# Patient Record
Sex: FEMALE | Race: WHITE | NOT HISPANIC OR LATINO | Employment: UNEMPLOYED | URBAN - METROPOLITAN AREA
[De-identification: names, ages, dates, MRNs, and addresses within clinical notes are randomized per-mention and may not be internally consistent; named-entity substitution may affect disease eponyms.]

---

## 2017-09-05 ENCOUNTER — HOSPITAL ENCOUNTER (OUTPATIENT)
Facility: MEDICAL CENTER | Age: 27
End: 2017-09-05
Admitting: OBSTETRICS & GYNECOLOGY
Payer: MEDICAID

## 2017-10-28 ENCOUNTER — HOSPITAL ENCOUNTER (INPATIENT)
Facility: MEDICAL CENTER | Age: 27
LOS: 2 days | End: 2017-10-30
Admitting: FAMILY MEDICINE
Payer: MEDICAID

## 2017-10-28 LAB
BASOPHILS # BLD AUTO: 0.4 % (ref 0–1.8)
BASOPHILS # BLD: 0.05 K/UL (ref 0–0.12)
CRYSTALS AMN MICRO: NORMAL
EOSINOPHIL # BLD AUTO: 0.07 K/UL (ref 0–0.51)
EOSINOPHIL NFR BLD: 0.5 % (ref 0–6.9)
ERYTHROCYTE [DISTWIDTH] IN BLOOD BY AUTOMATED COUNT: 42.9 FL (ref 35.9–50)
HCT VFR BLD AUTO: 37.3 % (ref 37–47)
HGB BLD-MCNC: 12.9 G/DL (ref 12–16)
HOLDING TUBE BB 8507: NORMAL
IMM GRANULOCYTES # BLD AUTO: 0.08 K/UL (ref 0–0.11)
IMM GRANULOCYTES NFR BLD AUTO: 0.6 % (ref 0–0.9)
LYMPHOCYTES # BLD AUTO: 2.68 K/UL (ref 1–4.8)
LYMPHOCYTES NFR BLD: 19.6 % (ref 22–41)
MCH RBC QN AUTO: 30.9 PG (ref 27–33)
MCHC RBC AUTO-ENTMCNC: 34.6 G/DL (ref 33.6–35)
MCV RBC AUTO: 89.4 FL (ref 81.4–97.8)
MONOCYTES # BLD AUTO: 1.05 K/UL (ref 0–0.85)
MONOCYTES NFR BLD AUTO: 7.7 % (ref 0–13.4)
NEUTROPHILS # BLD AUTO: 9.77 K/UL (ref 2–7.15)
NEUTROPHILS NFR BLD: 71.2 % (ref 44–72)
NRBC # BLD AUTO: 0 K/UL
NRBC BLD AUTO-RTO: 0 /100 WBC
PLATELET # BLD AUTO: 233 K/UL (ref 164–446)
PMV BLD AUTO: 10.8 FL (ref 9–12.9)
RBC # BLD AUTO: 4.17 M/UL (ref 4.2–5.4)
WBC # BLD AUTO: 13.7 K/UL (ref 4.8–10.8)

## 2017-10-28 PROCEDURE — A9270 NON-COVERED ITEM OR SERVICE: HCPCS | Performed by: FAMILY MEDICINE

## 2017-10-28 PROCEDURE — 700105 HCHG RX REV CODE 258: Performed by: FAMILY MEDICINE

## 2017-10-28 PROCEDURE — 89060 EXAM SYNOVIAL FLUID CRYSTALS: CPT

## 2017-10-28 PROCEDURE — 700102 HCHG RX REV CODE 250 W/ 637 OVERRIDE(OP): Performed by: FAMILY MEDICINE

## 2017-10-28 PROCEDURE — 700111 HCHG RX REV CODE 636 W/ 250 OVERRIDE (IP): Performed by: FAMILY MEDICINE

## 2017-10-28 PROCEDURE — 4A1HX4Z MONITORING OF PRODUCTS OF CONCEPTION, CARDIAC ELECTRICAL ACTIVITY, EXTERNAL APPROACH: ICD-10-PCS | Performed by: FAMILY MEDICINE

## 2017-10-28 PROCEDURE — 770002 HCHG ROOM/CARE - OB PRIVATE (112)

## 2017-10-28 PROCEDURE — 85025 COMPLETE CBC W/AUTO DIFF WBC: CPT

## 2017-10-28 PROCEDURE — 700111 HCHG RX REV CODE 636 W/ 250 OVERRIDE (IP)

## 2017-10-28 RX ORDER — ROPIVACAINE HYDROCHLORIDE 2 MG/ML
INJECTION, SOLUTION EPIDURAL; INFILTRATION; PERINEURAL
Status: COMPLETED
Start: 2017-10-28 | End: 2017-10-28

## 2017-10-28 RX ORDER — SODIUM CHLORIDE, SODIUM LACTATE, POTASSIUM CHLORIDE, CALCIUM CHLORIDE 600; 310; 30; 20 MG/100ML; MG/100ML; MG/100ML; MG/100ML
INJECTION, SOLUTION INTRAVENOUS CONTINUOUS
Status: DISPENSED | OUTPATIENT
Start: 2017-10-28 | End: 2017-10-29

## 2017-10-28 RX ORDER — ONDANSETRON 4 MG/1
4 TABLET, ORALLY DISINTEGRATING ORAL EVERY 6 HOURS PRN
Status: DISCONTINUED | OUTPATIENT
Start: 2017-10-28 | End: 2017-10-30 | Stop reason: HOSPADM

## 2017-10-28 RX ORDER — MISOPROSTOL 200 UG/1
800 TABLET ORAL
Status: DISCONTINUED | OUTPATIENT
Start: 2017-10-28 | End: 2017-10-29 | Stop reason: HOSPADM

## 2017-10-28 RX ORDER — CALCIUM CARBONATE 500 MG/1
500 TABLET, CHEWABLE ORAL 3 TIMES DAILY
Status: DISCONTINUED | OUTPATIENT
Start: 2017-10-28 | End: 2017-10-30 | Stop reason: HOSPADM

## 2017-10-28 RX ORDER — CARBOPROST TROMETHAMINE 250 UG/ML
250 INJECTION, SOLUTION INTRAMUSCULAR
Status: DISCONTINUED | OUTPATIENT
Start: 2017-10-28 | End: 2017-10-29 | Stop reason: HOSPADM

## 2017-10-28 RX ORDER — IBUPROFEN 600 MG/1
600 TABLET ORAL EVERY 6 HOURS PRN
Status: DISCONTINUED | OUTPATIENT
Start: 2017-10-28 | End: 2017-10-30 | Stop reason: HOSPADM

## 2017-10-28 RX ORDER — METHYLERGONOVINE MALEATE 0.2 MG/ML
0.2 INJECTION INTRAVENOUS
Status: DISCONTINUED | OUTPATIENT
Start: 2017-10-28 | End: 2017-10-29 | Stop reason: HOSPADM

## 2017-10-28 RX ORDER — ACETAMINOPHEN 325 MG/1
325 TABLET ORAL EVERY 4 HOURS PRN
Status: DISCONTINUED | OUTPATIENT
Start: 2017-10-28 | End: 2017-10-30 | Stop reason: HOSPADM

## 2017-10-28 RX ORDER — ONDANSETRON 2 MG/ML
4 INJECTION INTRAMUSCULAR; INTRAVENOUS EVERY 6 HOURS PRN
Status: DISCONTINUED | OUTPATIENT
Start: 2017-10-28 | End: 2017-10-30 | Stop reason: HOSPADM

## 2017-10-28 RX ORDER — HYDROCODONE BITARTRATE AND ACETAMINOPHEN 5; 325 MG/1; MG/1
1 TABLET ORAL EVERY 4 HOURS PRN
Status: DISCONTINUED | OUTPATIENT
Start: 2017-10-28 | End: 2017-10-30 | Stop reason: HOSPADM

## 2017-10-28 RX ORDER — HYDROCODONE BITARTRATE AND ACETAMINOPHEN 5; 325 MG/1; MG/1
2 TABLET ORAL EVERY 4 HOURS PRN
Status: DISCONTINUED | OUTPATIENT
Start: 2017-10-28 | End: 2017-10-30 | Stop reason: HOSPADM

## 2017-10-28 RX ADMIN — ANTACID TABLETS 500 MG: 500 TABLET, CHEWABLE ORAL at 19:24

## 2017-10-28 RX ADMIN — FENTANYL CITRATE 100 MCG: 50 INJECTION, SOLUTION INTRAMUSCULAR; INTRAVENOUS at 22:31

## 2017-10-28 RX ADMIN — SODIUM CHLORIDE, POTASSIUM CHLORIDE, SODIUM LACTATE AND CALCIUM CHLORIDE: 600; 310; 30; 20 INJECTION, SOLUTION INTRAVENOUS at 19:24

## 2017-10-28 RX ADMIN — ROPIVACAINE HYDROCHLORIDE 100 ML: 2 INJECTION, SOLUTION EPIDURAL; INFILTRATION at 23:33

## 2017-10-28 RX ADMIN — ONDANSETRON HYDROCHLORIDE 4 MG: 2 INJECTION, SOLUTION INTRAMUSCULAR; INTRAVENOUS at 22:26

## 2017-10-28 RX ADMIN — OXYTOCIN 1 MILLI-UNITS/MIN: 10 INJECTION, SOLUTION INTRAMUSCULAR; INTRAVENOUS at 19:23

## 2017-10-28 RX ADMIN — SODIUM CHLORIDE, POTASSIUM CHLORIDE, SODIUM LACTATE AND CALCIUM CHLORIDE: 600; 310; 30; 20 INJECTION, SOLUTION INTRAVENOUS at 22:26

## 2017-10-28 ASSESSMENT — PATIENT HEALTH QUESTIONNAIRE - PHQ9
2. FEELING DOWN, DEPRESSED, IRRITABLE, OR HOPELESS: NOT AT ALL
1. LITTLE INTEREST OR PLEASURE IN DOING THINGS: NOT AT ALL
SUM OF ALL RESPONSES TO PHQ QUESTIONS 1-9: 0
SUM OF ALL RESPONSES TO PHQ9 QUESTIONS 1 AND 2: 0

## 2017-10-28 ASSESSMENT — LIFESTYLE VARIABLES
EVER_SMOKED: YES
DO YOU DRINK ALCOHOL: NO
ALCOHOL_USE: NO

## 2017-10-29 LAB
ERYTHROCYTE [DISTWIDTH] IN BLOOD BY AUTOMATED COUNT: 44.1 FL (ref 35.9–50)
HCT VFR BLD AUTO: 37.3 % (ref 37–47)
HGB BLD-MCNC: 12.3 G/DL (ref 12–16)
MCH RBC QN AUTO: 30.1 PG (ref 27–33)
MCHC RBC AUTO-ENTMCNC: 33 G/DL (ref 33.6–35)
MCV RBC AUTO: 91.4 FL (ref 81.4–97.8)
PLATELET # BLD AUTO: 182 K/UL (ref 164–446)
PMV BLD AUTO: 10.5 FL (ref 9–12.9)
RBC # BLD AUTO: 4.08 M/UL (ref 4.2–5.4)
WBC # BLD AUTO: 20.3 K/UL (ref 4.8–10.8)

## 2017-10-29 PROCEDURE — 303615 HCHG EPIDURAL/SPINAL ANESTHESIA FOR LABOR

## 2017-10-29 PROCEDURE — 770002 HCHG ROOM/CARE - OB PRIVATE (112)

## 2017-10-29 PROCEDURE — 36415 COLL VENOUS BLD VENIPUNCTURE: CPT

## 2017-10-29 PROCEDURE — 700112 HCHG RX REV CODE 229: Performed by: FAMILY MEDICINE

## 2017-10-29 PROCEDURE — 700111 HCHG RX REV CODE 636 W/ 250 OVERRIDE (IP): Performed by: FAMILY MEDICINE

## 2017-10-29 PROCEDURE — 700102 HCHG RX REV CODE 250 W/ 637 OVERRIDE(OP): Performed by: FAMILY MEDICINE

## 2017-10-29 PROCEDURE — 59409 OBSTETRICAL CARE: CPT

## 2017-10-29 PROCEDURE — 85027 COMPLETE CBC AUTOMATED: CPT

## 2017-10-29 PROCEDURE — 700105 HCHG RX REV CODE 258: Performed by: FAMILY MEDICINE

## 2017-10-29 PROCEDURE — 304965 HCHG RECOVERY SERVICES

## 2017-10-29 PROCEDURE — A9270 NON-COVERED ITEM OR SERVICE: HCPCS | Performed by: FAMILY MEDICINE

## 2017-10-29 RX ORDER — SODIUM CHLORIDE, SODIUM LACTATE, POTASSIUM CHLORIDE, CALCIUM CHLORIDE 600; 310; 30; 20 MG/100ML; MG/100ML; MG/100ML; MG/100ML
INJECTION, SOLUTION INTRAVENOUS PRN
Status: DISCONTINUED | OUTPATIENT
Start: 2017-10-29 | End: 2017-10-30 | Stop reason: HOSPADM

## 2017-10-29 RX ORDER — DOCUSATE SODIUM 100 MG/1
100 CAPSULE, LIQUID FILLED ORAL 2 TIMES DAILY PRN
Status: DISCONTINUED | OUTPATIENT
Start: 2017-10-29 | End: 2017-10-30 | Stop reason: HOSPADM

## 2017-10-29 RX ORDER — BISACODYL 10 MG
10 SUPPOSITORY, RECTAL RECTAL PRN
Status: DISCONTINUED | OUTPATIENT
Start: 2017-10-29 | End: 2017-10-30 | Stop reason: HOSPADM

## 2017-10-29 RX ORDER — CARBOPROST TROMETHAMINE 250 UG/ML
250 INJECTION, SOLUTION INTRAMUSCULAR
Status: DISCONTINUED | OUTPATIENT
Start: 2017-10-29 | End: 2017-10-30 | Stop reason: HOSPADM

## 2017-10-29 RX ORDER — METHYLERGONOVINE MALEATE 0.2 MG/ML
0.2 INJECTION INTRAVENOUS
Status: DISCONTINUED | OUTPATIENT
Start: 2017-10-29 | End: 2017-10-30 | Stop reason: HOSPADM

## 2017-10-29 RX ORDER — MISOPROSTOL 200 UG/1
600 TABLET ORAL
Status: DISCONTINUED | OUTPATIENT
Start: 2017-10-29 | End: 2017-10-30 | Stop reason: HOSPADM

## 2017-10-29 RX ORDER — VITAMIN A ACETATE, BETA CAROTENE, ASCORBIC ACID, CHOLECALCIFEROL, .ALPHA.-TOCOPHEROL ACETATE, DL-, THIAMINE MONONITRATE, RIBOFLAVIN, NIACINAMIDE, PYRIDOXINE HYDROCHLORIDE, FOLIC ACID, CYANOCOBALAMIN, CALCIUM CARBONATE, FERROUS FUMARATE, ZINC OXIDE, CUPRIC OXIDE 3080; 12; 120; 400; 1; 1.84; 3; 20; 22; 920; 25; 200; 27; 10; 2 [IU]/1; UG/1; MG/1; [IU]/1; MG/1; MG/1; MG/1; MG/1; MG/1; [IU]/1; MG/1; MG/1; MG/1; MG/1; MG/1
1 TABLET, FILM COATED ORAL EVERY MORNING
Status: DISCONTINUED | OUTPATIENT
Start: 2017-10-29 | End: 2017-10-30 | Stop reason: HOSPADM

## 2017-10-29 RX ADMIN — IBUPROFEN 600 MG: 600 TABLET, FILM COATED ORAL at 03:09

## 2017-10-29 RX ADMIN — HYDROCODONE BITARTRATE AND ACETAMINOPHEN 2 TABLET: 5; 325 TABLET ORAL at 07:43

## 2017-10-29 RX ADMIN — HYDROCODONE BITARTRATE AND ACETAMINOPHEN 2 TABLET: 5; 325 TABLET ORAL at 03:37

## 2017-10-29 RX ADMIN — IBUPROFEN 600 MG: 600 TABLET, FILM COATED ORAL at 09:07

## 2017-10-29 RX ADMIN — OXYTOCIN 125 ML/HR: 10 INJECTION, SOLUTION INTRAMUSCULAR; INTRAVENOUS at 03:29

## 2017-10-29 RX ADMIN — IBUPROFEN 600 MG: 600 TABLET, FILM COATED ORAL at 21:26

## 2017-10-29 RX ADMIN — DOCUSATE SODIUM 100 MG: 100 CAPSULE ORAL at 21:28

## 2017-10-29 RX ADMIN — IBUPROFEN 600 MG: 600 TABLET, FILM COATED ORAL at 15:25

## 2017-10-29 RX ADMIN — OXYTOCIN 2000 ML/HR: 10 INJECTION, SOLUTION INTRAMUSCULAR; INTRAVENOUS at 02:27

## 2017-10-29 RX ADMIN — SODIUM CHLORIDE, POTASSIUM CHLORIDE, SODIUM LACTATE AND CALCIUM CHLORIDE: 600; 310; 30; 20 INJECTION, SOLUTION INTRAVENOUS at 00:17

## 2017-10-29 RX ADMIN — ANTACID TABLETS 500 MG: 500 TABLET, CHEWABLE ORAL at 15:25

## 2017-10-29 RX ADMIN — DOCUSATE SODIUM 100 MG: 100 CAPSULE ORAL at 09:14

## 2017-10-29 RX ADMIN — HYDROCODONE BITARTRATE AND ACETAMINOPHEN 2 TABLET: 5; 325 TABLET ORAL at 12:07

## 2017-10-29 RX ADMIN — HYDROCODONE BITARTRATE AND ACETAMINOPHEN 1 TABLET: 5; 325 TABLET ORAL at 19:21

## 2017-10-29 RX ADMIN — ANTACID TABLETS 500 MG: 500 TABLET, CHEWABLE ORAL at 21:26

## 2017-10-29 RX ADMIN — ANTACID TABLETS 500 MG: 500 TABLET, CHEWABLE ORAL at 07:43

## 2017-10-29 RX ADMIN — Medication 1 TABLET: at 07:43

## 2017-10-29 ASSESSMENT — PAIN SCALES - GENERAL
PAINLEVEL_OUTOF10: 6
PAINLEVEL_OUTOF10: 6
PAINLEVEL_OUTOF10: 5
PAINLEVEL_OUTOF10: 5
PAINLEVEL_OUTOF10: 7
PAINLEVEL_OUTOF10: 3
PAINLEVEL_OUTOF10: 2
PAINLEVEL_OUTOF10: 9
PAINLEVEL_OUTOF10: 3
PAINLEVEL_OUTOF10: 7
PAINLEVEL_OUTOF10: 5
PAINLEVEL_OUTOF10: 4

## 2017-10-29 ASSESSMENT — PATIENT HEALTH QUESTIONNAIRE - PHQ9
SUM OF ALL RESPONSES TO PHQ9 QUESTIONS 1 AND 2: 0
SUM OF ALL RESPONSES TO PHQ QUESTIONS 1-9: 0
1. LITTLE INTEREST OR PLEASURE IN DOING THINGS: NOT AT ALL
SUM OF ALL RESPONSES TO PHQ9 QUESTIONS 1 AND 2: 0
SUM OF ALL RESPONSES TO PHQ QUESTIONS 1-9: 0
2. FEELING DOWN, DEPRESSED, IRRITABLE, OR HOPELESS: NOT AT ALL
2. FEELING DOWN, DEPRESSED, IRRITABLE, OR HOPELESS: NOT AT ALL
1. LITTLE INTEREST OR PLEASURE IN DOING THINGS: NOT AT ALL

## 2017-10-29 ASSESSMENT — LIFESTYLE VARIABLES: DO YOU DRINK ALCOHOL: NO

## 2017-10-29 NOTE — PROGRESS NOTES
"Received bedside report  from \"LAUREN Quezada\"  on patient and assumed care.  Pt denies any questions at this time.  Would like her pain meds around the clock every 4 hours.  "

## 2017-10-29 NOTE — PROGRESS NOTES
185 Report received from LISA Lamar RN and assumed care. Escorted pt to labor room, POC discussed with pt and family, encouraged pt and family to state needs and questions at any time     Dr. Crews at bedside. US performed, vertex position confirmed by provider. Order for pitocin clarified by provider. Pitocin stopped at this time.     Dr. Crews at bedside. SVE by provider /-2. Orders received to restart pitocin     SVE by ANGEL Ta RN 3/100/-1    2322 Dr. Cooley at bedside to place epidural. Pt tolerated well.    2359 SVE by ANGEL Ta RN 4-100/0    0000 Dr. Crews at bedside, update given    0015 Dr. Cooley called, given update on pt's BP. Pt denies feelings of dizziness, light-headedness, or nausea.    0018 Dr. Cooley at bedside, ephedrine administered by provider    0105 SVE by ANGEL Ta RN 5100/0    0159 SVE by ANGEL Ta /0    0209 Dr. Crews at bedside, given update. SVE by provider C/+1    0226  of viable male infant apgars 8/9    0230 delivery of placenta S/I    0410 Pt up to bathroom and voided    0422 Pt transferred to  via wheelchair, infant in arms. Pt and infant in stable condition.    0430 Report given to Damien AGUILAR. Bands verified and cuddles active

## 2017-10-29 NOTE — CONSULTS
Baby placed skin to skin with mother and blankets over both. Discussed watching for hunger cues and helping baby to breast. Discussed normal feeding frequency of newborns. Enc to place baby in bassinette when she feels sleepy and needs to rest. Pt states that baby latched, without any nipple discomfort for her, twice since delivery.

## 2017-10-29 NOTE — PROGRESS NOTES
"[facility] LABOR AND DELIVERY PROGRESS NOTE    PATIENT ID:  NAME:  Lali Funes  MRN:               [id number]  YOB: 1990     27 y.o. female  at 38w0d.    Subjective: No acute events. Mildly uncomfortable but tolerating well.    Objective:    Vitals:    10/28/17 1719 10/28/17 1900   BP: 115/69 116/86   Pulse: (!) 118 (!) 108   Temp: 36.7 °C (98 °F) 36.7 °C (98 °F)   Weight: 87.1 kg (192 lb)    Height: 1.651 m (5' 5\")        Cervix:  2cm/80%/-2  [place]: Uterine Contractions Q2-3 minutes.   FHRM: Baseline 120, mod variability, Accels present, no decels  Pitocin: 1  Pain control: none    Assessment: 27 y.o. female  at 38w0d.    Plan: Intrauterine pregancy at 38w0d weeks in latent labor here for delivery for ROM at 1600 10/28/17. EDC 17.    - IUP at term, EFW - 3500g, Patient is GBS Neg  - Anticipating   - EFM Category 1  - Pitocin augmentation currently, consider IUPC if not improving at next check  "

## 2017-10-29 NOTE — PROGRESS NOTES
Patient presents to unit from L&D in wheelchair with infant in arms.  Infant to bassinet and IV fluids to pump per orders. Pt transfers self without difficulty.  Bedside report received from off going RN. Plan of care discussed, questions answered and understanding verbalized. Oriented to room, call light, care board, emergency light and toy light.  Encouraged to call for assistance or with questions, comments or concerns.

## 2017-10-29 NOTE — PROGRESS NOTES
[facility] LABOR AND DELIVERY PROGRESS NOTE    PATIENT ID:  NAME:  Lali Funes  MRN:               [id number]  YOB: 1990      27 y.o. female  at 38w0d.     Subjective: No acute events. Pain well controlled with epidural.     Objective:    Vitals:    10/29/17 0050 10/29/17 0055 10/29/17 0105 10/29/17 0120   BP: 104/59 (!) 97/55 103/57 (!) 94/52   Pulse: 86 86 85 81   Temp:       Weight:       Height:           Cervix:  5cm/100%/0  [place]: Uterine Contractions Q2-3 minutes.   FHRM: Baseline 125, moderate variability, Accels present, no decels  Pitocin: 1  Pain control: epidural      Plan: Intrauterine pregancy at 38w0d weeks in latent labor here for delivery for ROM at 1600 10/28/17. EDC 17.     - IUP at term, EFW - 3500g, Patient is GBS Neg  - Anticipating   - EFM Category 1  - Pitocin augmentation currently, dilated to 5 since 10pm check at 2cm  - Continue current management, will consider internal monitors as needed.

## 2017-10-29 NOTE — CARE PLAN
Problem: Potential for postpartum infection related to presence of episiotomy/vaginal tear and/or uterine contamination  Goal: Patient will be absent from signs and symptoms of infection  Outcome: PROGRESSING AS EXPECTED  Patient has no signs/symptoms of infection.  Vital signs stable.  Ambulating without difficulty.      Problem: Alteration in comfort related to episiotomy, vaginal repair and/or after birth pains  Goal: Patient verbalizes acceptable pain level  Outcome: PROGRESSING AS EXPECTED  Patient states pain control is adequate.

## 2017-10-29 NOTE — PROCEDURES
[facility] SPONTANEOUS VAGINAL DELIVERY PRODEDURE NOTE    PATIENT ID:  NAME:  Lali Funes  MRN:               [id number]  YOB: 1990    On 10/29/2017, this 27 y.o., now  38w1d , GBS Neg female delivered via  under epidural anesthesia a viable male infant weight pending with APGAR scores of 8 and 9 at one and five minutes. There was a single loose nuchal cord which was reduced and infant was bulb suctioned at delivery. Cord was doubly clamped, cut and infant handed to RN in attendance. An intact placenta was delivered spontaneously with 3 vessel cord. Upon vaginal exam, there was no lacerations. Estimated blood loss was 300cc. Patient will be transferred to postpartum in stable condition and infant to  nursery.    Delivery attended by Dr. Contreras who was present for the entire delivery.

## 2017-10-29 NOTE — CARE PLAN
Problem: Altered physiologic condition related to immediate post-delivery state and potential for bleeding/hemorrhage  Goal: Patient physiologically stable as evidenced by normal lochia, palpable uterine involution and vital signs within normal limits  Outcome: PROGRESSING AS EXPECTED  Assess fundus and lochia every 4 hours x12 followed by every shift and as indicated. Educate on when to contact physician

## 2017-10-29 NOTE — H&P
[facility] LABOR AND DELIVERY HISTORY AND PHYSICAL    PATIENT ID:  NAME:  Lali Funes  MRN:               [id number]  YOB: 1990    CC:  [facility]    HPI:  Lali Funes is a 27 y.o. female  at 38w0d by a 6 week ultrasound c/w LMP. Estimated Date of Delivery: 17.  Patient presents complaining of + uterine contractions, with + loss of fluid.  Fluid was clear.  normal fetal movement.  no vaginal bleeding.  Pregnancy was uncomplicated to date    ROS: Patient denies any fever chills, nausea, vomiting, headache, chest pain, shortness of breath, or dysuria or unusual swelling of hands or feet.     Prenatal Care: Obtained at [facility].  Third trimester BPs were approximately 110/80s.    Prenatal Labs:   HepBsAg: Neg HIV: NR Rubella: Imm   RPR: Neg PAP: NL GBS: Neg   GC/CT: NEG B+ / Ab Neg Quad Screen: WNL   No results for input(s): WBC, RBC, HEMOGLOBIN, HEMATOCRIT, MCV, MCH, RDW, PLATELETCT, MPV, NEUTSPOLYS, LYMPHOCYTES, MONOCYTES, EOSINOPHILS, BASOPHILS, RBCMORPHOLO in the last 72 hours.  No results for input(s): SODIUM, POTASSIUM, CHLORIDE, CO2, GLUCOSE, BUN, CPKTOTAL in the last 72 hours.       IMAGING:  3/21/17 OB ultrasound:   Single live IUP at 6w3d    17 US - Anatomy:  Anatomy scan WNL; Male  Single Live IUP dates consistent    POB Hx:  OB History    Para Term  AB Living   2 1 1   1 1   SAB TAB Ectopic Molar Multiple Live Births   1         1      # Outcome Date GA Lbr Koko/2nd Weight Sex Delivery Anes PTL Lv   2 Term 13 39w2d  2.821 kg (6 lb 3.5 oz) F Vag-Spont EPI  ISHMAEL   1 SAB  5w0d             Birth Comments: Pt. states no D/C needed.          PMH/Problem List:    Past Medical History:   Diagnosis Date   • Nausea/vomiting in pregnancy 10/5/2012     Patient Active Problem List    Diagnosis Date Noted   • Labor and delivery indication for care or intervention 2013   • Fetal cardiac echogenic focus-2; s/p PANN; declined amnio 01/10/2013   • Encounter for  "supervision of other normal pregnancy 10/05/2012       Current Outpatient Medications:  No current facility-administered medications on file prior to encounter.      Current Outpatient Prescriptions on File Prior to Encounter   Medication Sig Dispense Refill   • hydrOXYzine (ATARAX) 25 MG TABS Take 1 Tab by mouth 3 times a day as needed for Itching. 20 Tab 0   • hydrOXYzine (ATARAX) 25 MG TABS Take 1 Tab by mouth 3 times a day as needed for Itching. 15 Tab 0   • metoclopramide (REGLAN) 10 MG TABS Take 1 Tab by mouth 3 times a day before meals. 30 Tab 0   • ondansetron (ZOFRAN ODT) 4 MG TBDP Take 1 Tab by mouth every 6 hours as needed for Nausea/Vomiting. 10 Tab 1       PSH:    No past surgical history on file.    Allergies:   Allergies   Allergen Reactions   • Nkda [No Known Drug Allergy]        SH:  Social History     Social History   • Marital status: Single     Spouse name: N/A   • Number of children: N/A   • Years of education: N/A     Occupational History   • Not on file.     Social History Main Topics   • Smoking status: Current Every Day Smoker     Years: 8.00     Types: Cigarettes     Last attempt to quit: 2012   • Smokeless tobacco: Never Used      Comment: Pt. states smoking 2-3 a day.    • Alcohol use No   • Drug use: No   • Sexual activity: Yes     Partners: Male      Comment: none     Other Topics Concern   • Not on file     Social History Narrative   • No narrative on file         PHYSICAL EXAM:  Vitals:    10/28/17 1719   BP: 115/69   Pulse: (!) 118   Temp: 36.7 °C (98 °F)   Weight: 87.1 kg (192 lb)   Height: 1.651 m (5' 5\")     Temp (24hrs), Av.7 °C (98 °F), Min:36.7 °C (98 °F), Max:36.7 °C (98 °F)    General: No acute distress, resting comfortably in bed.  HEENT: normocephalic, nontraumatic, PERRLA, EOMI  Cardiovascular: Heart RRR with no murmurs, rubs or gallops. Distal Pulses 2+  Respiratory: symmetric chest expansion, lungs CTA bilaterally with no wheezes rales or  rhonci  Abdomen: " gravid, nontender  Musculoskeletal: strength 5/5 in four extremities  Neuro: non focal with no numbness, tingling or changes in sensation    SVE: 2/80%/-2  [place]: Q 2-3 minutes; EFM: 145 with accels to 165  EFW: 3500g    A/P: Intrauterine pregancy at 38w0d weeks in latent labor here for delivery and possible IOL for ROM at 1600 10/28/17. EDC 17   1. IUP at term  2. Patient is GBS Neg  3. Anticipating   4. EFM Category 1  5. Re-evaluate Dilation in several hours and consider augmentation if not progressing appropriately.

## 2017-10-29 NOTE — CARE PLAN
Problem: Alteration in comfort related to episiotomy, vaginal repair and/or after birth pains  Goal: Patient is able to ambulate, care for self and infant  Outcome: PROGRESSING AS EXPECTED  Assess pain every 2-4 hours and patient wants meds offered every 4 hours

## 2017-10-29 NOTE — PROGRESS NOTES
Came in for ob check.EFM applied and POC discussed.she has been leaking fluid wince 1600-Spec exam and julio césar slide sent.1805 Dr walker called inquiring.he is on his way in.SVE 2/80/-2 vertex.fern results positive.

## 2017-10-29 NOTE — CARE PLAN
Problem: Pain  Goal: Alleviation of Pain or a reduction in pain to the patient's comfort goal  Outcome: PROGRESSING AS EXPECTED  Pt states feeling comfortable at this time. Pain management options for labor discussed, pt opting for epidural, will notify RN when starting to feel uncomfortable.    Problem: Risk for Infection, Impaired Wound Healing  Goal: Remain free from signs and symptoms of infection  Outcome: PROGRESSING AS EXPECTED  Pt is afebrile at this time and shows no s/s of infection

## 2017-10-29 NOTE — PROGRESS NOTES
Nursing assessment done.  Plan of care discussed and pt progressing according to caremap.  FOB at bedside.   Reviewed infant security measures with pt per hospital protocol.  Advised of emergency cords in pt's room and skylight.  Pt wearing supportive bra and encouraged to continue.    Pt ambulating well.  See MAR for pain med administration. Pt encouraged to call for any needs.

## 2017-10-30 VITALS
HEIGHT: 65 IN | OXYGEN SATURATION: 96 % | SYSTOLIC BLOOD PRESSURE: 108 MMHG | DIASTOLIC BLOOD PRESSURE: 70 MMHG | HEART RATE: 56 BPM | BODY MASS INDEX: 31.99 KG/M2 | RESPIRATION RATE: 16 BRPM | TEMPERATURE: 97.5 F | WEIGHT: 192 LBS

## 2017-10-30 PROCEDURE — A9270 NON-COVERED ITEM OR SERVICE: HCPCS | Performed by: FAMILY MEDICINE

## 2017-10-30 PROCEDURE — 700102 HCHG RX REV CODE 250 W/ 637 OVERRIDE(OP): Performed by: FAMILY MEDICINE

## 2017-10-30 PROCEDURE — 700112 HCHG RX REV CODE 229: Performed by: FAMILY MEDICINE

## 2017-10-30 RX ORDER — BISACODYL 10 MG
10 SUPPOSITORY, RECTAL RECTAL PRN
Qty: 30 SUPPOSITORY | Refills: 1 | Status: SHIPPED | OUTPATIENT
Start: 2017-10-30 | End: 2019-05-16

## 2017-10-30 RX ORDER — IBUPROFEN 600 MG/1
600 TABLET ORAL EVERY 6 HOURS PRN
Qty: 30 TAB | Refills: 1 | Status: SHIPPED | OUTPATIENT
Start: 2017-10-30 | End: 2018-10-16 | Stop reason: SINTOL

## 2017-10-30 RX ADMIN — HYDROCODONE BITARTRATE AND ACETAMINOPHEN 1 TABLET: 5; 325 TABLET ORAL at 08:29

## 2017-10-30 RX ADMIN — HYDROCODONE BITARTRATE AND ACETAMINOPHEN 1 TABLET: 5; 325 TABLET ORAL at 03:29

## 2017-10-30 RX ADMIN — IBUPROFEN 600 MG: 600 TABLET, FILM COATED ORAL at 12:27

## 2017-10-30 RX ADMIN — Medication 1 TABLET: at 08:29

## 2017-10-30 RX ADMIN — ANTACID TABLETS 500 MG: 500 TABLET, CHEWABLE ORAL at 08:29

## 2017-10-30 RX ADMIN — IBUPROFEN 600 MG: 600 TABLET, FILM COATED ORAL at 03:29

## 2017-10-30 RX ADMIN — DOCUSATE SODIUM 100 MG: 100 CAPSULE ORAL at 08:29

## 2017-10-30 ASSESSMENT — PAIN SCALES - GENERAL
PAINLEVEL_OUTOF10: 6
PAINLEVEL_OUTOF10: 0
PAINLEVEL_OUTOF10: 5
PAINLEVEL_OUTOF10: 0
PAINLEVEL_OUTOF10: 0
PAINLEVEL_OUTOF10: 6

## 2017-10-30 NOTE — CARE PLAN
Problem: Altered physiologic condition related to immediate post-delivery state and potential for bleeding/hemorrhage  Goal: Patient physiologically stable as evidenced by normal lochia, palpable uterine involution and vital signs within normal limits  Outcome: PROGRESSING AS EXPECTED  Fundus firm,lochia light.    Problem: Potential for postpartum infection related to presence of episiotomy/vaginal tear and/or uterine contamination  Goal: Patient will be absent from signs and symptoms of infection  Outcome: PROGRESSING AS EXPECTED  Afebrile,no s/s of infection noted.    Problem: Alteration in comfort related to episiotomy, vaginal repair and/or after birth pains  Goal: Patient verbalizes acceptable pain level  Outcome: PROGRESSING AS EXPECTED  Medicated with norco 1 tab.amd motrin 600 mg.po for uterine cramps with good relief as verbalized by patient.States that she will call if she wants pain medication.Will medicate as needed.

## 2017-10-30 NOTE — PROGRESS NOTES
Report received from Carmela at the change of shift. Assessment done. Medicated for pain. Discussed plan of care. Encouraged  to call if with needs.

## 2017-10-30 NOTE — PROGRESS NOTES
Discussed normal course of breastfeeding and what to expect at 12-48 hours. Encouraged frequent skin to skin, and to continue offering breast every 2-3 hours. Breastfeeding essentials pamphlet given, and reviewed.      KAMLA states she has concerns regarding latching. Infant is NBN getting circumcision.       Discussed discharge feeding plan-   1- To breastfeed first.  2- if latch or breastfeeding is suboptimal, can supplement according to guidelines. (Volumes reviewed per infant birthweight)  3. Use breastpump to protect supply.      KAMLA has medicaid, states she will see about getting WIC.  If is able to get WIC, then can get outpatient lactation support at WIC office, or from [facility], lactation counselors.     KAMLA has no other questions or concerns regarding breastfeeding. Encouraged to call for assistance if needed with latch.

## 2017-10-30 NOTE — DISCHARGE INSTRUCTIONS
POSTPARTUM DISCHARGE INSTRUCTIONS FOR MOM    YOB: 1990   Age: 27 y.o.               Admit Date: 10/28/2017     Discharge Date: 10/30/2017  Attending Doctor:  Rebecca Hui                  Allergies:  Nkda [no known drug allergy]    Discharged to home by car. Discharged via wheelchair, hospital escort: Yes.  Special equipment needed: Not Applicable  Belongings with: Personal  Be sure to schedule a follow-up appointment with your primary care doctor or any specialists as instructed.     Discharge Plan:   Diet Plan: Discussed  Activity Level: Discussed  Smoking Cessation Offered: Patient Counseled  Confirmed Follow up Appointment: Patient to Call and Schedule Appointment  Confirmed Symptoms Management: Discussed  Medication Reconciliation Updated: Yes  Influenza Vaccine Indication: Not indicated: Previously immunized this influenza season and > 8 years of age    REASONS TO CALL YOUR OBSTETRICIAN:  1.   Persistent fever or shaking chills (Temperature higher than 100.4)  2.   Heavy bleeding (soaking more than 1 pad per hour); Passing clots  3.   Foul odor from vagina  4.   Mastitis (Breast infection; breast pain, chills, fever, redness)  5.   Urinary pain, burning or frequency  6.   Episiotomy infection  7.   Severe depression longer than 24 hours    HAND WASHING  · Prior to handling the baby.  · Before breastfeeding or bottle feeding baby.  · After using the bathroom or changing the baby's diaper.    VAGINAL CARE  · Nothing inside vagina for 6 weeks: no sexual intercourse, tampons or douching.  · Bleeding may continue for 2-4 weeks.  Amount may vary.    · Call your physician for heavy bleeding which means soaking more than 1 pad per hour    BIRTH CONTROL  · It is possible to become pregnant at any time after delivery and while breastfeeding.  · Plan to discuss a method of birth control with your physician at your follow up visit. visit.    DIET AND ELIMINATION  · Eating more fiber (bran cereal, fruits, and  "vegetables) and drinking plenty of fluids will help to avoid constipation.  · Urinary frequency after childbirth is normal.    POSTPARTUM BLUES  During the first few days after birth, you may experience a sense of the \"blues\" which may include impatience, irritability or even crying.  These feeling come and go quickly.  However, as many as 1 in 10 women experience emotional symptoms known as postpartum depression.    Postpartum depression:  May start as early as the second or third day after delivery or take several weeks or months to develop.  Symptoms of \"blues\" are present, but are more intense:  Crying spells; loss of appetite; feelings of hopelessness or loss of control; fear of touching the baby; over concern or no concern at all about the baby; little or no concern about your own appearance/caring for yourself; and/or inability to sleep or excessive sleeping.  Contact your physician if you are experiencing any of these symptoms.    Crisis Hotline:  · [place] Crisis Hotline:  9-598-UOZHCTY  Or 1-219.301.8147  · [place] Crisis Hotline:  1-731.435.3971  Or 809-609-0778    PREVENTING SHAKEN BABY:  If you are angry or stressed, PUT THE BABY IN THE CRIB, step away, take some deep breaths, and wait until you are calm to care for the baby.  DO NOT SHAKE THE BABY.  You are not alone, call a supporter for help.    · Crisis Call Center 24/7 crisis line 298-945-5461 or 1-535.191.4670  · You can also text them, text \"ANSWER\" to 829885    QUIT SMOKING/TOBACCO USE:  I understand the use of any tobacco products increases my chance of suffering from future heart disease and could cause other illnesses which may shorten my life. Quitting the use of tobacco products is the single most important thing I can do to improve my health. For further information on smoking / tobacco cessation call a Toll Free Quit Line at 1-438.566.1701 (*[employer] [place]) or 1-262.249.3195 (American Lung Association) or you can access the web " based program at www.lungusa.org.    · [place] Tobacco Users Help Line:  (633) 838-9758       Toll Free: 1-777.346.6214  · Quit Tobacco Program [facility] (163)726-4931    DEPRESSION / SUICIDE RISK:  As you are discharged from this [facility], it is important to learn how to keep safe from harming yourself.    Recognize the warning signs:  · Abrupt changes in personality, positive or negative- including increase in energy   · Giving away possessions  · Change in eating patterns- significant weight changes-  positive or negative  · Change in sleeping patterns- unable to sleep or sleeping all the time   · Unwillingness or inability to communicate  · Depression  · Unusual sadness, discouragement and loneliness  · Talk of wanting to die  · Neglect of personal appearance   · Rebelliousness- reckless behavior  · Withdrawal from people/activities they love  · Confusion- inability to concentrate     If you or a loved one observes any of these behaviors or has concerns about self-harm, here's what you can do:  · Talk about it- your feelings and reasons for harming yourself  · Remove any means that you might use to hurt yourself (examples: pills, rope, extension cords, firearm)  · Get professional help from the community (Mental Health, Substance Abuse, psychological counseling)  · Do not be alone:Call your Safe Contact- someone whom you trust who will be there for you.  · Call your local CRISIS HOTLINE 393-6553 or 183-715-3839  · Call your local Children's Mobile Crisis Response Team [place] (744) 969-4910 or www.Tr[employer]a  · Call the toll free National Suicide Prevention Hotlines   · National Suicide Prevention Lifeline 980-047-RCEN (0738)  · National Hope Line Network 800-SUICIDE (192-0808)    DISCHARGE SURVEY:  Thank you for choosing [facility].  We hope we provided you with very good care.  You may be receiving a survey in the mail.  Please fill it out.  Your opinion is  valuable to us.    ADDITIONAL EDUCATIONAL MATERIALS GIVEN TO PATIENT:        My signature on this form indicates that:  1.  I have reviewed and understand the above information  2.  My questions regarding this information have been answered to my satisfaction.  3.  I have formulated a plan with my discharge nurse to obtain my prescribed medication for home.

## 2017-10-30 NOTE — DISCHARGE SUMMARY
[facility]  NORMAL SPONTANEOUS VAGINAL DISCHARGE SUMMARY    PATIENT ID:  NAME:  Lali Funes  MRN:               [id number]  YOB: 1990    DATE OF ADMISSION: 10/28/2017    DATE OF DISCHARGE: 10/30/2017     ADMITTING DIAGNOSIS:  1. Intrauterine pregnancy at 38w0d.    DISCHARGE DIAGNOSIS:  1. s/p  at 38w1d        HOSPITAL COURSE: This is a 27 y.o. year old female admitted at 38w0d who presented with + contractions, + LOF, no vaginal bleeding, normal FM and in latent labor with SROM at 1600 on 10/28/17. Pt was 2 cm dilated, 80% effaced and at  -2 station on sterile vaginal exam. Pregnancy was uncomplicated. The patient had a good labor pattern after admission and proceeded to deliver a viable male infant weighing  3120g. Infants Apgars scores were 8 and 9 at one and five minutes. The patients postpartum course was uncomplicated and she was discharged home in stable condition on postpartum day #1.    PROCEDURES PERFORMED: Normal spontaneous vaginal delivery over intact perineum.    COMPLICATIONS: None    DIET: Regular    ACTIVITY: Nothing in the vagina (ie Tampons, douching, intercourse,...) for until follow-up in the office in five weeks.  No soaking in bathtubs or hot tubs until follow-up appointment in 5 weeks. Continue to take your prenatal vitamins while breastfeeding. Return to ER or see your primary care physician if your symptoms worsen or for any new problems, questions or concerns.      MEDICATIONS:  Current Outpatient Prescriptions   Medication Sig Dispense Refill   • ibuprofen (MOTRIN) 600 MG Tab Take 1 Tab by mouth every 6 hours as needed for Mild Pain or Moderate Pain (For cramping after delivery; do not give if patient is receiving ketorolac (Toradol)). 30 Tab 1   • bisacodyl (DULCOLAX) 10 MG Suppos Insert 1 Suppository in rectum as needed (Constipation, if docusate sodium ineffective or not ordered, but not if 4th degree laceration). 30 Suppository 1       FOLLOWUP:  1) Dr. Crews at [facility]  in 5-6 weeks  2) Return to the hospital if copious vaginal bleeding or foul smelling discharge is noted

## 2018-05-29 ENCOUNTER — EH NON-PROVIDER (OUTPATIENT)
Dept: OCCUPATIONAL MEDICINE | Facility: CLINIC | Age: 28
End: 2018-05-29

## 2018-05-29 ENCOUNTER — HOSPITAL ENCOUNTER (OUTPATIENT)
Facility: MEDICAL CENTER | Age: 28
End: 2018-05-29
Attending: PREVENTIVE MEDICINE
Payer: COMMERCIAL

## 2018-05-29 ENCOUNTER — EMPLOYEE HEALTH (OUTPATIENT)
Dept: OCCUPATIONAL MEDICINE | Facility: CLINIC | Age: 28
End: 2018-05-29

## 2018-05-29 VITALS
SYSTOLIC BLOOD PRESSURE: 102 MMHG | HEART RATE: 78 BPM | TEMPERATURE: 97 F | DIASTOLIC BLOOD PRESSURE: 62 MMHG | HEIGHT: 65 IN | OXYGEN SATURATION: 95 % | RESPIRATION RATE: 16 BRPM

## 2018-05-29 DIAGNOSIS — Z02.89 VISIT FOR OCCUPATIONAL HEALTH EXAMINATION: ICD-10-CM

## 2018-05-29 DIAGNOSIS — Z02.1 PRE-EMPLOYMENT HEALTH SCREENING EXAMINATION: ICD-10-CM

## 2018-05-29 DIAGNOSIS — Z02.1 PRE-EMPLOYMENT DRUG SCREENING: ICD-10-CM

## 2018-05-29 LAB
AMP AMPHETAMINE: NORMAL
BAR BARBITURATES: NORMAL
BZO BENZODIAZEPINES: NORMAL
COC COCAINE: NORMAL
INT CON NEG: NORMAL
INT CON POS: NORMAL
MDMA ECSTASY: NORMAL
MET METHAMPHETAMINES: NORMAL
MTD METHADONE: NORMAL
OPI OPIATES: NORMAL
OXY OXYCODONE: NORMAL
PCP PHENCYCLIDINE: NORMAL
POC URINE DRUG SCREEN OCDRS: NEGATIVE
THC: NORMAL

## 2018-05-29 PROCEDURE — 90632 HEPA VACCINE ADULT IM: CPT | Performed by: PREVENTIVE MEDICINE

## 2018-05-29 PROCEDURE — 86480 TB TEST CELL IMMUN MEASURE: CPT | Performed by: PREVENTIVE MEDICINE

## 2018-05-29 PROCEDURE — 86787 VARICELLA-ZOSTER ANTIBODY: CPT | Performed by: PREVENTIVE MEDICINE

## 2018-05-29 PROCEDURE — 8915 PR COMPREHENSIVE PHYSICAL: Performed by: PREVENTIVE MEDICINE

## 2018-05-29 PROCEDURE — 80305 DRUG TEST PRSMV DIR OPT OBS: CPT | Performed by: PREVENTIVE MEDICINE

## 2018-05-29 ASSESSMENT — VISUAL ACUITY
OD_CC: 20/15
OS_CC: 20/15

## 2018-05-30 LAB
M TB TUBERC IFN-G BLD QL: NEGATIVE
M TB TUBERC IFN-G/MITOGEN IGNF BLD: -0
M TB TUBERC IGNF/MITOGEN IGNF CONTROL: 50.7 [IU]/ML
MITOGEN IGNF BCKGRD COR BLD-ACNC: 0.02 [IU]/ML
VZV IGG SER IA-ACNC: 2.22

## 2018-06-04 ENCOUNTER — DOCUMENTATION (OUTPATIENT)
Dept: OCCUPATIONAL MEDICINE | Facility: CLINIC | Age: 28
End: 2018-06-04

## 2018-09-27 ENCOUNTER — IMMUNIZATION (OUTPATIENT)
Dept: OCCUPATIONAL MEDICINE | Facility: CLINIC | Age: 28
End: 2018-09-27

## 2018-09-27 DIAGNOSIS — Z23 NEED FOR VACCINATION: ICD-10-CM

## 2018-09-27 PROCEDURE — 90686 IIV4 VACC NO PRSV 0.5 ML IM: CPT | Performed by: PREVENTIVE MEDICINE

## 2018-10-02 ENCOUNTER — HOSPITAL ENCOUNTER (OUTPATIENT)
Dept: RADIOLOGY | Facility: MEDICAL CENTER | Age: 28
End: 2018-10-02
Attending: STUDENT IN AN ORGANIZED HEALTH CARE EDUCATION/TRAINING PROGRAM
Payer: COMMERCIAL

## 2018-10-02 DIAGNOSIS — Z33.1 PREGNANT STATE, INCIDENTAL: ICD-10-CM

## 2018-10-02 PROCEDURE — 76801 OB US < 14 WKS SINGLE FETUS: CPT

## 2018-10-16 ENCOUNTER — OFFICE VISIT (OUTPATIENT)
Dept: MEDICAL GROUP | Facility: PHYSICIAN GROUP | Age: 28
End: 2018-10-16
Payer: COMMERCIAL

## 2018-10-16 ENCOUNTER — HOSPITAL ENCOUNTER (OUTPATIENT)
Dept: LAB | Facility: MEDICAL CENTER | Age: 28
End: 2018-10-16
Attending: FAMILY MEDICINE
Payer: COMMERCIAL

## 2018-10-16 VITALS
WEIGHT: 163 LBS | DIASTOLIC BLOOD PRESSURE: 68 MMHG | HEART RATE: 87 BPM | BODY MASS INDEX: 27.16 KG/M2 | TEMPERATURE: 97.7 F | HEIGHT: 65 IN | OXYGEN SATURATION: 96 % | SYSTOLIC BLOOD PRESSURE: 120 MMHG

## 2018-10-16 DIAGNOSIS — N91.2 AMENORRHEA: ICD-10-CM

## 2018-10-16 DIAGNOSIS — Z3A.11 11 WEEKS GESTATION OF PREGNANCY: ICD-10-CM

## 2018-10-16 LAB
ABO GROUP BLD: NORMAL
APPEARANCE UR: CLEAR
BASOPHILS # BLD AUTO: 0.3 % (ref 0–1.8)
BASOPHILS # BLD: 0.03 K/UL (ref 0–0.12)
BILIRUB UR QL STRIP.AUTO: ABNORMAL
BLD GP AB SCN SERPL QL: NORMAL
COLOR UR: ABNORMAL
EOSINOPHIL # BLD AUTO: 0.06 K/UL (ref 0–0.51)
EOSINOPHIL NFR BLD: 0.7 % (ref 0–6.9)
ERYTHROCYTE [DISTWIDTH] IN BLOOD BY AUTOMATED COUNT: 42.3 FL (ref 35.9–50)
GLUCOSE UR STRIP.AUTO-MCNC: NEGATIVE MG/DL
HBV SURFACE AG SER QL: NEGATIVE
HCT VFR BLD AUTO: 44.3 % (ref 37–47)
HGB BLD-MCNC: 15.4 G/DL (ref 12–16)
HIV 1+2 AB+HIV1 P24 AG SERPL QL IA: NON REACTIVE
IMM GRANULOCYTES # BLD AUTO: 0.02 K/UL (ref 0–0.11)
IMM GRANULOCYTES NFR BLD AUTO: 0.2 % (ref 0–0.9)
KETONES UR STRIP.AUTO-MCNC: ABNORMAL MG/DL
LEUKOCYTE ESTERASE UR QL STRIP.AUTO: NEGATIVE
LYMPHOCYTES # BLD AUTO: 2.21 K/UL (ref 1–4.8)
LYMPHOCYTES NFR BLD: 24.3 % (ref 22–41)
MCH RBC QN AUTO: 30.6 PG (ref 27–33)
MCHC RBC AUTO-ENTMCNC: 34.8 G/DL (ref 33.6–35)
MCV RBC AUTO: 88.1 FL (ref 81.4–97.8)
MICRO URNS: ABNORMAL
MONOCYTES # BLD AUTO: 0.57 K/UL (ref 0–0.85)
MONOCYTES NFR BLD AUTO: 6.3 % (ref 0–13.4)
NEUTROPHILS # BLD AUTO: 6.19 K/UL (ref 2–7.15)
NEUTROPHILS NFR BLD: 68.2 % (ref 44–72)
NITRITE UR QL STRIP.AUTO: NEGATIVE
NRBC # BLD AUTO: 0 K/UL
NRBC BLD-RTO: 0 /100 WBC
PH UR STRIP.AUTO: 5.5 [PH]
PLATELET # BLD AUTO: 243 K/UL (ref 164–446)
PMV BLD AUTO: 11.1 FL (ref 9–12.9)
PROT UR QL STRIP: NEGATIVE MG/DL
RBC # BLD AUTO: 5.03 M/UL (ref 4.2–5.4)
RBC UR QL AUTO: NEGATIVE
RH BLD: NORMAL
RUBV AB SER QL: >500 IU/ML
SP GR UR STRIP.AUTO: 1.04
TREPONEMA PALLIDUM IGG+IGM AB [PRESENCE] IN SERUM OR PLASMA BY IMMUNOASSAY: NON REACTIVE
UROBILINOGEN UR STRIP.AUTO-MCNC: 0.2 MG/DL
WBC # BLD AUTO: 9.1 K/UL (ref 4.8–10.8)

## 2018-10-16 PROCEDURE — 86901 BLOOD TYPING SEROLOGIC RH(D): CPT

## 2018-10-16 PROCEDURE — 86850 RBC ANTIBODY SCREEN: CPT

## 2018-10-16 PROCEDURE — 81003 URINALYSIS AUTO W/O SCOPE: CPT

## 2018-10-16 PROCEDURE — 87340 HEPATITIS B SURFACE AG IA: CPT

## 2018-10-16 PROCEDURE — 87389 HIV-1 AG W/HIV-1&-2 AB AG IA: CPT

## 2018-10-16 PROCEDURE — 86780 TREPONEMA PALLIDUM: CPT

## 2018-10-16 PROCEDURE — 86762 RUBELLA ANTIBODY: CPT

## 2018-10-16 PROCEDURE — 85025 COMPLETE CBC W/AUTO DIFF WBC: CPT

## 2018-10-16 PROCEDURE — 36415 COLL VENOUS BLD VENIPUNCTURE: CPT

## 2018-10-16 PROCEDURE — 99213 OFFICE O/P EST LOW 20 MIN: CPT | Performed by: FAMILY MEDICINE

## 2018-10-16 PROCEDURE — 86900 BLOOD TYPING SEROLOGIC ABO: CPT

## 2018-10-16 RX ORDER — ACETAMINOPHEN 500 MG
500-1000 TABLET ORAL EVERY 6 HOURS PRN
COMMUNITY
End: 2019-05-16

## 2018-10-16 ASSESSMENT — PATIENT HEALTH QUESTIONNAIRE - PHQ9: CLINICAL INTERPRETATION OF PHQ2 SCORE: 0

## 2018-10-16 NOTE — ASSESSMENT & PLAN NOTE
Is a 28-year-old  female presents at 10weeks 2days gestation by 9-week ultrasound performed on 10/12/18 that is consistent with her LMP.  Here to establish primary care provider.  Patient has not yet had her labs done for this pregnancy but has had a dating ultrasound.  Did have some vaginal bleeding and presented to [facility] for threatened , bleeding has since resolved and she has no cramping, further bleeding, or concerns.

## 2018-10-16 NOTE — PROGRESS NOTES
CC:  Diagnoses of Amenorrhea and 11 weeks gestation of pregnancy were pertinent to this visit.    HISTORY OF THE PRESENT ILLNESS: Patient is a 28 y.o. female. This pleasant patient is here today to establish PCP.    Health Maintenance: Completed      11 weeks gestation of pregnancy  Is a 28-year-old  female presents at 10weeks 2days gestation by 9-week ultrasound performed on 10/12/18 that is consistent with her LMP.  Here to establish primary care provider.  Patient has not yet had her labs done for this pregnancy but has had a dating ultrasound.  Did have some vaginal bleeding and presented to [facility] for threatened , bleeding has since resolved and she has no cramping, further bleeding, or concerns.    Allergies: Nkda [no known drug allergy]    Current Outpatient Prescriptions Ordered in [facility]   Medication Sig Dispense Refill   • acetaminophen (TYLENOL) 500 MG Tab Take 500-1,000 mg by mouth every 6 hours as needed.     • Prenatal MV-Min-Fe Fum-FA-DHA (PRENATAL 1 PO) Take  by mouth.     • bisacodyl (DULCOLAX) 10 MG Suppos Insert 1 Suppository in rectum as needed (Constipation, if docusate sodium ineffective or not ordered, but not if 4th degree laceration). 30 Suppository 1   • ondansetron (ZOFRAN ODT) 4 MG TBDP Take 1 Tab by mouth every 6 hours as needed for Nausea/Vomiting. 10 Tab 1     No current Epic-ordered facility-administered medications on file.        Past Medical History:   Diagnosis Date   • Nausea/vomiting in pregnancy 10/5/2012       No past surgical history on file.    Social History   Substance Use Topics   • Smoking status: Current Every Day Smoker     Years: 8.00     Types: Cigarettes     Last attempt to quit: 2012   • Smokeless tobacco: Never Used      Comment: Pt. states smoking 2-3 a day.    • Alcohol use No       Social History     Social History Narrative   • No narrative on file       Family History   Problem Relation Age of Onset   • Cancer Mother 45        breast  "cancer, lymph node,   • Heart Attack Father    • Cancer Maternal Grandmother         breast cancer       ROS:   Denies chest pain and shortness of breath        - NOTE: All other systems reviewed and are negative, except as in HPI.        Exam: Blood pressure 120/68, pulse 87, temperature 36.5 °C (97.7 °F), temperature source Temporal, height 1.651 m (5' 5\"), weight 73.9 kg (163 lb), last menstrual period 2018, SpO2 96 %, not currently breastfeeding. Body mass index is 27.12 kg/m².    GENERAL: No acute distress  HENT: Atraumatic, normocephalic, external auditory canals clear bilaterally, TMs translucent and pearly gray, nares clear without discharge, posterior pharynx clear without erythema or exudates.  EYES: Extraocular movements intact, pupils equal and reactive to light.  NECK: Supple, FROM  CHEST: No deformities, Equal chest expansion, no murmurs, gallops, or rubs.  RESP: Unlabored, no stridor or audible wheeze.  No wheezes, crackles, nor rhonchi  ABD: Soft, Nontender, Non-Distended.  Normal bowel sounds.  No hepatosplenomegaly  Extremities: No Clubbing, Cyanosis, or Edema.  Skin: Warm/dry, without rases  Neuro: A/O x 4, CN 2-12 Grossly intact, Motor/sensory grosly intact  Psych: Normal behavior, normal affect    Lab review:  orders written for new lab studies as appropriate; see orders    Medical Records Reviewed:  Obtaining medical records from [facility].    Imaging Review:  Ultrasound of current pregnancy reviewed from 10/12/2018    Assessment/Plan:  28-year-old  at 10 weeks 2 days gestation by 9-week ultrasound performed on 10/12/2018.  Single live IUP.  1. Amenorrhea  Secondary to pregnancy.  Labs as below.  Referral to OB/GYN.  - PRENATAL PANEL 3; Future  - US-OB 2ND 3RD TRI COMPLETE; Future  - REFERRAL TO OB/GYN    2. 11 weeks gestation of pregnancy  Please see above.  - REFERRAL TO OB/GYN      Please note that this dictation was created using voice recognition software. I have made " every reasonable attempt to correct obvious errors, but I expect that there are errors of grammar and possibly content that I did not discover before finalizing the note.

## 2018-11-29 ENCOUNTER — HOSPITAL ENCOUNTER (OUTPATIENT)
Dept: LAB | Facility: MEDICAL CENTER | Age: 28
End: 2018-11-29
Attending: STUDENT IN AN ORGANIZED HEALTH CARE EDUCATION/TRAINING PROGRAM
Payer: COMMERCIAL

## 2018-11-29 PROCEDURE — 81511 FTL CGEN ABNOR FOUR ANAL: CPT

## 2018-11-29 PROCEDURE — 36415 COLL VENOUS BLD VENIPUNCTURE: CPT

## 2018-12-02 LAB
# FETUSES US: NORMAL
AFP MOM SERPL: 0.66
AFP SERPL-MCNC: 26 NG/ML
AGE - REPORTED: 29.6 YR
CURRENT SMOKER: NO
FAMILY MEMBER DISEASES HX: NO
GA METHOD: NORMAL
GA: NORMAL WK
HCG MOM SERPL: 0.16
HCG SERPL-ACNC: 3508 IU/L
HX OF HEREDITARY DISORDERS: NO
IDDM PATIENT QL: NO
INHIBIN A MOM SERPL: 1.05
INHIBIN A SERPL-MCNC: 159 PG/ML
INTEGRATED SCN PATIENT-IMP: NORMAL
PATHOLOGY STUDY: NORMAL
SPECIMEN DRAWN SERPL: NORMAL
U ESTRIOL MOM SERPL: 1.29
U ESTRIOL SERPL-MCNC: 1.78 NG/ML

## 2018-12-18 ENCOUNTER — HOSPITAL ENCOUNTER (OUTPATIENT)
Dept: RADIOLOGY | Facility: MEDICAL CENTER | Age: 28
End: 2018-12-18
Attending: FAMILY MEDICINE
Payer: COMMERCIAL

## 2018-12-18 DIAGNOSIS — N91.2 AMENORRHEA: ICD-10-CM

## 2018-12-18 PROCEDURE — 76805 OB US >/= 14 WKS SNGL FETUS: CPT

## 2019-02-12 ENCOUNTER — HOSPITAL ENCOUNTER (OUTPATIENT)
Facility: MEDICAL CENTER | Age: 29
End: 2019-02-12
Payer: COMMERCIAL

## 2019-02-12 VITALS
BODY MASS INDEX: 30.82 KG/M2 | DIASTOLIC BLOOD PRESSURE: 68 MMHG | SYSTOLIC BLOOD PRESSURE: 109 MMHG | RESPIRATION RATE: 17 BRPM | TEMPERATURE: 97.8 F | WEIGHT: 185 LBS | HEIGHT: 65 IN | HEART RATE: 94 BPM

## 2019-02-12 LAB
ALBUMIN SERPL BCP-MCNC: 3.4 G/DL (ref 3.2–4.9)
ALBUMIN/GLOB SERPL: 1.3 G/DL
ALP SERPL-CCNC: 88 U/L (ref 30–99)
ALT SERPL-CCNC: 16 U/L (ref 2–50)
ANION GAP SERPL CALC-SCNC: 10 MMOL/L (ref 0–11.9)
APPEARANCE UR: ABNORMAL
AST SERPL-CCNC: 18 U/L (ref 12–45)
BASOPHILS # BLD AUTO: 0.2 % (ref 0–1.8)
BASOPHILS # BLD: 0.03 K/UL (ref 0–0.12)
BILIRUB SERPL-MCNC: 0.6 MG/DL (ref 0.1–1.5)
BUN SERPL-MCNC: 12 MG/DL (ref 8–22)
CALCIUM SERPL-MCNC: 8.5 MG/DL (ref 8.5–10.5)
CHLORIDE SERPL-SCNC: 106 MMOL/L (ref 96–112)
CO2 SERPL-SCNC: 18 MMOL/L (ref 20–33)
COLOR UR AUTO: ABNORMAL
CREAT SERPL-MCNC: 0.62 MG/DL (ref 0.5–1.4)
EOSINOPHIL # BLD AUTO: 0.01 K/UL (ref 0–0.51)
EOSINOPHIL NFR BLD: 0.1 % (ref 0–6.9)
ERYTHROCYTE [DISTWIDTH] IN BLOOD BY AUTOMATED COUNT: 48.2 FL (ref 35.9–50)
FLUAV RNA SPEC QL NAA+PROBE: NEGATIVE
FLUBV RNA SPEC QL NAA+PROBE: NEGATIVE
GLOBULIN SER CALC-MCNC: 2.7 G/DL (ref 1.9–3.5)
GLUCOSE SERPL-MCNC: 210 MG/DL (ref 65–99)
GLUCOSE UR QL STRIP.AUTO: NEGATIVE MG/DL
HCT VFR BLD AUTO: 39.7 % (ref 37–47)
HGB BLD-MCNC: 13 G/DL (ref 12–16)
IMM GRANULOCYTES # BLD AUTO: 0.07 K/UL (ref 0–0.11)
IMM GRANULOCYTES NFR BLD AUTO: 0.5 % (ref 0–0.9)
KETONES UR QL STRIP.AUTO: 15 MG/DL
LEUKOCYTE ESTERASE UR QL STRIP.AUTO: NEGATIVE
LYMPHOCYTES # BLD AUTO: 0.68 K/UL (ref 1–4.8)
LYMPHOCYTES NFR BLD: 4.9 % (ref 22–41)
MCH RBC QN AUTO: 31.6 PG (ref 27–33)
MCHC RBC AUTO-ENTMCNC: 32.7 G/DL (ref 33.6–35)
MCV RBC AUTO: 96.4 FL (ref 81.4–97.8)
MONOCYTES # BLD AUTO: 0.28 K/UL (ref 0–0.85)
MONOCYTES NFR BLD AUTO: 2 % (ref 0–13.4)
NEUTROPHILS # BLD AUTO: 12.75 K/UL (ref 2–7.15)
NEUTROPHILS NFR BLD: 92.3 % (ref 44–72)
NITRITE UR QL STRIP.AUTO: NEGATIVE
NRBC # BLD AUTO: 0 K/UL
NRBC BLD-RTO: 0 /100 WBC
PH UR STRIP.AUTO: 5.5 [PH]
PLATELET # BLD AUTO: 180 K/UL (ref 164–446)
PMV BLD AUTO: 10.8 FL (ref 9–12.9)
POTASSIUM SERPL-SCNC: 3.5 MMOL/L (ref 3.6–5.5)
PROT SERPL-MCNC: 6.1 G/DL (ref 6–8.2)
PROT UR QL STRIP: 30 MG/DL
RBC # BLD AUTO: 4.12 M/UL (ref 4.2–5.4)
RBC UR QL AUTO: NEGATIVE
SODIUM SERPL-SCNC: 134 MMOL/L (ref 135–145)
SP GR UR: >=1.03
WBC # BLD AUTO: 13.8 K/UL (ref 4.8–10.8)

## 2019-02-12 PROCEDURE — 700102 HCHG RX REV CODE 250 W/ 637 OVERRIDE(OP): Performed by: STUDENT IN AN ORGANIZED HEALTH CARE EDUCATION/TRAINING PROGRAM

## 2019-02-12 PROCEDURE — 36415 COLL VENOUS BLD VENIPUNCTURE: CPT

## 2019-02-12 PROCEDURE — 80053 COMPREHEN METABOLIC PANEL: CPT

## 2019-02-12 PROCEDURE — 96374 THER/PROPH/DIAG INJ IV PUSH: CPT

## 2019-02-12 PROCEDURE — 87502 INFLUENZA DNA AMP PROBE: CPT

## 2019-02-12 PROCEDURE — A9270 NON-COVERED ITEM OR SERVICE: HCPCS | Performed by: STUDENT IN AN ORGANIZED HEALTH CARE EDUCATION/TRAINING PROGRAM

## 2019-02-12 PROCEDURE — 59025 FETAL NON-STRESS TEST: CPT

## 2019-02-12 PROCEDURE — 85025 COMPLETE CBC W/AUTO DIFF WBC: CPT

## 2019-02-12 PROCEDURE — 700111 HCHG RX REV CODE 636 W/ 250 OVERRIDE (IP): Performed by: STUDENT IN AN ORGANIZED HEALTH CARE EDUCATION/TRAINING PROGRAM

## 2019-02-12 PROCEDURE — 81002 URINALYSIS NONAUTO W/O SCOPE: CPT

## 2019-02-12 PROCEDURE — 700105 HCHG RX REV CODE 258: Performed by: STUDENT IN AN ORGANIZED HEALTH CARE EDUCATION/TRAINING PROGRAM

## 2019-02-12 RX ORDER — POTASSIUM CHLORIDE 7.45 MG/ML
10 INJECTION INTRAVENOUS ONCE
Status: DISCONTINUED | OUTPATIENT
Start: 2019-02-12 | End: 2019-02-12

## 2019-02-12 RX ORDER — DEXTROSE, SODIUM CHLORIDE, SODIUM LACTATE, POTASSIUM CHLORIDE, AND CALCIUM CHLORIDE 5; .6; .31; .03; .02 G/100ML; G/100ML; G/100ML; G/100ML; G/100ML
INJECTION, SOLUTION INTRAVENOUS CONTINUOUS
Status: DISCONTINUED | OUTPATIENT
Start: 2019-02-12 | End: 2019-02-12 | Stop reason: HOSPADM

## 2019-02-12 RX ORDER — ONDANSETRON 2 MG/ML
INJECTION INTRAMUSCULAR; INTRAVENOUS
Status: DISCONTINUED
Start: 2019-02-12 | End: 2019-02-12 | Stop reason: HOSPADM

## 2019-02-12 RX ORDER — POTASSIUM CHLORIDE 20 MEQ/1
40 TABLET, EXTENDED RELEASE ORAL ONCE
Status: COMPLETED | OUTPATIENT
Start: 2019-02-12 | End: 2019-02-12

## 2019-02-12 RX ORDER — ONDANSETRON 4 MG/1
4 TABLET, ORALLY DISINTEGRATING ORAL EVERY 6 HOURS PRN
Qty: 20 TAB | Refills: 0 | Status: ON HOLD | OUTPATIENT
Start: 2019-02-12 | End: 2019-04-20

## 2019-02-12 RX ORDER — ONDANSETRON 2 MG/ML
4 INJECTION INTRAMUSCULAR; INTRAVENOUS ONCE
Status: COMPLETED | OUTPATIENT
Start: 2019-02-12 | End: 2019-02-12

## 2019-02-12 RX ADMIN — ONDANSETRON 4 MG: 2 INJECTION INTRAMUSCULAR; INTRAVENOUS at 18:33

## 2019-02-12 RX ADMIN — POTASSIUM CHLORIDE 40 MEQ: 1500 TABLET, EXTENDED RELEASE ORAL at 21:04

## 2019-02-12 RX ADMIN — SODIUM CHLORIDE, SODIUM LACTATE, POTASSIUM CHLORIDE, CALCIUM CHLORIDE AND DEXTROSE MONOHYDRATE: 5; 600; 310; 30; 20 INJECTION, SOLUTION INTRAVENOUS at 18:37

## 2019-02-13 NOTE — PROGRESS NOTES
"[facility] LABOR AND DELIVERY TRIAGE PROGRESS NOTE    PATIENT ID:  NAME:  Lali Funes  MRN:               [id number]  YOB: 1990     28 y.o. female  at 28w0d.    Subjective: Pt presents with nausea and vomiting for the whole day today.  She was unable to keep anything down.  She reports multiple watery bowel movements this morning which has resolved since.  She denies any decreased fetal movement, vaginal bleeding, worsening abdominal pain.  She does have some pain in the epigastric area which was worse with vomiting.  Denies any fevers.  Her daughter has a strep throat and she has taken her to the urgent care yesterday.  Patient denies any sore throat she states she has some throat burning with vomiting.    Pregnancy noncomplicated    negative  For CTXS.   negative Feels pain   negative for LOF  negative for vaginal bleeding.   positive for fetal movement    ROS: Patient denies any fever chills, headache, chest pain, shortness of breath, or dysuria or unusual swelling of hands or feet.     Objective:    Vitals:    19 1700 19 1734 19 1902   BP:  112/64 109/68   Pulse:  (!) 109 94   Resp:   17   Temp:  36.6 °C (97.8 °F) 36.6 °C (97.8 °F)   TempSrc:  Temporal Temporal   Weight: 83.9 kg (185 lb)     Height: 1.651 m (5' 5\")       Temp (24hrs), Av.6 °C (97.8 °F), Min:36.6 °C (97.8 °F), Max:36.6 °C (97.8 °F)    General: No acute distress, resting comfortably in bed.  HEENT: normocephalic, nontraumatic, PERRLA, no lymphadenopathy  Cardiovascular: Heart RRR with no murmurs, rubs or gallops. Distal Pulses 2+  Respiratory: symmetric chest expansion, lungs CTAB, with no wheezes, rales, rhonci  Abdomen: gravid, nontender, no guarding or rebound, bowel sounds present  Musculoskeletal: strength 5/5 in four extremities  Neuro: non focal with no numbness, tingling or changes in sensation    Cervix: Cervical exam not performed   [place]: No uterine Contractions  FHRM: Baseline 130, Accels to " 150, no decels, moderate variability    Assessment: 28 y.o. female  at 28w0d presents today due to diarrhea and vomiting which started this morning.  UA was significant for specific gravity >1030 which would speak for dehydration most likely due to gastroenteritis.  Patient has received 1 L D5/LR and 4 mg of Zofran. Her symptoms improved significantly.    P.o. Challenge with water and crackers was successful, pt. was able to tolerate.     Plan:   1. Discharge home with return precautions and instructions to return to the hospital if her symptoms worsen, if she develops any fever, if the vomiting persists and she is unable to keep anything down, develops abdominal pain or any worrisome symptoms. Rx for Zofran given.   2. Patient to return to the L&D if she develops any labor signs or vaginal bleeding, decreased fetal movement, leakage of fluids.  3. Follow-up with primary care in the next couple of days.      Discussed case with Sheri Lam. Case was discussed and attending agreed with plan prior to discharge of patient.

## 2019-02-13 NOTE — PROGRESS NOTES
1730- 27 y/o, , EDC 19, EGA 28. Here to room 235 C/o nausea/vomiting all day today. Pt states she hasn't been able to keep anything down all day. EFM/toco applied, patient denies lof/bleeding, reports positive fm. VSS.     - urine dipped. Updates to Dr. Ramirez. Orders for zofran and IV hydration. See MAR. Pt states she had BV swab yesterday in office for symptoms but result hasn't come back yet. Dr. Ramirez advised and will follow up.     - Report to NEHA Ye RN. POC discsussed.

## 2019-04-04 ENCOUNTER — HOSPITAL ENCOUNTER (OUTPATIENT)
Facility: MEDICAL CENTER | Age: 29
End: 2019-04-05
Payer: COMMERCIAL

## 2019-04-04 VITALS
HEIGHT: 65 IN | HEART RATE: 89 BPM | BODY MASS INDEX: 30.16 KG/M2 | TEMPERATURE: 96.5 F | SYSTOLIC BLOOD PRESSURE: 106 MMHG | WEIGHT: 181 LBS | DIASTOLIC BLOOD PRESSURE: 68 MMHG | RESPIRATION RATE: 18 BRPM

## 2019-04-04 LAB
APPEARANCE UR: CLEAR
COLOR UR AUTO: YELLOW
GLUCOSE UR QL STRIP.AUTO: NEGATIVE MG/DL
KETONES UR QL STRIP.AUTO: NEGATIVE MG/DL
LEUKOCYTE ESTERASE UR QL STRIP.AUTO: NEGATIVE
NITRITE UR QL STRIP.AUTO: NEGATIVE
PH UR STRIP.AUTO: 7 [PH]
PROT UR QL STRIP: NEGATIVE MG/DL
RBC UR QL AUTO: NEGATIVE
SP GR UR: 1.01

## 2019-04-04 PROCEDURE — 81002 URINALYSIS NONAUTO W/O SCOPE: CPT

## 2019-04-04 RX ORDER — BETAMETHASONE SODIUM PHOSPHATE AND BETAMETHASONE ACETATE 3; 3 MG/ML; MG/ML
12 INJECTION, SUSPENSION INTRA-ARTICULAR; INTRALESIONAL; INTRAMUSCULAR; SOFT TISSUE ONCE
Status: COMPLETED | OUTPATIENT
Start: 2019-04-05 | End: 2019-04-05

## 2019-04-05 PROCEDURE — 700111 HCHG RX REV CODE 636 W/ 250 OVERRIDE (IP): Performed by: STUDENT IN AN ORGANIZED HEALTH CARE EDUCATION/TRAINING PROGRAM

## 2019-04-05 PROCEDURE — 96372 THER/PROPH/DIAG INJ SC/IM: CPT

## 2019-04-05 PROCEDURE — 59025 FETAL NON-STRESS TEST: CPT

## 2019-04-05 RX ADMIN — BETAMETHASONE SODIUM PHOSPHATE AND BETAMETHASONE ACETATE 12 MG: 3; 3 INJECTION, SUSPENSION INTRA-ARTICULAR; INTRALESIONAL; INTRAMUSCULAR at 00:33

## 2019-04-05 NOTE — PROGRESS NOTES
edc 5/3  35.6  Weeks gbs unknonw    Complaints: cramping    2255:  Pt to labor and delivery c/o cramping that comes and goes for the last week.  efm and toco applied. Pt denies vaginal bleeding, leaking, and reports fetal movement.  ua done, wnl.  sve 2/75/-2.  Pt states she was breech last week on us.  Pt believed to be vertex per exam.      2335: report to dr. Vazquez.  Orders received.  Dr. vazquez at [facility] currently but will be on his way to [facility] in 30 minutes.    0020: dr. vazquez at , ultrasound confirms vertex presentation.  Betamethasone given im.  Pt up to ambulate per dr. Vazquez.      0145: pt to lda 3, pt denies uc's.  sve 2-3/50/-2.  Pt states she doesn't think she is in labor and denies strong uc's.  Report to dr. Vazquez.  Orders to discharge home with labor precautions.  Pt to return to hospital on Saturday morning at 8 am for second betamethasone injection.      0215: pt verbalizes understanding of discharge instructions and is discharged instable condition.

## 2019-04-05 NOTE — PROGRESS NOTES
"[facility] LABOR AND DELIVERY TRIAGE PROGRESS NOTE    PATIENT ID:  NAME:  Lali Funes  MRN:               [id number]  YOB: 1990     28 y.o. female  at 36w0d by     Subjective: Pt here w/ 1 wk of uterine cxn's that were mild in strength and every 15 minutes - for the past day they have increased in strength, keeping her up at nght and now every 5-10 minutes. She denies any LOF, no vag bleeding, no decreased FM. She has had an otherwise healthy pregnancy and is has no concerns at this time    Has had good PNC w/ [facility] - Dr. Humza Carlson is her PCP.  No HTN, GDM, asthma  No abd surgeries  NKDA      ROS: Patient denies any fever chills, nausea, vomiting, headache, chest pain, shortness of breath, or dysuria or unusual swelling of hands or feet.     Objective:    Vitals:    19 2257 19 2300   BP:  106/68   Pulse:  89   Resp: 18    Temp: 35.8 °C (96.5 °F)    TempSrc: Temporal    Weight: 82.1 kg (181 lb)    Height: 1.651 m (5' 5\")      Temp (24hrs), Av.8 °C (96.5 °F), Min:35.8 °C (96.5 °F), Max:35.8 °C (96.5 °F)    General: No acute distress, resting comfortably in bed.  HEENT: normocephalic, nontraumatic, PERRLA, EOMI  Cardiovascular: Heart RRR with gentle systolic murmur. Distal Pulses 2+  Respiratory: symmetric chest expansion, lungs CTAB, with no wheezes, rales, rhonci  Abdomen: gravid, nontender, utine fundus firm - leopolds cephalic  Musculoskeletal: strength 5/5 in four extremities  Neuro: non focal with no numbness, tingling or changes in sensation      Cervix:  2cm/75%/-2 by RN in triage  [place]: Uterine Contractions Q3-5 minutes - moderate in intensity  FHRM: Baseline 130, Accels to 150, no decels, moderate variability    BEDSIDE US - cephalic presentation, LOT, KIM > 10, heart rate 120, grade 2 placenta w/ numerous small calcifications    Assessment: 28 y.o. female  at 36w0d - here for  labor check - at this time concern that she might be in early " labor    Plan:   1. Betamethasone x 1  2. Walk for 2 hours and then back on monitor for NST - if reactive and no cervical change, will d/c home and return in 24 hrs for repeat dose betamethasone  3. Has f/u appointment next week w/ Dr. Humza Carlson - [facility]  - on Thursday  4. Routine return precautions     Discussed case with Dr. Contreras, [facility] Attending. Case was discussed and attending agreed with plan prior to discharge of patient.

## 2019-04-05 NOTE — DISCHARGE INSTRUCTIONS
Labor Instructions (37 - 39 weeks):  Call your physician or return to hospital if:  · You have regular contractions that get progressively closer, longer and stronger.  · Your water breaks (remember time and color).  · You have bleeding like a period.  · Your baby does not move enough to complete the daily kick counts (10 movements in 2 hours)  · Your baby moves much less often than on the days before or you have not felt your baby move all day.      Return Saturday, April 6 th for second Betamethasone injection

## 2019-04-06 ENCOUNTER — HOSPITAL ENCOUNTER (OUTPATIENT)
Facility: MEDICAL CENTER | Age: 29
End: 2019-04-06
Admitting: STUDENT IN AN ORGANIZED HEALTH CARE EDUCATION/TRAINING PROGRAM
Payer: COMMERCIAL

## 2019-04-06 ENCOUNTER — APPOINTMENT (OUTPATIENT)
Dept: OBGYN | Facility: MEDICAL CENTER | Age: 29
End: 2019-04-06
Payer: COMMERCIAL

## 2019-04-06 VITALS
DIASTOLIC BLOOD PRESSURE: 63 MMHG | WEIGHT: 181 LBS | HEART RATE: 109 BPM | BODY MASS INDEX: 30.16 KG/M2 | SYSTOLIC BLOOD PRESSURE: 109 MMHG | HEIGHT: 65 IN | TEMPERATURE: 97.1 F

## 2019-04-06 PROCEDURE — 700111 HCHG RX REV CODE 636 W/ 250 OVERRIDE (IP): Performed by: STUDENT IN AN ORGANIZED HEALTH CARE EDUCATION/TRAINING PROGRAM

## 2019-04-06 PROCEDURE — 96372 THER/PROPH/DIAG INJ SC/IM: CPT

## 2019-04-06 PROCEDURE — 59025 FETAL NON-STRESS TEST: CPT

## 2019-04-06 RX ORDER — BETAMETHASONE SODIUM PHOSPHATE AND BETAMETHASONE ACETATE 3; 3 MG/ML; MG/ML
12 INJECTION, SUSPENSION INTRA-ARTICULAR; INTRALESIONAL; INTRAMUSCULAR; SOFT TISSUE ONCE
Status: COMPLETED | OUTPATIENT
Start: 2019-04-06 | End: 2019-04-06

## 2019-04-06 RX ADMIN — BETAMETHASONE SODIUM PHOSPHATE AND BETAMETHASONE ACETATE 12 MG: 3; 3 INJECTION, SUSPENSION INTRA-ARTICULAR; INTRALESIONAL; INTRAMUSCULAR at 08:25

## 2019-04-06 NOTE — PROGRESS NOTES
presents at 36.1 wk gestation at 36.1 wk gestation for scheduled 2nd betamethasone injection. Reports irregular UCs but denies VB or LOF; positive FM. Report given to Dr. Dockery; orders to d/c after injection. Labor precautions provided

## 2019-04-20 ENCOUNTER — HOSPITAL ENCOUNTER (OUTPATIENT)
Facility: MEDICAL CENTER | Age: 29
End: 2019-04-20
Admitting: STUDENT IN AN ORGANIZED HEALTH CARE EDUCATION/TRAINING PROGRAM
Payer: COMMERCIAL

## 2019-04-20 VITALS
WEIGHT: 191 LBS | HEIGHT: 65 IN | BODY MASS INDEX: 31.82 KG/M2 | SYSTOLIC BLOOD PRESSURE: 112 MMHG | HEART RATE: 111 BPM | DIASTOLIC BLOOD PRESSURE: 65 MMHG | TEMPERATURE: 97.2 F

## 2019-04-20 LAB
APPEARANCE UR: ABNORMAL
COLOR UR AUTO: YELLOW
GLUCOSE UR QL STRIP.AUTO: NEGATIVE MG/DL
KETONES UR QL STRIP.AUTO: NEGATIVE MG/DL
LEUKOCYTE ESTERASE UR QL STRIP.AUTO: NEGATIVE
NITRITE UR QL STRIP.AUTO: NEGATIVE
PH UR STRIP.AUTO: 7 [PH]
PROT UR QL STRIP: NEGATIVE MG/DL
RBC UR QL AUTO: NEGATIVE
SP GR UR: 1.02

## 2019-04-20 PROCEDURE — 700102 HCHG RX REV CODE 250 W/ 637 OVERRIDE(OP): Performed by: STUDENT IN AN ORGANIZED HEALTH CARE EDUCATION/TRAINING PROGRAM

## 2019-04-20 PROCEDURE — A9270 NON-COVERED ITEM OR SERVICE: HCPCS | Performed by: STUDENT IN AN ORGANIZED HEALTH CARE EDUCATION/TRAINING PROGRAM

## 2019-04-20 PROCEDURE — 59025 FETAL NON-STRESS TEST: CPT

## 2019-04-20 PROCEDURE — 81002 URINALYSIS NONAUTO W/O SCOPE: CPT

## 2019-04-20 RX ORDER — ACETAMINOPHEN 500 MG
1000 TABLET ORAL ONCE
Status: COMPLETED | OUTPATIENT
Start: 2019-04-20 | End: 2019-04-20

## 2019-04-20 RX ORDER — ONDANSETRON 4 MG/1
4 TABLET, ORALLY DISINTEGRATING ORAL EVERY 6 HOURS PRN
Qty: 10 TAB | Refills: 0 | Status: SHIPPED | OUTPATIENT
Start: 2019-04-20 | End: 2019-06-17

## 2019-04-20 RX ADMIN — ACETAMINOPHEN 1000 MG: 500 TABLET ORAL at 03:14

## 2019-04-20 NOTE — PROGRESS NOTES
27yo    EDC 5/3=38-1    0153-Pt presents today c/o headache since 0430 on . Pt states she took (4) 500 mg tylenol tablets since then and the headache was unrelieved. Pt denies LOF, VB or UC's and confirms +FM. DTRs 2+ and clonus not present upon assessment. TOCO and EFM in place. POC discussed.   210-SVE -3/50/212-Left message for on call resident  218-Call returned by Dr. Griffith, report given as stated above, MD to see patient  0250-Dr. Griffith at bedside  0305-Orders received in department to administer 1gm tylenol (see MAR)  032-Dr. Griffith in department, orders to discharge pt home  0327-Pt discharged home with relatives/friends via walking. Provided general discharge and labor precaution instructions, understanding verbalized.

## 2019-04-20 NOTE — PROGRESS NOTES
"[facility] LAB[facility] AND DELIVERY TRIAGE PROGRESS NOTE    PATIENT ID:  NAME:  Lali Funes  MRN:               [id number]  YOB: 1990     28 y.o. female  at 38w1d.    Subjective: Pt presented with headache since 0430 yesterday Left temporal region not associated with vision changes, dizziness, RUQ pain or edema. Patient has taken 500mg x 4 throughout the last 24hours without relief. She continues to have intermittent contractions for the last 1 month. She also had vomiting episode this morning that lasted a short period and has since stopped.   Pregnancy complicated by nothing.     positive  For CTXS.   positive Feels pain   negative for LOF  negative for vaginal bleeding.   positive for fetal movement    ROS: Patient denies any fever chills, diarrhea, chest pain, shortness of breath, or dysuria or unusual swelling of hands or feet.     Objective:    Vitals:    19 0156   BP: 112/65   Pulse: (!) 111   Temp: 36.2 °C (97.2 °F)   TempSrc: Temporal   Weight: 86.6 kg (191 lb)   Height: 1.651 m (5' 5\")     Temp (24hrs), Av.2 °C (97.2 °F), Min:36.2 °C (97.2 °F), Max:36.2 °C (97.2 °F)    General: No acute distress, resting comfortably in bed.  HEENT: normocephalic, nontraumatic, PERRLA, EOMI  Cardiovascular: Heart regular rhythm, tachycardic, with no murmurs, rubs or gallops. Distal Pulses 2+  Respiratory: symmetric chest expansion, lungs CTAB, with no wheezes, rales, rhonci  Abdomen: gravid, nontender  Musculoskeletal: strength 5/5 in four extremities, no edema  Neuro: non focal with no numbness, tingling or changes in sensation    Cervix:  2-3cm/50%/-2  [place]: Uterine Contractions irregularly   FHRM: Baseline 125, some accels, no decels, moderate variability    Assessment: 28 y.o. female  at 38w1d not in labor w/ headache without any signs/symptoms of preeclampsia. Limited vomiting episode and decreased PO hydration at home.   Blood pressures normal (109-120/63-65); no RUQ tenderness, no " edema  CAT 1 FHT    Plan:   1. Patient given 1g PO Tylenol and rx for Zofran should vomiting occur again  2. Patient is cleared to return home with family. Encouraged to see MD for increased painful uterine contractions @ 3-5, vaginal bleeding, loss of fluid, decreased fetal movement or other serious symptoms. Also discussed she return for intractable vomiting, headache, RUQ pain or vision changes.    Discussed case with Dr. Contreras, [facility] Attending. Case was discussed and attending agreed with plan prior to discharge of patient.    Denisse Griffith M.D.

## 2019-04-28 ENCOUNTER — ANESTHESIA EVENT (OUTPATIENT)
Dept: OBGYN | Facility: MEDICAL CENTER | Age: 29
End: 2019-04-28
Payer: COMMERCIAL

## 2019-04-28 ENCOUNTER — ANESTHESIA (OUTPATIENT)
Dept: OBGYN | Facility: MEDICAL CENTER | Age: 29
End: 2019-04-28
Payer: COMMERCIAL

## 2019-04-28 ENCOUNTER — HOSPITAL ENCOUNTER (INPATIENT)
Facility: MEDICAL CENTER | Age: 29
LOS: 2 days | End: 2019-04-30
Admitting: FAMILY MEDICINE
Payer: COMMERCIAL

## 2019-04-28 LAB
BASOPHILS # BLD AUTO: 0.3 % (ref 0–1.8)
BASOPHILS # BLD: 0.03 K/UL (ref 0–0.12)
EOSINOPHIL # BLD AUTO: 0.06 K/UL (ref 0–0.51)
EOSINOPHIL NFR BLD: 0.5 % (ref 0–6.9)
ERYTHROCYTE [DISTWIDTH] IN BLOOD BY AUTOMATED COUNT: 45.5 FL (ref 35.9–50)
HCT VFR BLD AUTO: 41 % (ref 37–47)
HGB BLD-MCNC: 14 G/DL (ref 12–16)
HOLDING TUBE BB 8507: NORMAL
IMM GRANULOCYTES # BLD AUTO: 0.16 K/UL (ref 0–0.11)
IMM GRANULOCYTES NFR BLD AUTO: 1.4 % (ref 0–0.9)
LYMPHOCYTES # BLD AUTO: 2.27 K/UL (ref 1–4.8)
LYMPHOCYTES NFR BLD: 19.5 % (ref 22–41)
MCH RBC QN AUTO: 31.5 PG (ref 27–33)
MCHC RBC AUTO-ENTMCNC: 34.1 G/DL (ref 33.6–35)
MCV RBC AUTO: 92.1 FL (ref 81.4–97.8)
MONOCYTES # BLD AUTO: 0.76 K/UL (ref 0–0.85)
MONOCYTES NFR BLD AUTO: 6.5 % (ref 0–13.4)
NEUTROPHILS # BLD AUTO: 8.36 K/UL (ref 2–7.15)
NEUTROPHILS NFR BLD: 71.8 % (ref 44–72)
NRBC # BLD AUTO: 0 K/UL
NRBC BLD-RTO: 0 /100 WBC
PLATELET # BLD AUTO: 197 K/UL (ref 164–446)
PMV BLD AUTO: 10.5 FL (ref 9–12.9)
RBC # BLD AUTO: 4.45 M/UL (ref 4.2–5.4)
WBC # BLD AUTO: 11.6 K/UL (ref 4.8–10.8)

## 2019-04-28 PROCEDURE — 0UQMXZZ REPAIR VULVA, EXTERNAL APPROACH: ICD-10-PCS | Performed by: FAMILY MEDICINE

## 2019-04-28 PROCEDURE — 0HQ9XZZ REPAIR PERINEUM SKIN, EXTERNAL APPROACH: ICD-10-PCS | Performed by: FAMILY MEDICINE

## 2019-04-28 PROCEDURE — 770002 HCHG ROOM/CARE - OB PRIVATE (112)

## 2019-04-28 PROCEDURE — 304965 HCHG RECOVERY SERVICES

## 2019-04-28 PROCEDURE — 700111 HCHG RX REV CODE 636 W/ 250 OVERRIDE (IP): Performed by: ANESTHESIOLOGY

## 2019-04-28 PROCEDURE — 700111 HCHG RX REV CODE 636 W/ 250 OVERRIDE (IP): Performed by: STUDENT IN AN ORGANIZED HEALTH CARE EDUCATION/TRAINING PROGRAM

## 2019-04-28 PROCEDURE — A9270 NON-COVERED ITEM OR SERVICE: HCPCS | Performed by: STUDENT IN AN ORGANIZED HEALTH CARE EDUCATION/TRAINING PROGRAM

## 2019-04-28 PROCEDURE — 59409 OBSTETRICAL CARE: CPT

## 2019-04-28 PROCEDURE — 700101 HCHG RX REV CODE 250: Performed by: ANESTHESIOLOGY

## 2019-04-28 PROCEDURE — 700102 HCHG RX REV CODE 250 W/ 637 OVERRIDE(OP): Performed by: STUDENT IN AN ORGANIZED HEALTH CARE EDUCATION/TRAINING PROGRAM

## 2019-04-28 PROCEDURE — 85025 COMPLETE CBC W/AUTO DIFF WBC: CPT

## 2019-04-28 PROCEDURE — 36415 COLL VENOUS BLD VENIPUNCTURE: CPT

## 2019-04-28 PROCEDURE — 700105 HCHG RX REV CODE 258

## 2019-04-28 PROCEDURE — 700105 HCHG RX REV CODE 258: Performed by: STUDENT IN AN ORGANIZED HEALTH CARE EDUCATION/TRAINING PROGRAM

## 2019-04-28 RX ORDER — METHYLERGONOVINE MALEATE 0.2 MG/ML
0.2 INJECTION INTRAVENOUS
Status: DISCONTINUED | OUTPATIENT
Start: 2019-04-28 | End: 2019-04-29 | Stop reason: HOSPADM

## 2019-04-28 RX ORDER — SODIUM CHLORIDE, SODIUM LACTATE, POTASSIUM CHLORIDE, CALCIUM CHLORIDE 600; 310; 30; 20 MG/100ML; MG/100ML; MG/100ML; MG/100ML
INJECTION, SOLUTION INTRAVENOUS
Status: COMPLETED
Start: 2019-04-28 | End: 2019-04-28

## 2019-04-28 RX ORDER — SODIUM CHLORIDE, SODIUM LACTATE, POTASSIUM CHLORIDE, AND CALCIUM CHLORIDE .6; .31; .03; .02 G/100ML; G/100ML; G/100ML; G/100ML
1000 INJECTION, SOLUTION INTRAVENOUS
Status: COMPLETED | OUTPATIENT
Start: 2019-04-28 | End: 2019-04-29

## 2019-04-28 RX ORDER — MISOPROSTOL 200 UG/1
800 TABLET ORAL
Status: DISCONTINUED | OUTPATIENT
Start: 2019-04-28 | End: 2019-04-29 | Stop reason: HOSPADM

## 2019-04-28 RX ORDER — HYDROCODONE BITARTRATE AND ACETAMINOPHEN 5; 325 MG/1; MG/1
1 TABLET ORAL EVERY 4 HOURS PRN
Status: DISCONTINUED | OUTPATIENT
Start: 2019-04-28 | End: 2019-04-29

## 2019-04-28 RX ORDER — ROPIVACAINE HYDROCHLORIDE 2 MG/ML
INJECTION, SOLUTION EPIDURAL; INFILTRATION; PERINEURAL CONTINUOUS
Status: DISCONTINUED | OUTPATIENT
Start: 2019-04-28 | End: 2019-04-29

## 2019-04-28 RX ORDER — ONDANSETRON 2 MG/ML
4 INJECTION INTRAMUSCULAR; INTRAVENOUS EVERY 6 HOURS PRN
Status: DISCONTINUED | OUTPATIENT
Start: 2019-04-28 | End: 2019-04-30 | Stop reason: HOSPADM

## 2019-04-28 RX ORDER — BUPIVACAINE HYDROCHLORIDE 2.5 MG/ML
INJECTION, SOLUTION EPIDURAL; INFILTRATION; INTRACAUDAL PRN
Status: DISCONTINUED | OUTPATIENT
Start: 2019-04-28 | End: 2019-04-28 | Stop reason: SURG

## 2019-04-28 RX ORDER — SODIUM CHLORIDE, SODIUM LACTATE, POTASSIUM CHLORIDE, CALCIUM CHLORIDE 600; 310; 30; 20 MG/100ML; MG/100ML; MG/100ML; MG/100ML
INJECTION, SOLUTION INTRAVENOUS CONTINUOUS
Status: DISPENSED | OUTPATIENT
Start: 2019-04-28 | End: 2019-04-29

## 2019-04-28 RX ORDER — SODIUM CHLORIDE, SODIUM LACTATE, POTASSIUM CHLORIDE, AND CALCIUM CHLORIDE .6; .31; .03; .02 G/100ML; G/100ML; G/100ML; G/100ML
250 INJECTION, SOLUTION INTRAVENOUS PRN
Status: DISCONTINUED | OUTPATIENT
Start: 2019-04-28 | End: 2019-04-29 | Stop reason: HOSPADM

## 2019-04-28 RX ORDER — IBUPROFEN 600 MG/1
600 TABLET ORAL EVERY 6 HOURS PRN
Status: DISCONTINUED | OUTPATIENT
Start: 2019-04-28 | End: 2019-04-30 | Stop reason: HOSPADM

## 2019-04-28 RX ORDER — ONDANSETRON 4 MG/1
4 TABLET, ORALLY DISINTEGRATING ORAL EVERY 6 HOURS PRN
Status: DISCONTINUED | OUTPATIENT
Start: 2019-04-28 | End: 2019-04-30 | Stop reason: HOSPADM

## 2019-04-28 RX ADMIN — EPHEDRINE SULFATE 5 MG: 50 INJECTION INTRAVENOUS at 20:27

## 2019-04-28 RX ADMIN — SODIUM CHLORIDE, POTASSIUM CHLORIDE, SODIUM LACTATE AND CALCIUM CHLORIDE: 600; 310; 30; 20 INJECTION, SOLUTION INTRAVENOUS at 20:15

## 2019-04-28 RX ADMIN — ROPIVACAINE HYDROCHLORIDE: 2 INJECTION, SOLUTION EPIDURAL; INFILTRATION at 20:08

## 2019-04-28 RX ADMIN — SODIUM CHLORIDE, SODIUM LACTATE, POTASSIUM CHLORIDE, AND CALCIUM CHLORIDE 1000 ML: .6; .31; .03; .02 INJECTION, SOLUTION INTRAVENOUS at 19:20

## 2019-04-28 RX ADMIN — EPHEDRINE SULFATE 5 MG: 50 INJECTION INTRAVENOUS at 20:22

## 2019-04-28 RX ADMIN — BUPIVACAINE HYDROCHLORIDE 5 ML: 2.5 INJECTION, SOLUTION EPIDURAL; INFILTRATION; INTRACAUDAL; PERINEURAL at 20:06

## 2019-04-28 RX ADMIN — FENTANYL CITRATE 100 MCG: 50 INJECTION, SOLUTION INTRAMUSCULAR; INTRAVENOUS at 19:30

## 2019-04-28 RX ADMIN — Medication 2000 ML/HR: at 23:40

## 2019-04-28 RX ADMIN — SODIUM CHLORIDE, POTASSIUM CHLORIDE, SODIUM LACTATE AND CALCIUM CHLORIDE 1000 ML: 600; 310; 30; 20 INJECTION, SOLUTION INTRAVENOUS at 19:20

## 2019-04-28 RX ADMIN — IBUPROFEN 600 MG: 600 TABLET ORAL at 23:17

## 2019-04-28 ASSESSMENT — LIFESTYLE VARIABLES
ALCOHOL_USE: NO
EVER_SMOKED: YES

## 2019-04-28 ASSESSMENT — PATIENT HEALTH QUESTIONNAIRE - PHQ9
1. LITTLE INTEREST OR PLEASURE IN DOING THINGS: NOT AT ALL
2. FEELING DOWN, DEPRESSED, IRRITABLE, OR HOPELESS: NOT AT ALL
SUM OF ALL RESPONSES TO PHQ9 QUESTIONS 1 AND 2: 0

## 2019-04-28 ASSESSMENT — PAIN SCALES - GENERAL: PAIN_LEVEL: 0

## 2019-04-29 LAB
ERYTHROCYTE [DISTWIDTH] IN BLOOD BY AUTOMATED COUNT: 45.1 FL (ref 35.9–50)
HCT VFR BLD AUTO: 37 % (ref 37–47)
HGB BLD-MCNC: 12.7 G/DL (ref 12–16)
MCH RBC QN AUTO: 31.5 PG (ref 27–33)
MCHC RBC AUTO-ENTMCNC: 34.3 G/DL (ref 33.6–35)
MCV RBC AUTO: 91.8 FL (ref 81.4–97.8)
PLATELET # BLD AUTO: 155 K/UL (ref 164–446)
PMV BLD AUTO: 10.5 FL (ref 9–12.9)
RBC # BLD AUTO: 4.03 M/UL (ref 4.2–5.4)
WBC # BLD AUTO: 15.7 K/UL (ref 4.8–10.8)

## 2019-04-29 PROCEDURE — 36415 COLL VENOUS BLD VENIPUNCTURE: CPT

## 2019-04-29 PROCEDURE — 302131 K PAD MOTOR: Performed by: STUDENT IN AN ORGANIZED HEALTH CARE EDUCATION/TRAINING PROGRAM

## 2019-04-29 PROCEDURE — 302151 K-PAD 14X20: Performed by: STUDENT IN AN ORGANIZED HEALTH CARE EDUCATION/TRAINING PROGRAM

## 2019-04-29 PROCEDURE — A9270 NON-COVERED ITEM OR SERVICE: HCPCS | Performed by: STUDENT IN AN ORGANIZED HEALTH CARE EDUCATION/TRAINING PROGRAM

## 2019-04-29 PROCEDURE — 700111 HCHG RX REV CODE 636 W/ 250 OVERRIDE (IP): Performed by: STUDENT IN AN ORGANIZED HEALTH CARE EDUCATION/TRAINING PROGRAM

## 2019-04-29 PROCEDURE — 303615 HCHG EPIDURAL/SPINAL ANESTHESIA FOR LABOR

## 2019-04-29 PROCEDURE — 770002 HCHG ROOM/CARE - OB PRIVATE (112)

## 2019-04-29 PROCEDURE — 700102 HCHG RX REV CODE 250 W/ 637 OVERRIDE(OP): Performed by: STUDENT IN AN ORGANIZED HEALTH CARE EDUCATION/TRAINING PROGRAM

## 2019-04-29 PROCEDURE — 700112 HCHG RX REV CODE 229: Performed by: STUDENT IN AN ORGANIZED HEALTH CARE EDUCATION/TRAINING PROGRAM

## 2019-04-29 PROCEDURE — 85027 COMPLETE CBC AUTOMATED: CPT

## 2019-04-29 RX ORDER — HYDROCODONE BITARTRATE AND ACETAMINOPHEN 5; 325 MG/1; MG/1
1-2 TABLET ORAL EVERY 6 HOURS PRN
Status: DISCONTINUED | OUTPATIENT
Start: 2019-04-29 | End: 2019-04-30 | Stop reason: HOSPADM

## 2019-04-29 RX ORDER — MISOPROSTOL 200 UG/1
600 TABLET ORAL
Status: CANCELLED | OUTPATIENT
Start: 2019-04-29

## 2019-04-29 RX ORDER — METHYLERGONOVINE MALEATE 0.2 MG/ML
0.2 INJECTION INTRAVENOUS
Status: CANCELLED | OUTPATIENT
Start: 2019-04-29

## 2019-04-29 RX ORDER — SODIUM CHLORIDE, SODIUM LACTATE, POTASSIUM CHLORIDE, CALCIUM CHLORIDE 600; 310; 30; 20 MG/100ML; MG/100ML; MG/100ML; MG/100ML
INJECTION, SOLUTION INTRAVENOUS PRN
Status: CANCELLED | OUTPATIENT
Start: 2019-04-29

## 2019-04-29 RX ORDER — SODIUM CHLORIDE, SODIUM LACTATE, POTASSIUM CHLORIDE, CALCIUM CHLORIDE 600; 310; 30; 20 MG/100ML; MG/100ML; MG/100ML; MG/100ML
INJECTION, SOLUTION INTRAVENOUS PRN
Status: DISCONTINUED | OUTPATIENT
Start: 2019-04-29 | End: 2019-04-30 | Stop reason: HOSPADM

## 2019-04-29 RX ORDER — MISOPROSTOL 200 UG/1
600 TABLET ORAL
Status: DISCONTINUED | OUTPATIENT
Start: 2019-04-29 | End: 2019-04-30 | Stop reason: HOSPADM

## 2019-04-29 RX ORDER — HYDROCODONE BITARTRATE AND ACETAMINOPHEN 5; 325 MG/1; MG/1
1-2 TABLET ORAL EVERY 6 HOURS PRN
Status: DISCONTINUED | OUTPATIENT
Start: 2019-04-29 | End: 2019-04-29

## 2019-04-29 RX ORDER — VITAMIN A ACETATE, BETA CAROTENE, ASCORBIC ACID, CHOLECALCIFEROL, .ALPHA.-TOCOPHEROL ACETATE, DL-, THIAMINE MONONITRATE, RIBOFLAVIN, NIACINAMIDE, PYRIDOXINE HYDROCHLORIDE, FOLIC ACID, CYANOCOBALAMIN, CALCIUM CARBONATE, FERROUS FUMARATE, ZINC OXIDE, CUPRIC OXIDE 3080; 12; 120; 400; 1; 1.84; 3; 20; 22; 920; 25; 200; 27; 10; 2 [IU]/1; UG/1; MG/1; [IU]/1; MG/1; MG/1; MG/1; MG/1; MG/1; [IU]/1; MG/1; MG/1; MG/1; MG/1; MG/1
1 TABLET, FILM COATED ORAL EVERY MORNING
Status: DISCONTINUED | OUTPATIENT
Start: 2019-04-29 | End: 2019-04-30 | Stop reason: HOSPADM

## 2019-04-29 RX ORDER — VITAMIN A ACETATE, BETA CAROTENE, ASCORBIC ACID, CHOLECALCIFEROL, .ALPHA.-TOCOPHEROL ACETATE, DL-, THIAMINE MONONITRATE, RIBOFLAVIN, NIACINAMIDE, PYRIDOXINE HYDROCHLORIDE, FOLIC ACID, CYANOCOBALAMIN, CALCIUM CARBONATE, FERROUS FUMARATE, ZINC OXIDE, CUPRIC OXIDE 3080; 12; 120; 400; 1; 1.84; 3; 20; 22; 920; 25; 200; 27; 10; 2 [IU]/1; UG/1; MG/1; [IU]/1; MG/1; MG/1; MG/1; MG/1; MG/1; [IU]/1; MG/1; MG/1; MG/1; MG/1; MG/1
1 TABLET, FILM COATED ORAL EVERY MORNING
Status: CANCELLED | OUTPATIENT
Start: 2019-04-29

## 2019-04-29 RX ORDER — METHYLERGONOVINE MALEATE 0.2 MG/ML
0.2 INJECTION INTRAVENOUS
Status: DISCONTINUED | OUTPATIENT
Start: 2019-04-29 | End: 2019-04-30 | Stop reason: HOSPADM

## 2019-04-29 RX ORDER — DOCUSATE SODIUM 100 MG/1
100 CAPSULE, LIQUID FILLED ORAL 2 TIMES DAILY PRN
Status: DISCONTINUED | OUTPATIENT
Start: 2019-04-29 | End: 2019-04-30 | Stop reason: HOSPADM

## 2019-04-29 RX ORDER — OXYCODONE HYDROCHLORIDE AND ACETAMINOPHEN 5; 325 MG/1; MG/1
1 TABLET ORAL EVERY 4 HOURS PRN
Status: DISCONTINUED | OUTPATIENT
Start: 2019-04-29 | End: 2019-04-30 | Stop reason: HOSPADM

## 2019-04-29 RX ORDER — IBUPROFEN 600 MG/1
600 TABLET ORAL EVERY 6 HOURS PRN
Status: DISCONTINUED | OUTPATIENT
Start: 2019-04-29 | End: 2019-04-29

## 2019-04-29 RX ORDER — DOCUSATE SODIUM 100 MG/1
100 CAPSULE, LIQUID FILLED ORAL 2 TIMES DAILY PRN
Status: CANCELLED | OUTPATIENT
Start: 2019-04-29

## 2019-04-29 RX ADMIN — HYDROCODONE BITARTRATE AND ACETAMINOPHEN 1 TABLET: 5; 325 TABLET ORAL at 05:35

## 2019-04-29 RX ADMIN — IBUPROFEN 600 MG: 600 TABLET ORAL at 21:28

## 2019-04-29 RX ADMIN — HYDROCODONE BITARTRATE AND ACETAMINOPHEN 2 TABLET: 5; 325 TABLET ORAL at 17:22

## 2019-04-29 RX ADMIN — Medication 125 ML/HR: at 00:27

## 2019-04-29 RX ADMIN — IBUPROFEN 600 MG: 600 TABLET ORAL at 15:18

## 2019-04-29 RX ADMIN — IBUPROFEN 600 MG: 600 TABLET ORAL at 05:35

## 2019-04-29 RX ADMIN — DOCUSATE SODIUM 100 MG: 100 CAPSULE, LIQUID FILLED ORAL at 21:29

## 2019-04-29 RX ADMIN — HYDROCODONE BITARTRATE AND ACETAMINOPHEN 1 TABLET: 5; 325 TABLET ORAL at 01:25

## 2019-04-29 RX ADMIN — Medication 1 TABLET: at 05:35

## 2019-04-29 RX ADMIN — HYDROCODONE BITARTRATE AND ACETAMINOPHEN 2 TABLET: 5; 325 TABLET ORAL at 11:29

## 2019-04-29 RX ADMIN — HYDROCODONE BITARTRATE AND ACETAMINOPHEN 1 TABLET: 5; 325 TABLET ORAL at 23:56

## 2019-04-29 NOTE — H&P
History and Physical    Lali Funes is a 28 y.o. female  -Para:     Gestational Age:  39w2d  Admitted for:   Active Labor  Admitted to  [facility].  Patient received prenatal care: [facility]    HPI: Patient is admitted with the above mentioned Chief Complaint and States   Loss of fluid:   negative  Abdominal Pain:  positive  Uterine Contractions:  positive  Vaginal Bleeding:  negative  Fetal Movement:  normal  Patient denies fever, chills, nausea, vomiting , headache, visual disturbance, or dysuria  Patient's last menstrual period was 2018 (approximate).  Estimated Date of Delivery: 5/3/19  Final DESTIN: 5/3/2019, by Patient Reported    Patient Active Problem List    Diagnosis Date Noted   • Amenorrhea 10/16/2018   • 11 weeks gestation of pregnancy 10/16/2018       Admitting DX: pregnancy  Pregnancy  Pregnancy Complications:  maternal smoking  OB Risk Factors:   smoker  Labor State:    Active phase labor.    History:   has a past medical history of Nausea/vomiting in pregnancy (10/5/2012). She also has no past medical history of Headache(784.0) or Ulcer.     has no past surgical history on file.    OB History    Para Term  AB Living   7 2 1   1 1   SAB TAB Ectopic Molar Multiple Live Births   1         2      # Outcome Date GA Lbr Koko/2nd Weight Sex Delivery Anes PTL Lv   7 Current            6 Term 13 39w2d  2.821 kg (6 lb 3.5 oz) F Vag-Spont EPI  ISHMAEL   5 2012 5w0d             Birth Comments: Pt. states no D/C needed.   4             3             2             1 Para                   Medications:  No current facility-administered medications on file prior to encounter.      Current Outpatient Prescriptions on File Prior to Encounter   Medication Sig Dispense Refill   • ondansetron (ZOFRAN ODT) 4 MG TABLET DISPERSIBLE Take 1 Tab by mouth every 6 hours as needed for Nausea. 10 Tab 0   • acetaminophen (TYLENOL) 500 MG  "Tab Take 500-1,000 mg by mouth every 6 hours as needed.     • Prenatal MV-Min-Fe Fum-FA-DHA (PRENATAL 1 PO) Take  by mouth.     • bisacodyl (DULCOLAX) 10 MG Suppos Insert 1 Suppository in rectum as needed (Constipation, if docusate sodium ineffective or not ordered, but not if 4th degree laceration). 30 Suppository 1       Allergies:  Nkda [no known drug allergy]    ROS:   Neuro: negative    Cardiovascular: negative  Gastro intestinal: negative  Genitourinary: negative            Physical Exam:  /75   Pulse 81   Temp 36.3 °C (97.3 °F) (Temporal)   Resp 16   Ht 1.651 m (5' 5\")   Wt 88.9 kg (196 lb)   LMP 07/31/2018 (Approximate)   BMI 32.62 kg/m²   Constitutional: healthy-appearing, Well-developed, well-nourished  HEENT: PERRLA, EOMI and Sclera clear, anicteric  Breast Exam: negative  Cardio: regular rate and rhythm, S1, S2 normal, no murmur, click, rub or gallop  Lung: unlabored respirations, no intercostal retractions or accessory muscle use  Abdomen: abdomen is soft without significant tenderness, masses, organomegaly or guarding  Extremity: extremities, peripheral pulses and reflexes normal, no edema, redness or tenderness in the calves or thighs    Cervical Exam: 100%  Cervix Dilatation: 6  Station: negative 1  Pelvis: Normal  Fetal Assessment: Fetal heart variability: moderate  Fetal Heart Rate accelerations: yes  Baseline FHR: 130 per minute  Uterine contractions: regular, every 3 minutes  Estimated Fetal Weight: 3000 - 3500g      Labs:      Prenatal Results     General (Most Recent Result)     Test Value Date Time    ABO B  10/16/18 0908    Rh POS  10/16/18 0908    Antibody screen NEG  10/16/18 0908    HbA1c       Gonorrhea       Chlamydia  by PCR       Chlamydia by DNA       RPR/Syphilus Non Reactive  10/16/18 0908    HSV 1/2 by PCR (non-serum)       HSV 1/2 (serum)       HSV 1       HSV 2       HPV (16)       HPV (18)       HPV (other)       HBsAg Negative  10/16/18 0908    HIV-1 HIV-2 " Antibodies Non Reactive  10/16/18 0908    Rubella >500.0 IU/mL 10/16/18 0908    Tb Negative  05/29/18 1000          Pap Smear (Most Recent Result)     Test Value Date Time    Pap smear       Pap smear w/HPV       Pap smear w/CTNG       Pap smar w/HPV CTNG       Pap smear (reflex HPV ACUS)       Pap smear (reflex HPV ASCUS w/CTNG)       Pathology gyn specimen             Urinalysis (Most Recent Result)     Test Value Date Time    Urinalysis       Urinalysis (culture if indicated)  Abnormal  (A)   (See Report)   10/16/18 0908    POC urinalysis  (See Report)   10/05/12 1335    Urine drug screen       Urine drug screen (w/o conf)       Urine culture ([id number])       Urine culture ([id number])             1st Trimester     Test Value Date Time    Hgb 15.4 g/dL 10/16/18 0908    Hct 44.3 % 10/16/18 0908    Fasting Glucose Tolerance       GTT, 1 hour       GTT, 2 hours       GTT, 3 hours             2nd Trimester     Test Value Date Time    Hgb       Hct       Urinalysis       Urine Culture       AST       ALT       Uric Acid       Fasting Glucose Tolerance       GTT, 1 hour       GTT, 2 hours       GTT, 3 hours       Urine Protein/Creatinine Ratio             3rd Trimester     Test Value Date Time    Hgb 13.0 g/dL 02/12/19 1939    Hct 39.7 % 02/12/19 1939    Platelet count 180 K/uL 02/12/19 1939    GBS (CORONA BROTH)       GBS (Direct)       Urinalysis       Urine Culture       Urine Drug Screen       Urine Protein/Creatinine Ratio             Congenital Disease Screening     Test Value Date Time    First Trimester Screen       Quad Screen       Sickle Cell       Thalassemia       [facility]       Cystic Fibrosis Carrier Study                     Assessment:  Gestational Age:  39w2d  Labor State:   Labor, Active  Risk Factors:   smoker  Pregnancy Complications: maternal smoking    Patient Active Problem List    Diagnosis Date Noted   • Amenorrhea 10/16/2018   • 11 weeks gestation of pregnancy 10/16/2018       Plan:   Patient  is a 27 y/o  at 39w2d who presented in active labor. PNL wnl, B+, GBS neg.   Admitted for: Active Labor  Anticipate   CBC  Antibiotics: Patient is GBS neg, no antibiotics required at this time.    Negra Carlson M.D.

## 2019-04-29 NOTE — ANESTHESIA PREPROCEDURE EVALUATION
@39.2, IUP, Agosto    Relevant Problems   No relevant active problems       Physical Exam    Airway   Mallampati: II  TM distance: >3 FB  Neck ROM: full       Cardiovascular - normal exam  Rhythm: regular  Rate: normal  (-) murmur     Dental - normal exam         Pulmonary - normal exam  Breath sounds clear to auscultation     Abdominal    Neurological - normal exam                 Anesthesia Plan    ASA 2       Plan - epidural   Neuraxial block will be labor analgesia              Pertinent diagnostic labs and testing reviewed    Informed Consent:    Anesthetic plan and risks discussed with patient.    Use of blood products discussed with: patient whom consented to blood products.

## 2019-04-29 NOTE — PROGRESS NOTES
: Report received from LAUREN Javier. POC discussed. EFM/TOCO placed, VSS  : Report called to [facility] resident- Dr Carlson. Admit orders received. IV started, labs drawn and sent.  : Dr Christina at bedside for epidural placement  : Pt BP dropped (see flowsheet), Ephedrine given (see MAR).  : Dr Carlson and Cherelle CNM at bedside, AROM- clear fluid  : SVE: C/+2, Dr watson called for delivery  :  of viable female infant, 8/9 apgars, placed skin-to-skin with mom.    Recovery: Firm/Light/ at U. Food ordered. Ibuprofen given (see MAR). Pt right leg/thigh still numb, unable to ambulate to BR. Pt transferred to  and transferred to PP S341. Report given to LAUREN Menard. POC discussed.

## 2019-04-29 NOTE — PROGRESS NOTES
1832 Pt here with complaints of bleeding when wiping after going to bathrom. Pt to triage #5 oriented to room and call system. External monitors placed. FHT;S 135 with Moderate variability noted. Accels present no decels noted. SVE 6cm 100%-1st.  Pt moved to D room 215, bedside report to [facility] pt plan of care discussed pt care assumed.  at bedside for support.

## 2019-04-29 NOTE — PROGRESS NOTES
"Name:   Lali Funes   Date/Time:  4/29/2019 7:32 AM  Gestational Age:  39w2d  Admit Date:   4/28/2019  Admitting Dx:   pregnancy  Pregnancy    PP day 1     Subjective:   Abdominal pain yes   Ambulating   yes  Tolerating PO  yes  Flatus    yes  Bleeding   yes  Voiding   yes   Dizziness   no  Breast feeding  yes  Breast tenderness  no    /60   Pulse 65   Temp 36.2 °C (97.2 °F) (Temporal)   Resp 18   Ht 1.651 m (5' 5\")   Wt 88.9 kg (196 lb)   SpO2 94%   Breast Exam: Tenderness .no, Engourgement .no, Mastitis .no  Abdomen soft, non-tender. BS normal. No masses,  No organomegaly  Fundus Tenderness:no, Below umbilicus:Yes  Perineumperineum intact  ExtremitiesNormal, no cyanosis, clubbing, no edema    Meds:   No current facility-administered medications on file prior to encounter.      Current Outpatient Prescriptions on File Prior to Encounter   Medication Sig Dispense Refill   • ondansetron (ZOFRAN ODT) 4 MG TABLET DISPERSIBLE Take 1 Tab by mouth every 6 hours as needed for Nausea. 10 Tab 0   • acetaminophen (TYLENOL) 500 MG Tab Take 500-1,000 mg by mouth every 6 hours as needed.     • Prenatal MV-Min-Fe Fum-FA-DHA (PRENATAL 1 PO) Take  by mouth.     • bisacodyl (DULCOLAX) 10 MG Suppos Insert 1 Suppository in rectum as needed (Constipation, if docusate sodium ineffective or not ordered, but not if 4th degree laceration). 30 Suppository 1       normal    Assessment and Plan  normal postpartum course PPD#1  Taking adequate diet and fluids, No heavy bleeding or foul vaginal discharge , Voiding without difficulty  Increased norco to 1-2 Q6H PRN.    Continue routine postpartum care, encourage ambulation, pain control, anticipate D/C home PPD#2    Negra Carlson M.D.  "

## 2019-04-29 NOTE — PROGRESS NOTES
0700 - Bedside report received from Sailaja ACUÑA RN. Patient care assumed. Chart and orders reviewed  0800 - Pt assessment complete, wnl. Fundus firm with minimal discharge. Pt ambulating to bathroom and voiding without difficulty. Patient denies any dizziness or lightheadedness at this time. Patient denies any calf pain or tenderness at this time.  Plan of care discussed with patient for the day including infant feeding every 2-3 hours or on demand, pain management, and ambulation in halls. Pain medication plan discussed with patient; patient requesting PRN pain medication ATC. All questions/concerns addressed at this time. Call light within reach, encouraged to call with needs. Will continue with routine postpartum cares.

## 2019-04-29 NOTE — CARE PLAN
Problem: Alteration in comfort related to episiotomy, vaginal repair and/or after birth pains  Goal: Patient is able to ambulate, care for self and infant  Outcome: PROGRESSING AS EXPECTED  Patient has demonstrated that she is able to ambulate and care for self and infant independently. Pain relief is met with PRN medication orders. Will continue to monitor with Q6 hour checks and patient rounding.  Goal: Patient verbalizes acceptable pain level  Outcome: PROGRESSING AS EXPECTED  Patient has verbalized acceptable pain level 1-3/10. Pain currently being managed with PRN medication orders. Will continue to monitor with Q6 hour checks and patient rounding.

## 2019-04-29 NOTE — ANESTHESIA TIME REPORT
Anesthesia Start and Stop Event Times     Date Time Event    4/28/2019 2000 Anesthesia Start     2231 Anesthesia Stop        Responsible Staff  04/28/19    Name Role Begin End    Jaguar Christina D.O. Anesth 2000 2231        Preop Diagnosis (Free Text):  Pre-op Diagnosis             Preop Diagnosis (Codes):    Post op Diagnosis  Delivery normal      Premium Reason  E. Weekend    Comments:

## 2019-04-29 NOTE — ANESTHESIA POSTPROCEDURE EVALUATION
Patient: Lali Funes    Procedure Summary     Date:  04/28/19 Room / Location:      Anesthesia Start:  2000 Anesthesia Stop:  2231    Procedure:  Labor Epidural Diagnosis:      Scheduled Providers:   Responsible Provider:  Jaguar Christina D.O.    Anesthesia Type:  epidural ASA Status:  2          Final Anesthesia Type: epidural  Last vitals  BP   Blood Pressure: 107/74    Temp   36.3 °C (97.3 °F)    Pulse   Pulse: 84   Resp   16    SpO2     96     Anesthesia Post Evaluation    Patient location during evaluation: floor  Patient participation: complete - patient participated  Level of consciousness: awake and alert  Pain score: 0    Airway patency: patent  Anesthetic complications: no  Cardiovascular status: hemodynamically stable  Respiratory status: acceptable  Hydration status: euvolemic    PONV: none

## 2019-04-29 NOTE — CARE PLAN
Problem: Infection  Goal: Will remain free from infection    Intervention: Assess signs and symptoms of infection  Pt afebrile, no other s/s of infection noted, will continue to monitor.      Problem: Pain  Goal: Alleviation of Pain or a reduction in pain to the patient's comfort goal    Intervention: Pain Management - Epidural/Spinal  Pt now with epidural for pain management, will continue to monitor. Pt educated on use of bolus button for breakthrough contraction pain.

## 2019-04-29 NOTE — ANESTHESIA PROCEDURE NOTES
Epidural Block  Performed by: ANGELA EVANGELISTA  Authorized by: ANGELA EVANGELISTA     Patient Location:  OB  Start Time:  4/28/2019 8:00 PM  End Time:  4/28/2019 8:10 PM  Reason for Block: labor analgesia    patient identified, IV checked, site marked, risks and benefits discussed, surgical consent, monitors and equipment checked, pre-op evaluation and timeout performed    Patient Position:  Sitting  Prep: ChloraPrep, patient draped and sterile technique    Monitoring:  Blood pressure, continuous pulse oximetry and heart rate  Approach:  Midline  Location:  L3-L4  Injection Technique:  OSVALDO air  Skin infiltration:  Lidocaine  Strength:  1%  Dose:  3ml  Needle Type:  Tuohy  Needle Gauge:  17 G  Needle Length:  3.5 in  Loss of resistance::  6  Catheter Size:  19 G  Catheter at Skin Depth:  11  Test Dose:  Lidocaine 1.5% with epinephrine 1-to-200,000  Test Dose Result:  Negative

## 2019-04-29 NOTE — LACTATION NOTE
This note was copied from a baby's chart.  Met with Mom/baby for help with breast feeding. Baby born at term gestation and is now almost 12 hrs old. This is Mom's 4th child. She nursed her other babies until she went back to work.     Had Mom try laid back nursing after trying to get baby to latch in cross-cradle hold. Baby is somewhat sleepy.  Placed baby on Mom's chest and Mom in laidback position.  Baby started to root around and led her close to the nipple. Explained the rationale for this way to latch since baby does a lot of tongue sucking and thrusting, hoping this will help loosen up baby's jaw and widen latch. Had several latches that started out deeply but baby's mouth ella up into a shallow painful latch for mother. Finally baby got a wide latch and nursed for over 10 min. Encouraged mom to continue to work on baby getting a wide latch, to nurse on cue but at least Q 3hrs, listen and watch for swallows, keep track of I&O. May need follow up.

## 2019-04-29 NOTE — L&D DELIVERY NOTE
VAGINAL DELIVERY PRODEDURE NOTE    PATIENT ID:  NAME:  Lali Funes  MRN:               [id number]  YOB: 1990    On 2019 at 22:31, this 28 y.o., now 39w2d , GBS neg female delivered via  under epidural anesthesia a viable male infant weighing 3345g with APGAR scores of 8 and 9 at one and five minutes. There was a single nuchal cord which was reduced and infant was bulb suctioned at delivery. Cord was doubly clamped, cut and infant handed to RN in attendance. An intact placenta was delivered spontaneously at 22:48 with 3 vessel cord. Upon vaginal exam, there was first degree labial laceration which was repaired using 2.0 vicryl in the usual sterile fashion. Estimated blood loss was 300cc. Patient will be transferred to postpartum in stable condition and infant to  nursery.    Negra Carlson M.D.    I attest that I was gowned, gloved and present for this second stage and delivery and agree with the documentation.     Sheri Lam CNM

## 2019-04-29 NOTE — PROGRESS NOTES
Pt arrived to S[address] via wheelchair with L&D RN. Report received from Misty. Assessment completed. Pt oriented to room, call light, infant security, emergency light, and unit routine. Encouraged to call for assistance.

## 2019-04-29 NOTE — ANESTHESIA QCDR
2019 [facility] Clinical Data Registry (for Quality Improvement)     Postoperative nausea/vomiting risk protocol (Adult = 18 yrs and Pediatric 3-17 yrs)- (430 and 463)  General inhalation anesthetic (NOT TIVA) with PONV risk factors: No  Provision of anti-emetic therapy with at least 2 different classes of agents: N/A  Patient DID NOT receive anti-emetic therapy and reason is documented in Medical Record: N/A    Multimodal Pain Management- (AQI59)  Patient undergoing Elective Surgery (i.e. Outpatient, or ASC, or Prescheduled Surgery prior to Hospital Admission): No  Use of Multimodal Pain Management, two or more drugs and/or interventions, NOT including systemic opioids: N/A  Exception: Documented allergy to multiple classes of analgesics: N/A    PACU assessment of acute postoperative pain prior to Anesthesia Care End- Applies to Patients Age = 18- (ABG7)  Initial PACU pain score is which of the following: < 7/10  Patient unable to report pain score: N/A    Post-anesthetic transfer of care checklist/protocol to PACU/ICU- (426 and 427)  Upon conclusion of case, patient transferred to which of the following locations: PACU/Non-ICU  Use of transfer checklist/protocol: Yes  Exclusion: Service Performed in Patient Hospital Room (and thus did not require transfer): N/A    PACU Reintubation- (AQI31)  General anesthesia requiring endotracheal intubation (ETT) along with subsequent extubation in OR or PACU: No  Required reintubation in the PACU: N/A  Extubation was a planned trial documented in the medical record prior to removal of the original airway device: N/A    Unplanned admission to ICU related to anesthesia service up through end of PACU care- (MD51)  Unplanned admission to ICU (not initially anticipated at anesthesia start time): No

## 2019-04-30 VITALS
TEMPERATURE: 97.2 F | OXYGEN SATURATION: 93 % | HEIGHT: 65 IN | DIASTOLIC BLOOD PRESSURE: 63 MMHG | BODY MASS INDEX: 32.65 KG/M2 | SYSTOLIC BLOOD PRESSURE: 117 MMHG | RESPIRATION RATE: 16 BRPM | WEIGHT: 196 LBS | HEART RATE: 60 BPM

## 2019-04-30 PROCEDURE — 700102 HCHG RX REV CODE 250 W/ 637 OVERRIDE(OP): Performed by: STUDENT IN AN ORGANIZED HEALTH CARE EDUCATION/TRAINING PROGRAM

## 2019-04-30 PROCEDURE — A9270 NON-COVERED ITEM OR SERVICE: HCPCS | Performed by: STUDENT IN AN ORGANIZED HEALTH CARE EDUCATION/TRAINING PROGRAM

## 2019-04-30 RX ORDER — IBUPROFEN 600 MG/1
600 TABLET ORAL EVERY 6 HOURS PRN
Qty: 60 TAB | Refills: 0 | Status: ON HOLD | OUTPATIENT
Start: 2019-04-30 | End: 2019-06-18

## 2019-04-30 RX ORDER — PSEUDOEPHEDRINE HCL 30 MG
100 TABLET ORAL 2 TIMES DAILY PRN
Qty: 60 CAP | Refills: 0 | Status: ON HOLD | OUTPATIENT
Start: 2019-04-30 | End: 2019-06-18

## 2019-04-30 RX ADMIN — IBUPROFEN 600 MG: 600 TABLET ORAL at 05:33

## 2019-04-30 RX ADMIN — Medication 1 TABLET: at 05:33

## 2019-04-30 RX ADMIN — HYDROCODONE BITARTRATE AND ACETAMINOPHEN 1 TABLET: 5; 325 TABLET ORAL at 05:33

## 2019-04-30 NOTE — PROGRESS NOTES
Patient assessment completed. Discussed pain management plan and patient requests pain medication as available. Patient denies dizziness and headaches; states she is voiding w/o difficulty. Reviewed plan of care, all questions answered, and rounding in place.

## 2019-04-30 NOTE — PROGRESS NOTES
0700 - Updates received from Zuleima HUNG RN. Patient care assumed. Chart and orders reviewed  0800 - Pt assessment complete, wnl. Fundus firm with minimal discharge. Pt ambulating to bathroom and voiding without difficulty. Patient denies any dizziness or lightheadedness at this time. Patient denies any calf pain or tenderness at this time. Reviewed use of bathroom emergency light. Plan of care discussed with patient for the day including infant feeding every 2-3 hours or on demand, pain management, and ambulation in halls. Pain medication plan discussed with patient; patient states she will call if PRN pain medication is wanted. All questions/concerns addressed at this time. Call light within reach, encouraged to call with needs. Will continue with routine postpartum cares.

## 2019-04-30 NOTE — CARE PLAN
Problem: Altered physiologic condition related to immediate post-delivery state and potential for bleeding/hemorrhage  Goal: Patient physiologically stable as evidenced by normal lochia, palpable uterine involution and vital signs within normal limits  Outcome: PROGRESSING AS EXPECTED  Patient is physiologically stable as evidenced by light lochia rubra, firm fundus one fingerbreadth below the umbilicus, and vital signs WDL. Will continue to monitor patient condition.     Problem: Alteration in comfort related to episiotomy, vaginal repair and/or after birth pains  Goal: Patient is able to ambulate, care for self and infant  Outcome: PROGRESSING AS EXPECTED  Discussed pain management and verbalization of pain utilizing the 0-10 pain scale. White board updated with times of pain medication availability and pt requests pain medication be provided as available. Discussed non-pharmacological pain control therapies and pt provided K Pad for low back pain and Tucks/lidocaine spray for perineal discomfort. Pt ambulates with a steady gait and demonstrates the ability to participate in ADLs and provide care for  son.

## 2019-04-30 NOTE — DISCHARGE INSTRUCTIONS
POSTPARTUM DISCHARGE INSTRUCTIONS FOR MOM    YOB: 1990   Age: 28 y.o.               Admit Date: 4/28/2019     Discharge Date: 4/30/2019  Attending Doctor:  Rebecca Hui                  Allergies:  Nkda [no known drug allergy]    Discharged to home by car. Discharged via wheelchair, hospital escort: Yes.  Special equipment needed: Not Applicable  Belongings with: Personal  Be sure to schedule a follow-up appointment with your primary care doctor or any specialists as instructed.     Discharge Plan:   Diet Plan: Discussed  Activity Level: Discussed  Smoking Cessation Offered: Patient Counseled  Confirmed Follow up Appointment: Patient to Call and Schedule Appointment  Confirmed Symptoms Management: Discussed  Medication Reconciliation Updated: Yes  Influenza Vaccine Indication: Not indicated: Previously immunized this influenza season and > 8 years of age (current)    REASONS TO CALL YOUR OBSTETRICIAN:  1.   Persistent fever or shaking chills (Temperature higher than 100.4)  2.   Heavy bleeding (soaking more than 1 pad per hour); Passing clots  3.   Foul odor from vagina  4.   Mastitis (Breast infection; breast pain, chills, fever, redness)  5.   Urinary pain, burning or frequency  6.   Episiotomy infection    8.   Severe depression longer than 24 hours    HAND WASHING  · Prior to handling the baby.  · Before breastfeeding or bottle feeding baby.  · After using the bathroom or changing the baby's diaper.      VAGINAL CARE  · Nothing inside vagina for 6 weeks: no sexual intercourse, tampons or douching.  · Bleeding may continue for 2-4 weeks.  Amount may vary.    · Call your physician for heavy bleeding which means soaking more than 1 pad per hour    BIRTH CONTROL  · It is possible to become pregnant at any time after delivery and while breastfeeding.  · Plan to discuss a method of birth control with your physician at your follow up visit. visit.    DIET AND ELIMINATION  · Eating more fiber (bran cereal,  "fruits, and vegetables) and drinking plenty of fluids will help to avoid constipation.  · Urinary frequency after childbirth is normal.    POSTPARTUM BLUES  During the first few days after birth, you may experience a sense of the \"blues\" which may include impatience, irritability or even crying.  These feeling come and go quickly.  However, as many as 1 in 10 women experience emotional symptoms known as postpartum depression.    Postpartum depression:  May start as early as the second or third day after delivery or take several weeks or months to develop.  Symptoms of \"blues\" are present, but are more intense:  Crying spells; loss of appetite; feelings of hopelessness or loss of control; fear of touching the baby; over concern or no concern at all about the baby; little or no concern about your own appearance/caring for yourself; and/or inability to sleep or excessive sleeping.  Contact your physician if you are experiencing any of these symptoms.    Crisis Hotline:  · [place] Crisis Hotline:  0-220-LQRDKRO  Or 1-611.308.9196  · [place] Crisis Hotline:  1-502.236.5188  Or 015-019-3545    PREVENTING SHAKEN BABY:  If you are angry or stressed, PUT THE BABY IN THE CRIB, step away, take some deep breaths, and wait until you are calm to care for the baby.  DO NOT SHAKE THE BABY.  You are not alone, call a supporter for help.    · Crisis Call Center 24/7 crisis line 197-802-2066 or 1-481.449.8363  · You can also text them, text \"ANSWER\" to 172492    QUIT SMOKING/TOBACCO USE:  I understand the use of any tobacco products increases my chance of suffering from future heart disease and could cause other illnesses which may shorten my life. Quitting the use of tobacco products is the single most important thing I can do to improve my health. For further information on smoking / tobacco cessation call a Toll Free Quit Line at 1-129.963.2202 (*[employer] [place]) or 1-534.882.3315 (American Lung Association) or you can " access the web based program at www.lungusa.org.    · [place] Tobacco Users Help Line:  (845) 862-3930       Toll Free: 1-612.270.1633  · Quit Tobacco Program [facility] (780)542-6656    DEPRESSION / SUICIDE RISK:  As you are discharged from this [facility], it is important to learn how to keep safe from harming yourself.    Recognize the warning signs:  · Abrupt changes in personality, positive or negative- including increase in energy   · Giving away possessions  · Change in eating patterns- significant weight changes-  positive or negative  · Change in sleeping patterns- unable to sleep or sleeping all the time   · Unwillingness or inability to communicate  · Depression  · Unusual sadness, discouragement and loneliness  · Talk of wanting to die  · Neglect of personal appearance   · Rebelliousness- reckless behavior  · Withdrawal from people/activities they love  · Confusion- inability to concentrate     If you or a loved one observes any of these behaviors or has concerns about self-harm, here's what you can do:  · Talk about it- your feelings and reasons for harming yourself  · Remove any means that you might use to hurt yourself (examples: pills, rope, extension cords, firearm)  · Get professional help from the community (Mental Health, Substance Abuse, psychological counseling)  · Do not be alone:Call your Safe Contact- someone whom you trust who will be there for you.  · Call your local CRISIS HOTLINE 157-3656 or 520-433-0357  · Call your local Children's Mobile Crisis Response Team [place] (217) 935-9210 or www.[employer]  · Call the toll free National Suicide Prevention Hotlines   · National Suicide Prevention Lifeline 355-000-OACO (3791)  · National Hope Line Network 800-SUICIDE (989-7168)    DISCHARGE SURVEY:  Thank you for choosing [facility].  We hope we provided you with very good care.  You may be receiving a survey in the mail.  Please fill it out.   Your opinion is valuable to us.    ADDITIONAL EDUCATIONAL MATERIALS GIVEN TO PATIENT:  Nothing in the vagina for 6 weeks, no intercourse for at least 6 weeks. Return to ED with any fever, vomiting that won't subside, abdominal pain, excessive uterine bleeding.      My signature on this form indicates that:  1.  I have reviewed and understand the above information  2.  My questions regarding this information have been answered to my satisfaction.  3.  I have formulated a plan with my discharge nurse to obtain my prescribed medication for home.

## 2019-04-30 NOTE — CARE PLAN
Problem: Altered physiologic condition related to immediate post-delivery state and potential for bleeding/hemorrhage  Goal: Patient physiologically stable as evidenced by normal lochia, palpable uterine involution and vital signs within normal limits  Outcome: PROGRESSING AS EXPECTED  Fundus is firm, lochia is light and vital signs are stable. Will continue to monitor with Q6 hour checks and patient rounding      Problem: Potential knowledge deficit related to lack of understanding of self and  care  Goal: Patient will demonstrate ability to care for self and infant  Outcome: PROGRESSING AS EXPECTED  Patient has demonstrated that she is able to ambulate and care for self and infant independently. Will continue to monitor with Q6 hour checks and patient rounding.

## 2019-04-30 NOTE — PROGRESS NOTES
Infant car seat checked and verified by this RN. Volunteer called to walk infant and family out.

## 2019-04-30 NOTE — CONSULTS
Follow up visit. KAMLA has infant latched to right side, in football hold. KAMLA does complain of soreness, but does feel better, in football hold. KAMLA has her personal pump at bedside. KAMLA states she has past difficulties with other children, and was never able to get full supply with both of them, so she had to pump and supplement with formula. KAMLA will continue to offer breast first, then supplement with formula, as needed, and may use her personal pump to help with stimulation. Discussed that we would support whatever her breastfeeding goals were. Encouraged to call for support as needed.

## 2019-04-30 NOTE — DISCHARGE SUMMARY
Discharge Summary:      Lali Funes      Admit Date:   2019  Discharge Date:  2019     Admitting diagnosis:  pregnancy  Pregnancy  Discharge Diagnosis: Status post vaginal, spontaneous.  Pregnancy Complications: maternal smoking  Tubal Ligation:  no        History:  Past Medical History:   Diagnosis Date   • Nausea/vomiting in pregnancy 10/5/2012     OB History    Para Term  AB Living   7 3 2   1 2   SAB TAB Ectopic Molar Multiple Live Births   1       0 3      # Outcome Date GA Lbr Koko/2nd Weight Sex Delivery Anes PTL Lv   7 Term 19 39w2d 01:05 / 00:16 3.345 kg (7 lb 6 oz) M Vag-Spont EPI N ISHMAEL   6 Term 13 39w2d  2.821 kg (6 lb 3.5 oz) F Vag-Spont EPI  ISHMAEL   5 SAB  5w0d             Birth Comments: Pt. states no D/C needed.   4             3             2             1 Para                    Nkda [no known drug allergy]  Patient Active Problem List    Diagnosis Date Noted   • Amenorrhea 10/16/2018   • 11 weeks gestation of pregnancy 10/16/2018        Hospital Course:   28 y.o. , now para 3, was admitted with the above mentioned diagnosis, underwent Active Labor, vaginal, spontaneous. Patient postpartum course was unremarkable, with progressive advancement in diet , ambulation and toleration of oral analgesia. Patient without complaints today and desires discharge.      Vitals:    19 0600 19 1000 19 1800 19 0600   BP: 111/60 (!) 95/68 102/50 117/63   Pulse: 65 60 60 60   Resp: 18 17 19 16   Temp: 36.2 °C (97.2 °F) 36.3 °C (97.4 °F) 36.4 °C (97.5 °F) 36.2 °C (97.2 °F)   TempSrc: Temporal Temporal Temporal Temporal   SpO2: 94% 96% 96% 93%   Weight:       Height:           Current Facility-Administered Medications   Medication Dose   • LR infusion     • PRN oxytocin (PITOCIN) (20 Units/1000 mL) PRN for excessive uterine bleeding - See Admin Instr  125-999 mL/hr   • miSOPROStol (CYTOTEC) tablet 600 mcg  600 mcg   •  methylergonovine (METHERGINE) injection 0.2 mg  0.2 mg   • docusate sodium (COLACE) capsule 100 mg  100 mg   • magnesium hydroxide (MILK OF MAGNESIA) suspension 30 mL  30 mL   • prenatal plus vitamin (STUARTNATAL 1+1) 27-1 MG tablet 1 Tab  1 Tab   • HYDROcodone-acetaminophen (NORCO) 5-325 MG per tablet 1-2 Tab  1-2 Tab   • oxyCODONE-acetaminophen (PERCOCET) 5-325 MG per tablet 1 Tab  1 Tab   • ondansetron (ZOFRAN ODT) dispertab 4 mg  4 mg    Or   • ondansetron (ZOFRAN) syringe/vial injection 4 mg  4 mg   • oxytocin (PITOCIN) infusion (for postpartum)   mL/hr   • ibuprofen (MOTRIN) tablet 600 mg  600 mg       Exam:  Breast Exam: negative  Abdomen: Abdomen soft, non-tender. BS normal. No masses,  No organomegaly  Fundus Non Tender: yes  Incision: none  Perineum: perineum intact  Extremity: extremities, peripheral pulses and reflexes normal, no edema, redness or tenderness in the calves or thighs, no ulcers, gangrene or trophic changes     Labs:  Recent Labs      04/28/19   1920  04/29/19   0646   WBC  11.6*  15.7*   RBC  4.45  4.03*   HEMOGLOBIN  14.0  12.7   HEMATOCRIT  41.0  37.0   MCV  92.1  91.8   MCH  31.5  31.5   MCHC  34.1  34.3   RDW  45.5  45.1   PLATELETCT  197  155*   MPV  10.5  10.5        Activity:   Discharge to home  Pelvic Rest x 6 weeks    Assessment:  normal postpartum course and good candidate for tubal ligation  Discharge Assessment: Voiding without difficulty, No heavy bleeding or foul vaginal discharge , No breast redness or severe pain of breast     Follow up: [facility] within 6 weeks   To resume daily PNV and iron supplement if needed with hydration.   Patient to RT clinic or ER if any of the following occur:  Fever over 100.5  Severe abdominal pain  Red streaks or painful masses in the breasts  Foul smelling discharge or lochia  Heavy vaginal bleeding saturating a pad per hour  S/s of PP depression     Discharge Meds:   Current Outpatient Prescriptions   Medication Sig Dispense  Refill   • ibuprofen (MOTRIN) 600 MG Tab Take 1 Tab by mouth every 6 hours as needed for Mild Pain (For cramping after delivery; do not give if patient is receiving ketorolac (Toradol)). 60 Tab 0   • docusate sodium 100 MG Cap Take 100 mg by mouth 2 times a day as needed for Constipation. 60 Cap 0       Negra Carlson M.D.

## 2019-05-16 ENCOUNTER — OFFICE VISIT (OUTPATIENT)
Dept: MEDICAL GROUP | Facility: PHYSICIAN GROUP | Age: 29
End: 2019-05-16
Payer: COMMERCIAL

## 2019-05-16 VITALS
HEIGHT: 65 IN | BODY MASS INDEX: 29.99 KG/M2 | HEART RATE: 81 BPM | TEMPERATURE: 97.9 F | DIASTOLIC BLOOD PRESSURE: 72 MMHG | SYSTOLIC BLOOD PRESSURE: 110 MMHG | OXYGEN SATURATION: 95 % | WEIGHT: 180 LBS

## 2019-05-16 DIAGNOSIS — M54.50 LOW BACK PAIN WITH RADIATION: ICD-10-CM

## 2019-05-16 DIAGNOSIS — N61.0 MASTITIS: ICD-10-CM

## 2019-05-16 PROCEDURE — 99214 OFFICE O/P EST MOD 30 MIN: CPT | Performed by: FAMILY MEDICINE

## 2019-05-16 RX ORDER — CEPHALEXIN 500 MG/1
500 CAPSULE ORAL 4 TIMES DAILY
Qty: 20 CAP | Refills: 0 | Status: SHIPPED | OUTPATIENT
Start: 2019-05-16 | End: 2019-06-17

## 2019-05-16 RX ORDER — ACETAMINOPHEN AND CODEINE PHOSPHATE 120; 12 MG/5ML; MG/5ML
1 SOLUTION ORAL DAILY
Qty: 28 TAB | Refills: 11 | Status: ON HOLD | OUTPATIENT
Start: 2019-05-16 | End: 2019-06-18

## 2019-05-16 ASSESSMENT — PAIN SCALES - GENERAL: PAINLEVEL: 6=MODERATE PAIN

## 2019-05-16 NOTE — PROGRESS NOTES
CC:Diagnoses of Mastitis and Low back pain with radiation were pertinent to this visit.      HISTORY OF PRESENT ILLNESS: Patient is a 28 y.o. female established patient who presents today to follow-up on low back pain and breast pain.      Low back pain with radiation  New problem.  Patient presents with low back pain radiating down her right leg.  States that she gets numbness and tingling on the top of her right thigh.  Ongoing for the past 2 weeks and unchanged in severity.  States that this started after the delivery of her son 2 weeks ago.  Her low back pain is mild in nature.  Dull in character with exacerbations of sharpness with certain positions or movements.    Mastitis  New problem to examiner.  Patient has had 3 to 4 days of worsening breast tenderness, redness, pain with lactation and breast-feeding.  She states that her breasts are more warm especially where there is redness.  Especially top part of her left nipple and lateral right breast.  She continues to breast-feed but finds that this redness, pain, and warmth are getting worse.  She has not tried any over-the-counter remedies at this point.      Patient Active Problem List    Diagnosis Date Noted   • Mastitis 05/16/2019   • Low back pain with radiation 05/16/2019   • Vaginal delivery 04/30/2019   • Amenorrhea 10/16/2018   • 11 weeks gestation of pregnancy 10/16/2018      Allergies:Nkda [no known drug allergy]    Current Outpatient Prescriptions   Medication Sig Dispense Refill   • cephALEXin (KEFLEX) 500 MG Cap Take 1 Cap by mouth 4 times a day. 20 Cap 0   • norethindrone (MICRONOR) 0.35 MG tablet Take 1 Tab by mouth every day. 28 Tab 11   • docusate sodium 100 MG Cap Take 100 mg by mouth 2 times a day as needed for Constipation. 60 Cap 0   • Prenatal MV-Min-Fe Fum-FA-DHA (PRENATAL 1 PO) Take  by mouth.     • ibuprofen (MOTRIN) 600 MG Tab Take 1 Tab by mouth every 6 hours as needed for Mild Pain (For cramping after delivery; do not give if  "patient is receiving ketorolac (Toradol)). 60 Tab 0   • ondansetron (ZOFRAN ODT) 4 MG TABLET DISPERSIBLE Take 1 Tab by mouth every 6 hours as needed for Nausea. 10 Tab 0     No current facility-administered medications for this visit.        Social History   Substance Use Topics   • Smoking status: Current Every Day Smoker     Packs/day: 0.50     Years: 8.00     Types: Cigarettes     Last attempt to quit: 2/22/2012   • Smokeless tobacco: Never Used      Comment: Pt. states smoking 2-3 a day.    • Alcohol use No     Social History     Social History Narrative   • No narrative on file       Family History   Problem Relation Age of Onset   • Cancer Mother 45        breast cancer, lymph node,   • Heart Attack Father    • Cancer Maternal Grandmother         breast cancer       Review of Systems:    Constitutional: No Fevers, Chills  Resp: No Shortness of breath  CV: No Chest pain  GI: No Nausea/Vomiting  MSK: No weakness  Skin: No rashes      Exam:    /72 (BP Location: Left arm, Patient Position: Sitting, BP Cuff Size: Adult)   Pulse 81   Temp 36.6 °C (97.9 °F)   Ht 1.651 m (5' 5\")   Wt 81.6 kg (180 lb)   SpO2 95%  Body mass index is 29.95 kg/m².    General:  Well nourished, well developed female in NAD  HENT: Atraumatic, normocephalic  EYES: Extraocular movements intact, pupils equal and reactive to light  NECK: Supple, FROM  CHEST: No deformities, Equal chest expansion.  Mild erythema on superior aspect of the areola at the 12 o'clock position on the left breast and tender to palpation in same area.  Small firm 2 cm fluctuant mass at the 8 o'clock position of the right breast that is tender to palpation with mild erythema noted near the area.  RESP: Unlabored, no stridor or audible wheeze  ABD: Soft, Nontender, Non-Distended  Extremities: No Clubbing, Cyanosis, or Edema  Skin: Warm/dry, without rashes  Neuro: A/O x 4, CN 2-12 Grossly intact, Motor  grossly intact  Psych: Normal behavior, normal " affect      Assessment/Plan:  1. Mastitis  New problem.  Prescription for Keflex 4 times daily x5 days.  Follow-up if not improving.  Counseled on warm compresses and massage to aid with milk letdown.    2. Low back pain with radiation  New problem to examiner.  Patient education materials provided for home physical therapy stretching and exercises to help with back pain.  Counseled on follow-up in 2 months if not improving counseled on follow-up immediately if worsening.  Patient may use OTC NSAIDs or Tylenol, also ice and heat may be used.    Please note that this dictation was created using voice recognition software. I have worked with consultants from the vendor as well as technical experts from [facility] to optimize the interface. I have made every reasonable attempt to correct obvious errors, but I expect that there are errors of grammar and possibly content that I did not discover before finalizing the note.

## 2019-05-16 NOTE — ASSESSMENT & PLAN NOTE
New problem.  Patient presents with low back pain radiating down her right leg.  States that she gets numbness and tingling on the top of her right thigh.  Ongoing for the past 2 weeks and unchanged in severity.  States that this started after the delivery of her son 2 weeks ago.  Her low back pain is mild in nature.  Dull in character with exacerbations of sharpness with certain positions or movements.

## 2019-05-16 NOTE — ASSESSMENT & PLAN NOTE
New problem to examiner.  Patient has had 3 to 4 days of worsening breast tenderness, redness, pain with lactation and breast-feeding.  She states that her breasts are more warm especially where there is redness.  Especially top part of her left nipple and lateral right breast.  She continues to breast-feed but finds that this redness, pain, and warmth are getting worse.  She has not tried any over-the-counter remedies at this point.

## 2019-06-05 DIAGNOSIS — N63.0 BREAST LUMP IN FEMALE: ICD-10-CM

## 2019-06-05 NOTE — TELEPHONE ENCOUNTER
Patient increasingly concerned about non-resolving lump in right breast previously diagnosed as mastitis that has not decreased in size since last evaluation 3 weeks ago.

## 2019-06-17 DIAGNOSIS — Z01.812 PRE-OPERATIVE LABORATORY EXAMINATION: ICD-10-CM

## 2019-06-17 LAB
ANION GAP SERPL CALC-SCNC: 6 MMOL/L (ref 0–11.9)
APPEARANCE UR: CLEAR
BASOPHILS # BLD AUTO: 0.7 % (ref 0–1.8)
BASOPHILS # BLD: 0.05 K/UL (ref 0–0.12)
BILIRUB UR QL STRIP.AUTO: NEGATIVE
BUN SERPL-MCNC: 15 MG/DL (ref 8–22)
CALCIUM SERPL-MCNC: 9.7 MG/DL (ref 8.5–10.5)
CHLORIDE SERPL-SCNC: 111 MMOL/L (ref 96–112)
CO2 SERPL-SCNC: 24 MMOL/L (ref 20–33)
COLOR UR: YELLOW
CREAT SERPL-MCNC: 0.74 MG/DL (ref 0.5–1.4)
EOSINOPHIL # BLD AUTO: 0.08 K/UL (ref 0–0.51)
EOSINOPHIL NFR BLD: 1.1 % (ref 0–6.9)
ERYTHROCYTE [DISTWIDTH] IN BLOOD BY AUTOMATED COUNT: 41.7 FL (ref 35.9–50)
GLUCOSE SERPL-MCNC: 89 MG/DL (ref 65–99)
GLUCOSE UR STRIP.AUTO-MCNC: NEGATIVE MG/DL
HCG SERPL QL: NEGATIVE
HCT VFR BLD AUTO: 48 % (ref 37–47)
HGB BLD-MCNC: 16.2 G/DL (ref 12–16)
IMM GRANULOCYTES # BLD AUTO: 0.01 K/UL (ref 0–0.11)
IMM GRANULOCYTES NFR BLD AUTO: 0.1 % (ref 0–0.9)
KETONES UR STRIP.AUTO-MCNC: NEGATIVE MG/DL
LEUKOCYTE ESTERASE UR QL STRIP.AUTO: NEGATIVE
LYMPHOCYTES # BLD AUTO: 2.33 K/UL (ref 1–4.8)
LYMPHOCYTES NFR BLD: 32 % (ref 22–41)
MCH RBC QN AUTO: 31 PG (ref 27–33)
MCHC RBC AUTO-ENTMCNC: 33.8 G/DL (ref 33.6–35)
MCV RBC AUTO: 91.8 FL (ref 81.4–97.8)
MICRO URNS: NORMAL
MONOCYTES # BLD AUTO: 0.39 K/UL (ref 0–0.85)
MONOCYTES NFR BLD AUTO: 5.4 % (ref 0–13.4)
NEUTROPHILS # BLD AUTO: 4.42 K/UL (ref 2–7.15)
NEUTROPHILS NFR BLD: 60.7 % (ref 44–72)
NITRITE UR QL STRIP.AUTO: NEGATIVE
NRBC # BLD AUTO: 0 K/UL
NRBC BLD-RTO: 0 /100 WBC
PH UR STRIP.AUTO: 5.5 [PH]
PLATELET # BLD AUTO: 213 K/UL (ref 164–446)
PMV BLD AUTO: 10.5 FL (ref 9–12.9)
POTASSIUM SERPL-SCNC: 4.2 MMOL/L (ref 3.6–5.5)
PROT UR QL STRIP: NEGATIVE MG/DL
RBC # BLD AUTO: 5.23 M/UL (ref 4.2–5.4)
RBC UR QL AUTO: NEGATIVE
SODIUM SERPL-SCNC: 141 MMOL/L (ref 135–145)
SP GR UR STRIP.AUTO: 1.02
UROBILINOGEN UR STRIP.AUTO-MCNC: 0.2 MG/DL
WBC # BLD AUTO: 7.3 K/UL (ref 4.8–10.8)

## 2019-06-17 PROCEDURE — 36415 COLL VENOUS BLD VENIPUNCTURE: CPT

## 2019-06-17 PROCEDURE — 81003 URINALYSIS AUTO W/O SCOPE: CPT

## 2019-06-17 PROCEDURE — 85025 COMPLETE CBC W/AUTO DIFF WBC: CPT

## 2019-06-17 PROCEDURE — 84703 CHORIONIC GONADOTROPIN ASSAY: CPT

## 2019-06-17 PROCEDURE — 80048 BASIC METABOLIC PNL TOTAL CA: CPT

## 2019-06-18 ENCOUNTER — ANESTHESIA (OUTPATIENT)
Dept: SURGERY | Facility: MEDICAL CENTER | Age: 29
End: 2019-06-18
Payer: COMMERCIAL

## 2019-06-18 ENCOUNTER — HOSPITAL ENCOUNTER (OUTPATIENT)
Facility: MEDICAL CENTER | Age: 29
End: 2019-06-18
Attending: OBSTETRICS & GYNECOLOGY | Admitting: OBSTETRICS & GYNECOLOGY
Payer: COMMERCIAL

## 2019-06-18 ENCOUNTER — ANESTHESIA EVENT (OUTPATIENT)
Dept: SURGERY | Facility: MEDICAL CENTER | Age: 29
End: 2019-06-18
Payer: COMMERCIAL

## 2019-06-18 VITALS
RESPIRATION RATE: 14 BRPM | OXYGEN SATURATION: 96 % | HEIGHT: 65 IN | WEIGHT: 176.81 LBS | TEMPERATURE: 97.7 F | HEART RATE: 51 BPM | BODY MASS INDEX: 29.46 KG/M2 | SYSTOLIC BLOOD PRESSURE: 99 MMHG | DIASTOLIC BLOOD PRESSURE: 56 MMHG

## 2019-06-18 DIAGNOSIS — G89.18 POST-OP PAIN: ICD-10-CM

## 2019-06-18 PROBLEM — Z30.2 ENCOUNTER FOR STERILIZATION: Status: ACTIVE | Noted: 2019-06-18

## 2019-06-18 LAB — PATHOLOGY CONSULT NOTE: NORMAL

## 2019-06-18 PROCEDURE — 160009 HCHG ANES TIME/MIN: Performed by: OBSTETRICS & GYNECOLOGY

## 2019-06-18 PROCEDURE — 501586 HCHG TROCAR, THRD SPIKE 5X55: Performed by: OBSTETRICS & GYNECOLOGY

## 2019-06-18 PROCEDURE — 501838 HCHG SUTURE GENERAL: Performed by: OBSTETRICS & GYNECOLOGY

## 2019-06-18 PROCEDURE — A9270 NON-COVERED ITEM OR SERVICE: HCPCS | Performed by: ANESTHESIOLOGY

## 2019-06-18 PROCEDURE — 700101 HCHG RX REV CODE 250: Performed by: ANESTHESIOLOGY

## 2019-06-18 PROCEDURE — 160048 HCHG OR STATISTICAL LEVEL 1-5: Performed by: OBSTETRICS & GYNECOLOGY

## 2019-06-18 PROCEDURE — 160046 HCHG PACU - 1ST 60 MINS PHASE II: Performed by: OBSTETRICS & GYNECOLOGY

## 2019-06-18 PROCEDURE — 700111 HCHG RX REV CODE 636 W/ 250 OVERRIDE (IP): Performed by: ANESTHESIOLOGY

## 2019-06-18 PROCEDURE — 160025 RECOVERY II MINUTES (STATS): Performed by: OBSTETRICS & GYNECOLOGY

## 2019-06-18 PROCEDURE — 700101 HCHG RX REV CODE 250

## 2019-06-18 PROCEDURE — 160029 HCHG SURGERY MINUTES - 1ST 30 MINS LEVEL 4: Performed by: OBSTETRICS & GYNECOLOGY

## 2019-06-18 PROCEDURE — 700102 HCHG RX REV CODE 250 W/ 637 OVERRIDE(OP): Performed by: ANESTHESIOLOGY

## 2019-06-18 PROCEDURE — 700105 HCHG RX REV CODE 258: Performed by: OBSTETRICS & GYNECOLOGY

## 2019-06-18 PROCEDURE — 160002 HCHG RECOVERY MINUTES (STAT): Performed by: OBSTETRICS & GYNECOLOGY

## 2019-06-18 PROCEDURE — 502704 HCHG DEVICE, LIGASURE IMPACT: Performed by: OBSTETRICS & GYNECOLOGY

## 2019-06-18 PROCEDURE — 88302 TISSUE EXAM BY PATHOLOGIST: CPT

## 2019-06-18 PROCEDURE — 160035 HCHG PACU - 1ST 60 MINS PHASE I: Performed by: OBSTETRICS & GYNECOLOGY

## 2019-06-18 PROCEDURE — 160047 HCHG PACU  - EA ADDL 30 MINS PHASE II: Performed by: OBSTETRICS & GYNECOLOGY

## 2019-06-18 PROCEDURE — 160041 HCHG SURGERY MINUTES - EA ADDL 1 MIN LEVEL 4: Performed by: OBSTETRICS & GYNECOLOGY

## 2019-06-18 PROCEDURE — 500886 HCHG PACK, LAPAROSCOPY: Performed by: OBSTETRICS & GYNECOLOGY

## 2019-06-18 RX ORDER — OXYCODONE HCL 5 MG/5 ML
10 SOLUTION, ORAL ORAL
Status: COMPLETED | OUTPATIENT
Start: 2019-06-18 | End: 2019-06-18

## 2019-06-18 RX ORDER — HYDROMORPHONE HYDROCHLORIDE 1 MG/ML
0.4 INJECTION, SOLUTION INTRAMUSCULAR; INTRAVENOUS; SUBCUTANEOUS
Status: DISCONTINUED | OUTPATIENT
Start: 2019-06-18 | End: 2019-06-18 | Stop reason: HOSPADM

## 2019-06-18 RX ORDER — HYDRALAZINE HYDROCHLORIDE 20 MG/ML
5 INJECTION INTRAMUSCULAR; INTRAVENOUS
Status: DISCONTINUED | OUTPATIENT
Start: 2019-06-18 | End: 2019-06-18 | Stop reason: HOSPADM

## 2019-06-18 RX ORDER — DEXAMETHASONE SODIUM PHOSPHATE 4 MG/ML
INJECTION, SOLUTION INTRA-ARTICULAR; INTRALESIONAL; INTRAMUSCULAR; INTRAVENOUS; SOFT TISSUE PRN
Status: DISCONTINUED | OUTPATIENT
Start: 2019-06-18 | End: 2019-06-18 | Stop reason: SURG

## 2019-06-18 RX ORDER — SODIUM CHLORIDE, SODIUM LACTATE, POTASSIUM CHLORIDE, CALCIUM CHLORIDE 600; 310; 30; 20 MG/100ML; MG/100ML; MG/100ML; MG/100ML
INJECTION, SOLUTION INTRAVENOUS CONTINUOUS
Status: DISCONTINUED | OUTPATIENT
Start: 2019-06-18 | End: 2019-06-18 | Stop reason: HOSPADM

## 2019-06-18 RX ORDER — CEFAZOLIN SODIUM 1 G/3ML
INJECTION, POWDER, FOR SOLUTION INTRAMUSCULAR; INTRAVENOUS PRN
Status: DISCONTINUED | OUTPATIENT
Start: 2019-06-18 | End: 2019-06-18 | Stop reason: SURG

## 2019-06-18 RX ORDER — DIPHENHYDRAMINE HYDROCHLORIDE 50 MG/ML
12.5 INJECTION INTRAMUSCULAR; INTRAVENOUS
Status: DISCONTINUED | OUTPATIENT
Start: 2019-06-18 | End: 2019-06-18 | Stop reason: HOSPADM

## 2019-06-18 RX ORDER — LORAZEPAM 2 MG/ML
0.5 INJECTION INTRAMUSCULAR
Status: DISCONTINUED | OUTPATIENT
Start: 2019-06-18 | End: 2019-06-18 | Stop reason: HOSPADM

## 2019-06-18 RX ORDER — LABETALOL HYDROCHLORIDE 5 MG/ML
5 INJECTION, SOLUTION INTRAVENOUS
Status: DISCONTINUED | OUTPATIENT
Start: 2019-06-18 | End: 2019-06-18 | Stop reason: HOSPADM

## 2019-06-18 RX ORDER — BUPIVACAINE HYDROCHLORIDE AND EPINEPHRINE 2.5; 5 MG/ML; UG/ML
INJECTION, SOLUTION EPIDURAL; INFILTRATION; INTRACAUDAL; PERINEURAL
Status: DISCONTINUED
Start: 2019-06-18 | End: 2019-06-18 | Stop reason: HOSPADM

## 2019-06-18 RX ORDER — OXYCODONE HYDROCHLORIDE 5 MG/1
5 TABLET ORAL EVERY 4 HOURS PRN
Status: DISCONTINUED | OUTPATIENT
Start: 2019-06-18 | End: 2019-06-18 | Stop reason: HOSPADM

## 2019-06-18 RX ORDER — HYDROMORPHONE HYDROCHLORIDE 1 MG/ML
0.2 INJECTION, SOLUTION INTRAMUSCULAR; INTRAVENOUS; SUBCUTANEOUS
Status: DISCONTINUED | OUTPATIENT
Start: 2019-06-18 | End: 2019-06-18 | Stop reason: HOSPADM

## 2019-06-18 RX ORDER — HALOPERIDOL 5 MG/ML
1 INJECTION INTRAMUSCULAR
Status: DISCONTINUED | OUTPATIENT
Start: 2019-06-18 | End: 2019-06-18 | Stop reason: HOSPADM

## 2019-06-18 RX ORDER — IBUPROFEN 600 MG/1
600 TABLET ORAL EVERY 6 HOURS PRN
Status: DISCONTINUED | OUTPATIENT
Start: 2019-06-18 | End: 2019-06-18 | Stop reason: HOSPADM

## 2019-06-18 RX ORDER — MEPERIDINE HYDROCHLORIDE 25 MG/ML
25 INJECTION INTRAMUSCULAR; INTRAVENOUS; SUBCUTANEOUS
Status: DISCONTINUED | OUTPATIENT
Start: 2019-06-18 | End: 2019-06-18 | Stop reason: HOSPADM

## 2019-06-18 RX ORDER — ONDANSETRON 2 MG/ML
4 INJECTION INTRAMUSCULAR; INTRAVENOUS
Status: COMPLETED | OUTPATIENT
Start: 2019-06-18 | End: 2019-06-18

## 2019-06-18 RX ORDER — CELECOXIB 200 MG/1
400 CAPSULE ORAL ONCE
Status: COMPLETED | OUTPATIENT
Start: 2019-06-18 | End: 2019-06-18

## 2019-06-18 RX ORDER — ACETAMINOPHEN 500 MG
1000 TABLET ORAL ONCE
Status: COMPLETED | OUTPATIENT
Start: 2019-06-18 | End: 2019-06-18

## 2019-06-18 RX ORDER — PROMETHAZINE HYDROCHLORIDE 25 MG/1
25 SUPPOSITORY RECTAL EVERY 4 HOURS PRN
Status: DISCONTINUED | OUTPATIENT
Start: 2019-06-18 | End: 2019-06-18 | Stop reason: HOSPADM

## 2019-06-18 RX ORDER — IBUPROFEN 600 MG/1
600 TABLET ORAL EVERY 6 HOURS PRN
Qty: 30 TAB | Refills: 1 | Status: SHIPPED | OUTPATIENT
Start: 2019-06-18 | End: 2019-08-22

## 2019-06-18 RX ORDER — OXYCODONE HYDROCHLORIDE 5 MG/1
5 TABLET ORAL EVERY 4 HOURS PRN
Qty: 15 TAB | Refills: 0 | Status: SHIPPED | OUTPATIENT
Start: 2019-06-18 | End: 2019-06-21

## 2019-06-18 RX ORDER — OXYCODONE HCL 5 MG/5 ML
5 SOLUTION, ORAL ORAL
Status: COMPLETED | OUTPATIENT
Start: 2019-06-18 | End: 2019-06-18

## 2019-06-18 RX ORDER — BUPIVACAINE HYDROCHLORIDE AND EPINEPHRINE 2.5; 5 MG/ML; UG/ML
INJECTION, SOLUTION EPIDURAL; INFILTRATION; INTRACAUDAL; PERINEURAL
Status: DISCONTINUED | OUTPATIENT
Start: 2019-06-18 | End: 2019-06-18 | Stop reason: HOSPADM

## 2019-06-18 RX ORDER — HYDROMORPHONE HYDROCHLORIDE 1 MG/ML
0.1 INJECTION, SOLUTION INTRAMUSCULAR; INTRAVENOUS; SUBCUTANEOUS
Status: DISCONTINUED | OUTPATIENT
Start: 2019-06-18 | End: 2019-06-18 | Stop reason: HOSPADM

## 2019-06-18 RX ORDER — ONDANSETRON 2 MG/ML
INJECTION INTRAMUSCULAR; INTRAVENOUS PRN
Status: DISCONTINUED | OUTPATIENT
Start: 2019-06-18 | End: 2019-06-18 | Stop reason: SURG

## 2019-06-18 RX ADMIN — FENTANYL CITRATE 50 MCG: 50 INJECTION, SOLUTION INTRAMUSCULAR; INTRAVENOUS at 08:41

## 2019-06-18 RX ADMIN — ONDANSETRON 4 MG: 2 INJECTION INTRAMUSCULAR; INTRAVENOUS at 08:41

## 2019-06-18 RX ADMIN — SUGAMMADEX 200 MG: 100 INJECTION, SOLUTION INTRAVENOUS at 09:21

## 2019-06-18 RX ADMIN — ONDANSETRON 4 MG: 2 INJECTION INTRAMUSCULAR; INTRAVENOUS at 10:26

## 2019-06-18 RX ADMIN — FENTANYL CITRATE 50 MCG: 50 INJECTION INTRAMUSCULAR; INTRAVENOUS at 09:54

## 2019-06-18 RX ADMIN — PROPOFOL 200 MG: 10 INJECTION, EMULSION INTRAVENOUS at 08:24

## 2019-06-18 RX ADMIN — SODIUM CHLORIDE, POTASSIUM CHLORIDE, SODIUM LACTATE AND CALCIUM CHLORIDE: 600; 310; 30; 20 INJECTION, SOLUTION INTRAVENOUS at 08:20

## 2019-06-18 RX ADMIN — FENTANYL CITRATE 50 MCG: 50 INJECTION INTRAMUSCULAR; INTRAVENOUS at 10:15

## 2019-06-18 RX ADMIN — OXYCODONE HYDROCHLORIDE 10 MG: 5 SOLUTION ORAL at 09:36

## 2019-06-18 RX ADMIN — MIDAZOLAM HYDROCHLORIDE 2 MG: 1 INJECTION, SOLUTION INTRAMUSCULAR; INTRAVENOUS at 08:20

## 2019-06-18 RX ADMIN — FENTANYL CITRATE 100 MCG: 50 INJECTION, SOLUTION INTRAMUSCULAR; INTRAVENOUS at 08:24

## 2019-06-18 RX ADMIN — CELECOXIB 400 MG: 200 CAPSULE ORAL at 07:33

## 2019-06-18 RX ADMIN — ROCURONIUM BROMIDE 50 MG: 10 INJECTION, SOLUTION INTRAVENOUS at 08:24

## 2019-06-18 RX ADMIN — SODIUM CHLORIDE, POTASSIUM CHLORIDE, SODIUM LACTATE AND CALCIUM CHLORIDE 1000 ML: 600; 310; 30; 20 INJECTION, SOLUTION INTRAVENOUS at 07:15

## 2019-06-18 RX ADMIN — LIDOCAINE HYDROCHLORIDE 80 MG: 20 INJECTION, SOLUTION INFILTRATION; PERINEURAL at 08:24

## 2019-06-18 RX ADMIN — DEXAMETHASONE SODIUM PHOSPHATE 8 MG: 4 INJECTION, SOLUTION INTRA-ARTICULAR; INTRALESIONAL; INTRAMUSCULAR; INTRAVENOUS; SOFT TISSUE at 08:41

## 2019-06-18 RX ADMIN — ACETAMINOPHEN 1000 MG: 500 TABLET ORAL at 07:33

## 2019-06-18 RX ADMIN — FENTANYL CITRATE 50 MCG: 50 INJECTION, SOLUTION INTRAMUSCULAR; INTRAVENOUS at 09:21

## 2019-06-18 RX ADMIN — FENTANYL CITRATE 50 MCG: 50 INJECTION, SOLUTION INTRAMUSCULAR; INTRAVENOUS at 08:59

## 2019-06-18 RX ADMIN — CEFAZOLIN 2 G: 330 INJECTION, POWDER, FOR SOLUTION INTRAMUSCULAR; INTRAVENOUS at 08:20

## 2019-06-18 RX ADMIN — EPHEDRINE SULFATE 10 MG: 50 INJECTION INTRAVENOUS at 10:54

## 2019-06-18 ASSESSMENT — PAIN SCALES - GENERAL: PAIN_LEVEL: 4

## 2019-06-18 NOTE — ANESTHESIA POSTPROCEDURE EVALUATION
Patient: Lali Funes    Procedure Summary     Date:  06/18/19 Room / Location:  [facility] ROOM 21 / SURGERY SAME DAY [facility]    Anesthesia Start:  0820 Anesthesia Stop:  0934    Procedures:       PELVISCOPY (Abdomen)      SALPINGECTOMY (Bilateral Abdomen) Diagnosis:  (UNDESIRED FERITILTY)    Surgeon:  Pilar Vuong M.D. Responsible Provider:  Jose Gallo M.D.    Anesthesia Type:  general ASA Status:  1          Final Anesthesia Type: general  Last vitals  BP   Blood Pressure: (!) 99/56, NIBP: (!) 85/47    Temp   36.5 °C (97.7 °F)    Pulse   Pulse: 74, Heart Rate (Monitored): 70   Resp   (!) 30    SpO2   95 %      Anesthesia Post Evaluation    Patient location during evaluation: PACU  Patient participation: complete - patient participated  Level of consciousness: awake and alert  Pain score: 4    Airway patency: patent  Anesthetic complications: no  Cardiovascular status: hemodynamically stable  Respiratory status: acceptable  Hydration status: euvolemic    PONV: none           Nurse Pain Score: 0 (NPRS)

## 2019-06-18 NOTE — H&P
DATE OF PROCEDURE:  2019    IDENTIFICATION:  This is a 28-year-old  female  7, para   3-0-4-3, who is 7-1/2 weeks status post normal spontaneous vaginal delivery.    CHIEF COMPLAINT:  Desires permanent sterilization.    HISTORY OF PRESENT ILLNESS:  The patient with her prenatal and routine care at   the [facility].    She was referred to me for desire for permanent sterilization.  She was seen   on  by me for preop surgical counseling and was followed by family   medicine.  She had previously signed sterilization consents with [facility] and re-signed laparoscopic bilateral salpingectomy consents with me   in the office on the .  She is sure she wants no further children in   regardless of what happens with any social situation, fathers of the baby,   etc.  She understands this is permanent nonreversible procedure.    OBSTETRICAL HISTORY:  Significant for 3 normal spontaneous vaginal deliveries   and 4 spontaneous miscarriages.    GYNECOLOGIC HISTORY:  Benign with no history of abnormal Pap, sexually   transmitted diseases or herpes simplex virus.    PAST MEDICAL HISTORY:  ILLNESSES:  None.    SURGERIES:  None.    ALLERGIES:  No known drug allergies.    CURRENT MEDICATIONS:  Oral contraceptive pills (given by Dr. Crews at [facility]), but she has not taken these in 3 days.    SOCIAL HISTORY:  She denies alcohol, tobacco or drug use.  She is currently   grieving, her sister  suddenly approximately 1 month ago.  The patient was   given Zoloft prescription by primary care physician, but she has not needed   it or taking it.    FAMILY MEDICAL HISTORY:  Negative and noncontributory for anesthetic   reactions.    REVIEW OF SYSTEMS:  Completely negative for full 10 review of systems.    PHYSICAL EXAMINATION:  VITAL SIGNS:  She is afebrile, vital signs stable.  LUNGS:  Clear.  HEART:  Regular rate and  rhythm.  ABDOMEN:  Soft.  No CVA or suprapubic tenderness.  PELVIC:  Pelvic exam in the office documented by family medicine on 06/11.    External genitalia, Bartholin, urethral, and [place] glands within normal   limits.  Vagina is pink and moist without gross lesions.  Cervix is parous,   but closed.  No cervical motion tenderness.  Uterus is normal size, shape,   contour, mobile, nontender and no adnexal masses palpated per their   documentation.  EXTREMITIES:  Show no clubbing, cyanosis, or edema.    DISCUSSION:  Thorough discussion with the patient concerning her desire for   permanent sterilization.  She understands this is a permanent nonreversible   procedure and that she can no longer spontaneously conceive.  The only way to   carry a pregnancy in the future would be through in vitro fertilization.  She   understands the different options for types of tubal sterilization.  We   discussed tubal ligation, tubal clips, long-acting reversible contraceptions   and salpingectomy and she desires to proceed with salpingectomy.  Therefore,   the specific surgical risks, benefits, and alternatives discussed in detail   including the risks of anesthesia, which include the risk of death, risk of   injury to other intra-abdominal pelvic organs including but not limited to   bowel, bladder, blood vessels, ureters and nerves all requiring further   surgical repair; if delayed diagnosis, possible permanent organ damage, risk   of hemorrhage requiring transfusion, she verbally consents to blood   transfusion in emergent situation, risk of bladder and ureteral injuries   requiring repair up to and including permanent catheterization reimplantation,   risk of bowel injury requiring repair up to and including permanent   colostomy, risk of nerve damage causing chronic pain syndromes and loss of   limb mobility.  She understands she will have abdominal incisions,   postoperative complications including wound separation, wound  infection, wound   dehiscence, thromboembolic events and pneumonia discussed in detail, risks of   tubal sterilization failure, ectopic pregnancy and the inherent grave risks   of these.  All of her questions were answered.  She has been appropriately   consented both by the family medicine department and by me on the 05/14 and   again this morning and she would like to proceed as scheduled.    ASSESSMENT:  1.  Multiparity.  2.  Desires permanent sterilization.    PLAN:  The patient was consented, prepared and scheduled for laparoscopic   bilateral salpingectomy today, 06/18/2019.       ____________________________________     MD KAREN Patel / CELY    DD:  06/18/2019 08:27:14  DT:  06/18/2019 08:55:53    D#:  [id number]  Job#:  [id number]

## 2019-06-18 NOTE — OP REPORT
DATE OF SERVICE:  06/18/2019    PREOPERATIVE DIAGNOSES:  1.  Multiparity.  2.  Desires permanent sterilization.    POSTOPERATIVE DIAGNOSES:  1.  Multiparity.  2.  Desires permanent sterilization.    PROCEDURE:  Laparoscopic bilateral salpingectomy.    SURGEON:  Pilar Vuong MD    ANESTHESIOLOGIST:  Jose Gallo MD    ANESTHESIA:  General endotracheal anesthesia.    COMPLICATIONS:  None.    ESTIMATED BLOOD LOSS:  Minimum.    SPECIMENS TO PATHOLOGY:  Bilateral fallopian tubes.    DESCRIPTION OF PROCEDURE IN DETAIL:  After proper consent was obtained,   patient was taken to the operating room where general anesthesia was obtained   by Dr. Gallo without difficulty.  She was placed in a dorsal lithotomy   position in the [facility].  Exam under anesthesia reveals parous, but   closed cervix, mobile.  No adnexal masses identified.  She was then prepped in   normal sterile fashion, straight catheterized of clear urine, and draped in a   normal sterile fashion.  At this time, a bivalve speculum was placed in the   vaginal vault.  Anterior lip of the cervix grasped with single-tooth   tenaculum.  Uterus was sounded to 8.5 cm.  A Scopis manipulator was inserted to   provide a mean to manipulate the uterus.  Single-tooth tenaculum removed,   bivalve speculum removed, legs lowered, gloves changed.  Attention was turned   to the infraumbilical area, which was injected with local anesthetic and an   incision was made infraumbilically with the use of S retractors.  This   incision was carried down to the underlying layer of fascia.  The fascia was   grasped with Kochers, elevated and incised in the midline, and the lateral   aspects of the fascial incision were tagged with 0 Vicryl suture tag.  S   retractors replaced.  Peritoneum was identified and entered bluntly.  S   retractors replaced and Jane cannula was inserted under direct visualization   and held in place with balloon.  The instruments were removed  with the   exception of the Jane cannula.  At this time, the camera was inserted   through the operative port.  Correct placement was confirmed.  She was then   placed in Trendelenburg position.  Please note, prior to camera insertion, the   pneumoperitoneum was obtained with CO2 gas.  Camera inserted, correct   placement confirmed.  Pelvis examined.  Manipulating the uterus, uterus   appears normal.  Bilateral tubes and ovaries appear normal.  No evidence of   any pelvic pathology.  At this time, the suprapubic incision was made, a stab   incision with the scalpel after injection with local anesthetic and a 5 Apple   trocar was inserted under direct visualization.  Graspers placed through the   suprapubic port.  The uterus was manipulated to visualize the left fallopian   tube, which was grasped with a grasper at the fimbriated end, elevated, and   using the LigaSure through the operative port, the tube and fimbria were   cauterized and transected serially through the mesosalpinx up to the level of   the cornua, transected, excised and removed through the suprapubic port.  The   area all appeared hemostatic.  The tube that was removed was manually   visualized and includes the full length of the tube with the fimbriated end.    Attention was then turned to the right side, which in a similar fashion, the   fallopian tube was identified, followed out to the fimbriated end, grasped   with a grasper, elevated and completely excised with cautery and ligation.    The right fallopian tube again was completely removed up to the level of the   cornua and it was removed through the suprapubic port.  The area was   visualized and it was hemostatic.  The right tube was again visualized and was   intact with fimbriated ends.  All areas were hemostatic and at this time, the   suprapubic port was removed under direct visualization and was hemostatic.    The pneumoperitoneum was released and all instruments and trocars removed.     Prior to removal of the trocar and camera, all areas of the pelvis and abdomen   were visualized and there were no gross abnormalities identified.  All   instruments and trocars removed.  The previously placed 0 Vicryl was used to   close the fascia in the infraumbilical incision along with a second 0 Vicryl   and manual palpation confirms through an adequate closure the fascial layer   and the skin incisions were closed in subcuticular fashion with 4-0 Vicryl   suture.  Attention was returned to the vagina where the Hulka manipulator was   removed, bivalve speculum replaced.  Cervix was visualized and was hemostatic.    All instruments and sponges removed and the procedure was ended.  Sponge,   lap, needle, and instrument counts were correct, and the patient was taken to   the recovery room, awake and in stable condition.  She will be discharged home   after day surgery criteria met with routine postoperative instructions,   prescriptions, and followup.       ____________________________________     MD KAREN Patle / CELY    DD:  06/18/2019 09:29:20  DT:  06/18/2019 10:55:45    D#:  [id number]  Job#:  [id number]

## 2019-06-18 NOTE — ANESTHESIA TIME REPORT
Anesthesia Start and Stop Event Times     Date Time Event    6/18/2019 0820 Anesthesia Start     0934 Anesthesia Stop        Responsible Staff  06/18/19    Name Role Begin End    Jose Gallo M.D. Anesth 0820 0934        Preop Diagnosis (Free Text):  Pre-op Diagnosis     UNDESIRED FERITILTY        Preop Diagnosis (Codes):  Diagnosis Information     Diagnosis Code(s):         Post op Diagnosis  Female sterility      Premium Reason  Non-Premium    Comments:

## 2019-06-18 NOTE — ANESTHESIA PROCEDURE NOTES
Airway  Date/Time: 6/18/2019 8:26 AM  Performed by: JOSUE CISNEROS  Authorized by: JOSUE CISNEROS     Location:  OR  Urgency:  Elective  Indications for Airway Management:  Anesthesia  Spontaneous Ventilation: absent    Sedation Level:  Deep  Preoxygenated: Yes    Patient Position:  Sniffing  Final Airway Type:  Endotracheal airway  Final Endotracheal Airway:  ETT  Cuffed: Yes    Technique Used for Successful ETT Placement:  Direct laryngoscopy  Insertion Site:  Oral  Blade Type:  Anber  Laryngoscope Blade/Videolaryngoscope Blade Size:  3  ETT Size (mm):  7.0  Measured from:  Teeth  ETT to Teeth (cm):  21  Placement Verified by: auscultation and capnometry    Cormack-Lehane Classification:  Grade I - full view of glottis  Number of Attempts at Approach:  1

## 2019-06-18 NOTE — OR SURGEON
Immediate Post OP Note    PreOp Diagnosis: multiparity; desires permanent sterilziation    PostOp Diagnosis: same    Procedure(s):  PELVISCOPY - Wound Class: Clean Contaminated  BILATERAL SALPINGECTOMY - Wound Class: Clean Contaminated    Surgeon(s):  Pilar Vuong M.D.    Anesthesiologist/Type of Anesthesia:  Anesthesiologist: Jose Gallo M.D./General    Surgical Staff:  Circulator: Keysha Vo R.N.  Scrub Person: Serjio Maravilla    Specimens removed if any:  ID Type Source Tests Collected by Time Destination   A : bilateral fallopian tubes Tissue Fallopian Tube PATHOLOGY SPECIMEN Pilar Vuong M.D. 6/18/2019  7:52 AM        Estimated Blood Loss: minimal    Findings: normal pelvis    Complications: none    Pathology: bilateral tubes    6/18/2019 9:23 AM Pilar Vuong M.D.    
74

## 2019-06-18 NOTE — ANESTHESIA QCDR
2019 [facility] Clinical Data Registry (for Quality Improvement)     Postoperative nausea/vomiting risk protocol (Adult = 18 yrs and Pediatric 3-17 yrs)- (430 and 463)  General inhalation anesthetic (NOT TIVA) with PONV risk factors: Yes  Provision of anti-emetic therapy with at least 2 different classes of agents: Yes   Patient DID NOT receive anti-emetic therapy and reason is documented in Medical Record:  N/A    Multimodal Pain Management- (AQI59)  Patient undergoing Elective Surgery (i.e. Outpatient, or ASC, or Prescheduled Surgery prior to Hospital Admission): Yes  Use of Multimodal Pain Management, two or more drugs and/or interventions, NOT including systemic opioids: Yes   Exception: Documented allergy to multiple classes of analgesics:  N/A    PACU assessment of acute postoperative pain prior to Anesthesia Care End- Applies to Patients Age = 18- (ABG7)  Initial PACU pain score is which of the following: < 7/10  Patient unable to report pain score: N/A    Post-anesthetic transfer of care checklist/protocol to PACU/ICU- (426 and 427)  Upon conclusion of case, patient transferred to which of the following locations: PACU/Non-ICU  Use of transfer checklist/protocol: Yes  Exclusion: Service Performed in Patient Hospital Room (and thus did not require transfer): N/A    PACU Reintubation- (AQI31)  General anesthesia requiring endotracheal intubation (ETT) along with subsequent extubation in OR or PACU: Yes  Required reintubation in the PACU: No   Extubation was a planned trial documented in the medical record prior to removal of the original airway device:  N/A    Unplanned admission to ICU related to anesthesia service up through end of PACU care- (MD51)  Unplanned admission to ICU (not initially anticipated at anesthesia start time): No

## 2019-06-18 NOTE — OR NURSING
0927  Pt arrived to PACU from OR awake and crying.  VS stable.  x2 incisions with bandages C,D&I    0930  Dr Gallo giving 50mcg fentanyl    0936  Pt given 10mg oxycodone    0945  Pt states pain is tolerable.  Asking for coffee and sister.  Dr Vuong at bs.    0950  Sister at bs.  VS stable on 1L NC.  Ice pack given.    0954  Pt given 50mcg fentanyl for pain.    1015  Pt given 50mcg fentanyl for pain.    1045  Zofran given for nausea.    1054  PT HR 39/40 and hypotensive, Dr Cancino at bs ordering ephedrine.  10mg given.    1059  BP and Hr back wnl.  Pt emesis x1.      1105  Pt states she no longer has nausea, is just sore.  VS wnl on RA.    1145  PT oob to br, able to void and dress.  Pt states she is ready to go home.    1215  PT and sisters read instructions.  Pt meets d/c criteria.  Pt discharged home with sisters in wheelchair.

## 2019-06-19 ENCOUNTER — HOSPITAL ENCOUNTER (OUTPATIENT)
Dept: RADIOLOGY | Facility: MEDICAL CENTER | Age: 29
End: 2019-06-19

## 2019-06-25 ENCOUNTER — HOSPITAL ENCOUNTER (OUTPATIENT)
Dept: RADIOLOGY | Facility: MEDICAL CENTER | Age: 29
End: 2019-06-25
Attending: FAMILY MEDICINE
Payer: COMMERCIAL

## 2019-06-25 ENCOUNTER — TELEPHONE (OUTPATIENT)
Dept: MEDICAL GROUP | Facility: PHYSICIAN GROUP | Age: 29
End: 2019-06-25

## 2019-06-25 DIAGNOSIS — N63.0 BREAST LUMP IN FEMALE: ICD-10-CM

## 2019-06-25 PROCEDURE — G0279 TOMOSYNTHESIS, MAMMO: HCPCS

## 2019-06-25 PROCEDURE — 76642 ULTRASOUND BREAST LIMITED: CPT | Mod: RT

## 2019-06-25 NOTE — TELEPHONE ENCOUNTER
Spoke to pt at 10:15 on 6/25/2019 to let her know about normal ultrasound results.    Lucía Newberry, Med Ass't

## 2019-06-27 ENCOUNTER — HOSPITAL ENCOUNTER (OUTPATIENT)
Dept: RADIOLOGY | Facility: MEDICAL CENTER | Age: 29
End: 2019-06-27
Attending: FAMILY MEDICINE
Payer: COMMERCIAL

## 2019-06-27 ENCOUNTER — OFFICE VISIT (OUTPATIENT)
Dept: MEDICAL GROUP | Facility: PHYSICIAN GROUP | Age: 29
End: 2019-06-27
Payer: COMMERCIAL

## 2019-06-27 VITALS
WEIGHT: 177 LBS | HEIGHT: 65 IN | TEMPERATURE: 97.5 F | DIASTOLIC BLOOD PRESSURE: 60 MMHG | SYSTOLIC BLOOD PRESSURE: 98 MMHG | BODY MASS INDEX: 29.49 KG/M2 | OXYGEN SATURATION: 95 % | HEART RATE: 89 BPM

## 2019-06-27 DIAGNOSIS — M54.50 LOW BACK PAIN WITH RADIATION: ICD-10-CM

## 2019-06-27 DIAGNOSIS — N81.11 CYSTOCELE, MIDLINE: ICD-10-CM

## 2019-06-27 DIAGNOSIS — F43.21 GRIEF: ICD-10-CM

## 2019-06-27 PROBLEM — Z3A.11 11 WEEKS GESTATION OF PREGNANCY: Status: RESOLVED | Noted: 2018-10-16 | Resolved: 2019-06-27

## 2019-06-27 PROCEDURE — 72072 X-RAY EXAM THORAC SPINE 3VWS: CPT

## 2019-06-27 PROCEDURE — 99214 OFFICE O/P EST MOD 30 MIN: CPT | Performed by: FAMILY MEDICINE

## 2019-06-27 PROCEDURE — 72100 X-RAY EXAM L-S SPINE 2/3 VWS: CPT

## 2019-06-27 ASSESSMENT — PAIN SCALES - GENERAL: PAINLEVEL: 5=MODERATE PAIN

## 2019-06-27 NOTE — PROGRESS NOTES
CC:Diagnoses of Low back pain with radiation, Cystocele, midline, and Grief were pertinent to this visit.      HISTORY OF PRESENT ILLNESS: Patient is a 28 y.o. female established patient who presents today to discuss cystocele, sadness, low back pain.      Cystocele, midline  New problem to examiner.  Patient with cystocele previously diagnosed by her prior PCP Humza Carlson MD.  States that she has problems with urinary frequency and incomplete bladder emptying.    Grief  New problem to examiner.  Patient having feelings of sadness and depression as well as anger since the passing of her sister via suicide 1 month ago.  Patient has sertraline that she has not taken in 1 years and debating whether or not she should start taking them.    Low back pain with radiation  Chronic ongoing medical condition.  Patient continues to have symptoms despite home physical therapy.  Patient also has noticed that pain has gotten worse since her bilateral tubal ligation approximately 1 week ago.  Largely unchanged in character from previous visit.    5/16/2019 appointment:New problem.  Patient presents with low back pain radiating down her right leg.  States that she gets numbness and tingling on the top of her right thigh.  Ongoing for the past 2 weeks and unchanged in severity.  States that this started after the delivery of her son 2 weeks ago.  Her low back pain is mild in nature.  Dull in character with exacerbations of sharpness with certain positions or movements.      Patient Active Problem List    Diagnosis Date Noted   • Cystocele, midline 06/27/2019   • Grief 06/27/2019   • Encounter for sterilization 06/18/2019   • Post-op pain 06/18/2019   • Mastitis 05/16/2019   • Low back pain with radiation 05/16/2019   • Vaginal delivery 04/30/2019   • Amenorrhea 10/16/2018      Allergies:Nkda [no known drug allergy]    Current Outpatient Prescriptions   Medication Sig Dispense Refill   • sertraline (ZOLOFT) 50 MG Tab Take 50 mg by  "mouth every day.     • ibuprofen (MOTRIN) 600 MG Tab Take 1 Tab by mouth every 6 hours as needed for Moderate Pain. 30 Tab 1     No current facility-administered medications for this visit.        Social History   Substance Use Topics   • Smoking status: Current Every Day Smoker     Packs/day: 0.25     Years: 15.00     Types: Cigarettes   • Smokeless tobacco: Never Used      Comment: Pt. states smoking 2-3 a day.    • Alcohol use No     Social History     Social History Narrative   • No narrative on file       Family History   Problem Relation Age of Onset   • Cancer Mother 45        breast cancer, lymph node,   • Heart Attack Father    • Cancer Maternal Grandmother         breast cancer       Review of Systems:    Constitutional: No Fevers, Chills  Resp: No Shortness of breath  CV: No Chest pain  GI: No Nausea/Vomiting      Exam:    BP (!) 98/60 (BP Location: Left arm, Patient Position: Sitting, BP Cuff Size: Adult)   Pulse 89   Temp 36.4 °C (97.5 °F)   Ht 1.651 m (5' 5\")   Wt 80.3 kg (177 lb)   SpO2 95%  Body mass index is 29.45 kg/m².    General:  Well nourished, well developed female in NAD  HENT: Atraumatic, normocephalic  EYES: Extraocular movements intact, pupils equal and reactive to light  NECK: Supple, FROM  CHEST: No deformities, Equal chest expansion  RESP: Unlabored, no stridor or audible wheeze  ABD: Soft, Nontender, Non-Distended  Extremities: No Clubbing, Cyanosis, or Edema  Skin: Warm/dry, without rashes  Neuro: A/O x 4, CN 2-12 Grossly intact, Motor/sensory grossly intact  Psych: Normal behavior, normal affect      Assessment/Plan:  1. Low back pain with radiation  Chronic ongoing medical condition.  Thoracic and lumbar x-rays as below.  Continue home physical therapy exercises.  - DX-LUMBAR SPINE-2 OR 3 VIEWS; Future    2. Cystocele, midline  New problem to examiner.  Patient not established with gynecology Dr. Vuong who recently completed her tubal ligation.  Counseled on follow-up with " gynecology for possible pessary placement versus surgical repair.    3. Grief  New problem to examiner.  Patient with typical grief and no suicidality.  Discussed counseling options which patient declines at this point.  Extensive time spent discussing normal versus abnormal grief and went to seek medical attention.  Patient demonstrates understanding and agrees to follow-up.    Patient was seen for 25 minutes face to face of which, 15 minutes was spent counseling regarding the above mentioned problems.    Please note that this dictation was created using voice recognition software. I have worked with consultants from the vendor as well as technical experts from [facility] to optimize the interface. I have made every reasonable attempt to correct obvious errors, but I expect that there are errors of grammar and possibly content that I did not discover before finalizing the note.

## 2019-06-27 NOTE — ASSESSMENT & PLAN NOTE
New problem to examiner.  Patient with cystocele previously diagnosed by her prior PCP Humza Carlson MD.  States that she has problems with urinary frequency and incomplete bladder emptying.

## 2019-06-27 NOTE — ASSESSMENT & PLAN NOTE
New problem to examiner.  Patient having feelings of sadness and depression as well as anger since the passing of her sister via suicide 1 month ago.  Patient has sertraline that she has not taken in 1 years and debating whether or not she should start taking them.

## 2019-06-27 NOTE — ASSESSMENT & PLAN NOTE
Chronic ongoing medical condition.  Patient continues to have symptoms despite home physical therapy.  Patient also has noticed that pain has gotten worse since her bilateral tubal ligation approximately 1 week ago.  Largely unchanged in character from previous visit.    5/16/2019 appointment:New problem.  Patient presents with low back pain radiating down her right leg.  States that she gets numbness and tingling on the top of her right thigh.  Ongoing for the past 2 weeks and unchanged in severity.  States that this started after the delivery of her son 2 weeks ago.  Her low back pain is mild in nature.  Dull in character with exacerbations of sharpness with certain positions or movements.

## 2019-07-26 ENCOUNTER — OFFICE VISIT (OUTPATIENT)
Dept: MEDICAL GROUP | Facility: PHYSICIAN GROUP | Age: 29
End: 2019-07-26
Payer: COMMERCIAL

## 2019-07-26 ENCOUNTER — HOSPITAL ENCOUNTER (OUTPATIENT)
Facility: MEDICAL CENTER | Age: 29
End: 2019-07-26
Attending: FAMILY MEDICINE
Payer: COMMERCIAL

## 2019-07-26 VITALS
DIASTOLIC BLOOD PRESSURE: 62 MMHG | SYSTOLIC BLOOD PRESSURE: 98 MMHG | BODY MASS INDEX: 29.32 KG/M2 | OXYGEN SATURATION: 96 % | HEIGHT: 65 IN | HEART RATE: 61 BPM | TEMPERATURE: 98.4 F | WEIGHT: 176 LBS

## 2019-07-26 DIAGNOSIS — M54.50 LOW BACK PAIN WITH RADIATION: ICD-10-CM

## 2019-07-26 DIAGNOSIS — Z00.00 WELL ADULT EXAM: ICD-10-CM

## 2019-07-26 PROBLEM — N91.2 AMENORRHEA: Status: RESOLVED | Noted: 2018-10-16 | Resolved: 2019-07-26

## 2019-07-26 PROBLEM — N39.3 STRESS INCONTINENCE: Status: ACTIVE | Noted: 2019-07-08

## 2019-07-26 PROCEDURE — 88175 CYTOPATH C/V AUTO FLUID REDO: CPT

## 2019-07-26 PROCEDURE — 99213 OFFICE O/P EST LOW 20 MIN: CPT | Performed by: FAMILY MEDICINE

## 2019-07-26 ASSESSMENT — PAIN SCALES - GENERAL: PAINLEVEL: NO PAIN

## 2019-07-26 ASSESSMENT — PATIENT HEALTH QUESTIONNAIRE - PHQ9: CLINICAL INTERPRETATION OF PHQ2 SCORE: 0

## 2019-07-26 NOTE — ASSESSMENT & PLAN NOTE
Chronic ongoing medical condition.  Patient has continued to have symptoms of low back pain with radiculopathy despite physical therapy, NSAID use, home exercises.  Patient states the pain is limiting her ability to care for her children and limit performing activities of daily living

## 2019-07-26 NOTE — PROGRESS NOTES
CC:Diagnoses of Low back pain with radiation and Well adult exam were pertinent to this visit.      HISTORY OF PRESENT ILLNESS: Patient is a 28 y.o. female established patient who presents today to follow-up on back pain and Pap smear.    Health Maintenance: Completed    Low back pain with radiation  Chronic ongoing medical condition.  Patient has continued to have symptoms of low back pain with radiculopathy despite physical therapy, NSAID use, home exercises.  Patient states the pain is limiting her ability to care for her children and limit performing activities of daily living      Patient Active Problem List    Diagnosis Date Noted   • Stress incontinence 07/08/2019   • Cystocele, midline 06/27/2019   • Grief 06/27/2019   • Encounter for sterilization 06/18/2019   • Post-op pain 06/18/2019   • Mastitis 05/16/2019   • Low back pain with radiation 05/16/2019   • Vaginal delivery 04/30/2019      Allergies:Nkda [no known drug allergy]    Current Outpatient Prescriptions   Medication Sig Dispense Refill   • ibuprofen (MOTRIN) 600 MG Tab Take 1 Tab by mouth every 6 hours as needed for Moderate Pain. 30 Tab 1   • sertraline (ZOLOFT) 50 MG Tab Take 50 mg by mouth every day.       No current facility-administered medications for this visit.        Social History   Substance Use Topics   • Smoking status: Former Smoker     Packs/day: 0.25     Years: 15.00     Types: Cigarettes     Quit date: 7/7/2019   • Smokeless tobacco: Never Used      Comment: Pt. states smoking 2-3 a day.    • Alcohol use No     Social History     Social History Narrative   • No narrative on file       Family History   Problem Relation Age of Onset   • Cancer Mother 45        breast cancer, lymph node,   • Heart Attack Father    • Cancer Maternal Grandmother         breast cancer       Review of Systems:    Constitutional: No Fevers, Chills  Eyes: No vision changes  ENT: No hearing changes  Resp: No Shortness of breath  CV: No Chest pain  GI: No  "Nausea/Vomiting  MSK: No weakness    Exam:    BP (!) 98/62 (BP Location: Left arm, Patient Position: Sitting, BP Cuff Size: Adult)   Pulse 61   Temp 36.9 °C (98.4 °F)   Ht 1.651 m (5' 5\")   Wt 79.8 kg (176 lb)   SpO2 96%  Body mass index is 29.29 kg/m².    General:  Well nourished, well developed female in NAD  HENT: Atraumatic, normocephalic  EYES: Extraocular movements intact, pupils equal and reactive to light  NECK: Supple, FROM  CHEST: No deformities, Equal chest expansion  RESP: Unlabored, no stridor or audible wheeze  ABD: Soft, Nontender, Non-Distended  : Normal external vaginal appearance.  Normal female genitalia, Adonis V here distribution.  Normal-appearing vaginal rugated with normal parous appearing cervix.  Extremities: No Clubbing, Cyanosis, or Edema  Skin: Warm/dry, without rashes  Neuro: A/O x 4, CN 2-12 Grossly intact, Motor/sensory grossly intact  Psych: Normal behavior, normal affect      Assessment/Plan:  1. Low back pain with radiation  Chronic ongoing medical condition.  MRI as below.  Follow-up pending MRI.  - MR-LUMBAR SPINE-W/O; Future    2. Well adult exam  This is a otherwise healthy 28-year-old female here for Pap smear as well.  Patient is up-to-date on vaccinations and preventative screening exams after today's Pap smear.  Contraception with recent bilateral tubal ligation with Lizbet Vuong MD.  Counseled on barrier contraception for STD prevention should she have any new partners in the future.    Follow-up PRN    Please note that this dictation was created using voice recognition software. I have worked with consultants from the vendor as well as technical experts from [employer] to optimize the interface. I have made every reasonable attempt to correct obvious errors, but I expect that there are errors of grammar and possibly content that I did not discover before finalizing the note.  "

## 2019-07-27 LAB — CYTOLOGY REG CYTOL: NORMAL

## 2019-07-31 ENCOUNTER — HOSPITAL ENCOUNTER (OUTPATIENT)
Dept: RADIOLOGY | Facility: MEDICAL CENTER | Age: 29
End: 2019-07-31
Attending: FAMILY MEDICINE
Payer: COMMERCIAL

## 2019-07-31 DIAGNOSIS — M54.50 LOW BACK PAIN WITH RADIATION: ICD-10-CM

## 2019-07-31 PROCEDURE — 72148 MRI LUMBAR SPINE W/O DYE: CPT

## 2019-08-22 ENCOUNTER — OFFICE VISIT (OUTPATIENT)
Dept: MEDICAL GROUP | Facility: PHYSICIAN GROUP | Age: 29
End: 2019-08-22
Payer: COMMERCIAL

## 2019-08-22 ENCOUNTER — HOSPITAL ENCOUNTER (OUTPATIENT)
Facility: MEDICAL CENTER | Age: 29
End: 2019-08-22
Attending: FAMILY MEDICINE
Payer: COMMERCIAL

## 2019-08-22 VITALS
DIASTOLIC BLOOD PRESSURE: 62 MMHG | HEART RATE: 64 BPM | TEMPERATURE: 98.4 F | SYSTOLIC BLOOD PRESSURE: 96 MMHG | BODY MASS INDEX: 29.32 KG/M2 | WEIGHT: 176 LBS | OXYGEN SATURATION: 94 % | HEIGHT: 65 IN

## 2019-08-22 DIAGNOSIS — N89.8 VAGINAL ODOR: ICD-10-CM

## 2019-08-22 DIAGNOSIS — N93.9 ABNORMAL VAGINAL BLEEDING: ICD-10-CM

## 2019-08-22 DIAGNOSIS — M54.50 LOW BACK PAIN WITH RADIATION: ICD-10-CM

## 2019-08-22 PROCEDURE — 87660 TRICHOMONAS VAGIN DIR PROBE: CPT

## 2019-08-22 PROCEDURE — 87510 GARDNER VAG DNA DIR PROBE: CPT

## 2019-08-22 PROCEDURE — 87591 N.GONORRHOEAE DNA AMP PROB: CPT

## 2019-08-22 PROCEDURE — 87480 CANDIDA DNA DIR PROBE: CPT

## 2019-08-22 PROCEDURE — 99214 OFFICE O/P EST MOD 30 MIN: CPT | Performed by: FAMILY MEDICINE

## 2019-08-22 PROCEDURE — 87491 CHLMYD TRACH DNA AMP PROBE: CPT

## 2019-08-22 RX ORDER — ACETAMINOPHEN AND CODEINE PHOSPHATE 120; 12 MG/5ML; MG/5ML
SOLUTION ORAL
Refills: 11 | COMMUNITY
Start: 2019-07-30 | End: 2019-09-11

## 2019-08-22 RX ORDER — CELECOXIB 100 MG/1
100 CAPSULE ORAL 2 TIMES DAILY
Qty: 60 CAP | Refills: 2 | Status: SHIPPED | OUTPATIENT
Start: 2019-08-22 | End: 2019-10-23

## 2019-08-22 RX ORDER — MEDROXYPROGESTERONE ACETATE 150 MG/ML
150 INJECTION, SUSPENSION INTRAMUSCULAR ONCE
Status: CANCELLED | OUTPATIENT
Start: 2019-08-22 | End: 2019-08-22

## 2019-08-22 ASSESSMENT — PAIN SCALES - GENERAL: PAINLEVEL: NO PAIN

## 2019-08-23 DIAGNOSIS — N93.9 ABNORMAL VAGINAL BLEEDING: ICD-10-CM

## 2019-08-23 DIAGNOSIS — N89.8 VAGINAL ODOR: ICD-10-CM

## 2019-08-23 LAB — AMBIGUOUS DTTM AMBI4: NORMAL

## 2019-08-23 NOTE — ASSESSMENT & PLAN NOTE
Chronic ongoing medical condition.  Patient has continued back pain and states that MRI recently done was of the wrong area of her back and that her back pain is in the thoracic area.  Patient states that she has radiation of pain to bilateral ribs.  Continues to limit her ability to care for children and work.

## 2019-08-23 NOTE — ASSESSMENT & PLAN NOTE
New problem to examiner.  Patient with abnormal vaginal bleeding and new vaginal odor that is unlike previous yeast or BV infections she has had.

## 2019-08-23 NOTE — PROGRESS NOTES
CC:Diagnoses of Abnormal vaginal bleeding, Low back pain with radiation, and Vaginal odor were pertinent to this visit.      HISTORY OF PRESENT ILLNESS: Patient is a 28 y.o. female established patient who presents today to follow-up on multiple medical concerns per      Vaginal odor  New problem to examiner.  Patient with abnormal vaginal bleeding and new vaginal odor that is unlike previous yeast or BV infections she has had.      Abnormal vaginal bleeding  New problem to examiner.  Patient with worsening vaginal bleeding since recent tubal ligation 2 months ago.  Patient states that she is having bleeding episodes intermittently with spotting unpredictably and her normal periods lasting longer and are heavier than normal.  Patient states that she has some abdominal pain and has had previous ovarian cysts that are similar in pain.    Low back pain with radiation  Chronic ongoing medical condition.  Patient has continued back pain and states that MRI recently done was of the wrong area of her back and that her back pain is in the thoracic area.  Patient states that she has radiation of pain to bilateral ribs.  Continues to limit her ability to care for children and work.      Patient Active Problem List    Diagnosis Date Noted   • Abnormal vaginal bleeding 08/22/2019   • Vaginal odor 08/22/2019   • Stress incontinence 07/08/2019   • Cystocele, midline 06/27/2019   • Grief 06/27/2019   • Encounter for sterilization 06/18/2019   • Post-op pain 06/18/2019   • Mastitis 05/16/2019   • Low back pain with radiation 05/16/2019      Allergies:Nkda [no known drug allergy]    Current Outpatient Medications   Medication Sig Dispense Refill   • celecoxib (CELEBREX) 100 MG Cap Take 1 Cap by mouth 2 times a day. 60 Cap 2   • sertraline (ZOLOFT) 50 MG Tab Take 50 mg by mouth every day.     • norethindrone (MICRONOR) 0.35 MG tablet TK 1 T PO QD  11     No current facility-administered medications for this visit.        Social  "History     Tobacco Use   • Smoking status: Former Smoker     Packs/day: 0.25     Years: 15.00     Pack years: 3.75     Types: Cigarettes     Last attempt to quit: 2019     Years since quittin.1   • Smokeless tobacco: Never Used   • Tobacco comment: Pt. states smoking 2-3 a day.    Substance Use Topics   • Alcohol use: No   • Drug use: No     Social History     Social History Narrative   • Not on file       Family History   Problem Relation Age of Onset   • Cancer Mother 45        breast cancer, lymph node,   • Heart Attack Father    • Cancer Maternal Grandmother         breast cancer       Review of Systems:    Constitutional: No Fevers, Chills  Eyes: No vision changes  ENT: No hearing changes  Resp: No Shortness of breath  CV: No Chest pain  GI: No Nausea/Vomiting      Exam:    BP (!) 96/62 (BP Location: Left arm, Patient Position: Sitting, BP Cuff Size: Adult)   Pulse 64   Temp 36.9 °C (98.4 °F)   Ht 1.651 m (5' 5\")   Wt 79.8 kg (176 lb)   SpO2 94%  Body mass index is 29.29 kg/m².    General:  Well nourished, well developed female in NAD, overweight  HENT: Atraumatic, normocephalic  EYES: Extraocular movements intact, pupils equal and reactive to light  NECK: Supple, FROM  CHEST: No deformities, Equal chest expansion  RESP: Unlabored, no stridor or audible wheeze  ABD: Soft, Nontender, Non-Distended  : Normal external female genitalia.  Normal rugated vagina with parous appearing cervix without lesions.  No blood or blood coming from the office on exam today.  No notable abnormal order or abnormal discharge.  Extremities: No Clubbing, Cyanosis, or Edema  Skin: Warm/dry, without rashes  Neuro: A/O x 4, CN 2-12 Grossly intact, Motor/sensory grossly intact  Psych: Normal behavior, normal affect        Assessment/Plan:  1. Abnormal vaginal bleeding  New problem with uncertain diagnosis/prognosis.  Ultrasound and vaginal pathogens as below.  Consider Depo-Provera pending ultrasound results  - US-PELVIC " TRANSVAGINAL ONLY; Future  - VAGINAL PATHOGENS DNA PANEL; Future  - CHLAMYDIA/GC PCR URINE OR SWAB; Future    2. Low back pain with radiation  Chronic ongoing medical problem.  MRI as below.  Prescription for Celebrex sent as below.  - MR-THORACIC SPINE-W/O; Future  - celecoxib (CELEBREX) 100 MG Cap; Take 1 Cap by mouth 2 times a day.  Dispense: 60 Cap; Refill: 2    3. Vaginal odor  New problem to examiner.  Vaginal pathogens as below.  Follow-up as needed.  - VAGINAL PATHOGENS DNA PANEL; Future  - CHLAMYDIA/GC PCR URINE OR SWAB; Future    Follow-up pending results    Please note that this dictation was created using voice recognition software. I have worked with consultants from the vendor as well as technical experts from [employer] to optimize the interface. I have made every reasonable attempt to correct obvious errors, but I expect that there are errors of grammar and possibly content that I did not discover before finalizing the note.

## 2019-08-23 NOTE — ASSESSMENT & PLAN NOTE
New problem to examiner.  Patient with worsening vaginal bleeding since recent tubal ligation 2 months ago.  Patient states that she is having bleeding episodes intermittently with spotting unpredictably and her normal periods lasting longer and are heavier than normal.  Patient states that she has some abdominal pain and has had previous ovarian cysts that are similar in pain.

## 2019-08-24 LAB
C TRACH DNA SPEC QL NAA+PROBE: NEGATIVE
CANDIDA DNA VAG QL PROBE+SIG AMP: NEGATIVE
G VAGINALIS DNA VAG QL PROBE+SIG AMP: POSITIVE
N GONORRHOEA DNA SPEC QL NAA+PROBE: NEGATIVE
SPECIMEN SOURCE: NORMAL
T VAGINALIS DNA VAG QL PROBE+SIG AMP: NEGATIVE

## 2019-08-27 DIAGNOSIS — B96.89 BV (BACTERIAL VAGINOSIS): ICD-10-CM

## 2019-08-27 DIAGNOSIS — N76.0 BV (BACTERIAL VAGINOSIS): ICD-10-CM

## 2019-08-27 RX ORDER — METRONIDAZOLE 500 MG/1
500 TABLET ORAL 2 TIMES DAILY
Qty: 14 TAB | Refills: 0 | Status: SHIPPED | OUTPATIENT
Start: 2019-08-27 | End: 2019-09-03

## 2019-09-06 ENCOUNTER — TELEPHONE (OUTPATIENT)
Dept: MEDICAL GROUP | Facility: PHYSICIAN GROUP | Age: 29
End: 2019-09-06

## 2019-09-06 NOTE — TELEPHONE ENCOUNTER
Please have patient take over-the-counter ibuprofen up to 800 mg 3 times a day and have a follow-up appointment with me as soon as available.  If symptoms worsen follow-up urgently with urgent care or emergency room.

## 2019-09-06 NOTE — TELEPHONE ENCOUNTER
1. Caller Name: Lali Funes                                         Call Back Number: [phone] (home)       Patient approves a detailed voicemail message: yes    2. What are the patient's symptoms (location & severity)? Heavy painful Vaginal Bleeding Pt is very miserable had a 5 day brake from Bleeding restarted 3 days ago with vengeance.    3. Is this a new symptom No    4. When did it start? 3 days    5. Action taken per Active Symptom Guide: Pt Requesting advise what to do to help with pain    6. Patient agrees to recommended action per Active Symptom Escalation Protocol.

## 2019-09-06 NOTE — TELEPHONE ENCOUNTER
Phone Number Called: [phone] (home)     Call outcome: spoke to patient regarding message below    Message: Pt states she gets diarrhea and tears her stomach  up using ibuprofen plus she is on Celebrex any other suggestion? Pt had been advised of UC or ED if need be. Schedule Pt for results of MRI and Vaginal Ultra Sound  (schedule for 09/10/19 at 1pm and 2 pm)  on 09/11/19 at 11:40 Am

## 2019-09-07 NOTE — TELEPHONE ENCOUNTER
Please advise patient that if symptoms fail to improve she may need to be seen at urgent care or emergency department for evaluation of severe pelvic pain.

## 2019-09-10 ENCOUNTER — HOSPITAL ENCOUNTER (OUTPATIENT)
Dept: RADIOLOGY | Facility: MEDICAL CENTER | Age: 29
End: 2019-09-10
Attending: FAMILY MEDICINE
Payer: COMMERCIAL

## 2019-09-10 DIAGNOSIS — N93.9 ABNORMAL VAGINAL BLEEDING: ICD-10-CM

## 2019-09-10 DIAGNOSIS — M54.50 LOW BACK PAIN WITH RADIATION: ICD-10-CM

## 2019-09-10 PROCEDURE — 76830 TRANSVAGINAL US NON-OB: CPT

## 2019-09-10 PROCEDURE — 72146 MRI CHEST SPINE W/O DYE: CPT

## 2019-09-11 ENCOUNTER — OFFICE VISIT (OUTPATIENT)
Dept: MEDICAL GROUP | Facility: PHYSICIAN GROUP | Age: 29
End: 2019-09-11
Payer: COMMERCIAL

## 2019-09-11 VITALS
BODY MASS INDEX: 28.82 KG/M2 | HEIGHT: 65 IN | DIASTOLIC BLOOD PRESSURE: 64 MMHG | WEIGHT: 173 LBS | TEMPERATURE: 97.6 F | HEART RATE: 64 BPM | OXYGEN SATURATION: 97 % | SYSTOLIC BLOOD PRESSURE: 102 MMHG

## 2019-09-11 DIAGNOSIS — N93.9 ABNORMAL VAGINAL BLEEDING: ICD-10-CM

## 2019-09-11 DIAGNOSIS — M54.50 LOW BACK PAIN WITH RADIATION: ICD-10-CM

## 2019-09-11 DIAGNOSIS — N39.3 STRESS INCONTINENCE: ICD-10-CM

## 2019-09-11 PROBLEM — N89.8 VAGINAL ODOR: Status: RESOLVED | Noted: 2019-08-22 | Resolved: 2019-09-11

## 2019-09-11 PROCEDURE — 99214 OFFICE O/P EST MOD 30 MIN: CPT | Mod: 25 | Performed by: FAMILY MEDICINE

## 2019-09-11 PROCEDURE — 96372 THER/PROPH/DIAG INJ SC/IM: CPT | Performed by: FAMILY MEDICINE

## 2019-09-11 RX ORDER — MEDROXYPROGESTERONE ACETATE 150 MG/ML
150 INJECTION, SUSPENSION INTRAMUSCULAR ONCE
Status: COMPLETED | OUTPATIENT
Start: 2019-09-11 | End: 2019-09-11

## 2019-09-11 RX ADMIN — MEDROXYPROGESTERONE ACETATE 150 MG: 150 INJECTION, SUSPENSION INTRAMUSCULAR at 11:48

## 2019-09-11 ASSESSMENT — PAIN SCALES - GENERAL: PAINLEVEL: 6=MODERATE PAIN

## 2019-09-11 NOTE — ASSESSMENT & PLAN NOTE
Chronic ongoing medical condition.  Patient continues to have stress incontinence despite Kegle exercises at home.  Patient is requesting referral to gynecology for stress incontinence, declines to follow-up with Dr. Vuong, her previous gynecologist.

## 2019-09-11 NOTE — ASSESSMENT & PLAN NOTE
Chronic ongoing medical condition.  Worsening abnormal vaginal bleeding with worsening cramping associated with menstrual bleeding.She is approximately 3 months out from bilateral tubal ligation with Dr. Vuong.  She has had normal MRIs, transvaginal, and abdominal ultrasounds.

## 2019-09-11 NOTE — ASSESSMENT & PLAN NOTE
Chronic ongoing medical condition.  Relatively normal MRI of the thoracic and lumbar spine.  She with continued low back pain associated with abdominal cramping.  Patient continues to perform home physical therapy activities declines referral to formal physical therapy.  Denies any fevers or chills, saddle anesthesia, bowel incontinence (she does have stress incontinence with urine), gait abnormalities.

## 2019-09-11 NOTE — PROGRESS NOTES
CC:Diagnoses of Abnormal vaginal bleeding, Low back pain with radiation, and Stress incontinence were pertinent to this visit.      HISTORY OF PRESENT ILLNESS: Patient is a 28 y.o. female established patient who presents today to follow-up on multiple medical conditions.    Abnormal vaginal bleeding  Chronic ongoing medical condition.  Worsening abnormal vaginal bleeding with worsening cramping associated with menstrual bleeding.She is approximately 3 months out from bilateral tubal ligation with Dr. Vuong.  She has had normal MRIs, transvaginal, and abdominal ultrasounds.    Low back pain with radiation  Chronic ongoing medical condition.  Relatively normal MRI of the thoracic and lumbar spine.  She with continued low back pain associated with abdominal cramping.  Patient continues to perform home physical therapy activities declines referral to formal physical therapy.  Denies any fevers or chills, saddle anesthesia, bowel incontinence (she does have stress incontinence with urine), gait abnormalities.    Stress incontinence  Chronic ongoing medical condition.  Patient continues to have stress incontinence despite Kegle exercises at home.  Patient is requesting referral to gynecology for stress incontinence, declines to follow-up with Dr. Vuong, her previous gynecologist.      Patient Active Problem List    Diagnosis Date Noted   • Abnormal vaginal bleeding 08/22/2019   • Stress incontinence 07/08/2019   • Cystocele, midline 06/27/2019   • Grief 06/27/2019   • Encounter for sterilization 06/18/2019   • Post-op pain 06/18/2019   • Mastitis 05/16/2019   • Low back pain with radiation 05/16/2019      Allergies:Nkda [no known drug allergy]    Current Outpatient Medications   Medication Sig Dispense Refill   • celecoxib (CELEBREX) 100 MG Cap Take 1 Cap by mouth 2 times a day. 60 Cap 2   • sertraline (ZOLOFT) 50 MG Tab Take 50 mg by mouth every day.       No current facility-administered medications for this  "visit.        Social History     Tobacco Use   • Smoking status: Former Smoker     Packs/day: 0.25     Years: 15.00     Pack years: 3.75     Types: Cigarettes     Last attempt to quit: 2019     Years since quittin.1   • Smokeless tobacco: Never Used   • Tobacco comment: Pt. states smoking 2-3 a day.    Substance Use Topics   • Alcohol use: No   • Drug use: No     Social History     Social History Narrative   • Not on file       Family History   Problem Relation Age of Onset   • Cancer Mother 45        breast cancer, lymph node,   • Heart Attack Father    • Cancer Maternal Grandmother         breast cancer       Review of Systems:    Constitutional: No Fevers, Chills  GI: No Nausea/Vomiting  MSK: No weakness  Skin: No rashes      Exam:    /64 (BP Location: Left arm, Patient Position: Sitting, BP Cuff Size: Adult)   Pulse 64   Temp 36.4 °C (97.6 °F)   Ht 1.651 m (5' 5\")   Wt 78.5 kg (173 lb)   SpO2 97%  Body mass index is 28.79 kg/m².    General:  Well nourished, well developed female in NAD  HENT: Atraumatic, normocephalic  EYES: Extraocular movements intact, pupils equal and reactive to light  NECK: Supple, FROM  CHEST: No deformities, Equal chest expansion  RESP: Unlabored, no stridor or audible wheeze  ABD: Soft, Nontender, Non-Distended  Extremities: No Clubbing, Cyanosis, or Edema  Skin: Warm/dry, without rashes  Neuro: A/O x 4, CN 2-12 Grossly intact, Motor/sensory grossly intact  Psych: Normal behavior, normal affect      Assessment/Plan:  1. Abnormal vaginal bleeding  Chronic uncontrolled medical condition.  Differential diagnosis at this point includes endometriosis.  Depo-Provera to stop menstrual cycles and evaluate for improvement.  Follow-up in 3 months for repeat Depo-Provera injection otherwise follow-up sooner if symptoms not improving.  - medroxyPROGESTERone (DEPO-PROVERA) injection 150 mg    2. Low back pain with radiation  Chronic ongoing medical condition.  Normal thoracic and " lumbar MRI.  Counseled on possibility of this being endometriosis as a cause for back pain.  Recommended continued physical therapy and evaluation after Depo-Provera injections.    3.  Stress incontinence  Chronic uncontrolled medical condition.  Referral to gynecology as below.    Follow-up in 3 months or sooner as needed    Please note that this dictation was created using voice recognition software. I have worked with consultants from the vendor as well as technical experts from [facility] to optimize the interface. I have made every reasonable attempt to correct obvious errors, but I expect that there are errors of grammar and possibly content that I did not discover before finalizing the note.

## 2019-09-19 ENCOUNTER — TELEPHONE (OUTPATIENT)
Dept: MEDICAL GROUP | Facility: PHYSICIAN GROUP | Age: 29
End: 2019-09-19

## 2019-09-19 NOTE — TELEPHONE ENCOUNTER
VOICEMAIL  1. Caller Name: Lali Funes                      Call Back Number: [phone] (home)     2. Message: Pt stated she is still having abdominal cramping. Pt is asking do you want her to talk to the GYN she was referred to or do you want her to come and and be seen by you to talk about getting a laparoscopy  Done.  Please advise    3. Patient approves office to leave a detailed voicemail/MyChart message: yes

## 2019-09-20 ENCOUNTER — HOSPITAL ENCOUNTER (OUTPATIENT)
Facility: MEDICAL CENTER | Age: 29
End: 2019-09-20
Attending: OBSTETRICS & GYNECOLOGY
Payer: COMMERCIAL

## 2019-09-20 ENCOUNTER — GYNECOLOGY VISIT (OUTPATIENT)
Dept: OBGYN | Facility: MEDICAL CENTER | Age: 29
End: 2019-09-20
Payer: COMMERCIAL

## 2019-09-20 VITALS — DIASTOLIC BLOOD PRESSURE: 60 MMHG | BODY MASS INDEX: 28.12 KG/M2 | SYSTOLIC BLOOD PRESSURE: 100 MMHG | WEIGHT: 169 LBS

## 2019-09-20 DIAGNOSIS — N89.8 VAGINAL DISCHARGE: ICD-10-CM

## 2019-09-20 DIAGNOSIS — R32 FEMALE INCONTINENCE: ICD-10-CM

## 2019-09-20 DIAGNOSIS — N81.11 CYSTOCELE, MIDLINE: ICD-10-CM

## 2019-09-20 DIAGNOSIS — N39.3 STRESS INCONTINENCE: ICD-10-CM

## 2019-09-20 DIAGNOSIS — N93.9 ABNORMAL VAGINAL BLEEDING: ICD-10-CM

## 2019-09-20 DIAGNOSIS — R10.2 PELVIC PAIN: ICD-10-CM

## 2019-09-20 PROCEDURE — 87510 GARDNER VAG DNA DIR PROBE: CPT

## 2019-09-20 PROCEDURE — 87660 TRICHOMONAS VAGIN DIR PROBE: CPT

## 2019-09-20 PROCEDURE — 99213 OFFICE O/P EST LOW 20 MIN: CPT | Performed by: OBSTETRICS & GYNECOLOGY

## 2019-09-20 PROCEDURE — 87480 CANDIDA DNA DIR PROBE: CPT

## 2019-09-20 RX ORDER — MEDROXYPROGESTERONE ACETATE 150 MG/ML
150 INJECTION, SUSPENSION INTRAMUSCULAR
Status: ON HOLD | COMMUNITY
End: 2020-06-03

## 2019-09-20 NOTE — TELEPHONE ENCOUNTER
Please notify patient that she has a scheduled appointment with OB/GYN tomorrow with Dr. Bowen and she should follow-up with him as that is why she was referred to a gynecologist.

## 2019-09-21 LAB
CANDIDA DNA VAG QL PROBE+SIG AMP: NEGATIVE
G VAGINALIS DNA VAG QL PROBE+SIG AMP: POSITIVE
T VAGINALIS DNA VAG QL PROBE+SIG AMP: NEGATIVE

## 2019-09-24 ENCOUNTER — TELEPHONE (OUTPATIENT)
Dept: OBGYN | Facility: CLINIC | Age: 29
End: 2019-09-24

## 2019-09-24 NOTE — TELEPHONE ENCOUNTER
Pt called, saw her test results on MyChart and stated Dr. Bowen had told her he would send a different treatment. Adv pt I would give the results to the doctor and have him look at them. Pt understood and agreed

## 2019-09-27 ENCOUNTER — TELEPHONE (OUTPATIENT)
Dept: MEDICAL GROUP | Facility: PHYSICIAN GROUP | Age: 29
End: 2019-09-27

## 2019-09-27 DIAGNOSIS — B96.89 BACTERIAL VAGINOSIS: ICD-10-CM

## 2019-09-27 DIAGNOSIS — N76.0 BACTERIAL VAGINOSIS: ICD-10-CM

## 2019-09-27 RX ORDER — METRONIDAZOLE 500 MG/1
500 TABLET ORAL 2 TIMES DAILY
Qty: 14 TAB | Refills: 0 | Status: SHIPPED | OUTPATIENT
Start: 2019-09-27 | End: 2019-10-03

## 2019-09-27 NOTE — TELEPHONE ENCOUNTER
1. Caller Name: Lali Funes                                         Call Back Number: [phone] (home)       Patient approves a detailed voicemail message: yes    Pt called stating she has not heard from the GYN department she saw her results from 09/20/19 [facility] that it was positive for [place] and [facility] but have not gotten a call back or any medication called into her pharmacy Pt is very upset and stressed. Pt is schedule to see you next Thursday October 3rd at 1:40 for pain under her ribs she thought it was gas but she isn't getting any relief.

## 2019-09-29 RX ORDER — METRONIDAZOLE 500 MG/1
500 TABLET ORAL 2 TIMES DAILY
Qty: 14 TAB | Refills: 0 | Status: SHIPPED | OUTPATIENT
Start: 2019-09-29 | End: 2019-10-23

## 2019-09-29 NOTE — PROGRESS NOTES
Chief Complaint   Patient presents with   • New Patient     new patient   Chief complaint: Pelvic pressure, pelvic pain and problems urinating    History of present illness:   28 y.o.  presents with above chief complaint.  Patient reports a lifelong history of abdominal pain/cramping with her cycle.  Pain can be very severe at times, reaching 7+ out of 10 in intensity.  Reported as very sharp and located in the pelvic area.  She does report occasional pain with intercourse and worsening pain with her cycles, no dyschezia.  She has tried heat/ibuprofen/rest with no improvement in her pain.  Patient also reports issues with irregular bleeding.  She will bleed for 5 days then stop for 2 to 3 days and begin again.  She was started on Depo-Provera last week and the vaginal bleeding stopped 2 days prior to this visit.  She still has some cramping at this time but pain is not as bad.  She does report some pressure in the vaginal area however.    Patient also reporting a white malodorous discharge at this time.    Sometimes she feels as if she can empty her bladder and she does report loss of urine proxy once a week when laughing/sneezing/coughing.    ROS: Pertinent positives documented in HPI and all other systems reviewed & are negative    POBHx:  8 para 3-0-5-3, vaginal delivery x3    PGYNHx: last pap 2019, no abnormality noted.  Using tubal ligation for contraception    All PMH, PSH, meds, allergies, social history and FH reviewed and updated today:  Past Medical History:   Diagnosis Date   • Arthritis     toes   • Back pain    • Breastfeeding (infant) 2019   • Mastitis 2019   • Nausea/vomiting in pregnancy 10/5/2012   • Pain 2019    shoulders/back       Past Surgical History:   Procedure Laterality Date   • PB LAP,DIAGNOSTIC ABDOMEN  2019    Procedure: PELVISCOPY;  Surgeon: Pialr Vuong M.D.;  Location: SURGERY SAME DAY [facility];  Service: Gynecology   • SALPINGECTOMY  Bilateral 2019    Procedure: SALPINGECTOMY;  Surgeon: Pilar Vuong M.D.;  Location: SURGERY SAME DAY [facility];  Service: Gynecology   • TUBAL COAGULATION LAPAROSCOPIC BILATERAL     • TUBAL LIGATION         Allergies:   Allergies   Allergen Reactions   • Nkda [No Known Drug Allergy]        Social History     Socioeconomic History   • Marital status:      Spouse name: Not on file   • Number of children: Not on file   • Years of education: Not on file   • Highest education level: Not on file   Occupational History   • Not on file   Social Needs   • Financial resource strain: Not on file   • Food insecurity:     Worry: Not on file     Inability: Not on file   • Transportation needs:     Medical: Not on file     Non-medical: Not on file   Tobacco Use   • Smoking status: Former Smoker     Packs/day: 0.25     Years: 15.00     Pack years: 3.75     Types: Cigarettes     Last attempt to quit: 2019     Years since quittin.2   • Smokeless tobacco: Never Used   • Tobacco comment: Pt. states smoking 2-3 a day.    Substance and Sexual Activity   • Alcohol use: No   • Drug use: No   • Sexual activity: Yes     Partners: Male     Birth control/protection: Surgical, Injection     Comment: none   Lifestyle   • Physical activity:     Days per week: Not on file     Minutes per session: Not on file   • Stress: Not on file   Relationships   • Social connections:     Talks on phone: Not on file     Gets together: Not on file     Attends Hindu service: Not on file     Active member of club or organization: Not on file     Attends meetings of clubs or organizations: Not on file     Relationship status: Not on file   • Intimate partner violence:     Fear of current or ex partner: Not on file     Emotionally abused: Not on file     Physically abused: Not on file     Forced sexual activity: Not on file   Other Topics Concern   • Not on file   Social History Narrative   • Not on file       Family History    Problem Relation Age of Onset   • Cancer Mother 45        breast cancer, lymph node,   • Heart Attack Father    • Gout Father    • Cancer Maternal Grandmother         breast cancer       Physical exam:  /60 (BP Location: Left arm, Patient Position: Sitting)   Wt 76.7 kg (169 lb)     GENERAL APPEARANCE: healthy, alert, no distress  NECK nontender, no masses, thyromegaly or nodules  HEART RRR with normal S1 and S2 ,no murmurs, no gallops, no peripheral edema  LUNG clear to auscultation, normal respiratory effort  ABDOMEN Abdomen soft, minimal tender to palpation on examination . BS normal. No masses,  No organomegaly  FEMALE GYN: normal female external genitalia without lesions, BUS normal without lesions, vaginal mucosa pink and moist, stage II anterior and posterior prolapse with leading edges to 3 cm from hymenal remnant.  white vaginal discharge noted, cervix normal in appearance without lesions, no CMT, urethra is normal without discharge or scarring, slight urethral hypermobility noted with angle greater than 30 degrees, no bladder fullness or masses, normal anus and perineum. Uterus mobile, normal size, and minimally tender. Ovaries normal size and minimally tender bilaterally. No palpable masses.  No inguinal hernia present .  EXTREMITIES:negative clubbing, cyanosis, edema    NEURO Awake, alert and oriented x 3, Normal gait, no sensory deficits  SKIN No rashes, or ulcers or lesions seen  PSYCHIATRIC: Patient shows appropriate affect, is alert and oriented x3, intact judgment and insight.      Assessment:  1. Vaginal discharge  VAGINAL PATHOGENS DNA PANEL       Plan:    Given the patient's complaints, endometriosis could be a cause of her issues.    Other possible options include:  Pelvic congestion syndrome  Adenomyosis  GI causes  Urological causes    Bleeding appears to be improved on Depo-Provera.  S Depo-Provera can also be used for treatment of pelvic pain as well as irregular bleeding, I would  like to wait until she is due for her next shot to assess if the Depo-Provera is controlling her symptoms.  If Depo-Provera does not improve her symptoms, I will discuss diagnostic laparoscopy with this patient to assess for any pathology in her pelvis which may be causing her pain..    Ultrasound does not show any evidence of an anatomical cause for her pain.  There is a very small dominant follicle on the right ovary.    If patient has recurrent/persistent bacterial vaginosis, may need longer treatment with Flagyl followed by weekly prophylaxis along with boric acid vaginal capsules to help with re-acidification of the vagina    No significant prolapse noted at this time, it is unlikely to be the cause of her pain.    Urology referral also made at this time so that her urological issues may be addressed.    All questions answered

## 2019-10-03 ENCOUNTER — HOSPITAL ENCOUNTER (OUTPATIENT)
Dept: LAB | Facility: MEDICAL CENTER | Age: 29
End: 2019-10-03
Attending: FAMILY MEDICINE
Payer: COMMERCIAL

## 2019-10-03 ENCOUNTER — OFFICE VISIT (OUTPATIENT)
Dept: MEDICAL GROUP | Facility: PHYSICIAN GROUP | Age: 29
End: 2019-10-03
Payer: COMMERCIAL

## 2019-10-03 VITALS
TEMPERATURE: 97.8 F | WEIGHT: 169 LBS | OXYGEN SATURATION: 94 % | SYSTOLIC BLOOD PRESSURE: 118 MMHG | HEIGHT: 65 IN | HEART RATE: 74 BPM | BODY MASS INDEX: 28.16 KG/M2 | DIASTOLIC BLOOD PRESSURE: 62 MMHG

## 2019-10-03 DIAGNOSIS — R10.13 EPIGASTRIC PAIN: ICD-10-CM

## 2019-10-03 LAB
ALBUMIN SERPL BCP-MCNC: 5 G/DL (ref 3.2–4.9)
ALBUMIN/GLOB SERPL: 1.9 G/DL
ALP SERPL-CCNC: 73 U/L (ref 30–99)
ALT SERPL-CCNC: 24 U/L (ref 2–50)
ANION GAP SERPL CALC-SCNC: 8 MMOL/L (ref 0–11.9)
AST SERPL-CCNC: 21 U/L (ref 12–45)
BASOPHILS # BLD AUTO: 0.4 % (ref 0–1.8)
BASOPHILS # BLD: 0.04 K/UL (ref 0–0.12)
BILIRUB SERPL-MCNC: 1.1 MG/DL (ref 0.1–1.5)
BUN SERPL-MCNC: 12 MG/DL (ref 8–22)
CALCIUM SERPL-MCNC: 9.5 MG/DL (ref 8.5–10.5)
CHLORIDE SERPL-SCNC: 108 MMOL/L (ref 96–112)
CO2 SERPL-SCNC: 24 MMOL/L (ref 20–33)
CREAT SERPL-MCNC: 0.88 MG/DL (ref 0.5–1.4)
EOSINOPHIL # BLD AUTO: 0.07 K/UL (ref 0–0.51)
EOSINOPHIL NFR BLD: 0.7 % (ref 0–6.9)
ERYTHROCYTE [DISTWIDTH] IN BLOOD BY AUTOMATED COUNT: 41.4 FL (ref 35.9–50)
GGT SERPL-CCNC: 21 U/L (ref 7–34)
GLOBULIN SER CALC-MCNC: 2.7 G/DL (ref 1.9–3.5)
GLUCOSE SERPL-MCNC: 80 MG/DL (ref 65–99)
HCT VFR BLD AUTO: 46 % (ref 37–47)
HGB BLD-MCNC: 15.5 G/DL (ref 12–16)
IMM GRANULOCYTES # BLD AUTO: 0.02 K/UL (ref 0–0.11)
IMM GRANULOCYTES NFR BLD AUTO: 0.2 % (ref 0–0.9)
LIPASE SERPL-CCNC: 20 U/L (ref 11–82)
LYMPHOCYTES # BLD AUTO: 2.53 K/UL (ref 1–4.8)
LYMPHOCYTES NFR BLD: 25.2 % (ref 22–41)
MCH RBC QN AUTO: 30.7 PG (ref 27–33)
MCHC RBC AUTO-ENTMCNC: 33.7 G/DL (ref 33.6–35)
MCV RBC AUTO: 91.1 FL (ref 81.4–97.8)
MONOCYTES # BLD AUTO: 0.56 K/UL (ref 0–0.85)
MONOCYTES NFR BLD AUTO: 5.6 % (ref 0–13.4)
NEUTROPHILS # BLD AUTO: 6.8 K/UL (ref 2–7.15)
NEUTROPHILS NFR BLD: 67.9 % (ref 44–72)
NRBC # BLD AUTO: 0 K/UL
NRBC BLD-RTO: 0 /100 WBC
PLATELET # BLD AUTO: 254 K/UL (ref 164–446)
PMV BLD AUTO: 11.2 FL (ref 9–12.9)
POTASSIUM SERPL-SCNC: 4.5 MMOL/L (ref 3.6–5.5)
PROT SERPL-MCNC: 7.7 G/DL (ref 6–8.2)
RBC # BLD AUTO: 5.05 M/UL (ref 4.2–5.4)
SODIUM SERPL-SCNC: 140 MMOL/L (ref 135–145)
WBC # BLD AUTO: 10 K/UL (ref 4.8–10.8)

## 2019-10-03 PROCEDURE — 85025 COMPLETE CBC W/AUTO DIFF WBC: CPT

## 2019-10-03 PROCEDURE — 36415 COLL VENOUS BLD VENIPUNCTURE: CPT

## 2019-10-03 PROCEDURE — 80053 COMPREHEN METABOLIC PANEL: CPT

## 2019-10-03 PROCEDURE — 83013 H PYLORI (C-13) BREATH: CPT

## 2019-10-03 PROCEDURE — 83690 ASSAY OF LIPASE: CPT

## 2019-10-03 PROCEDURE — 82977 ASSAY OF GGT: CPT

## 2019-10-03 PROCEDURE — 99214 OFFICE O/P EST MOD 30 MIN: CPT | Performed by: FAMILY MEDICINE

## 2019-10-03 ASSESSMENT — PAIN SCALES - GENERAL: PAINLEVEL: 8=MODERATE-SEVERE PAIN

## 2019-10-03 NOTE — PROGRESS NOTES
CC:The encounter diagnosis was Epigastric pain.      HISTORY OF PRESENT ILLNESS: Patient is a 28 y.o. female established patient who presents today to discuss epigastric abdominal pain.    Epigastric pain  New problem to examiner.  Patient has been feeling sharp catching intermittent abdominal pain that is worse 10 to 15 minutes after eating.  Denies any vomiting or diarrhea but does have nausea but nausea has been ongoing since tubal ligation several months ago.  Abdominal pain has been ongoing for approximately 1 week.  Patient states severity 8/10 without radiation.      Patient Active Problem List    Diagnosis Date Noted   • Epigastric pain 10/03/2019   • Abnormal vaginal bleeding 2019   • Stress incontinence 2019   • Cystocele, midline 2019   • Grief 2019   • Encounter for sterilization 2019   • Post-op pain 2019   • Mastitis 2019   • Low back pain with radiation 2019      Allergies:Nkda [no known drug allergy]    Current Outpatient Medications   Medication Sig Dispense Refill   • metroNIDAZOLE (FLAGYL) 500 MG Tab Take 1 Tab by mouth 2 times a day. 14 Tab 0   • medroxyPROGESTERone (DEPO-PROVERA) 150 MG/ML Suspension 150 mg by Intramuscular route Once.     • celecoxib (CELEBREX) 100 MG Cap Take 1 Cap by mouth 2 times a day. 60 Cap 2     No current facility-administered medications for this visit.        Social History     Tobacco Use   • Smoking status: Former Smoker     Packs/day: 0.25     Years: 15.00     Pack years: 3.75     Types: Cigarettes     Last attempt to quit: 2019     Years since quittin.2   • Smokeless tobacco: Never Used   • Tobacco comment: Pt. states smoking 2-3 a day.    Substance Use Topics   • Alcohol use: No   • Drug use: No     Social History     Social History Narrative   • Not on file       Family History   Problem Relation Age of Onset   • Cancer Mother 45        breast cancer, lymph node,   • Heart Attack Father    • Gout Father   "  • Cancer Maternal Grandmother         breast cancer       Review of Systems:    Constitutional: No Fevers, Chills  Resp: No Shortness of breath  CV: No Chest pain  GI: No Nausea/Vomiting  MSK: No weakness      Exam:    /62 (BP Location: Left arm, Patient Position: Sitting, BP Cuff Size: Adult)   Pulse 74   Temp 36.6 °C (97.8 °F)   Ht 1.651 m (5' 5\")   Wt 76.7 kg (169 lb)   SpO2 94%  Body mass index is 28.12 kg/m².    General:  Well nourished, well developed female in NAD  HENT: Atraumatic, normocephalic  EYES: Extraocular movements intact, pupils equal and reactive to light  NECK: Supple, FROM  CHEST: No deformities, Equal chest expansion  RESP: Unlabored, no stridor or audible wheeze  ABD: Soft, mild epigastric tenderness, Non-Distended, no guarding, no rebound, no masses  Extremities: No Clubbing, Cyanosis, or Edema  Skin: Warm/dry, without rashes  Neuro: A/O x 4, CN 2-12 Grossly intact, Motor/sensory grossly intact  Psych: Normal behavior, normal affect      Assessment/Plan:  1. Epigastric pain  New problem to examiner with uncertain prognosis.  Labs as below and ultrasound as below.  Follow-up pending labs  - Comp Metabolic Panel; Future  - LIPASE; Future  - GAMMA GT (GGT); Future  - CBC WITH DIFFERENTIAL; Future  - US-ABDOMEN COMPLETE SURVEY; Future  - H. PYLORI BREATH TEST, PEDIATRIC; Future    Follow-up pending labs    Please note that this dictation was created using voice recognition software. I have worked with consultants from the vendor as well as technical experts from [employer] to optimize the interface. I have made every reasonable attempt to correct obvious errors, but I expect that there are errors of grammar and possibly content that I did not discover before finalizing the note.  "

## 2019-10-03 NOTE — ASSESSMENT & PLAN NOTE
New problem to examiner.  Patient has been feeling sharp catching intermittent abdominal pain that is worse 10 to 15 minutes after eating.  Denies any vomiting or diarrhea but does have nausea but nausea has been ongoing since tubal ligation several months ago.  Abdominal pain has been ongoing for approximately 1 week.  Patient states severity 8/10 without radiation.

## 2019-10-04 ENCOUNTER — TELEPHONE (OUTPATIENT)
Dept: OBGYN | Facility: MEDICAL CENTER | Age: 29
End: 2019-10-04

## 2019-10-04 NOTE — TELEPHONE ENCOUNTER
MALU Taveras from urology called asking for Dr. Bowen to clarify re : pt's future surgery.    Phone # [phone].    Provider notify, info provided.

## 2019-10-06 LAB — UREA BREATH TEST QL: NEGATIVE

## 2019-10-07 ENCOUNTER — TELEPHONE (OUTPATIENT)
Dept: OBGYN | Facility: CLINIC | Age: 29
End: 2019-10-07

## 2019-10-07 NOTE — TELEPHONE ENCOUNTER
10/07/19 1:41 PM  Informed pt of below, pt finished rx yesterday but states she thinks she still has it. I advised pt if the symptoms are still there within the next week to make an appt to be seen. Pt understood and has no further questions.    ----- Message from Jose Bowen M.D. sent at 9/29/2019 11:06 AM PDT -----  Positive BV.  Antibiotics sent

## 2019-10-10 ENCOUNTER — PATIENT MESSAGE (OUTPATIENT)
Dept: OBGYN | Facility: MEDICAL CENTER | Age: 29
End: 2019-10-10

## 2019-10-11 ENCOUNTER — TELEPHONE (OUTPATIENT)
Dept: OBGYN | Facility: CLINIC | Age: 29
End: 2019-10-11

## 2019-10-11 NOTE — TELEPHONE ENCOUNTER
Pt called c/o still has BV, states she was given flagyl prior but continue getting the infection. Has an appt lev/Bindu on 10/24/19.  I offered a sooner appt to have a swab but declined it.    Will contact her PCP for new Rx

## 2019-10-11 NOTE — TELEPHONE ENCOUNTER
"Spoke to pt who wanted to get some clarification as to what was happening surgically with her. Pt does not have a referral for GYN surgery placed. Pt did have a referral to urology. Pt was seen at urology and was told that her visit was basically pointless as Dr. Bowen could do the procedure he referrad her to urology for. Pt has another appointment with Dr. Ruano coming up and \"just wants someone to do something for her and the pain she's feeling\" pt also stated that she hhad a medication prescribed to her by Dr. Bowen but that it was never sent to the pharmacy. Pt ws informed that I would connect her with the MA's in order to address the medication. I also informed her that I would talk to Dr. Bowen when he is in the office again 10/17 to get clarification of his plan. Pt was given my contact information to call me directly with any questions. JDA  "

## 2019-10-18 ENCOUNTER — HOSPITAL ENCOUNTER (OUTPATIENT)
Dept: RADIOLOGY | Facility: MEDICAL CENTER | Age: 29
End: 2019-10-18
Attending: FAMILY MEDICINE
Payer: COMMERCIAL

## 2019-10-18 DIAGNOSIS — R10.13 EPIGASTRIC PAIN: ICD-10-CM

## 2019-10-18 PROCEDURE — 76700 US EXAM ABDOM COMPLETE: CPT

## 2019-10-23 ENCOUNTER — APPOINTMENT (OUTPATIENT)
Dept: RADIOLOGY | Facility: MEDICAL CENTER | Age: 29
End: 2019-10-23
Attending: EMERGENCY MEDICINE
Payer: COMMERCIAL

## 2019-10-23 ENCOUNTER — HOSPITAL ENCOUNTER (OUTPATIENT)
Facility: MEDICAL CENTER | Age: 29
End: 2019-10-25
Attending: EMERGENCY MEDICINE | Admitting: HOSPITALIST
Payer: COMMERCIAL

## 2019-10-23 DIAGNOSIS — K80.01 CALCULUS OF GALLBLADDER WITH ACUTE CHOLECYSTITIS AND OBSTRUCTION: ICD-10-CM

## 2019-10-23 DIAGNOSIS — Z90.49 STATUS POST LAPAROSCOPIC CHOLECYSTECTOMY: ICD-10-CM

## 2019-10-23 DIAGNOSIS — K80.50 CHOLEDOCHOLITHIASIS: ICD-10-CM

## 2019-10-23 PROBLEM — D72.829 LEUKOCYTOSIS: Status: ACTIVE | Noted: 2019-10-23

## 2019-10-23 PROBLEM — R74.01 TRANSAMINITIS: Status: ACTIVE | Noted: 2019-10-23

## 2019-10-23 PROBLEM — K83.1 BILIARY OBSTRUCTION: Status: ACTIVE | Noted: 2019-10-23

## 2019-10-23 LAB
ALBUMIN SERPL BCP-MCNC: 4.8 G/DL (ref 3.2–4.9)
ALBUMIN/GLOB SERPL: 1.8 G/DL
ALP SERPL-CCNC: 101 U/L (ref 30–99)
ALT SERPL-CCNC: 62 U/L (ref 2–50)
ANION GAP SERPL CALC-SCNC: 10 MMOL/L (ref 0–11.9)
APPEARANCE UR: CLEAR
AST SERPL-CCNC: 83 U/L (ref 12–45)
BASOPHILS # BLD AUTO: 0.4 % (ref 0–1.8)
BASOPHILS # BLD: 0.05 K/UL (ref 0–0.12)
BILIRUB SERPL-MCNC: 1.2 MG/DL (ref 0.1–1.5)
BILIRUB UR QL STRIP.AUTO: NEGATIVE
BUN SERPL-MCNC: 12 MG/DL (ref 8–22)
CALCIUM SERPL-MCNC: 9.8 MG/DL (ref 8.5–10.5)
CHLORIDE SERPL-SCNC: 103 MMOL/L (ref 96–112)
CO2 SERPL-SCNC: 26 MMOL/L (ref 20–33)
COLOR UR: NORMAL
CREAT SERPL-MCNC: 0.8 MG/DL (ref 0.5–1.4)
EKG IMPRESSION: NORMAL
EOSINOPHIL # BLD AUTO: 0.05 K/UL (ref 0–0.51)
EOSINOPHIL NFR BLD: 0.4 % (ref 0–6.9)
ERYTHROCYTE [DISTWIDTH] IN BLOOD BY AUTOMATED COUNT: 39.5 FL (ref 35.9–50)
GLOBULIN SER CALC-MCNC: 2.7 G/DL (ref 1.9–3.5)
GLUCOSE SERPL-MCNC: 100 MG/DL (ref 65–99)
GLUCOSE UR STRIP.AUTO-MCNC: NEGATIVE MG/DL
HCG SERPL QL: NEGATIVE
HCT VFR BLD AUTO: 47.7 % (ref 37–47)
HGB BLD-MCNC: 16 G/DL (ref 12–16)
IMM GRANULOCYTES # BLD AUTO: 0.06 K/UL (ref 0–0.11)
IMM GRANULOCYTES NFR BLD AUTO: 0.4 % (ref 0–0.9)
KETONES UR STRIP.AUTO-MCNC: NEGATIVE MG/DL
LEUKOCYTE ESTERASE UR QL STRIP.AUTO: NEGATIVE
LIPASE SERPL-CCNC: 22 U/L (ref 11–82)
LYMPHOCYTES # BLD AUTO: 2.14 K/UL (ref 1–4.8)
LYMPHOCYTES NFR BLD: 15.6 % (ref 22–41)
MCH RBC QN AUTO: 30.1 PG (ref 27–33)
MCHC RBC AUTO-ENTMCNC: 33.5 G/DL (ref 33.6–35)
MCV RBC AUTO: 89.8 FL (ref 81.4–97.8)
MICRO URNS: NORMAL
MONOCYTES # BLD AUTO: 0.85 K/UL (ref 0–0.85)
MONOCYTES NFR BLD AUTO: 6.2 % (ref 0–13.4)
NEUTROPHILS # BLD AUTO: 10.57 K/UL (ref 2–7.15)
NEUTROPHILS NFR BLD: 77 % (ref 44–72)
NITRITE UR QL STRIP.AUTO: NEGATIVE
NRBC # BLD AUTO: 0 K/UL
NRBC BLD-RTO: 0 /100 WBC
PH UR STRIP.AUTO: 5 [PH] (ref 5–8)
PLATELET # BLD AUTO: 240 K/UL (ref 164–446)
PMV BLD AUTO: 10.3 FL (ref 9–12.9)
POTASSIUM SERPL-SCNC: 4.4 MMOL/L (ref 3.6–5.5)
PROT SERPL-MCNC: 7.5 G/DL (ref 6–8.2)
PROT UR QL STRIP: NEGATIVE MG/DL
RBC # BLD AUTO: 5.31 M/UL (ref 4.2–5.4)
RBC UR QL AUTO: NEGATIVE
SODIUM SERPL-SCNC: 139 MMOL/L (ref 135–145)
SP GR UR STRIP.AUTO: 1.03
UROBILINOGEN UR STRIP.AUTO-MCNC: 0.2 MG/DL
WBC # BLD AUTO: 13.7 K/UL (ref 4.8–10.8)

## 2019-10-23 PROCEDURE — 96374 THER/PROPH/DIAG INJ IV PUSH: CPT

## 2019-10-23 PROCEDURE — G0378 HOSPITAL OBSERVATION PER HR: HCPCS

## 2019-10-23 PROCEDURE — 83690 ASSAY OF LIPASE: CPT

## 2019-10-23 PROCEDURE — 700105 HCHG RX REV CODE 258: Performed by: HOSPITALIST

## 2019-10-23 PROCEDURE — 99220 PR INITIAL OBSERVATION CARE,LEVL III: CPT | Performed by: HOSPITALIST

## 2019-10-23 PROCEDURE — 96375 TX/PRO/DX INJ NEW DRUG ADDON: CPT

## 2019-10-23 PROCEDURE — 81003 URINALYSIS AUTO W/O SCOPE: CPT

## 2019-10-23 PROCEDURE — A9270 NON-COVERED ITEM OR SERVICE: HCPCS | Performed by: HOSPITALIST

## 2019-10-23 PROCEDURE — 85025 COMPLETE CBC W/AUTO DIFF WBC: CPT

## 2019-10-23 PROCEDURE — 36415 COLL VENOUS BLD VENIPUNCTURE: CPT

## 2019-10-23 PROCEDURE — 93005 ELECTROCARDIOGRAM TRACING: CPT | Performed by: EMERGENCY MEDICINE

## 2019-10-23 PROCEDURE — 76705 ECHO EXAM OF ABDOMEN: CPT

## 2019-10-23 PROCEDURE — 84703 CHORIONIC GONADOTROPIN ASSAY: CPT

## 2019-10-23 PROCEDURE — 700111 HCHG RX REV CODE 636 W/ 250 OVERRIDE (IP): Performed by: EMERGENCY MEDICINE

## 2019-10-23 PROCEDURE — 99285 EMERGENCY DEPT VISIT HI MDM: CPT

## 2019-10-23 PROCEDURE — 700102 HCHG RX REV CODE 250 W/ 637 OVERRIDE(OP): Performed by: HOSPITALIST

## 2019-10-23 PROCEDURE — 80053 COMPREHEN METABOLIC PANEL: CPT

## 2019-10-23 PROCEDURE — 700105 HCHG RX REV CODE 258: Performed by: EMERGENCY MEDICINE

## 2019-10-23 RX ORDER — PROMETHAZINE HYDROCHLORIDE 12.5 MG/1
12.5-25 SUPPOSITORY RECTAL EVERY 4 HOURS PRN
Status: DISCONTINUED | OUTPATIENT
Start: 2019-10-23 | End: 2019-10-25 | Stop reason: HOSPADM

## 2019-10-23 RX ORDER — OXYCODONE HYDROCHLORIDE 5 MG/1
5 TABLET ORAL
Status: DISCONTINUED | OUTPATIENT
Start: 2019-10-23 | End: 2019-10-25 | Stop reason: HOSPADM

## 2019-10-23 RX ORDER — MORPHINE SULFATE 4 MG/ML
4 INJECTION, SOLUTION INTRAMUSCULAR; INTRAVENOUS ONCE
Status: COMPLETED | OUTPATIENT
Start: 2019-10-23 | End: 2019-10-23

## 2019-10-23 RX ORDER — ONDANSETRON 4 MG/1
4 TABLET, ORALLY DISINTEGRATING ORAL EVERY 4 HOURS PRN
Status: DISCONTINUED | OUTPATIENT
Start: 2019-10-23 | End: 2019-10-25 | Stop reason: HOSPADM

## 2019-10-23 RX ORDER — TRAMADOL HYDROCHLORIDE 50 MG/1
50 TABLET ORAL DAILY
Status: SHIPPED | COMMUNITY
End: 2022-08-09

## 2019-10-23 RX ORDER — SODIUM CHLORIDE 9 MG/ML
1000 INJECTION, SOLUTION INTRAVENOUS ONCE
Status: COMPLETED | OUTPATIENT
Start: 2019-10-23 | End: 2019-10-23

## 2019-10-23 RX ORDER — OXYCODONE HYDROCHLORIDE 5 MG/1
2.5 TABLET ORAL
Status: DISCONTINUED | OUTPATIENT
Start: 2019-10-23 | End: 2019-10-25 | Stop reason: HOSPADM

## 2019-10-23 RX ORDER — MORPHINE SULFATE 4 MG/ML
2 INJECTION, SOLUTION INTRAMUSCULAR; INTRAVENOUS
Status: DISCONTINUED | OUTPATIENT
Start: 2019-10-23 | End: 2019-10-25 | Stop reason: HOSPADM

## 2019-10-23 RX ORDER — BISACODYL 10 MG
10 SUPPOSITORY, RECTAL RECTAL
Status: DISCONTINUED | OUTPATIENT
Start: 2019-10-23 | End: 2019-10-25 | Stop reason: HOSPADM

## 2019-10-23 RX ORDER — SODIUM CHLORIDE, SODIUM LACTATE, POTASSIUM CHLORIDE, CALCIUM CHLORIDE 600; 310; 30; 20 MG/100ML; MG/100ML; MG/100ML; MG/100ML
2000 INJECTION, SOLUTION INTRAVENOUS ONCE
Status: COMPLETED | OUTPATIENT
Start: 2019-10-23 | End: 2019-10-24

## 2019-10-23 RX ORDER — PROCHLORPERAZINE EDISYLATE 5 MG/ML
5-10 INJECTION INTRAMUSCULAR; INTRAVENOUS EVERY 4 HOURS PRN
Status: DISCONTINUED | OUTPATIENT
Start: 2019-10-23 | End: 2019-10-25 | Stop reason: HOSPADM

## 2019-10-23 RX ORDER — ONDANSETRON 2 MG/ML
4 INJECTION INTRAMUSCULAR; INTRAVENOUS ONCE
Status: COMPLETED | OUTPATIENT
Start: 2019-10-23 | End: 2019-10-23

## 2019-10-23 RX ORDER — PROMETHAZINE HYDROCHLORIDE 25 MG/1
12.5-25 TABLET ORAL EVERY 4 HOURS PRN
Status: DISCONTINUED | OUTPATIENT
Start: 2019-10-23 | End: 2019-10-25 | Stop reason: HOSPADM

## 2019-10-23 RX ORDER — AMOXICILLIN 250 MG
2 CAPSULE ORAL 2 TIMES DAILY
Status: DISCONTINUED | OUTPATIENT
Start: 2019-10-23 | End: 2019-10-25 | Stop reason: HOSPADM

## 2019-10-23 RX ORDER — POLYETHYLENE GLYCOL 3350 17 G/17G
1 POWDER, FOR SOLUTION ORAL
Status: DISCONTINUED | OUTPATIENT
Start: 2019-10-23 | End: 2019-10-25 | Stop reason: HOSPADM

## 2019-10-23 RX ORDER — METHOCARBAMOL 750 MG/1
750 TABLET, FILM COATED ORAL EVERY MORNING
COMMUNITY
End: 2021-07-03

## 2019-10-23 RX ORDER — ONDANSETRON 2 MG/ML
4 INJECTION INTRAMUSCULAR; INTRAVENOUS EVERY 4 HOURS PRN
Status: DISCONTINUED | OUTPATIENT
Start: 2019-10-23 | End: 2019-10-25 | Stop reason: HOSPADM

## 2019-10-23 RX ADMIN — SODIUM CHLORIDE, POTASSIUM CHLORIDE, SODIUM LACTATE AND CALCIUM CHLORIDE 2000 ML: 600; 310; 30; 20 INJECTION, SOLUTION INTRAVENOUS at 21:57

## 2019-10-23 RX ADMIN — SODIUM CHLORIDE 1000 ML: 9 INJECTION, SOLUTION INTRAVENOUS at 19:18

## 2019-10-23 RX ADMIN — ONDANSETRON 4 MG: 2 INJECTION INTRAMUSCULAR; INTRAVENOUS at 18:12

## 2019-10-23 RX ADMIN — OXYCODONE HYDROCHLORIDE 5 MG: 5 TABLET ORAL at 19:57

## 2019-10-23 RX ADMIN — SENNOSIDES, DOCUSATE SODIUM 2 TABLET: 50; 8.6 TABLET, FILM COATED ORAL at 21:58

## 2019-10-23 RX ADMIN — MORPHINE SULFATE 4 MG: 4 INJECTION INTRAVENOUS at 18:12

## 2019-10-23 ASSESSMENT — LIFESTYLE VARIABLES
HOW MANY TIMES IN THE PAST YEAR HAVE YOU HAD 5 OR MORE DRINKS IN A DAY: 0
DO YOU DRINK ALCOHOL: NO
EVER FELT BAD OR GUILTY ABOUT YOUR DRINKING: NO
TOTAL SCORE: 0
ALCOHOL_USE: NO
ON A TYPICAL DAY WHEN YOU DRINK ALCOHOL HOW MANY DRINKS DO YOU HAVE: 0
CONSUMPTION TOTAL: NEGATIVE
DO YOU DRINK ALCOHOL: NO
HAVE PEOPLE ANNOYED YOU BY CRITICIZING YOUR DRINKING: NO
TOTAL SCORE: 0
TOTAL SCORE: 0
DOES PATIENT WANT TO STOP DRINKING: NO
EVER HAD A DRINK FIRST THING IN THE MORNING TO STEADY YOUR NERVES TO GET RID OF A HANGOVER: NO
EVER_SMOKED: YES
AVERAGE NUMBER OF DAYS PER WEEK YOU HAVE A DRINK CONTAINING ALCOHOL: 0
HAVE YOU EVER FELT YOU SHOULD CUT DOWN ON YOUR DRINKING: NO

## 2019-10-23 ASSESSMENT — ENCOUNTER SYMPTOMS
SHORTNESS OF BREATH: 0
CONSTIPATION: 0
BLOOD IN STOOL: 0
PSYCHIATRIC NEGATIVE: 1
ABDOMINAL PAIN: 1
TINGLING: 0
COUGH: 0
PALPITATIONS: 0
MYALGIAS: 0
CONSTITUTIONAL NEGATIVE: 1
EYES NEGATIVE: 1
PHOTOPHOBIA: 0
DIZZINESS: 0
DEPRESSION: 0
FEVER: 0
DIARRHEA: 0
SORE THROAT: 0
BACK PAIN: 1
HEADACHES: 0
WHEEZING: 0
CARDIOVASCULAR NEGATIVE: 1
VOMITING: 0
CHILLS: 0
NAUSEA: 1
RESPIRATORY NEGATIVE: 1
FOCAL WEAKNESS: 0
NEUROLOGICAL NEGATIVE: 1

## 2019-10-23 ASSESSMENT — PATIENT HEALTH QUESTIONNAIRE - PHQ9
2. FEELING DOWN, DEPRESSED, IRRITABLE, OR HOPELESS: NOT AT ALL
SUM OF ALL RESPONSES TO PHQ9 QUESTIONS 1 AND 2: 0
1. LITTLE INTEREST OR PLEASURE IN DOING THINGS: NOT AT ALL

## 2019-10-23 NOTE — ED TRIAGE NOTES
Pt amb to triage.  Chief Complaint   Patient presents with   • Abdominal Pain     described as stabbing xweeks, recently diagnosed w/ gallstones   • Nausea     Pt given urine collection supplies. Instructed on clean catch technique.

## 2019-10-24 ENCOUNTER — ANESTHESIA (OUTPATIENT)
Dept: SURGERY | Facility: MEDICAL CENTER | Age: 29
End: 2019-10-24
Payer: COMMERCIAL

## 2019-10-24 ENCOUNTER — ANESTHESIA EVENT (OUTPATIENT)
Dept: SURGERY | Facility: MEDICAL CENTER | Age: 29
End: 2019-10-24
Payer: COMMERCIAL

## 2019-10-24 ENCOUNTER — APPOINTMENT (OUTPATIENT)
Dept: RADIOLOGY | Facility: MEDICAL CENTER | Age: 29
End: 2019-10-24
Attending: INTERNAL MEDICINE
Payer: COMMERCIAL

## 2019-10-24 LAB
ALBUMIN SERPL BCP-MCNC: 3.9 G/DL (ref 3.2–4.9)
ALBUMIN/GLOB SERPL: 1.4 G/DL
ALP SERPL-CCNC: 111 U/L (ref 30–99)
ALT SERPL-CCNC: 127 U/L (ref 2–50)
ANION GAP SERPL CALC-SCNC: 5 MMOL/L (ref 0–11.9)
AST SERPL-CCNC: 105 U/L (ref 12–45)
BILIRUB SERPL-MCNC: 1.2 MG/DL (ref 0.1–1.5)
BUN SERPL-MCNC: 10 MG/DL (ref 8–22)
CALCIUM SERPL-MCNC: 8.7 MG/DL (ref 8.5–10.5)
CHLORIDE SERPL-SCNC: 108 MMOL/L (ref 96–112)
CO2 SERPL-SCNC: 25 MMOL/L (ref 20–33)
CREAT SERPL-MCNC: 0.68 MG/DL (ref 0.5–1.4)
ERYTHROCYTE [DISTWIDTH] IN BLOOD BY AUTOMATED COUNT: 39.8 FL (ref 35.9–50)
GLOBULIN SER CALC-MCNC: 2.7 G/DL (ref 1.9–3.5)
GLUCOSE SERPL-MCNC: 86 MG/DL (ref 65–99)
HCT VFR BLD AUTO: 41.4 % (ref 37–47)
HGB BLD-MCNC: 13.9 G/DL (ref 12–16)
INR PPP: 1 (ref 0.87–1.13)
MCH RBC QN AUTO: 30.3 PG (ref 27–33)
MCHC RBC AUTO-ENTMCNC: 33.6 G/DL (ref 33.6–35)
MCV RBC AUTO: 90.2 FL (ref 81.4–97.8)
PLATELET # BLD AUTO: 218 K/UL (ref 164–446)
PMV BLD AUTO: 10.1 FL (ref 9–12.9)
POTASSIUM SERPL-SCNC: 3.9 MMOL/L (ref 3.6–5.5)
PROT SERPL-MCNC: 6.6 G/DL (ref 6–8.2)
PROTHROMBIN TIME: 13.5 SEC (ref 12–14.6)
RBC # BLD AUTO: 4.59 M/UL (ref 4.2–5.4)
SODIUM SERPL-SCNC: 138 MMOL/L (ref 135–145)
WBC # BLD AUTO: 11 K/UL (ref 4.8–10.8)

## 2019-10-24 PROCEDURE — A9270 NON-COVERED ITEM OR SERVICE: HCPCS | Performed by: ANESTHESIOLOGY

## 2019-10-24 PROCEDURE — 160048 HCHG OR STATISTICAL LEVEL 1-5: Performed by: INTERNAL MEDICINE

## 2019-10-24 PROCEDURE — G0378 HOSPITAL OBSERVATION PER HR: HCPCS

## 2019-10-24 PROCEDURE — 85027 COMPLETE CBC AUTOMATED: CPT

## 2019-10-24 PROCEDURE — 700102 HCHG RX REV CODE 250 W/ 637 OVERRIDE(OP): Performed by: HOSPITALIST

## 2019-10-24 PROCEDURE — A9270 NON-COVERED ITEM OR SERVICE: HCPCS | Performed by: HOSPITALIST

## 2019-10-24 PROCEDURE — 500066 HCHG BITE BLOCK, ECT: Performed by: INTERNAL MEDICINE

## 2019-10-24 PROCEDURE — 700111 HCHG RX REV CODE 636 W/ 250 OVERRIDE (IP): Performed by: INTERNAL MEDICINE

## 2019-10-24 PROCEDURE — 36415 COLL VENOUS BLD VENIPUNCTURE: CPT

## 2019-10-24 PROCEDURE — 85610 PROTHROMBIN TIME: CPT

## 2019-10-24 PROCEDURE — 160035 HCHG PACU - 1ST 60 MINS PHASE I: Performed by: INTERNAL MEDICINE

## 2019-10-24 PROCEDURE — 99225 PR SUBSEQUENT OBSERVATION CARE,LEVEL II: CPT | Performed by: HOSPITALIST

## 2019-10-24 PROCEDURE — 160203 HCHG ENDO MINUTES - 1ST 30 MINS LEVEL 4: Performed by: INTERNAL MEDICINE

## 2019-10-24 PROCEDURE — 160009 HCHG ANES TIME/MIN: Performed by: INTERNAL MEDICINE

## 2019-10-24 PROCEDURE — 700105 HCHG RX REV CODE 258: Performed by: INTERNAL MEDICINE

## 2019-10-24 PROCEDURE — 700101 HCHG RX REV CODE 250: Performed by: ANESTHESIOLOGY

## 2019-10-24 PROCEDURE — 80053 COMPREHEN METABOLIC PANEL: CPT

## 2019-10-24 PROCEDURE — 160002 HCHG RECOVERY MINUTES (STAT): Performed by: INTERNAL MEDICINE

## 2019-10-24 PROCEDURE — 700102 HCHG RX REV CODE 250 W/ 637 OVERRIDE(OP): Performed by: ANESTHESIOLOGY

## 2019-10-24 PROCEDURE — 160208 HCHG ENDO MINUTES - EA ADDL 1 MIN LEVEL 4: Performed by: INTERNAL MEDICINE

## 2019-10-24 PROCEDURE — 160036 HCHG PACU - EA ADDL 30 MINS PHASE I: Performed by: INTERNAL MEDICINE

## 2019-10-24 PROCEDURE — 700111 HCHG RX REV CODE 636 W/ 250 OVERRIDE (IP): Performed by: ANESTHESIOLOGY

## 2019-10-24 PROCEDURE — 700111 HCHG RX REV CODE 636 W/ 250 OVERRIDE (IP): Performed by: HOSPITALIST

## 2019-10-24 PROCEDURE — 110371 HCHG SHELL REV 272: Performed by: INTERNAL MEDICINE

## 2019-10-24 RX ORDER — OXYCODONE HCL 5 MG/5 ML
10 SOLUTION, ORAL ORAL
Status: COMPLETED | OUTPATIENT
Start: 2019-10-24 | End: 2019-10-24

## 2019-10-24 RX ORDER — MIDAZOLAM HYDROCHLORIDE 1 MG/ML
INJECTION INTRAMUSCULAR; INTRAVENOUS PRN
Status: DISCONTINUED | OUTPATIENT
Start: 2019-10-24 | End: 2019-10-24 | Stop reason: SURG

## 2019-10-24 RX ORDER — DIPHENHYDRAMINE HYDROCHLORIDE 50 MG/ML
12.5 INJECTION INTRAMUSCULAR; INTRAVENOUS
Status: DISCONTINUED | OUTPATIENT
Start: 2019-10-24 | End: 2019-10-24 | Stop reason: HOSPADM

## 2019-10-24 RX ORDER — LIDOCAINE HYDROCHLORIDE 40 MG/ML
SOLUTION TOPICAL PRN
Status: DISCONTINUED | OUTPATIENT
Start: 2019-10-24 | End: 2019-10-24 | Stop reason: SURG

## 2019-10-24 RX ORDER — MEPERIDINE HYDROCHLORIDE 25 MG/ML
6.25 INJECTION INTRAMUSCULAR; INTRAVENOUS; SUBCUTANEOUS
Status: DISCONTINUED | OUTPATIENT
Start: 2019-10-24 | End: 2019-10-24 | Stop reason: HOSPADM

## 2019-10-24 RX ORDER — MIDAZOLAM HYDROCHLORIDE 1 MG/ML
1 INJECTION INTRAMUSCULAR; INTRAVENOUS
Status: DISCONTINUED | OUTPATIENT
Start: 2019-10-24 | End: 2019-10-24 | Stop reason: HOSPADM

## 2019-10-24 RX ORDER — OXYCODONE HCL 5 MG/5 ML
5 SOLUTION, ORAL ORAL
Status: COMPLETED | OUTPATIENT
Start: 2019-10-24 | End: 2019-10-24

## 2019-10-24 RX ORDER — DEXAMETHASONE SODIUM PHOSPHATE 4 MG/ML
INJECTION, SOLUTION INTRA-ARTICULAR; INTRALESIONAL; INTRAMUSCULAR; INTRAVENOUS; SOFT TISSUE PRN
Status: DISCONTINUED | OUTPATIENT
Start: 2019-10-24 | End: 2019-10-24 | Stop reason: SURG

## 2019-10-24 RX ORDER — HALOPERIDOL 5 MG/ML
1 INJECTION INTRAMUSCULAR
Status: DISCONTINUED | OUTPATIENT
Start: 2019-10-24 | End: 2019-10-24 | Stop reason: HOSPADM

## 2019-10-24 RX ORDER — SODIUM CHLORIDE, SODIUM LACTATE, POTASSIUM CHLORIDE, CALCIUM CHLORIDE 600; 310; 30; 20 MG/100ML; MG/100ML; MG/100ML; MG/100ML
INJECTION, SOLUTION INTRAVENOUS CONTINUOUS
Status: DISCONTINUED | OUTPATIENT
Start: 2019-10-24 | End: 2019-10-24 | Stop reason: HOSPADM

## 2019-10-24 RX ORDER — ROCURONIUM BROMIDE 10 MG/ML
INJECTION, SOLUTION INTRAVENOUS PRN
Status: DISCONTINUED | OUTPATIENT
Start: 2019-10-24 | End: 2019-10-24 | Stop reason: SURG

## 2019-10-24 RX ORDER — ONDANSETRON 2 MG/ML
4 INJECTION INTRAMUSCULAR; INTRAVENOUS
Status: DISCONTINUED | OUTPATIENT
Start: 2019-10-24 | End: 2019-10-24 | Stop reason: HOSPADM

## 2019-10-24 RX ADMIN — OXYCODONE HYDROCHLORIDE 10 MG: 5 SOLUTION ORAL at 11:11

## 2019-10-24 RX ADMIN — ONDANSETRON 4 MG: 2 INJECTION INTRAMUSCULAR; INTRAVENOUS at 09:28

## 2019-10-24 RX ADMIN — OXYCODONE HYDROCHLORIDE 5 MG: 5 TABLET ORAL at 18:54

## 2019-10-24 RX ADMIN — PIPERACILLIN SODIUM AND TAZOBACTAM SODIUM 3.38 G: 3; .375 INJECTION, POWDER, FOR SOLUTION INTRAVENOUS at 09:31

## 2019-10-24 RX ADMIN — DEXAMETHASONE SODIUM PHOSPHATE 8 MG: 4 INJECTION, SOLUTION INTRA-ARTICULAR; INTRALESIONAL; INTRAMUSCULAR; INTRAVENOUS; SOFT TISSUE at 10:11

## 2019-10-24 RX ADMIN — SUGAMMADEX 200 MG: 100 INJECTION, SOLUTION INTRAVENOUS at 10:22

## 2019-10-24 RX ADMIN — MIDAZOLAM 2 MG: 1 INJECTION INTRAMUSCULAR; INTRAVENOUS at 09:58

## 2019-10-24 RX ADMIN — OXYCODONE HYDROCHLORIDE 5 MG: 5 TABLET ORAL at 22:06

## 2019-10-24 RX ADMIN — EPHEDRINE SULFATE 5 MG: 50 INJECTION, SOLUTION INTRAVENOUS at 10:01

## 2019-10-24 RX ADMIN — OXYCODONE HYDROCHLORIDE 5 MG: 5 TABLET ORAL at 14:36

## 2019-10-24 RX ADMIN — FENTANYL CITRATE 50 MCG: 50 INJECTION, SOLUTION INTRAMUSCULAR; INTRAVENOUS at 10:04

## 2019-10-24 RX ADMIN — OXYCODONE HYDROCHLORIDE 5 MG: 5 TABLET ORAL at 01:53

## 2019-10-24 RX ADMIN — ROCURONIUM BROMIDE 50 MG: 10 INJECTION, SOLUTION INTRAVENOUS at 10:05

## 2019-10-24 RX ADMIN — PROPOFOL 150 MG: 10 INJECTION, EMULSION INTRAVENOUS at 10:05

## 2019-10-24 RX ADMIN — FENTANYL CITRATE 50 MCG: 50 INJECTION, SOLUTION INTRAMUSCULAR; INTRAVENOUS at 10:11

## 2019-10-24 RX ADMIN — LIDOCAINE HYDROCHLORIDE 4 ML: 40 SOLUTION TOPICAL at 10:05

## 2019-10-24 RX ADMIN — SENNOSIDES, DOCUSATE SODIUM 2 TABLET: 50; 8.6 TABLET, FILM COATED ORAL at 18:53

## 2019-10-24 ASSESSMENT — ENCOUNTER SYMPTOMS
NAUSEA: 1
DIZZINESS: 0
MEMORY LOSS: 0
COUGH: 0
VOMITING: 1
SORE THROAT: 0
MYALGIAS: 0
DEPRESSION: 0
FEVER: 0
ABDOMINAL PAIN: 1
BACK PAIN: 0
EYE DISCHARGE: 0
HEADACHES: 0
SHORTNESS OF BREATH: 0
EYE PAIN: 0
CHILLS: 0
CONSTIPATION: 0

## 2019-10-24 NOTE — CARE PLAN
Problem: Safety  Goal: Will remain free from falls  Outcome: PROGRESSING AS EXPECTED     Problem: Pain Management  Goal: Pain level will decrease to patient's comfort goal  Outcome: PROGRESSING AS EXPECTED

## 2019-10-24 NOTE — ANESTHESIA QCDR
2019 [facility] Clinical Data Registry (for Quality Improvement)     Postoperative nausea/vomiting risk protocol (Adult = 18 yrs and Pediatric 3-17 yrs)- (430 and 463)  General inhalation anesthetic (NOT TIVA) with PONV risk factors: Yes  Provision of anti-emetic therapy with at least 2 different classes of agents: Yes   Patient DID NOT receive anti-emetic therapy and reason is documented in Medical Record:  N/A    Multimodal Pain Management- (AQI59)  Patient undergoing Elective Surgery (i.e. Outpatient, or ASC, or Prescheduled Surgery prior to Hospital Admission): No  Use of Multimodal Pain Management, two or more drugs and/or interventions, NOT including systemic opioids: N/A  Exception: Documented allergy to multiple classes of analgesics: N/A    PACU assessment of acute postoperative pain prior to Anesthesia Care End- Applies to Patients Age = 18- (ABG7)  Initial PACU pain score is which of the following: < 7/10  Patient unable to report pain score: N/A    Post-anesthetic transfer of care checklist/protocol to PACU/ICU- (426 and 427)  Upon conclusion of case, patient transferred to which of the following locations: PACU/Non-ICU  Use of transfer checklist/protocol: Yes  Exclusion: Service Performed in Patient Hospital Room (and thus did not require transfer): N/A    PACU Reintubation- (AQI31)  General anesthesia requiring endotracheal intubation (ETT) along with subsequent extubation in OR or PACU: Yes  Required reintubation in the PACU: No   Extubation was a planned trial documented in the medical record prior to removal of the original airway device:  N/A    Unplanned admission to ICU related to anesthesia service up through end of PACU care- (MD51)  Unplanned admission to ICU (not initially anticipated at anesthesia start time): No

## 2019-10-24 NOTE — CARE PLAN
Problem: Safety  Goal: Will remain free from falls  Outcome: PROGRESSING AS EXPECTED  Note:   Pt educated on safety precautions, utilization of the call light, and bed alarm.  Pt verbalized understanding.      Problem: Pain Management  Goal: Pain level will decrease to patient's comfort goal  Outcome: PROGRESSING AS EXPECTED  Note:   Pt educated regarding non-pharmacological methods of pain relief as well as informed of medications available in her MAR.

## 2019-10-24 NOTE — DISCHARGE PLANNING
Care Transition Team Assessment    Spoke with patient at bedside. Uses [address] [address] DR. Family will be ride @ D/C. Anticipate no needs @ present time.    Information Source  Orientation : Oriented x 4  Information Given By: Patient  Informant's Name: Lali Funes    Readmission Evaluation  Is this a readmission?: No    Interdisciplinary Discharge Planning  Primary Care Physician: Elian Crews  Lives with - Patient's Self Care Capacity: Spouse, Parents  Patient or legal guardian wants to designate a caregiver (see row info): No  Support Systems: Parent, Spouse / Significant Other  Housing / Facility: 1 Story Apartment / Condo  Do You Take your Prescribed Medications Regularly: Yes  Able to Return to Previous ADL's: Yes  Mobility Issues: No  Prior Services: Home-Independent  Patient Expects to be Discharged to:: Home  Assistance Needed: No  Durable Medical Equipment: Not Applicable    Discharge Preparedness  What are your discharge supports?: Parent, Spouse  Prior Functional Level: Ambulatory    Functional Assesment  Prior Functional Level: Ambulatory    Finances  Prescription Coverage: Yes    Anticipated Discharge Information  Anticipated discharge disposition: Home  Discharge Address: [address] [place] [address] [address]  Discharge Contact Phone Number: [phone]

## 2019-10-24 NOTE — ED NOTES
IV start completed, +blood return flushed appropriately.  Meds to be given for nausea/pain, see eMAR.  US at bedside at this time.  Will reassess accordingly, pt remains on monitor. NAD noted. CTM pt.

## 2019-10-24 NOTE — ANESTHESIA PREPROCEDURE EVALUATION
Bile duct stone    Relevant Problems   No relevant active problems   chronic back pain    Physical Exam    Airway   Mallampati: I  TM distance: >3 FB  Neck ROM: full       Cardiovascular - normal exam  Rhythm: regular  Rate: normal  (-) murmur     Dental - normal exam         Pulmonary - normal exam  Breath sounds clear to auscultation     Abdominal    Neurological - normal exam               Anesthesia Plan    ASA 2- EMERGENT (obstructing CBD stone)       Plan - general       Airway plan will be ETT        Induction: intravenous    Postoperative Plan: Postoperative administration of opioids is intended.    Pertinent diagnostic labs and testing reviewed    Informed Consent:    Anesthetic plan and risks discussed with patient.    Use of blood products discussed with: patient whom consented to blood products.

## 2019-10-24 NOTE — CONSULTS
DATE OF SERVICE:  10/24/2019    HISTORY OF PRESENT ILLNESS:  The patient is a 29-year-old woman who I have   been asked to evaluate further for cholecystitis and choledocholithiasis.  She   was admitted on 10/23/2019, at which time she was noted to have evidence of   choledocholithiasis.  She underwent an ERCP, which did clear several common   bile duct stones.  She is now felt to be a candidate for cholecystectomy.  At   the time of my exam, she states that she has some abdominal soreness, but   otherwise is essentially without complaints.  She states that she has been   having some intermittent abdominal symptoms including postprandial nausea and   right upper quadrant pain.  She has not noted any jaundice, acholic stools,   fevers, chills, sweats, or acute weight changes.  Her pain is currently   minimal and there are no aggravating factors.    PAST MEDICAL HISTORY:  Generally unremarkable.    PAST SURGICAL HISTORY:  She has had some laparoscopies and a salpingectomy as   well as a tubal ligation.  There is concern that she may have endometriosis.    MEDICATIONS:  Prior to this admission include tramadol, Robaxin, and   Depo-Provera shots.    ALLERGIES:  She has no stated drug allergies.    SOCIAL HISTORY:  She is a smoker, quit recently in 07/2019.  She does not   drink or use illicit drugs.    FAMILY HISTORY:  Positive for coronary artery disease and breast cancer.    REVIEW OF SYSTEMS:  Intermittent abdominal symptoms for the past 3 weeks as   per HPI, otherwise negative per AMA and CMS criteria.    PHYSICAL EXAMINATION:  VITAL SIGNS:  Here, she currently has a temperature of 36.3, pulse of 45,   blood pressure is 121/57.  GENERAL:  She is lying in bed and is in no distress.  HEENT:  Unremarkable.  Pupils are equal.  Oropharynx without lesions.  NECK:  Supple.  LUNGS:  Clear.  CARDIOVASCULAR:  Reveals regular rate and rhythm.  ABDOMEN:  Soft and nontender.  EXTREMITIES:  Symmetrical, without clubbing, cyanosis  or edema.  She has   palpable radial and femoral pulses.  SKIN:  Warm and dry.    NEUROLOGIC:  She is neurologically intact without any grossly detectable motor   or sensory deficits.    LABORATORY DATA:  Includes a white count of 11, hematocrit 41.4, platelets of   218.  Sodium is 138, potassium is 3.9, chloride is 108, CO2 is 25, BUN is 10,   creatinine is 0.68.  Transaminases are remarkable for AST of 105, ALT of 127,   alkaline phosphatase of 111, total bilirubin 1.2.  Her lipase yesterday was   22.    IMPRESSION:  A 29-year-old woman who presented with abdominal pain and was   noted to have choledocholithiasis.  She underwent ERCP with stone extraction   this morning.  She is now a candidate for cholecystectomy.  We will make   arrangements.  I discussed the procedure with her in detail including the   laparoscopic approach, potential for converting to an open procedure,   associated risk of bleeding, infection, abscess, and hematoma.  I also   discussed risk of postoperative bile leak, common bile duct stricture, common   bile duct transection, and the significance of these complications.  She understands all   the above and does wish to proceed.  We will make arrangements.       ____________________________________     MD LULU GARCIA / CLEY    DD:  10/24/2019 14:12:03  DT:  10/24/2019 16:19:29    D#:  [id number]  Job#:  [id number]

## 2019-10-24 NOTE — PROGRESS NOTES
Assessment done, Pt educated on plan of care. Waiting on dr rounds  Patient resting in bed, no signs of distress,  no complaints of pain at this time. Call light within reach,  side rails up, will monitor. Condition stable currently npo for procedure today

## 2019-10-24 NOTE — PROGRESS NOTES
[facility] Medicine Daily Progress Note    Date of Service  10/24/2019    Chief Complaint  29 y.o. female admitted 10/23/2019 with abdominal pain and nausea.     Hospital Course    Patient is a 29-year-old female with known medical history of arthritis, back pain and previous pregnancy.  Patient presented to the emergency room after having intermittent waxing and waning abdominal pain for the past 3 weeks.  Patient states her pain was associated with nausea and is aggravated when she eats.  Patient was originally seen by her primary care provider and recently diagnosed with gallstones on ultrasound.  Patient was admitted to CDU for further work-up and monitoring.  Patient was provided symptom management with antiemetics and analgesics.  Patient was initiated on IV fluid hydration and made n.p.o.  CBC was obtained which showed elevated white blood cell count of 13.7 otherwise benign.  CMP showed elevated liver enzymes.  Abdominal ultrasound showed cholelithiasis with borderline thickened gallbladder wall and biliary dilation with distal common bile duct stone and/or debris.  GI was consulted with plans for ERCP following morning.      Interval Problem Update  10/24- Patient resting in bed in obvious discomfort. ERCP completed for stone removal. General surgery consulted and plans for Lap Mariaelena in am. Patient to be continued on IV fluid hydration. Regular diet initiated with NPO at midnight. Patient to transfer to surgical floor when bed available.      Consultants/Specialty  GI- Dr. Jacobo   General Surgery- Dr. Ramesh     Code Status  Full     Disposition  Transfer to surgical floor for lap mariaelena tomorrow    Review of Systems  Review of Systems   Constitutional: Positive for malaise/fatigue. Negative for chills and fever.   HENT: Negative for congestion and sore throat.    Eyes: Negative for pain and discharge.   Respiratory: Negative for cough and shortness of breath.    Cardiovascular: Negative for chest pain and leg  swelling.   Gastrointestinal: Positive for abdominal pain, nausea and vomiting. Negative for constipation.   Genitourinary: Negative for dysuria, frequency and urgency.   Musculoskeletal: Negative for back pain and myalgias.   Skin: Negative for rash.   Neurological: Negative for dizziness and headaches.   Psychiatric/Behavioral: Negative for depression and memory loss.        Physical Exam  Temp:  [35.9 °C (96.6 °F)-37.2 °C (98.9 °F)] 36.3 °C (97.3 °F)  Pulse:  [40-78] 45  Resp:  [12-20] 14  BP: ()/(45-89) 121/57  SpO2:  [95 %-100 %] 96 %    Physical Exam   Constitutional: She is oriented to person, place, and time. She appears well-developed. She is cooperative. She appears ill. She appears distressed.   Pleasant young woman resting in bed in obvious discomfort    HENT:   Head: Normocephalic.   Mouth/Throat: Oropharynx is clear and moist.   Eyes: Conjunctivae and lids are normal.   Neck: Neck supple. No tracheal tenderness present.   Cardiovascular: Normal rate and normal heart sounds. Exam reveals no gallop.   No murmur heard.  Pulmonary/Chest: Effort normal and breath sounds normal. No respiratory distress. She has no decreased breath sounds. She has no wheezes.   Abdominal: Soft. She exhibits no distension. Bowel sounds are decreased. There is tenderness in the right upper quadrant. There is no guarding.   Musculoskeletal:   MOLINA   Neurological: She is alert and oriented to person, place, and time. She has normal strength.   Skin: Skin is warm and dry. She is not diaphoretic.   Psychiatric: She has a normal mood and affect. Her speech is normal and behavior is normal.   Nursing note and vitals reviewed.      Fluids    Intake/Output Summary (Last 24 hours) at 10/24/2019 1411  Last data filed at 10/24/2019 1111  Gross per 24 hour   Intake 1800 ml   Output --   Net 1800 ml       Laboratory  Recent Labs     10/23/19  1534 10/24/19  0201   WBC 13.7* 11.0*   RBC 5.31 4.59   HEMOGLOBIN 16.0 13.9   HEMATOCRIT  47.7* 41.4   MCV 89.8 90.2   MCH 30.1 30.3   MCHC 33.5* 33.6   RDW 39.5 39.8   PLATELETCT 240 218   MPV 10.3 10.1     Recent Labs     10/23/19  1534 10/24/19  0201   SODIUM 139 138   POTASSIUM 4.4 3.9   CHLORIDE 103 108   CO2 26 25   GLUCOSE 100* 86   BUN 12 10   CREATININE 0.80 0.68   CALCIUM 9.8 8.7     Recent Labs     10/24/19  0201   INR 1.00               Imaging  DX-PORTABLE FLUOROSCOPY < 1 HOUR Is the patient pregnant? No   Final Result      Intraoperative image as above described.      US-RUQ   Final Result      1.  Cholelithiasis   2.  Biliary dilation with distal common bile duct stone and/or debris.   3.  No gross evidence of acute cholecystitis.              Assessment/Plan  * Biliary obstruction  Assessment & Plan  ERCP completed with stone removal- GI following   Surgery consulted- Lap Alyssa planned for tomorrow  Symptom management  NPO at midnight   IV hydration     Leukocytosis  Assessment & Plan  Patient is not jaundiced, she is afebrile with stable vital signs.  Suspect reactive secondary to biliary obstruction.  Holding off on antibiotics for now but will monitor closely for any changes in her clinical status.    Transaminitis  Assessment & Plan  Treatment as noted above for biliary obstruction, expect improvement with ERCP.       VTE prophylaxis: SCD

## 2019-10-24 NOTE — ED NOTES
Med Rec Updated and Complete per Pt at bedside  Allergies Reviewed  No PO ABX last 14 days.    Pt denies OTC medication at this time.

## 2019-10-24 NOTE — ED NOTES
Informed Dr Vasquez pt having continued bradycardia 40-50's HR since triage assessment of HR in 70's.  EKG to be ordered.

## 2019-10-24 NOTE — ED NOTES
BIBA, found by [employer] drinking alcohol. Patient intoxicated, vomiting. clothing removed and placed in yellow gown. Pt reports pain relief at this time.  Transport arrived for pt.  No acute distress upon transport.  Resp even ul, skin warm dry/ NAD. All belongings with pt.

## 2019-10-24 NOTE — ED NOTES
Plan for admission, pt resting comfortably, reports pain relief at this time.  Pending bedding placement.  Denies needs, no acute distress noted. will CTM.

## 2019-10-24 NOTE — ASSESSMENT & PLAN NOTE
Patient is not jaundiced, she is afebrile with stable vital signs.  Suspect reactive secondary to biliary obstruction.  Holding off on antibiotics for now but will monitor closely for any changes in her clinical status.

## 2019-10-24 NOTE — ANESTHESIA PROCEDURE NOTES
Airway  Date/Time: 10/24/2019 10:05 AM  Performed by: Serjio Dudley M.D.  Authorized by: Serjio Dudley M.D.     Location:  OR  Urgency:  Elective  Difficult Airway: No    Indications for Airway Management:  Anesthesia  Spontaneous Ventilation: absent    Sedation Level:  Deep  Preoxygenated: Yes    Patient Position:  Sniffing  Mask Difficulty Assessment:  2 - vent by mask + OA or adjuvant +/- NMBA  Final Airway Type:  Endotracheal airway  Final Endotracheal Airway:  ETT  Cuffed: Yes    Technique Used for Successful ETT Placement:  Direct laryngoscopy  Insertion Site:  Oral  Blade Type:  Abner  Laryngoscope Blade/Videolaryngoscope Blade Size:  3  ETT Size (mm):  6.5  Measured from:  Teeth  ETT to Teeth (cm):  21  Placement Verified by: auscultation and capnometry    Cormack-Lehane Classification:  Grade I - full view of glottis  Number of Attempts at Approach:  1   Atraumatic DLx1

## 2019-10-24 NOTE — ANESTHESIA POSTPROCEDURE EVALUATION
Patient: Lali Funes    Procedure Summary     Date:  10/24/19 Room / Location:  [place] [address] / SURGERY [facility]    Anesthesia Start:  0955 Anesthesia Stop:  1031    Procedure:  ERCP (ENDOSCOPIC RETROGRADE CHOLANGIOPANCREATOGRAPHY) (N/A Mouth) Diagnosis:  (coledocolitiasis)    Surgeon:  Temo Liao M.D. Responsible Provider:  Serjio Dudley M.D.    Anesthesia Type:  general ASA Status:  2 - Emergent          Final Anesthesia Type: general  Last vitals  BP   Blood Pressure: 118/54    Temp   36.3 °C (97.3 °F)    Pulse   Pulse: (!) 46   Resp   14    SpO2   96 %      Anesthesia Post Evaluation    Patient location during evaluation: PACU  Patient participation: complete - patient participated  Level of consciousness: awake and alert    Airway patency: patent  Anesthetic complications: no  Cardiovascular status: hemodynamically stable  Respiratory status: acceptable  Hydration status: euvolemic    PONV: none           Nurse Pain Score: 6 (NPRS)

## 2019-10-24 NOTE — PROCEDURES
Pre-procedure Diagnoses   Calculus of bile duct without cholangitis with obstruction [K80.51]   Colicky RUQ abdominal pain [R10.11]   Epigastric pain [R10.13]   Abnormal liver function test [R94.5]   BMI 27.0-27.9,adult [Z68.27]   Post-procedure Diagnoses   Choledocholithiasis [K80.50]   Procedures   ENDOSCOPIC RETROGRADE CHOLANGIOPANCREATOGRAPHY (ERCP) WITH REMOVAL OF STONES FROM BILIARY DUCTS [61417 (CPT®)]   ENDOSCOPIC RETROGRADE CHOLANGIOPANCREATOGRAPHY (ERCP) BILIARY SPHINCTEROTOMY [35193 (CPT®)]   CHOLANGIOGRAM X-RAYS FOR BILE DUCTS ENDOSCOPY [76675 (CPT®)]     Endoscopist: Temo Liao MD, [facility], [facility] [place]    General anesthesiologist: Dr. Serjio Dudley    Premedications: zosyn 3.375grams IV    Consent: Patient seen and examined before proceeding with ERCP sphincterotomy and common bile duct stones extraction under x-ray fluoroscopic guidance with possible need for temporary stent placement and thus repeat ERCP.  Risks, benefits, and alternatives of aforementioned procedures were discussed with patient and  in detail.  Consenting persons were given opportunities to ask questions and discuss other options.  Risks including but not limited to pancreatitis, contrast reaction, radiation exposure, retained choledocholithiasis, possible need for temporary stent placement and thus repeat ERCP timing depending on type of stent placed, perforation, infection, bleeding, missed lesion(s), possible need for surgery and/or interventional radiology, possible need for repeat procedures and/or additional testing, hospitalization possibly prolonged, cardiac and/or pulmonary event, aspiration, hypoxia, stroke, medication and/or anesthesia reaction, indefinite diagnosis, discomfort, unsuccessful and/or incomplete procedure, ineffective therapy and/or persistent symptoms, damage to adjacent organs and/or vascular structures, and other adverse events possibly life-threatening.  Interactive discussion was undertaken with  "Layman's terms. I answered questions in full and to satisfaction.  Consenting persons stated understanding and acceptance of these risks, and wished to proceed.  Informed consent was given in clear state of mind.     Endoscopic procedures in detail:   ERCP scope was inserted from mouth to 2nd portion of the duodenum.  Pancreatic duct was intially wired cannulated then biliary duct was selectively cannulated on the second attempt, successful free cannulation was achieved.  Cholangiogram was injected and completed.    Then Biliary sphincterotomy was performed with a bow papillotome with subsequent balloon common bile duct stones extraction, clearance was complete without evidence of any residual common bile duct stones on completion cholangiogram. The C-arm was moved and rotated on rainbow axis to optimize imaging of intrahepatic biliary systems in different angles to avoid missing lesions/strictures with ductal overlap and/or image angulation. The balloon was inflated within the right and left intrahepatic ducts and dragged through the entire length of the bile duct out the biliary os multiple times in order to minimize risk of retained stones. The ERCP scope was removed from duodenum to also image the common bile duct \"behind\" the ERCP scope.  Of note, no radiologist was present to help obtain nor read ERCP images.  I was the sole physician who obtained and performed interpretation of static and dynamic ERCP fluoroscopic x-ray images, no over-read was requested from the radiologist nor was it necessary.  I suctioned insufflated air and stomach fluid contents upon removal.    Procedure times:  - In-room 09:55  - Start 10:11  - Completed  10:19  - Out of room per nursing records    Endoscopic Retrograde CholangioPancreatography Findings:  - Ampulla of Vater: located in 2nd portion of duodenum, endoscopically unremarkable.  - Cholangiogram: distal filling defects with proximal upstream ductal dilation.  - " Pancreatogram: wire cannulated but intentionally not injected, no evidence of divisum.  - Therapy: biliary sphincterotomy performed with subsequent common biliary duct stones extraction, yellowish soft stones 4 to 6 mm in size, clearance completed.    Impression: Choledocholithiasis, balloon extracted after biliary sphincterotomy    Recommendations:   1.  Routine post-endoscopy anesthesia recovery care.  Transfer patient back to prior hospital room when she is awake, alert and comfortable, when recovery criteria are met.  Aspiration and fall precautions x 24 hours.  2. GIC signed off but please feel free to call us with questions, concerns and/or problems.  3. Pending surgical evaluation for laparoscopic cholecystectomy.  4. Avoid aspirin, blood-thinners and fish-oil for 5 days.  5. I spoke to patient,  and recovery nurse about impression, diagnosis and recommendations.

## 2019-10-24 NOTE — ED NOTES
Assumed pt care from break RN and pt roomed to rm 56 without distress. Ambulated with steady gait, labs previously sent at triage, pending full results.  Introduced self, gave gown to change. RN assessment completed. Awaiting MD assess, will await orders.

## 2019-10-24 NOTE — PROGRESS NOTES
Patient seen and examined before proceeding with ERCP sphincterotomy and common bile duct stones extraction under x-ray fluoroscopic guidance with possible need for temporary stent placement and thus repeat ERCP.  Risks, benefits, and alternatives of aforementioned procedures were discussed with patient and  in detail.  Consenting persons were given opportunities to ask questions and discuss other options.  Risks including but not limited to pancreatitis, contrast reaction, radiation exposure, retained choledocholithiasis, possible need for temporary stent placement and thus repeat ERCP timing depending on type of stent placed, perforation, infection, bleeding, missed lesion(s), possible need for surgery and/or interventional radiology, possible need for repeat procedures and/or additional testing, hospitalization possibly prolonged, cardiac and/or pulmonary event, aspiration, hypoxia, stroke, medication and/or anesthesia reaction, indefinite diagnosis, discomfort, unsuccessful and/or incomplete procedure, ineffective therapy and/or persistent symptoms, damage to adjacent organs and/or vascular structures, and other adverse events possibly life-threatening.  Interactive discussion was undertaken with Layman's terms. I answered questions in full and to satisfaction.  Consenting persons stated understanding and acceptance of these risks, and wished to proceed.  Informed consent was given in clear state of mind.

## 2019-10-24 NOTE — ED PROVIDER NOTES
ED Provider Note    Scribed for Serjio Vasquez M.D. by Carlito Yarbrough. 10/23/2019  5:30 PM    Means of arrival: Walk in   History obtained by: Patient   Limitations: None       CHIEF COMPLAINT  Chief Complaint   Patient presents with   • Abdominal Pain     described as stabbing xweeks, recently diagnosed w/ gallstones   • Nausea       HPI  Lali Funes is a 29 y.o. female who presents for abdominal pain onset three weeks ago. The patient states that the pain is located in the middle of her stomach and radiates outwards to the right. The pain is exacerbated after eating or by taking a deep breath. She was recently diagnosed with gallstones via ultrasound by Dr. Crews. The patient endorses nausea, but denies any vomiting. She denies any possibility of her being pregnant as she had her tubes taken out. The patient does not states an alleviating factors.     REVIEW OF SYSTEMS  Review of Systems   Gastrointestinal: Positive for abdominal pain and nausea.   All other systems reviewed and are negative.    See HPI for further details.  All other systems reviewed and negative.     PAST MEDICAL HISTORY   has a past medical history of Arthritis, Back pain, Breastfeeding (infant) (2019), Mastitis (2019), Nausea/vomiting in pregnancy (10/5/2012), and Pain (2019).    SOCIAL HISTORY  Social History     Tobacco Use   • Smoking status: Former Smoker     Packs/day: 0.25     Years: 15.00     Pack years: 3.75     Types: Cigarettes     Last attempt to quit: 2019     Years since quittin.2   • Smokeless tobacco: Never Used   • Tobacco comment: Pt. states smoking 2-3 a day.    Substance and Sexual Activity   • Alcohol use: No   • Drug use: No   • Sexual activity: Yes     Partners: Male     Birth control/protection: Surgical, Injection     Comment: none       SURGICAL HISTORY   has a past surgical history that includes lap,diagnostic abdomen (2019); salpingectomy (Bilateral, 2019); tubal coagulation  "laparoscopic bilateral; and tubal ligation.    CURRENT MEDICATIONS  Home Medications     Reviewed by Tamara Broderick R.N. (Registered Nurse) on 10/23/19 at 1444  Med List Status: Partial   Medication Last Dose Status   celecoxib (CELEBREX) 100 MG Cap  Active   medroxyPROGESTERone (DEPO-PROVERA) 150 MG/ML Suspension  Active   metroNIDAZOLE (FLAGYL) 500 MG Tab  Active   NON SPECIFIED  Active   TRAMADOL HCL PO  Active                ALLERGIES  Allergies   Allergen Reactions   • Nkda [No Known Drug Allergy]        PHYSICAL EXAM  /89   Pulse 60   Temp 36.2 °C (97.2 °F) (Temporal)   Resp 20   Ht 1.651 m (5' 5\")   Wt 75 kg (165 lb 5.5 oz)   SpO2 96%   BMI 27.51 kg/m²   Constitutional: Well developed, Well nourished, No acute distress, Non-toxic appearance.   HENT: Normocephalic, Atraumatic,  Eyes: PERRL, EOM intact  Neck: Supple, no meningismus  Lymphatic: No lymphadenopathy noted.   Cardiovascular: Regular rate and rhythm  Lungs: Clear to auscultation bilaterally, easy unlabored respirations   Abdomen: Positive Banuelos's sign. Pain in right upper quadrant and epigastrium. Patient with extensive cholelithiasis. Gallbladder wall thickened to 0.34-0.4 cm with positive ultrasound Banuelos's. Bowel sounds normal, Soft.   Skin: Warm, Dry, no rash  Back: No tenderness, No CVA tenderness.   Extremities: No edema to lower extremities  Neurologic: Alert and oriented, appropriate, follows commands, moving all extremities, normal speech   Psychiatric: Affect normal      DIAGNOSTIC STUDIES     LABS  Results for orders placed or performed during the hospital encounter of 10/23/19   CBC WITH DIFFERENTIAL   Result Value Ref Range    WBC 13.7 (H) 4.8 - 10.8 K/uL    RBC 5.31 4.20 - 5.40 M/uL    Hemoglobin 16.0 12.0 - 16.0 g/dL    Hematocrit 47.7 (H) 37.0 - 47.0 %    MCV 89.8 81.4 - 97.8 fL    MCH 30.1 27.0 - 33.0 pg    MCHC 33.5 (L) 33.6 - 35.0 g/dL    RDW 39.5 35.9 - 50.0 fL    Platelet Count 240 164 - 446 K/uL    MPV 10.3 9.0 " - 12.9 fL    Neutrophils-Polys 77.00 (H) 44.00 - 72.00 %    Lymphocytes 15.60 (L) 22.00 - 41.00 %    Monocytes 6.20 0.00 - 13.40 %    Eosinophils 0.40 0.00 - 6.90 %    Basophils 0.40 0.00 - 1.80 %    Immature Granulocytes 0.40 0.00 - 0.90 %    Nucleated RBC 0.00 /100 WBC    Neutrophils (Absolute) 10.57 (H) 2.00 - 7.15 K/uL    Lymphs (Absolute) 2.14 1.00 - 4.80 K/uL    Monos (Absolute) 0.85 0.00 - 0.85 K/uL    Eos (Absolute) 0.05 0.00 - 0.51 K/uL    Baso (Absolute) 0.05 0.00 - 0.12 K/uL    Immature Granulocytes (abs) 0.06 0.00 - 0.11 K/uL    NRBC (Absolute) 0.00 K/uL   COMP METABOLIC PANEL   Result Value Ref Range    Sodium 139 135 - 145 mmol/L    Potassium 4.4 3.6 - 5.5 mmol/L    Chloride 103 96 - 112 mmol/L    Co2 26 20 - 33 mmol/L    Anion Gap 10.0 0.0 - 11.9    Glucose 100 (H) 65 - 99 mg/dL    Bun 12 8 - 22 mg/dL    Creatinine 0.80 0.50 - 1.40 mg/dL    Calcium 9.8 8.5 - 10.5 mg/dL    AST(SGOT) 83 (H) 12 - 45 U/L    ALT(SGPT) 62 (H) 2 - 50 U/L    Alkaline Phosphatase 101 (H) 30 - 99 U/L    Total Bilirubin 1.2 0.1 - 1.5 mg/dL    Albumin 4.8 3.2 - 4.9 g/dL    Total Protein 7.5 6.0 - 8.2 g/dL    Globulin 2.7 1.9 - 3.5 g/dL    A-G Ratio 1.8 g/dL   LIPASE   Result Value Ref Range    Lipase 22 11 - 82 U/L   HCG QUAL SERUM   Result Value Ref Range    Beta-Hcg Qualitative Serum Negative Negative   ESTIMATED GFR   Result Value Ref Range    GFR If African American >60 >60 mL/min/1.73 m 2    GFR If Non African American >60 >60 mL/min/1.73 m 2        RADIOLOGY  No orders to display     US-RUQ   Final Result      1.  Cholelithiasis   2.  Biliary dilation with distal common bile duct stone and/or debris.   3.  No gross evidence of acute cholecystitis.                 COURSE & MEDICAL DECISION MAKING  Pertinent Labs & Imaging studies reviewed. (See chart for details)    Patient here with symptoms consistent with cholecystitis versus cholelithiasis versus choledocholithiasis.  Patient's labs were checked in triage to show some  mild transaminitis with elevated alk phos.  Bilirubin is normal.  Patient's ultrasound was ordered and reveals a stone in patient's common bile duct.  Patient will continue to be treated for pain and admitted for ERCP and her associated upper quadrant pain if this does not resolve she may also need cholecystectomy follow-up.  Will discuss with hospitalist.    5:30 PM Patient seen and examined at bedside. The patient presents with abdominal pain. Ordered for UA, HCG qual serum, lipase, CMP, CBC with to evaluate, estimated GFR, and us-ruq. Patient will be treated with Zofran 4 mg and morphine 4 mg for her symptoms. The patient was informed that her labs indicate that she might be developing an infection of her gallbladder and will most likely need it removed.     DISPOSITION:  Patient will be admitted to hospitalist in guarded condition.     FINAL IMPRESSION    1. Choledocholithiasis            Carlito PORRAS (Scribe), am scribing for, and in the presence of, Serjio Vasquez M.D..    Electronically signed by: Carlito Yarbrough (Ford), 10/23/2019    ISerjio M.D. personally performed the services described in this documentation, as scribed by Carlito Yarbrough in my presence, and it is both accurate and complete.    The note accurately reflects work and decisions made by me.  Serjio Vasquez  10/23/2019  7:08 PM

## 2019-10-24 NOTE — ASSESSMENT & PLAN NOTE
Treatment as noted above for biliary obstruction, expect improvement with ERCP.  Trend labs in am

## 2019-10-24 NOTE — ASSESSMENT & PLAN NOTE
ERCP completed with stone removal- GI following   Surgery consulted- Lap Alyssa planned for today   Symptom management  NPO until post surgery   IV hydration

## 2019-10-24 NOTE — CONSULTS
Date of Consultation:  10/23/2019    Patient: : Lali Funes  MRN: [id number]    Referring Physician: Dr. Vasquez     GI:Venkata Jacobo M.D.     Reason for Consultation:Abdnormal Ultrasound, Bile Duct Stone    History of Present Illness:  Thank you for allowing me to consult on this patient.  This is a delightful 29-year-old female with history of chronic back pain on previous NSAIDs now on muscle relaxant medication who presents with right upper quadrant discomfort and epigastric discomfort for approximately 3 weeks and duration.  Patient reports intermittent abdominal discomfort described as spasm on and off with no associated known trigger.  Patient reports labs without any abnormality however noted to have ultrasounds finding with choledocholithiasis and thus GI was consulted.  Currently discomfort is 4 out of 10 intensity.  Denies any fevers any chills.  Recently gave birth to healthy baby 6 months old.  Denies any hematemesis hemoptysis melenic stool.  No dysphagia reported.  Does not drink alcohol.  Does vape, previously used tobacco.  No drugs of any sorts.  Denies any trauma          Past Medical History:   Diagnosis Date   • Pain 06/2019    shoulders/back   • Breastfeeding (infant) 06/2019   • Mastitis 05/2019   • Nausea/vomiting in pregnancy 10/5/2012   • Arthritis     toes   • Back pain          Past Surgical History:   Procedure Laterality Date   • PB LAP,DIAGNOSTIC ABDOMEN  6/18/2019    Procedure: PELVISCOPY;  Surgeon: Pilar Vuong M.D.;  Location: SURGERY SAME DAY [facility] ORS;  Service: Gynecology   • SALPINGECTOMY Bilateral 6/18/2019    Procedure: SALPINGECTOMY;  Surgeon: Pilar Vuong M.D.;  Location: SURGERY SAME DAY [facility] ORS;  Service: Gynecology   • TUBAL COAGULATION LAPAROSCOPIC BILATERAL     • TUBAL LIGATION         Family History   Problem Relation Age of Onset   • Cancer Mother 45        breast cancer, lymph node,   • Heart Attack Father    • Gout Father    • Cancer Maternal  Grandmother         breast cancer       Social History     Socioeconomic History   • Marital status:      Spouse name: Not on file   • Number of children: Not on file   • Years of education: Not on file   • Highest education level: Not on file   Occupational History   • Not on file   Social Needs   • Financial resource strain: Not on file   • Food insecurity:     Worry: Not on file     Inability: Not on file   • Transportation needs:     Medical: Not on file     Non-medical: Not on file   Tobacco Use   • Smoking status: Former Smoker     Packs/day: 0.25     Years: 15.00     Pack years: 3.75     Types: Cigarettes     Last attempt to quit: 2019     Years since quittin.2   • Smokeless tobacco: Never Used   • Tobacco comment: Pt. states smoking 2-3 a day.    Substance and Sexual Activity   • Alcohol use: No   • Drug use: No   • Sexual activity: Yes     Partners: Male     Birth control/protection: Surgical, Injection     Comment: none   Lifestyle   • Physical activity:     Days per week: Not on file     Minutes per session: Not on file   • Stress: Not on file   Relationships   • Social connections:     Talks on phone: Not on file     Gets together: Not on file     Attends [employer] service: Not on file     Active member of club or organization: Not on file     Attends meetings of clubs or organizations: Not on file     Relationship status: Not on file   • Intimate partner violence:     Fear of current or ex partner: Not on file     Emotionally abused: Not on file     Physically abused: Not on file     Forced sexual activity: Not on file   Other Topics Concern   • Not on file   Social History Narrative   • Not on file       Review of Systems   Constitutional: Negative.    HENT: Negative.    Eyes: Negative.    Respiratory: Negative.    Cardiovascular: Negative.    Gastrointestinal: Positive for abdominal pain and nausea. Negative for blood in stool, constipation, diarrhea, melena and vomiting.  "  Musculoskeletal: Positive for back pain.   Skin: Negative.    Neurological: Negative.    Endo/Heme/Allergies: Negative.    Psychiatric/Behavioral: Negative.          Physical Exam:  Vitals:    10/23/19 1930 10/23/19 2000 10/23/19 2100 10/23/19 2133   BP: 109/57 102/54 106/57 110/53   Pulse: (!) 44 (!) 57 61 (!) 58   Resp: 14 15 15 18   Temp:    36.1 °C (96.9 °F)   TempSrc:    Temporal   SpO2: 97% 96% 96% 96%   Weight:    75.3 kg (166 lb 0.1 oz)   Height:    1.651 m (5' 5\")       Physical Exam   Constitutional: She is oriented to person, place, and time and well-developed, well-nourished, and in no distress. No distress.   HENT:   Head: Normocephalic and atraumatic.   Eyes: Pupils are equal, round, and reactive to light. Conjunctivae are normal. Right eye exhibits no discharge. Left eye exhibits no discharge. No scleral icterus.   Neck: Normal range of motion. Neck supple. No JVD present. No tracheal deviation present. No thyromegaly present.   Cardiovascular: Normal rate and regular rhythm. Exam reveals no gallop and no friction rub.   No murmur heard.  Pulmonary/Chest: No stridor. No respiratory distress. She has no wheezes. She has no rales. She exhibits no tenderness.   Abdominal: Soft. Bowel sounds are normal. She exhibits no distension and no mass. There is tenderness. There is no rebound and no guarding.   Musculoskeletal: Normal range of motion.   Lymphadenopathy:     She has no cervical adenopathy.   Neurological: She is alert and oriented to person, place, and time. She displays normal reflexes. No cranial nerve deficit. Coordination normal.   Skin: Skin is warm and dry. No rash noted. She is not diaphoretic. No erythema. No pallor.         Labs:  Recent Labs     10/23/19  1534   WBC 13.7*   RBC 5.31   HEMOGLOBIN 16.0   HEMATOCRIT 47.7*   MCV 89.8   MCH 30.1   MCHC 33.5*   RDW 39.5   PLATELETCT 240   MPV 10.3     Recent Labs     10/23/19  1534   SODIUM 139   POTASSIUM 4.4   CHLORIDE 103   CO2 26   GLUCOSE " 100*   BUN 12             Imaging:  US-RUQ  Narrative: 10/23/2019 6:04 PM    HISTORY/REASON FOR EXAM:  hx of stones now with + reyes's and elevated LFTs  RIGHT upper quadrant abdominal pain.    TECHNIQUE/EXAM DESCRIPTION AND NUMBER OF VIEWS:  Real-time sonography of the liver and biliary tree.    COMPARISON: Abdominal ultrasound 10/18/2019    FINDINGS:  The liver is normal in contour. There is no evidence of solid mass lesion. The liver measures 14.4 cm.    Gallbladder shows echogenic shadowing foci consistent with stones.  The gallbladder wall thickness measures 3 mm. There is no pericholecystic fluid.  The common duct measures 8 mm.  Echogenic material within the distal common bile duct consistent with stones and/or debris.    The visualized pancreas is unremarkable.  The visualized aorta is normal in caliber.    Intrahepatic IVC is patent.    The portal vein is patent with hepatopetal flow. The MPV measures 0.9 cm.    The right kidney measures 12 cm.    There is no ascites.  Impression: 1.  Cholelithiasis  2.  Biliary dilation with distal common bile duct stone and/or debris.  3.  No gross evidence of acute cholecystitis.            Impressions:  1.  Choledocholithiasis and cholelithiasis  2.  Elevated LFT  3.  Epigastric and right upper quadrant pain  4.  Chronic back pain  5.  Leukocytosis      29-year-old female with chronic back pain, recent delivery of a healthy baby 6 months ago who presents with right upper quadrant and epigastric pain for approximately 3 weeks and duration.  Symptoms described as colicky intermittent.  Ultrasound demonstrated echogenic material in the distal common bile duct consistent with stones and/or debris.  Elevated LFTs also noted.  Lipase normal.  Suspect biliary colic likely secondary to intermittent obstruction with choledocholithiasis.  Currently no signs of sepsis.  No signs of gallstone pancreatitis.    Recommendations:  1.  N.p.o. for except for ice chips and oral meds  2.   Serial LFTs  3.  Hold anticoagulation  4.  Currently plan for ERCP on October 24, 2019; timing to be determined by OR; schedule with Dr. Liao.  Risk and benefit discussed; specific attention to pancreatitis also discussed.  Risk factor of being young female discussed.  Discussed usage of indomethacin postprocedure  5.  No need for antibiotics currently, no obvious signs suggestive of cholangitis  6.  Would recommend cholecystectomy for source control after ERCP      This note was generated using voice recognition software which has a small chance of producing errors of grammar and possibly content. I have made every reasonable attempt to find and correct any obvious errors, but expect that some may not be found prior to finalization of this note.

## 2019-10-24 NOTE — ANESTHESIA TIME REPORT
Anesthesia Start and Stop Event Times     Date Time Event    10/24/2019 0950 Ready for Procedure     0955 Anesthesia Start     1031 Anesthesia Stop        Responsible Staff  10/24/19    Name Role Begin End    Serjio Dudley M.D. Anesth 0955 1031        Preop Diagnosis (Free Text):  Pre-op Diagnosis     Bile Duct Stone        Preop Diagnosis (Codes):    Post op Diagnosis  Common bile duct stone      Premium Reason  Non-Premium    Comments:

## 2019-10-24 NOTE — ED NOTES
Rounding completed, continue to await bedding.  Pt reported burning w/ R 20g IV, removed d/t pain. No edema noted. new IV start placed, blood return+. Awaiting bedding.  NAD.  CTM.

## 2019-10-25 ENCOUNTER — ANESTHESIA (OUTPATIENT)
Dept: SURGERY | Facility: MEDICAL CENTER | Age: 29
End: 2019-10-25
Payer: COMMERCIAL

## 2019-10-25 VITALS
DIASTOLIC BLOOD PRESSURE: 55 MMHG | RESPIRATION RATE: 18 BRPM | HEIGHT: 65 IN | HEART RATE: 54 BPM | TEMPERATURE: 97.4 F | OXYGEN SATURATION: 96 % | SYSTOLIC BLOOD PRESSURE: 103 MMHG | BODY MASS INDEX: 27.66 KG/M2 | WEIGHT: 166.01 LBS

## 2019-10-25 PROBLEM — K59.00 CONSTIPATION: Status: ACTIVE | Noted: 2019-10-25

## 2019-10-25 PROBLEM — K83.1 BILIARY OBSTRUCTION: Status: RESOLVED | Noted: 2019-10-23 | Resolved: 2019-10-25

## 2019-10-25 PROBLEM — K80.01 CALCULUS OF GALLBLADDER WITH ACUTE CHOLECYSTITIS AND OBSTRUCTION: Status: ACTIVE | Noted: 2019-10-25

## 2019-10-25 PROBLEM — K80.01 CALCULUS OF GALLBLADDER WITH ACUTE CHOLECYSTITIS AND OBSTRUCTION: Status: RESOLVED | Noted: 2019-10-25 | Resolved: 2019-10-25

## 2019-10-25 PROBLEM — K59.00 CONSTIPATION: Status: RESOLVED | Noted: 2019-10-25 | Resolved: 2019-10-25

## 2019-10-25 LAB
ANION GAP SERPL CALC-SCNC: 8 MMOL/L (ref 0–11.9)
BUN SERPL-MCNC: 11 MG/DL (ref 8–22)
CALCIUM SERPL-MCNC: 9.2 MG/DL (ref 8.5–10.5)
CHLORIDE SERPL-SCNC: 107 MMOL/L (ref 96–112)
CO2 SERPL-SCNC: 24 MMOL/L (ref 20–33)
CREAT SERPL-MCNC: 0.76 MG/DL (ref 0.5–1.4)
ERYTHROCYTE [DISTWIDTH] IN BLOOD BY AUTOMATED COUNT: 40 FL (ref 35.9–50)
GLUCOSE SERPL-MCNC: 132 MG/DL (ref 65–99)
HCT VFR BLD AUTO: 40.9 % (ref 37–47)
HGB BLD-MCNC: 13.6 G/DL (ref 12–16)
INR PPP: 0.97 (ref 0.87–1.13)
MCH RBC QN AUTO: 30 PG (ref 27–33)
MCHC RBC AUTO-ENTMCNC: 33.3 G/DL (ref 33.6–35)
MCV RBC AUTO: 90.1 FL (ref 81.4–97.8)
PATHOLOGY CONSULT NOTE: NORMAL
PLATELET # BLD AUTO: 226 K/UL (ref 164–446)
PMV BLD AUTO: 10.3 FL (ref 9–12.9)
POTASSIUM SERPL-SCNC: 3.7 MMOL/L (ref 3.6–5.5)
PROTHROMBIN TIME: 13.1 SEC (ref 12–14.6)
RBC # BLD AUTO: 4.54 M/UL (ref 4.2–5.4)
SODIUM SERPL-SCNC: 139 MMOL/L (ref 135–145)
WBC # BLD AUTO: 13.8 K/UL (ref 4.8–10.8)

## 2019-10-25 PROCEDURE — 99217 PR OBSERVATION CARE DISCHARGE: CPT | Performed by: HOSPITALIST

## 2019-10-25 PROCEDURE — 700111 HCHG RX REV CODE 636 W/ 250 OVERRIDE (IP): Performed by: ANESTHESIOLOGY

## 2019-10-25 PROCEDURE — 160002 HCHG RECOVERY MINUTES (STAT): Performed by: SURGERY

## 2019-10-25 PROCEDURE — 80048 BASIC METABOLIC PNL TOTAL CA: CPT

## 2019-10-25 PROCEDURE — 501583 HCHG TROCAR, THRD CAN&SEAL 5X100: Performed by: SURGERY

## 2019-10-25 PROCEDURE — 502571 HCHG PACK, LAP CHOLE: Performed by: SURGERY

## 2019-10-25 PROCEDURE — A9270 NON-COVERED ITEM OR SERVICE: HCPCS | Performed by: ANESTHESIOLOGY

## 2019-10-25 PROCEDURE — 700102 HCHG RX REV CODE 250 W/ 637 OVERRIDE(OP): Performed by: HOSPITALIST

## 2019-10-25 PROCEDURE — 88304 TISSUE EXAM BY PATHOLOGIST: CPT

## 2019-10-25 PROCEDURE — 700101 HCHG RX REV CODE 250: Performed by: ANESTHESIOLOGY

## 2019-10-25 PROCEDURE — 700111 HCHG RX REV CODE 636 W/ 250 OVERRIDE (IP): Performed by: HOSPITALIST

## 2019-10-25 PROCEDURE — 160048 HCHG OR STATISTICAL LEVEL 1-5: Performed by: SURGERY

## 2019-10-25 PROCEDURE — 160028 HCHG SURGERY MINUTES - 1ST 30 MINS LEVEL 3: Performed by: SURGERY

## 2019-10-25 PROCEDURE — 96376 TX/PRO/DX INJ SAME DRUG ADON: CPT

## 2019-10-25 PROCEDURE — 85027 COMPLETE CBC AUTOMATED: CPT

## 2019-10-25 PROCEDURE — A9270 NON-COVERED ITEM OR SERVICE: HCPCS | Performed by: HOSPITALIST

## 2019-10-25 PROCEDURE — 700105 HCHG RX REV CODE 258: Performed by: ANESTHESIOLOGY

## 2019-10-25 PROCEDURE — 160009 HCHG ANES TIME/MIN: Performed by: SURGERY

## 2019-10-25 PROCEDURE — 501568 HCHG TROCAR, BLUNTPORT 12MM: Performed by: SURGERY

## 2019-10-25 PROCEDURE — 160035 HCHG PACU - 1ST 60 MINS PHASE I: Performed by: SURGERY

## 2019-10-25 PROCEDURE — 700101 HCHG RX REV CODE 250: Performed by: SURGERY

## 2019-10-25 PROCEDURE — 85610 PROTHROMBIN TIME: CPT

## 2019-10-25 PROCEDURE — 700102 HCHG RX REV CODE 250 W/ 637 OVERRIDE(OP): Performed by: ANESTHESIOLOGY

## 2019-10-25 PROCEDURE — 500002 HCHG ADHESIVE, DERMABOND: Performed by: SURGERY

## 2019-10-25 PROCEDURE — 501338 HCHG SHEARS, ENDO: Performed by: SURGERY

## 2019-10-25 PROCEDURE — 501399 HCHG SPECIMAN BAG, ENDO CATC: Performed by: SURGERY

## 2019-10-25 PROCEDURE — 501571 HCHG TROCAR, SEPARATOR 12X100: Performed by: SURGERY

## 2019-10-25 PROCEDURE — G0378 HOSPITAL OBSERVATION PER HR: HCPCS

## 2019-10-25 PROCEDURE — 501572 HCHG TROCAR, SHIELD OBTU 5X100: Performed by: SURGERY

## 2019-10-25 PROCEDURE — 160039 HCHG SURGERY MINUTES - EA ADDL 1 MIN LEVEL 3: Performed by: SURGERY

## 2019-10-25 PROCEDURE — 501838 HCHG SUTURE GENERAL: Performed by: SURGERY

## 2019-10-25 RX ORDER — DIPHENHYDRAMINE HYDROCHLORIDE 50 MG/ML
12.5 INJECTION INTRAMUSCULAR; INTRAVENOUS
Status: DISCONTINUED | OUTPATIENT
Start: 2019-10-25 | End: 2019-10-25 | Stop reason: HOSPADM

## 2019-10-25 RX ORDER — BUPIVACAINE HYDROCHLORIDE AND EPINEPHRINE 5; 5 MG/ML; UG/ML
INJECTION, SOLUTION EPIDURAL; INTRACAUDAL; PERINEURAL
Status: DISCONTINUED | OUTPATIENT
Start: 2019-10-25 | End: 2019-10-25 | Stop reason: HOSPADM

## 2019-10-25 RX ORDER — ROCURONIUM BROMIDE 10 MG/ML
INJECTION, SOLUTION INTRAVENOUS PRN
Status: DISCONTINUED | OUTPATIENT
Start: 2019-10-25 | End: 2019-10-25 | Stop reason: SURG

## 2019-10-25 RX ORDER — ONDANSETRON 2 MG/ML
INJECTION INTRAMUSCULAR; INTRAVENOUS PRN
Status: DISCONTINUED | OUTPATIENT
Start: 2019-10-25 | End: 2019-10-25 | Stop reason: SURG

## 2019-10-25 RX ORDER — SODIUM CHLORIDE, SODIUM LACTATE, POTASSIUM CHLORIDE, CALCIUM CHLORIDE 600; 310; 30; 20 MG/100ML; MG/100ML; MG/100ML; MG/100ML
INJECTION, SOLUTION INTRAVENOUS
Status: DISCONTINUED | OUTPATIENT
Start: 2019-10-25 | End: 2019-10-25 | Stop reason: SURG

## 2019-10-25 RX ORDER — HYDROMORPHONE HYDROCHLORIDE 1 MG/ML
0.1 INJECTION, SOLUTION INTRAMUSCULAR; INTRAVENOUS; SUBCUTANEOUS
Status: DISCONTINUED | OUTPATIENT
Start: 2019-10-25 | End: 2019-10-25 | Stop reason: HOSPADM

## 2019-10-25 RX ORDER — ONDANSETRON 2 MG/ML
4 INJECTION INTRAMUSCULAR; INTRAVENOUS
Status: DISCONTINUED | OUTPATIENT
Start: 2019-10-25 | End: 2019-10-25 | Stop reason: HOSPADM

## 2019-10-25 RX ORDER — HYDROMORPHONE HYDROCHLORIDE 1 MG/ML
0.4 INJECTION, SOLUTION INTRAMUSCULAR; INTRAVENOUS; SUBCUTANEOUS
Status: DISCONTINUED | OUTPATIENT
Start: 2019-10-25 | End: 2019-10-25 | Stop reason: HOSPADM

## 2019-10-25 RX ORDER — CEFAZOLIN SODIUM 1 G/3ML
INJECTION, POWDER, FOR SOLUTION INTRAMUSCULAR; INTRAVENOUS PRN
Status: DISCONTINUED | OUTPATIENT
Start: 2019-10-25 | End: 2019-10-25 | Stop reason: SURG

## 2019-10-25 RX ORDER — LIDOCAINE HYDROCHLORIDE 40 MG/ML
SOLUTION TOPICAL PRN
Status: DISCONTINUED | OUTPATIENT
Start: 2019-10-25 | End: 2019-10-25 | Stop reason: SURG

## 2019-10-25 RX ORDER — LIDOCAINE HYDROCHLORIDE 20 MG/ML
INJECTION, SOLUTION EPIDURAL; INFILTRATION; INTRACAUDAL; PERINEURAL PRN
Status: DISCONTINUED | OUTPATIENT
Start: 2019-10-25 | End: 2019-10-25 | Stop reason: SURG

## 2019-10-25 RX ORDER — HYDRALAZINE HYDROCHLORIDE 20 MG/ML
5 INJECTION INTRAMUSCULAR; INTRAVENOUS
Status: DISCONTINUED | OUTPATIENT
Start: 2019-10-25 | End: 2019-10-25 | Stop reason: HOSPADM

## 2019-10-25 RX ORDER — AMOXICILLIN 250 MG
2 CAPSULE ORAL 2 TIMES DAILY
Qty: 30 TAB | Refills: 0 | Status: SHIPPED | OUTPATIENT
Start: 2019-10-25 | End: 2020-05-29

## 2019-10-25 RX ORDER — MIDAZOLAM HYDROCHLORIDE 1 MG/ML
INJECTION INTRAMUSCULAR; INTRAVENOUS PRN
Status: DISCONTINUED | OUTPATIENT
Start: 2019-10-25 | End: 2019-10-25 | Stop reason: SURG

## 2019-10-25 RX ORDER — OXYCODONE HYDROCHLORIDE 5 MG/1
5 TABLET ORAL EVERY 6 HOURS PRN
Qty: 16 TAB | Refills: 0 | Status: SHIPPED | OUTPATIENT
Start: 2019-10-25 | End: 2019-10-29

## 2019-10-25 RX ORDER — MEPERIDINE HYDROCHLORIDE 25 MG/ML
12.5 INJECTION INTRAMUSCULAR; INTRAVENOUS; SUBCUTANEOUS
Status: DISCONTINUED | OUTPATIENT
Start: 2019-10-25 | End: 2019-10-25 | Stop reason: HOSPADM

## 2019-10-25 RX ORDER — HYDROCORTISONE ACETATE 25 MG/1
25 SUPPOSITORY RECTAL EVERY 12 HOURS
Status: DISCONTINUED | OUTPATIENT
Start: 2019-10-25 | End: 2019-10-25 | Stop reason: HOSPADM

## 2019-10-25 RX ORDER — LORAZEPAM 2 MG/ML
0.5 INJECTION INTRAMUSCULAR
Status: DISCONTINUED | OUTPATIENT
Start: 2019-10-25 | End: 2019-10-25 | Stop reason: HOSPADM

## 2019-10-25 RX ORDER — OXYCODONE HCL 5 MG/5 ML
5 SOLUTION, ORAL ORAL
Status: COMPLETED | OUTPATIENT
Start: 2019-10-25 | End: 2019-10-25

## 2019-10-25 RX ORDER — DEXAMETHASONE SODIUM PHOSPHATE 4 MG/ML
INJECTION, SOLUTION INTRA-ARTICULAR; INTRALESIONAL; INTRAMUSCULAR; INTRAVENOUS; SOFT TISSUE PRN
Status: DISCONTINUED | OUTPATIENT
Start: 2019-10-25 | End: 2019-10-25 | Stop reason: SURG

## 2019-10-25 RX ORDER — KETOROLAC TROMETHAMINE 30 MG/ML
INJECTION, SOLUTION INTRAMUSCULAR; INTRAVENOUS PRN
Status: DISCONTINUED | OUTPATIENT
Start: 2019-10-25 | End: 2019-10-25 | Stop reason: SURG

## 2019-10-25 RX ORDER — HYDROMORPHONE HYDROCHLORIDE 1 MG/ML
0.2 INJECTION, SOLUTION INTRAMUSCULAR; INTRAVENOUS; SUBCUTANEOUS
Status: DISCONTINUED | OUTPATIENT
Start: 2019-10-25 | End: 2019-10-25 | Stop reason: HOSPADM

## 2019-10-25 RX ORDER — LABETALOL HYDROCHLORIDE 5 MG/ML
5 INJECTION, SOLUTION INTRAVENOUS
Status: DISCONTINUED | OUTPATIENT
Start: 2019-10-25 | End: 2019-10-25 | Stop reason: HOSPADM

## 2019-10-25 RX ORDER — HALOPERIDOL 5 MG/ML
1 INJECTION INTRAMUSCULAR
Status: DISCONTINUED | OUTPATIENT
Start: 2019-10-25 | End: 2019-10-25 | Stop reason: HOSPADM

## 2019-10-25 RX ORDER — OXYCODONE HCL 5 MG/5 ML
10 SOLUTION, ORAL ORAL
Status: COMPLETED | OUTPATIENT
Start: 2019-10-25 | End: 2019-10-25

## 2019-10-25 RX ADMIN — MORPHINE SULFATE 2 MG: 4 INJECTION INTRAVENOUS at 00:32

## 2019-10-25 RX ADMIN — FENTANYL CITRATE 150 MCG: 50 INJECTION, SOLUTION INTRAMUSCULAR; INTRAVENOUS at 09:55

## 2019-10-25 RX ADMIN — BISACODYL 10 MG RECTAL SUPPOSITORY 10 MG: at 12:45

## 2019-10-25 RX ADMIN — SENNOSIDES, DOCUSATE SODIUM 2 TABLET: 50; 8.6 TABLET, FILM COATED ORAL at 05:00

## 2019-10-25 RX ADMIN — ROCURONIUM BROMIDE 50 MG: 10 INJECTION, SOLUTION INTRAVENOUS at 09:55

## 2019-10-25 RX ADMIN — OXYCODONE HYDROCHLORIDE 10 MG: 5 SOLUTION ORAL at 10:56

## 2019-10-25 RX ADMIN — HYDROMORPHONE HYDROCHLORIDE 0.4 MG: 1 INJECTION, SOLUTION INTRAMUSCULAR; INTRAVENOUS; SUBCUTANEOUS at 11:05

## 2019-10-25 RX ADMIN — OXYCODONE HYDROCHLORIDE 5 MG: 5 TABLET ORAL at 05:00

## 2019-10-25 RX ADMIN — KETOROLAC TROMETHAMINE 30 MG: 30 INJECTION, SOLUTION INTRAMUSCULAR at 10:32

## 2019-10-25 RX ADMIN — MIDAZOLAM 2 MG: 1 INJECTION INTRAMUSCULAR; INTRAVENOUS at 09:50

## 2019-10-25 RX ADMIN — DEXAMETHASONE SODIUM PHOSPHATE 8 MG: 4 INJECTION, SOLUTION INTRA-ARTICULAR; INTRALESIONAL; INTRAMUSCULAR; INTRAVENOUS; SOFT TISSUE at 09:59

## 2019-10-25 RX ADMIN — FENTANYL CITRATE 50 MCG: 50 INJECTION INTRAMUSCULAR; INTRAVENOUS at 10:56

## 2019-10-25 RX ADMIN — LIDOCAINE HYDROCHLORIDE 4 ML: 40 SOLUTION TOPICAL at 09:55

## 2019-10-25 RX ADMIN — FENTANYL CITRATE 50 MCG: 50 INJECTION INTRAMUSCULAR; INTRAVENOUS at 10:00

## 2019-10-25 RX ADMIN — FENTANYL CITRATE 50 MCG: 50 INJECTION, SOLUTION INTRAMUSCULAR; INTRAVENOUS at 10:16

## 2019-10-25 RX ADMIN — POLYETHYLENE GLYCOL 3350 1 PACKET: 17 POWDER, FOR SOLUTION ORAL at 00:37

## 2019-10-25 RX ADMIN — LIDOCAINE HYDROCHLORIDE 70 MG: 20 INJECTION, SOLUTION EPIDURAL; INFILTRATION; INTRACAUDAL at 09:55

## 2019-10-25 RX ADMIN — SODIUM CHLORIDE, POTASSIUM CHLORIDE, SODIUM LACTATE AND CALCIUM CHLORIDE: 600; 310; 30; 20 INJECTION, SOLUTION INTRAVENOUS at 09:50

## 2019-10-25 RX ADMIN — CEFAZOLIN 2 G: 330 INJECTION, POWDER, FOR SOLUTION INTRAMUSCULAR; INTRAVENOUS at 09:58

## 2019-10-25 RX ADMIN — ONDANSETRON 4 MG: 2 INJECTION INTRAMUSCULAR; INTRAVENOUS at 10:32

## 2019-10-25 RX ADMIN — PROPOFOL 200 MG: 10 INJECTION, EMULSION INTRAVENOUS at 09:55

## 2019-10-25 ASSESSMENT — ENCOUNTER SYMPTOMS
NAUSEA: 0
VOMITING: 0
DIZZINESS: 0
FEVER: 0
EYE PAIN: 0
SHORTNESS OF BREATH: 0
COUGH: 0
ABDOMINAL PAIN: 1
MEMORY LOSS: 0
CHILLS: 0
HEADACHES: 0
MYALGIAS: 0
BACK PAIN: 0
EYE DISCHARGE: 0
CONSTIPATION: 1
DEPRESSION: 0
SORE THROAT: 0

## 2019-10-25 ASSESSMENT — PATIENT HEALTH QUESTIONNAIRE - PHQ9
SUM OF ALL RESPONSES TO PHQ9 QUESTIONS 1 AND 2: 0
1. LITTLE INTEREST OR PLEASURE IN DOING THINGS: NOT AT ALL
2. FEELING DOWN, DEPRESSED, IRRITABLE, OR HOPELESS: NOT AT ALL

## 2019-10-25 ASSESSMENT — PAIN SCALES - GENERAL: PAIN_LEVEL: 4

## 2019-10-25 NOTE — OR SURGEON
Immediate Post OP Note    PreOp Diagnosis: cholecystitis and choledocholithiasis s/p ercp     PostOp Diagnosis: cholecystitis and choledocholithiasis s/p ercp     Procedure(s):  CHOLECYSTECTOMY, LAPAROSCOPIC - Wound Class: Clean Contaminated    Surgeon(s):  FLORENCIA Martin M.D.    Anesthesiologist/Type of Anesthesia:  Anesthesiologist: Becky Banuelos M.D./General    Surgical Staff:  Circulator: Lukas D. Gansert, R.N.  Scrub Person: Shahla Faria    Specimens removed if any:  ID Type Source Tests Collected by Time Destination   A : Gallbladder Tissue Abdominal PATHOLOGY SPECIMEN Efrain Ramesh M.D. 10/25/2019  8:52 AM        Estimated Blood Loss: 25 cc    Findings: adhesions    Complications: none        10/25/2019 10:46 AM Efrain Ramesh M.D.

## 2019-10-25 NOTE — PROGRESS NOTES
Assessment done, Pt educated on plan of care. Waiting on dr rounds  Patient resting in bed, no signs of distress,  no complaints of pain at this time. Call light within reach,  side rails up, will monitor. Condition stable npo at this time for surgery at 9 am  at bedside

## 2019-10-25 NOTE — OP REPORT
DATE OF SERVICE:  10/25/2019    SURGEON:  Efrain Ramesh MD    ASSISTANT:  Jonathan Almaguer MD    PREOPERATIVE DIAGNOSES:  Chronic cholecystitis and choledocholithiasis, status   post endoscopic retrograde cholangiopancreatography.    POSTOPERATIVE DIAGNOSES:  Chronic cholecystitis and choledocholithiasis,   status post endoscopic retrograde cholangiopancreatography.    PROCEDURE PERFORMED:  Laparoscopic cholecystectomy.    ANESTHESIA:  General endotracheal anesthesia.    ANESTHESIOLOGIST:  Becky Banuelos MD    INDICATIONS:  A 29-year-old woman who presented with evidence of cholecystitis   as well as choledocholithiasis.  She underwent an ERCP with stone extraction,   now is a candidate for cholecystectomy.    DESCRIPTION OF PROCEDURE:  The procedure was discussed in detail with the   patient including the laparoscopic approach, potential converting to an open   procedure, associated risk of bleeding, infection, abscess, and hematoma.  I   also discussed risk of postoperative bile leak, common duct stricture, common   bile duct transection, and significant complications.  She understood all the   above and wished to proceed.  She was placed under anesthesia by Dr. Banuelos.    Abdomen was prepped and draped with chlorhexidine prep and sterile drapes.    After a time-out, infraumbilical incision was made and through this incision,   peritoneal cavity was entered using the open Seldinger technique.    Pneumoperitoneum was achieved to insufflation pressure of 15 mmHg.  The   patient was placed in Trendelenburg and evaluated the pelvis that she had   requested that I look for any evidence of endometriosis.  I did not note any   gross endometriosis, but did take multiple pictures, which I have placed in   the chart.  Subsequent to that, she was placed in the standard head up,   rotated to the left position.  A subxiphoid 12 mm port and two 5 mm subcostal   ports were placed.  The gallbladder was identified, retracted  superiorly and   laterally.  Adhesions were noted.  These were carefully taken down.  The base   of the gallbladder was carefully dissected and the cystic duct was identified.    It could be seen to clearly exit the gallbladder and track laterally along   the gallbladder.  In similar fashion, cystic artery was identified and   dissected away from surrounding connective tissue.  A critical view was   obtained and subsequent to this, 3 clips were placed proximally and distally   and the duct was divided sharply with scissors.  In a similar fashion, cystic   artery was clipped twice proximally, once distally and divided sharply with   scissors.  Gallbladder was then dissected off the liver bed and placed in a   bag retrieval device and removed.  Hemostasis assured with cautery.  There was   no significant bleeding or bile leak.  Ports were removed.  Pneumoperitoneum   was released.  Infraumbilical fascia was approximated with #0 Vicryl stitches.    Subcutaneous tissue was irrigated and all incisions was approximated with   4-0 Vicryl sutures.  Dermabond was placed.  Patient tolerated the procedure   well without apparent complication.  Final counts were reported as correct.       ____________________________________     MD LULU GARCIA / CELY    DD:  10/25/2019 10:46:38  DT:  10/25/2019 13:07:03    D#:  [id number]  Job#:  [id number]

## 2019-10-25 NOTE — ANESTHESIA PREPROCEDURE EVALUATION
Relevant Problems   No relevant active problems       Physical Exam    Airway   Mallampati: I  TM distance: >3 FB  Neck ROM: full       Cardiovascular   Rhythm: regular  Rate: abnormal  Comments: bradycardia   Dental    Pulmonary   Breath sounds clear to auscultation     Abdominal    Neurological - normal exam                 Anesthesia Plan    ASA 2       Plan - general       Airway plan will be ETT        Induction: intravenous      Pertinent diagnostic labs and testing reviewed    Informed Consent:    Anesthetic plan and risks discussed with patient.

## 2019-10-25 NOTE — PROGRESS NOTES
MD called back and did not want to start muscle relaxer with the pt going to go to surgery in am 10-25-19. Will let pt know.

## 2019-10-25 NOTE — PROGRESS NOTES
Pt resting in bed with family at bedside. Rates  Pain    4/10. No c/o  Cp or sob at this time. Will CTM.

## 2019-10-25 NOTE — ANESTHESIA POSTPROCEDURE EVALUATION
Patient: Lali Funes    Procedure Summary     Date:  10/25/19 Room / Location:  [facility] OR 09 / SURGERY [facility]    Anesthesia Start:  0950 Anesthesia Stop:  1052    Procedure:  CHOLECYSTECTOMY, LAPAROSCOPIC (Abdomen) Diagnosis:  (choledocholithiasis)    Surgeon:  Efrain Ramesh M.D. Responsible Provider:  Becky Banuelos M.D.    Anesthesia Type:  general ASA Status:  2          Final Anesthesia Type: general  Last vitals  BP   Blood Pressure: 103/55    Temp   36.3 °C (97.4 °F)    Pulse   Pulse: (!) 54   Resp   18    SpO2   96 %      Anesthesia Post Evaluation    Patient location during evaluation: PACU  Patient participation: complete - patient participated  Level of consciousness: awake and alert  Pain score: 4    Airway patency: patent  Anesthetic complications: no  Cardiovascular status: adequate  Respiratory status: acceptable  Hydration status: acceptable    PONV: none           Nurse Pain Score: 4 (NPRS)

## 2019-10-25 NOTE — ANESTHESIA TIME REPORT
Anesthesia Start and Stop Event Times     Date Time Event    10/25/2019 0935 Ready for Procedure     0950 Anesthesia Start     1052 Anesthesia Stop        Responsible Staff  10/25/19    Name Role Begin End    Becky Banuelos M.D. Anesth 0950 1052        Preop Diagnosis (Free Text):  Pre-op Diagnosis     choledocholithiasis        Preop Diagnosis (Codes):    Post op Diagnosis  Choledocholithiasis      Premium Reason  Non-Premium    Comments:

## 2019-10-25 NOTE — ANESTHESIA PROCEDURE NOTES
Airway  Date/Time: 10/25/2019 9:56 AM  Performed by: Becky Banuelos M.D.  Authorized by: Becky Banuelos M.D.     Location:  OR  Urgency:  Elective  Indications for Airway Management:  Anesthesia  Spontaneous Ventilation: absent    Sedation Level:  Deep  Preoxygenated: Yes    Patient Position:  Sniffing  Mask Difficulty Assessment:  1 - vent by mask  Final Airway Type:  Endotracheal airway  Final Endotracheal Airway:  ETT  Cuffed: Yes    Technique Used for Successful ETT Placement:  Direct laryngoscopy  Devices/Methods Used in Placement:  Intubating stylet  Insertion Site:  Oral  Blade Type:  Abner  Laryngoscope Blade/Videolaryngoscope Blade Size:  3  ETT Size (mm):  7.0  Measured from:  Teeth  ETT to Teeth (cm):  21  Placement Verified by: auscultation and capnometry    Cormack-Lehane Classification:  Grade I - full view of glottis  Number of Attempts at Approach:  1

## 2019-10-25 NOTE — CARE PLAN
Problem: Safety  Goal: Will remain free from falls  Outcome: PROGRESSING AS EXPECTED     Problem: Pain Management  Goal: Pain level will decrease to patient's comfort goal  Outcome: PROGRESSING AS EXPECTED     Problem: Communication  Goal: The ability to communicate needs accurately and effectively will improve  Outcome: PROGRESSING AS EXPECTED     Problem: Knowledge Deficit  Goal: Knowledge of disease process/condition, treatment plan, diagnostic tests, and medications will improve  Outcome: PROGRESSING AS EXPECTED  Goal: Knowledge of the prescribed therapeutic regimen will improve  Outcome: PROGRESSING AS EXPECTED

## 2019-10-25 NOTE — DISCHARGE INSTRUCTIONS
Discharge Instructions    Discharged to home by car with friend. Discharged via wheelchair, hospital escort: Yes.  Special equipment needed: Not Applicable    Be sure to schedule a follow-up appointment with your primary care doctor or any specialists as instructed.     Discharge Plan:   Diet Plan: Discussed  Activity Level: Discussed  Confirmed Follow up Appointment: Appointment Scheduled  Confirmed Symptoms Management: Discussed  Medication Reconciliation Updated: Yes  Influenza Vaccine Indication: Not indicated: Previously immunized this influenza season and > 8 years of age    I understand that a diet low in cholesterol, fat, and sodium is recommended for good health. Unless I have been given specific instructions below for another diet, I accept this instruction as my diet prescription.   Other diet: low fat    Special Instructions: None    · Is patient discharged on Warfarin / Coumadin?   No     Depression / Suicide Risk    As you are discharged from this [facility] facility, it is important to learn how to keep safe from harming yourself.    Recognize the warning signs:  · Abrupt changes in personality, positive or negative- including increase in energy   · Giving away possessions  · Change in eating patterns- significant weight changes-  positive or negative  · Change in sleeping patterns- unable to sleep or sleeping all the time   · Unwillingness or inability to communicate  · Depression  · Unusual sadness, discouragement and loneliness  · Talk of wanting to die  · Neglect of personal appearance   · Rebelliousness- reckless behavior  · Withdrawal from people/activities they love  · Confusion- inability to concentrate     If you or a loved one observes any of these behaviors or has concerns about self-harm, here's what you can do:  · Talk about it- your feelings and reasons for harming yourself  · Remove any means that you might use to hurt yourself (examples: pills, rope, extension cords, firearm)  · Get  professional help from the community (Mental Health, Substance Abuse, psychological counseling)  · Do not be alone:Call your Safe Contact- someone whom you trust who will be there for you.  · Call your local CRISIS HOTLINE 249-5696 or 794-408-3746  · Call your local Children's Mobile Crisis Response Team [place] (616) 708-0516 or www.Asantae  · Call the toll free National Suicide Prevention Hotlines   · National Suicide Prevention Lifeline 634-527-FMQX (2988)  · National Hope Line Network 800-SUICIDE (674-1581)

## 2019-10-29 ENCOUNTER — OFFICE VISIT (OUTPATIENT)
Dept: MEDICAL GROUP | Facility: PHYSICIAN GROUP | Age: 29
End: 2019-10-29
Payer: COMMERCIAL

## 2019-10-29 VITALS
DIASTOLIC BLOOD PRESSURE: 68 MMHG | HEIGHT: 65 IN | OXYGEN SATURATION: 95 % | BODY MASS INDEX: 27.49 KG/M2 | TEMPERATURE: 98.2 F | WEIGHT: 165 LBS | SYSTOLIC BLOOD PRESSURE: 118 MMHG | HEART RATE: 85 BPM

## 2019-10-29 DIAGNOSIS — G89.18 POST-OP PAIN: ICD-10-CM

## 2019-10-29 DIAGNOSIS — N76.0 BACTERIAL VAGINOSIS: ICD-10-CM

## 2019-10-29 DIAGNOSIS — B96.89 BACTERIAL VAGINOSIS: ICD-10-CM

## 2019-10-29 PROCEDURE — 99214 OFFICE O/P EST MOD 30 MIN: CPT | Performed by: FAMILY MEDICINE

## 2019-10-29 RX ORDER — METRONIDAZOLE 500 MG/1
500 TABLET ORAL 2 TIMES DAILY
Qty: 14 TAB | Refills: 0 | Status: SHIPPED | OUTPATIENT
Start: 2019-10-29 | End: 2019-11-05

## 2019-10-29 RX ORDER — ONDANSETRON 4 MG/1
4 TABLET, FILM COATED ORAL EVERY 4 HOURS PRN
Qty: 20 TAB | Refills: 0 | Status: SHIPPED | OUTPATIENT
Start: 2019-10-29 | End: 2020-05-29

## 2019-10-29 ASSESSMENT — PAIN SCALES - GENERAL: PAINLEVEL: 8=MODERATE-SEVERE PAIN

## 2019-10-30 PROBLEM — B96.89 BACTERIAL VAGINOSIS: Status: ACTIVE | Noted: 2019-10-30

## 2019-10-30 PROBLEM — N76.0 BACTERIAL VAGINOSIS: Status: ACTIVE | Noted: 2019-10-30

## 2019-10-30 PROBLEM — F43.21 GRIEF: Status: RESOLVED | Noted: 2019-06-27 | Resolved: 2019-10-30

## 2019-10-30 PROBLEM — Z30.2 ENCOUNTER FOR STERILIZATION: Status: RESOLVED | Noted: 2019-06-18 | Resolved: 2019-10-30

## 2019-10-30 PROBLEM — R10.13 EPIGASTRIC PAIN: Status: RESOLVED | Noted: 2019-10-03 | Resolved: 2019-10-30

## 2019-10-30 NOTE — ASSESSMENT & PLAN NOTE
New problem to examiner.  Patient here to follow-up on postop abdominal pain from recent laparoscopic cholecystectomy.  On 10/25/2019 patient had lap mariaelena with Dr. Ramesh for acute on chronic cholecystitis.  Patient presents today for moderate abdominal tenderness around periumbilical incision site.  Patient denies any changes in bowel movements but does endorse nausea but no vomiting.  Currently has no postop visit scheduled with general surgery.  Patient has tried Senokot-S and MiraLAX to help with abdominal pain but these have been minimally effective.  Patient continues to use her oxycodone 5 mg per pain management.

## 2019-10-30 NOTE — ASSESSMENT & PLAN NOTE
New problem to examiner.  Patient has had 2 completed rounds of Flagyl for bacterial vaginosis and complains of recurrent symptoms of vaginal itching and discomfort along with increased physiologic discharge.  Denies any fevers or chills or new sexual partners.  Denies any purulent discharge, dysuria, vaginal bleeding at this time.

## 2019-10-30 NOTE — PROGRESS NOTES
CC:Diagnoses of Post-op pain and Bacterial vaginosis were pertinent to this visit.      HISTORY OF PRESENT ILLNESS: Patient is a 29 y.o. female established patient who presents today to follow-up on postop pain and BV.    Post-op pain  New problem to examiner.  Patient here to follow-up on postop abdominal pain from recent laparoscopic cholecystectomy.  On 10/25/2019 patient had lap mariaelena with Dr. Ramesh for acute on chronic cholecystitis.  Patient presents today for moderate abdominal tenderness around periumbilical incision site.  Patient denies any changes in bowel movements but does endorse nausea but no vomiting.  Currently has no postop visit scheduled with general surgery.  Patient has tried Senokot-S and MiraLAX to help with abdominal pain but these have been minimally effective.  Patient continues to use her oxycodone 5 mg per pain management.    Bacterial vaginosis  New problem to examiner.  Patient has had 2 completed rounds of Flagyl for bacterial vaginosis and complains of recurrent symptoms of vaginal itching and discomfort along with increased physiologic discharge.  Denies any fevers or chills or new sexual partners.  Denies any purulent discharge, dysuria, vaginal bleeding at this time.      Patient Active Problem List    Diagnosis Date Noted   • Bacterial vaginosis 10/30/2019   • Transaminitis 10/23/2019   • Leukocytosis 10/23/2019   • Abnormal vaginal bleeding 08/22/2019   • Stress incontinence 07/08/2019   • Cystocele, midline 06/27/2019   • Post-op pain 06/18/2019   • Mastitis 05/16/2019   • Low back pain with radiation 05/16/2019      Allergies:Nkda [no known drug allergy]    Current Outpatient Medications   Medication Sig Dispense Refill   • ondansetron (ZOFRAN) 4 MG Tab tablet Take 1 Tab by mouth every four hours as needed for Nausea/Vomiting. 20 Tab 0   • metroNIDAZOLE (FLAGYL) 500 MG Tab Take 1 Tab by mouth 2 Times a Day for 7 days. 14 Tab 0   • senna-docusate (PERICOLACE OR SENOKOT S)  "8.6-50 MG Tab Take 2 Tabs by mouth 2 Times a Day. 30 Tab 0   • tramadol (ULTRAM) 50 MG Tab Take 50 mg by mouth every 8 hours as needed (Pain).     • medroxyPROGESTERone (DEPO-PROVERA) 150 MG/ML Suspension 150 mg by Intramuscular route every 90 days.     • methocarbamol (ROBAXIN) 750 MG Tab Take 750 mg by mouth 4 times a day.       No current facility-administered medications for this visit.        Social History     Tobacco Use   • Smoking status: Former Smoker     Packs/day: 0.25     Years: 15.00     Pack years: 3.75     Types: Cigarettes     Last attempt to quit: 2019     Years since quittin.3   • Smokeless tobacco: Never Used   • Tobacco comment: Pt. states smoking 2-3 a day.    Substance Use Topics   • Alcohol use: No   • Drug use: No     Social History     Social History Narrative   • Not on file       Family History   Problem Relation Age of Onset   • Cancer Mother 45        breast cancer, lymph node,   • Heart Attack Father    • Gout Father    • Cancer Maternal Grandmother         breast cancer       Review of Systems:   Constitutional: No Fevers, Chills  Eyes: No vision changes  ENT: No hearing changes  Resp: No Shortness of breath  CV: No Chest pain  GI: No Nausea/Vomiting  MSK: No weakness  Skin: No rashes  Neuro: No Headaches  Psych: No Suicidal ideations    All remaining systems reviewed and found to be negative, except as stated above.    Exam:    /68 (BP Location: Left arm, Patient Position: Sitting, BP Cuff Size: Adult)   Pulse 85   Temp 36.8 °C (98.2 °F) (Temporal)   Ht 1.651 m (5' 5\")   Wt 74.8 kg (165 lb)   SpO2 95%  Body mass index is 27.46 kg/m².    General:  Well nourished, well developed female in NAD  HENT: Atraumatic, normocephalic  EYES: Extraocular movements intact, pupils equal and reactive to light  NECK: Supple, FROM  CHEST: No deformities, Equal chest expansion  RESP: Unlabored, no stridor or audible wheeze  ABD: Soft, mildly Distended.  Well-healing abdominal wounds " x4 consistent with laparoscopic cholecystectomy.  Minimal abdominal tenderness around periumbilical port site.  Minimal guarding.  No rebound.  Extremities: No Clubbing, Cyanosis, or Edema  Skin: Warm/dry, without rashes  Neuro: A/O x 4, CN 2-12 Grossly intact, Motor/sensory grossly intact  Psych: Normal behavior, normal affect    Hospital discharge summary from 10/25/2019 reviewed    Operative note from 10/25/2019 reviewed    Inpatient laboratory studies from 10/23/2019 through 10/25/2019 reviewed    Assessment/Plan:  1. Post-op pain  New problem to examiner.  Counseled on follow-up with general surgery for postoperative evaluation.  New prescription for Zofran sent.    2. Bacterial vaginosis  New problem to examiner.  Patient declines wet prep.  Empirically treating with Flagyl x7 days and counseled on follow-up immediately for any worsening or failed improvement of symptoms.    Follow-up in 1 week or sooner as needed if symptoms fail to improve.      Please note that this dictation was created using voice recognition software. I have worked with consultants from the vendor as well as technical experts from [facility] to optimize the interface. I have made every reasonable attempt to correct obvious errors, but I expect that there are errors of grammar and possibly content that I did not discover before finalizing the note.

## 2019-11-05 ENCOUNTER — GYNECOLOGY VISIT (OUTPATIENT)
Dept: OBGYN | Facility: MEDICAL CENTER | Age: 29
End: 2019-11-05
Payer: COMMERCIAL

## 2019-11-05 VITALS — SYSTOLIC BLOOD PRESSURE: 100 MMHG | BODY MASS INDEX: 26.96 KG/M2 | DIASTOLIC BLOOD PRESSURE: 74 MMHG | WEIGHT: 162 LBS

## 2019-11-05 DIAGNOSIS — N76.0 BACTERIAL VAGINOSIS: ICD-10-CM

## 2019-11-05 DIAGNOSIS — N81.11 CYSTOCELE, MIDLINE: ICD-10-CM

## 2019-11-05 DIAGNOSIS — N81.4 UTERINE PROLAPSE: ICD-10-CM

## 2019-11-05 DIAGNOSIS — B96.89 BACTERIAL VAGINOSIS: ICD-10-CM

## 2019-11-05 DIAGNOSIS — N81.6 RECTOCELE: ICD-10-CM

## 2019-11-05 PROCEDURE — 90040 PR PRENATAL FOLLOW UP: CPT | Performed by: OBSTETRICS & GYNECOLOGY

## 2019-11-05 RX ORDER — METRONIDAZOLE 7.5 MG/G
GEL TOPICAL
Qty: 1 TUBE | Refills: 6 | Status: ON HOLD | OUTPATIENT
Start: 2019-11-05 | End: 2020-06-03

## 2019-11-05 NOTE — NON-PROVIDER
Pt here for follow-up  Pt states that she seen Urologist and she doesn't want the Sling Surgery but she would like the Hys but keep her ovaries  Pharm verified  #[phone]

## 2019-11-05 NOTE — PROGRESS NOTES
Chief Complaint   Patient presents with   • Gynecologic Exam   Chief complaint: Pelvic pain and pressure      History of present illness: 29 y.o. presents to office for plan of care.  Patient was on Depo-Provera for issues with bleeding as well as pelvic pain.  She does report good results as far as the bleeding is concerned since starting Depo-Provera.  She reports no more cycles and she does not have to wear numerous pads and tampons that she was doing before.  However, she still reports significant cramping especially in the left lower quadrant.  Although the pain has slightly improved with Depo-Provera it still fairly significant and affecting her quality of life.  Pain is described as sharp, reaching 7+ out of 10 in intensity, worse in left lower quadrant versus right lower quadrant.  Occasional pain with intercourse.    She is also reporting a worsening pelvic pressure and feels as there is a hard lump in her vagina.    Recent diagnosed with bacterial vaginosis once again.  Getting very frustrated with recurrent infections.  Would like to know if anything can be done    Review of systems:  Pertinent positives documented in HPI and a comprehensive review of system is negative    All PMH, PSH, allergies, social history and FH reviewed and updated today:  Past Medical History:   Diagnosis Date   • Arthritis     toes   • Back pain    • Breastfeeding (infant) 06/2019   • Mastitis 05/2019   • Nausea/vomiting in pregnancy 10/5/2012   • Pain 06/2019    shoulders/back       Past Surgical History:   Procedure Laterality Date   • DURAN BY LAPAROSCOPY  10/25/2019    Procedure: CHOLECYSTECTOMY, LAPAROSCOPIC;  Surgeon: Efrain Ramesh M.D.;  Location: SURGERY [facility];  Service: General   • ERCP IN OR N/A 10/24/2019    Procedure: ERCP (ENDOSCOPIC RETROGRADE CHOLANGIOPANCREATOGRAPHY);  Surgeon: Temo Liao M.D.;  Location: SURGERY [facility];  Service: Gastroenterology   • PB LAP,DIAGNOSTIC ABDOMEN  6/18/2019     Procedure: PELVISCOPY;  Surgeon: Pilar Vuong M.D.;  Location: SURGERY SAME DAY [facility];  Service: Gynecology   • SALPINGECTOMY Bilateral 2019    Procedure: SALPINGECTOMY;  Surgeon: Pilar Vuong M.D.;  Location: SURGERY SAME DAY [facility];  Service: Gynecology   • TUBAL COAGULATION LAPAROSCOPIC BILATERAL     • TUBAL LIGATION         Allergies:   Allergies   Allergen Reactions   • Nkda [No Known Drug Allergy]        Social History     Socioeconomic History   • Marital status:      Spouse name: Not on file   • Number of children: Not on file   • Years of education: Not on file   • Highest education level: Not on file   Occupational History   • Not on file   Social Needs   • Financial resource strain: Not on file   • Food insecurity:     Worry: Not on file     Inability: Not on file   • Transportation needs:     Medical: Not on file     Non-medical: Not on file   Tobacco Use   • Smoking status: Former Smoker     Packs/day: 0.25     Years: 15.00     Pack years: 3.75     Types: Cigarettes     Last attempt to quit: 2019     Years since quittin.3   • Smokeless tobacco: Never Used   • Tobacco comment: Pt. states smoking 2-3 a day.    Substance and Sexual Activity   • Alcohol use: No   • Drug use: No   • Sexual activity: Yes     Partners: Male     Birth control/protection: Surgical, Injection     Comment: none   Lifestyle   • Physical activity:     Days per week: Not on file     Minutes per session: Not on file   • Stress: Not on file   Relationships   • Social connections:     Talks on phone: Not on file     Gets together: Not on file     Attends Confucianism service: Not on file     Active member of club or organization: Not on file     Attends meetings of clubs or organizations: Not on file     Relationship status: Not on file   • Intimate partner violence:     Fear of current or ex partner: Not on file     Emotionally abused: Not on file     Physically abused: Not on file      Forced sexual activity: Not on file   Other Topics Concern   • Not on file   Social History Narrative   • Not on file       Family History   Problem Relation Age of Onset   • Cancer Mother 45        breast cancer, lymph node,   • Heart Attack Father    • Gout Father    • Cancer Maternal Grandmother         breast cancer       Physical exam:  /74 (BP Location: Right arm, Patient Position: Sitting)   Wt 73.5 kg (162 lb)     GENERAL APPEARANCE: healthy, alert, no distress  NECK nontender, no masses, thyromegaly or nodules  ABDOMEN Abdomen soft, non-tender. BS normal. No masses,  No organomegaly  FEMALE GYN: normal female external genitalia without lesions, BUS normal without lesions, vaginal mucosa pink and moist, stage II anterior and posterior prolapse with leading edges to 3 cm from hymenal remnant.  +white vaginal discharge noted, cervix normal in appearance without lesions but leading edge approximately 2 cm from hymenal remnant, no CMT, urethra is normal without discharge or scarring, slight urethral hypermobility noted with angle greater than 30 degrees, no bladder fullness or masses, normal anus and perineum. Uterus mobile, normal size, and moderately tender. Ovaries normal size and minimally tender bilaterally. No palpable masses.  No inguinal hernia present .  NEURO Awake, alert and oriented x 3, Normal gait, no sensory deficits  SKIN No rashes, or ulcers or lesions seen  PSYCHIATRIC: Patient shows appropriate affect, is alert and oriented x3, intact judgment and insight.      Assessment:  1. Cystocele, midline     2. Uterine prolapse     3. Rectocele     4. Bacterial vaginosis         Plan:    Given normal resolution of her pain and what appears to be worsening prolapse since her last visit, patient is reporting she would like to undergo surgical management at this time.  I do agree with her choice at this time, I do believe that a hysterectomy with anterior and posterior repair, possible culdoplasty  would be effective in relieving her pressure as well as her pain symptoms.    Referral has been sent    As far as the recurrent bacterial vaginosis is concerned, discussed with the patient treatment with Flagyl, followed by 21 days of vaginal boric acid and twice weekly MetroGel application for 4 to 6 months.  Discussed with the patient that majority of patients that undergo this regimen have very good results.    Questions answered    Spent  30 minutes in face-to-face patient contact in which greater than 50% of that visit was spent in counseling/coordination of care including medical and surgical options of care.

## 2019-11-15 ENCOUNTER — OFFICE VISIT (OUTPATIENT)
Dept: MEDICAL GROUP | Facility: PHYSICIAN GROUP | Age: 29
End: 2019-11-15
Payer: COMMERCIAL

## 2019-11-15 VITALS
TEMPERATURE: 97.8 F | OXYGEN SATURATION: 97 % | HEIGHT: 65 IN | SYSTOLIC BLOOD PRESSURE: 102 MMHG | BODY MASS INDEX: 27.16 KG/M2 | WEIGHT: 163 LBS | DIASTOLIC BLOOD PRESSURE: 64 MMHG | HEART RATE: 72 BPM

## 2019-11-15 DIAGNOSIS — M79.674 TOE PAIN, RIGHT: ICD-10-CM

## 2019-11-15 DIAGNOSIS — N93.9 ABNORMAL VAGINAL BLEEDING: ICD-10-CM

## 2019-11-15 DIAGNOSIS — N81.4 UTERINE PROLAPSE: ICD-10-CM

## 2019-11-15 DIAGNOSIS — F32.0 CURRENT MILD EPISODE OF MAJOR DEPRESSIVE DISORDER WITHOUT PRIOR EPISODE (HCC): ICD-10-CM

## 2019-11-15 PROCEDURE — 99214 OFFICE O/P EST MOD 30 MIN: CPT | Performed by: FAMILY MEDICINE

## 2019-11-15 ASSESSMENT — PAIN SCALES - GENERAL: PAINLEVEL: 4=SLIGHT-MODERATE PAIN

## 2019-11-15 NOTE — ASSESSMENT & PLAN NOTE
Chronic recurrent medical condition.  Patient with resumption of abnormal uterine bleeding.  Patient states that she has started bleeding again within the last week, bleeding is light.  Had Depo-Provera injection approximately 2 months ago.  Status post BTL 5 months ago.  Currently follows with gynecology Dr. Sosa.  She is currently undergoing preoperative evaluation for possible hysterectomy and what sounds like reported sacral colpopexy per patient.  Also to undergo evaluation for possible endometriosis intraoperatively at that time

## 2019-11-15 NOTE — PROGRESS NOTES
CC:Diagnoses of Toe pain, right, Current mild episode of major depressive disorder without prior episode (HCC), Abnormal vaginal bleeding, and Uterine prolapse were pertinent to this visit.      Toe pain, right  New problem to examiner.  Patient with severe right pinky toe pain after stubbing it on couch leg.  Onset approximately 1 week ago.  Patient states she was walking around the living room and walked to close to the couch and stubbed her toe.  She was able to walk immediately after however there was extensive swelling and bruising.  Patient called the office and was advised to jaime tape her toe which she has not been doing.  Patient has been taking Tylenol and tramadol but otherwise not doing any icing, resting, topical analgesics or NSAIDs.  Patient is able to walk without difficulty.    Current mild episode of major depressive disorder without prior episode (HCC)  New problem to examiner.  Patient with psychomotor retardation, concentration and energy problems as well as loss of interest in normal activities.  Patient states that she is having marital strain and is requesting referral to counseling.  Declines any medications at this point.  Denies suicidality.    Abnormal vaginal bleeding  Chronic recurrent medical condition.  Patient with resumption of abnormal uterine bleeding.  Patient states that she has started bleeding again within the last week, bleeding is light.  Had Depo-Provera injection approximately 2 months ago.  Status post BTL 5 months ago.  Currently follows with gynecology Dr. Sosa.  She is currently undergoing preoperative evaluation for possible hysterectomy and what sounds like reported sacral colpopexy per patient.  Also to undergo evaluation for possible endometriosis intraoperatively at that time    Uterine prolapse  Chronic ongoing medical condition.  Patient undergoing preop evaluation with gynecology for sacral colpopexy and hysterectomy.  Currently states she is at least 1  month out from her upcoming surgery and will currently awaiting on gynecology for scheduling.  Symptoms continue at baseline currently.      Patient Active Problem List    Diagnosis Date Noted   • Toe pain, right 11/15/2019   • Current mild episode of major depressive disorder without prior episode (HCC) 11/15/2019   • Uterine prolapse 2019   • Rectocele 2019   • Bacterial vaginosis 10/30/2019   • Transaminitis 10/23/2019   • Leukocytosis 10/23/2019   • Abnormal vaginal bleeding 2019   • Stress incontinence 2019   • Cystocele, midline 2019   • Post-op pain 2019   • Mastitis 2019   • Low back pain with radiation 2019      Allergies:Nkda [no known drug allergy]    Current Outpatient Medications   Medication Sig Dispense Refill   • metronidazole (METROGEL) 0.75 % gel Please use 1 applicator full twice weekly for 4 to 6 months 1 Tube 6   • ondansetron (ZOFRAN) 4 MG Tab tablet Take 1 Tab by mouth every four hours as needed for Nausea/Vomiting. 20 Tab 0   • senna-docusate (PERICOLACE OR SENOKOT S) 8.6-50 MG Tab Take 2 Tabs by mouth 2 Times a Day. 30 Tab 0   • tramadol (ULTRAM) 50 MG Tab Take 50 mg by mouth every 8 hours as needed (Pain).     • methocarbamol (ROBAXIN) 750 MG Tab Take 750 mg by mouth 4 times a day.     • medroxyPROGESTERone (DEPO-PROVERA) 150 MG/ML Suspension 150 mg by Intramuscular route every 90 days.       No current facility-administered medications for this visit.        Social History     Tobacco Use   • Smoking status: Former Smoker     Packs/day: 0.25     Years: 15.00     Pack years: 3.75     Types: Cigarettes     Last attempt to quit: 2019     Years since quittin.3   • Smokeless tobacco: Never Used   • Tobacco comment: Pt. states smoking 2-3 a day.    Substance Use Topics   • Alcohol use: No   • Drug use: No     Social History     Patient does not qualify to have social determinant information on file (likely too young).   Social History  "Narrative   • Not on file       Family History   Problem Relation Age of Onset   • Cancer Mother 45        breast cancer, lymph node,   • Heart Attack Father    • Gout Father    • Cancer Maternal Grandmother         breast cancer       Review of Systems:    Constitutional: No Fevers, Chills  Eyes: No vision changes  ENT: No hearing changes  Resp: No Shortness of breath  CV: No Chest pain  GI: No Nausea/Vomiting  MSK: No weakness  Skin: No rashes  Neuro: No Headaches  Psych: No Suicidal ideations    All remaining systems reviewed and found to be negative, except as stated above.    Exam:    /64 (BP Location: Left arm, Patient Position: Sitting, BP Cuff Size: Adult)   Pulse 72   Temp 36.6 °C (97.8 °F)   Ht 1.651 m (5' 5\")   Wt 73.9 kg (163 lb)   SpO2 97%  Body mass index is 27.12 kg/m².    General:  Well nourished, well developed female in NAD  HENT: Atraumatic, normocephalic  EYES: Extraocular movements intact, pupils equal and reactive to light  NECK: Supple, FROM  CHEST: No deformities, Equal chest expansion  RESP: Unlabored, no stridor or audible wheeze  ABD: Soft, Nontender, Non-Distended  Extremities: No Clubbing, Cyanosis, or Edema.  Ecchymosis and trace edema over right fifth digit MTP joint.  Flexion extension  Skin: Warm/dry, without rashes  Neuro: A/O x 4, CN 2-12 Grossly intact, Motor/sensory grossly intact  Psych: Normal behavior, normal affect      Assessment/Plan:  1. Toe pain, right  New problem to examiner.  Counseled on conservative management with ice, NSAIDs, topical analgesics and jaime taping.  Follow-up for x-ray in 5 weeks if not improving or resolved    2. Current mild episode of major depressive disorder without prior episode (HCC)  New problem to examiner.  Referral to psychology for counseling.  Follow-up if symptoms worsen.    3. Abnormal vaginal bleeding  Chronic ongoing medical condition.  Follow-up with gynecology for possible hysterectomy and evaluation surgically for " endometriosis.    4. Uterine prolapse  Chronic ongoing medical condition.  Follow-up with gynecology for hysterectomy and possible sacral colpopexy.    Follow-up PRN    Please note that this dictation was created using voice recognition software. I have worked with consultants from the vendor as well as technical experts from [employer] to optimize the interface. I have made every reasonable attempt to correct obvious errors, but I expect that there are errors of grammar and possibly content that I did not discover before finalizing the note.

## 2019-11-15 NOTE — ASSESSMENT & PLAN NOTE
New problem to examiner.  Patient with psychomotor retardation, concentration and energy problems as well as loss of interest in normal activities.  Patient states that she is having marital strain and is requesting referral to counseling.  Declines any medications at this point.  Denies suicidality.

## 2019-11-15 NOTE — ASSESSMENT & PLAN NOTE
New problem to examiner.  Patient with severe right pinky toe pain after stubbing it on couch leg.  Onset approximately 1 week ago.  Patient states she was walking around the living room and walked to close to the couch and stubbed her toe.  She was able to walk immediately after however there was extensive swelling and bruising.  Patient called the office and was advised to jaime tape her toe which she has not been doing.  Patient has been taking Tylenol and tramadol but otherwise not doing any icing, resting, topical analgesics or NSAIDs.  Patient is able to walk without difficulty.

## 2019-11-15 NOTE — ASSESSMENT & PLAN NOTE
Chronic ongoing medical condition.  Patient undergoing preop evaluation with gynecology for sacral colpopexy and hysterectomy.  Currently states she is at least 1 month out from her upcoming surgery and will currently awaiting on gynecology for scheduling.  Symptoms continue at baseline currently.

## 2019-11-27 NOTE — ADDENDUM NOTE
Encounter addended by: Sheri Lam C.N.M. on: 11/27/2019 2:38 AM   Actions taken: Clinical Note Signed

## 2019-12-03 ENCOUNTER — OFFICE VISIT (OUTPATIENT)
Dept: MEDICAL GROUP | Facility: PHYSICIAN GROUP | Age: 29
End: 2019-12-03

## 2019-12-03 VITALS
HEIGHT: 65 IN | BODY MASS INDEX: 26.99 KG/M2 | SYSTOLIC BLOOD PRESSURE: 100 MMHG | WEIGHT: 162 LBS | OXYGEN SATURATION: 95 % | TEMPERATURE: 97.8 F | HEART RATE: 65 BPM | DIASTOLIC BLOOD PRESSURE: 64 MMHG

## 2019-12-03 DIAGNOSIS — Z00.00 WELL ADULT EXAM: ICD-10-CM

## 2019-12-03 DIAGNOSIS — N93.9 ABNORMAL VAGINAL BLEEDING: ICD-10-CM

## 2019-12-03 DIAGNOSIS — H69.93 EUSTACHIAN TUBE DYSFUNCTION, BILATERAL: ICD-10-CM

## 2019-12-03 PROCEDURE — 96372 THER/PROPH/DIAG INJ SC/IM: CPT | Performed by: FAMILY MEDICINE

## 2019-12-03 PROCEDURE — 99213 OFFICE O/P EST LOW 20 MIN: CPT | Mod: 25 | Performed by: FAMILY MEDICINE

## 2019-12-03 RX ORDER — MEDROXYPROGESTERONE ACETATE 150 MG/ML
150 INJECTION, SUSPENSION INTRAMUSCULAR ONCE
Status: COMPLETED | OUTPATIENT
Start: 2019-12-03 | End: 2019-12-03

## 2019-12-03 RX ADMIN — MEDROXYPROGESTERONE ACETATE 150 MG: 150 INJECTION, SUSPENSION INTRAMUSCULAR at 12:01

## 2019-12-03 ASSESSMENT — PAIN SCALES - GENERAL: PAINLEVEL: NO PAIN

## 2019-12-03 NOTE — PROGRESS NOTES
CC:Diagnoses of Well adult exam, Abnormal vaginal bleeding, and Eustachian tube dysfunction, bilateral were pertinent to this visit.      HISTORY OF PRESENT ILLNESS: Patient is a 29 y.o. female established patient who presents today to discuss ear fullness and get Depo-Provera shot.    Eustachian tube dysfunction, bilateral  New problem to examiner.  Patient with sensation of bilateral ear fullness that she gets annually about this time of year.  Denies any changes in hearing, tinnitus, popping.  Denies any dizziness or vertigo.  Has not taken anything over-the-counter or tried anything to alleviate ear fullness.  She denies any ear pain.  Denies headaches.    Abnormal vaginal bleeding  Chronic recurrent medical condition.  Patient here for repeat DepoCyt.  States that she has not had any bleeding in the last 2 weeks.  Has follow-up with Dr. Bowen for possible hysterectomy and ex lap for endometriosis.      Patient Active Problem List    Diagnosis Date Noted   • Eustachian tube dysfunction, bilateral 12/03/2019   • Toe pain, right 11/15/2019   • Current mild episode of major depressive disorder without prior episode ([facility]) 11/15/2019   • Uterine prolapse 11/05/2019   • Rectocele 11/05/2019   • Bacterial vaginosis 10/30/2019   • Transaminitis 10/23/2019   • Leukocytosis 10/23/2019   • Abnormal vaginal bleeding 08/22/2019   • Stress incontinence 07/08/2019   • Cystocele, midline 06/27/2019   • Post-op pain 06/18/2019   • Mastitis 05/16/2019   • Low back pain with radiation 05/16/2019      Allergies:Nkda [no known drug allergy]    Current Outpatient Medications   Medication Sig Dispense Refill   • metronidazole (METROGEL) 0.75 % gel Please use 1 applicator full twice weekly for 4 to 6 months 1 Tube 6   • ondansetron (ZOFRAN) 4 MG Tab tablet Take 1 Tab by mouth every four hours as needed for Nausea/Vomiting. 20 Tab 0   • senna-docusate (PERICOLACE OR SENOKOT S) 8.6-50 MG Tab Take 2 Tabs by mouth 2 Times a Day. 30 Tab  "0   • tramadol (ULTRAM) 50 MG Tab Take 50 mg by mouth every 8 hours as needed (Pain).     • methocarbamol (ROBAXIN) 750 MG Tab Take 750 mg by mouth 4 times a day.     • medroxyPROGESTERone (DEPO-PROVERA) 150 MG/ML Suspension 150 mg by Intramuscular route every 90 days.       No current facility-administered medications for this visit.        Social History     Tobacco Use   • Smoking status: Former Smoker     Packs/day: 0.25     Years: 15.00     Pack years: 3.75     Types: Cigarettes     Last attempt to quit: 2019     Years since quittin.4   • Smokeless tobacco: Never Used   • Tobacco comment: Pt. states smoking 2-3 a day.    Substance Use Topics   • Alcohol use: No   • Drug use: No     Social History     Patient does not qualify to have social determinant information on file (likely too young).   Social History Narrative   • Not on file       Family History   Problem Relation Age of Onset   • Cancer Mother 45        breast cancer, lymph node,   • Heart Attack Father    • Gout Father    • Cancer Maternal Grandmother         breast cancer       Review of Systems:    Constitutional: No Fevers, Chills  Resp: No Shortness of breath  CV: No Chest pain  GI: No Nausea/Vomiting    Exam:    /64 (BP Location: Left arm, Patient Position: Sitting, BP Cuff Size: Adult)   Pulse 65   Temp 36.6 °C (97.8 °F)   Ht 1.651 m (5' 5\")   Wt 73.5 kg (162 lb)   SpO2 95%  Body mass index is 26.96 kg/m².    General:  Well nourished, well developed female in NAD  HENT: Atraumatic, normocephalic, bilateral external auditory canals clear, bilateral tympanic membranes pearly gray with light reflex present.  EYES: Extraocular movements intact, pupils equal and reactive to light  NECK: Supple, FROM  CHEST: No deformities, Equal chest expansion  RESP: Unlabored, no stridor or audible wheeze  ABD: Soft, Nontender, Non-Distended  Extremities: No Clubbing, Cyanosis, or Edema  Skin: Warm/dry, without rashes  Neuro: A/O x 4, CN 2-12 " Grossly intact, Motor/sensory grossly intact  Psych: Normal behavior, normal affect    Assessment/Plan:  1.  Abnormal vaginal bleeding  Chronic ongoing medical condition.  Depo-Provera as below.  Follow-up with OB/GYN.  - medroxyPROGESTERone (DEPO-PROVERA) injection 150 mg    2. Eustachian tube dysfunction, bilateral  New problem to examiner.  Counseled on OTC nasal saline rinses and OTC allergy/antihistamines.  Follow-up if not improving peer    Follow-up as needed    Please note that this dictation was created using voice recognition software. I have worked with consultants from the vendor as well as technical experts from [facility] to optimize the interface. I have made every reasonable attempt to correct obvious errors, but I expect that there are errors of grammar and possibly content that I did not discover before finalizing the note.

## 2019-12-03 NOTE — ASSESSMENT & PLAN NOTE
New problem to examiner.  Patient with sensation of bilateral ear fullness that she gets annually about this time of year.  Denies any changes in hearing, tinnitus, popping.  Denies any dizziness or vertigo.  Has not taken anything over-the-counter or tried anything to alleviate ear fullness.  She denies any ear pain.  Denies headaches.

## 2019-12-03 NOTE — ASSESSMENT & PLAN NOTE
Chronic recurrent medical condition.  Patient here for repeat DepoCyt.  States that she has not had any bleeding in the last 2 weeks.  Has follow-up with Dr. Bowen for possible hysterectomy and ex lap for endometriosis.

## 2019-12-11 ENCOUNTER — NON-PROVIDER VISIT (OUTPATIENT)
Dept: URGENT CARE | Facility: PHYSICIAN GROUP | Age: 29
End: 2019-12-11

## 2019-12-11 DIAGNOSIS — Z02.1 PRE-EMPLOYMENT DRUG SCREENING: Primary | ICD-10-CM

## 2019-12-11 PROCEDURE — 80305 DRUG TEST PRSMV DIR OPT OBS: CPT | Performed by: PHYSICIAN ASSISTANT

## 2019-12-12 LAB
AMP AMPHETAMINE: NORMAL
COC COCAINE: NORMAL
INT CON NEG: NORMAL
INT CON POS: NORMAL
MET METHAMPHETAMINES: NORMAL
OPI OPIATES: NORMAL
PCP PHENCYCLIDINE: NORMAL
POC DRUG COMMENT 753798-OCCUPATIONAL HEALTH: NEGATIVE
THC: NORMAL

## 2020-02-27 ENCOUNTER — GYNECOLOGY VISIT (OUTPATIENT)
Dept: OBGYN | Facility: CLINIC | Age: 30
End: 2020-02-27
Payer: MEDICAID

## 2020-02-27 VITALS — SYSTOLIC BLOOD PRESSURE: 102 MMHG | BODY MASS INDEX: 25.29 KG/M2 | DIASTOLIC BLOOD PRESSURE: 70 MMHG | WEIGHT: 152 LBS

## 2020-02-27 DIAGNOSIS — N89.8 VAGINAL DISCHARGE: ICD-10-CM

## 2020-02-27 DIAGNOSIS — B96.89 BACTERIAL VAGINOSIS: ICD-10-CM

## 2020-02-27 DIAGNOSIS — N81.11 CYSTOCELE, MIDLINE: ICD-10-CM

## 2020-02-27 DIAGNOSIS — N81.6 RECTOCELE: ICD-10-CM

## 2020-02-27 DIAGNOSIS — N39.3 STRESS INCONTINENCE: ICD-10-CM

## 2020-02-27 DIAGNOSIS — N93.9 ABNORMAL VAGINAL BLEEDING: ICD-10-CM

## 2020-02-27 DIAGNOSIS — N76.0 BACTERIAL VAGINOSIS: ICD-10-CM

## 2020-02-27 PROCEDURE — 99213 OFFICE O/P EST LOW 20 MIN: CPT | Performed by: OBSTETRICS & GYNECOLOGY

## 2020-02-27 NOTE — PROGRESS NOTES
Chief Complaint   Patient presents with   • Gynecologic Exam   Chief complaint: Follow-up BV.  Discussion of heavy bleeding      History of present illness: 29 y.o. presents to office for follow-up bacterial vaginosis.  Patient still reports significant odor and discharge.  She started treatment with boric acid but then began to have significant and heavy vaginal bleeding, she stopped using the boric acid suppositories.  Since then she still reports significant odor.  She reports that now her cycles have become much heavier and more painful.  Her last cycle lasted 4 to 5 weeks.  Also significant cramping noted.  Still experiencing significant loss of urine when laughing/coughing or sneezing.    She would like to be tested once again for bacterial vaginosis as well as gonorrhea/chlamydia      Review of systems:  Pertinent positives documented in HPI and a comprehensive review of system is negative    All PMH, PSH, allergies, social history and FH reviewed and updated today:  Past Medical History:   Diagnosis Date   • Arthritis     toes   • Back pain    • Breastfeeding (infant) 06/2019   • Mastitis 05/2019   • Nausea/vomiting in pregnancy 10/5/2012   • Pain 06/2019    shoulders/back       Past Surgical History:   Procedure Laterality Date   • DURAN BY LAPAROSCOPY  10/25/2019    Procedure: CHOLECYSTECTOMY, LAPAROSCOPIC;  Surgeon: Efrain Ramesh M.D.;  Location: SURGERY [facility];  Service: General   • ERCP IN OR N/A 10/24/2019    Procedure: ERCP (ENDOSCOPIC RETROGRADE CHOLANGIOPANCREATOGRAPHY);  Surgeon: Temo Liao M.D.;  Location: SURGERY [facility];  Service: Gastroenterology   • PB LAP,DIAGNOSTIC ABDOMEN  6/18/2019    Procedure: PELVISCOPY;  Surgeon: Pilar Vuong M.D.;  Location: SURGERY SAME DAY [facility];  Service: Gynecology   • SALPINGECTOMY Bilateral 6/18/2019    Procedure: SALPINGECTOMY;  Surgeon: Pilar Vuong M.D.;  Location: SURGERY SAME DAY [facility];  Service: Gynecology    • TUBAL COAGULATION LAPAROSCOPIC BILATERAL     • TUBAL LIGATION         Allergies:   Allergies   Allergen Reactions   • Nkda [No Known Drug Allergy]        Social History     Socioeconomic History   • Marital status:      Spouse name: Not on file   • Number of children: Not on file   • Years of education: Not on file   • Highest education level: Not on file   Occupational History   • Not on file   Social Needs   • Financial resource strain: Not on file   • Food insecurity     Worry: Not on file     Inability: Not on file   • Transportation needs     Medical: Not on file     Non-medical: Not on file   Tobacco Use   • Smoking status: Former Smoker     Packs/day: 0.25     Years: 15.00     Pack years: 3.75     Types: Cigarettes     Last attempt to quit: 2019     Years since quittin.6   • Smokeless tobacco: Never Used   • Tobacco comment: Pt. states smoking 2-3 a day.    Substance and Sexual Activity   • Alcohol use: No   • Drug use: No   • Sexual activity: Yes     Partners: Male     Birth control/protection: Surgical, Injection     Comment: none   Lifestyle   • Physical activity     Days per week: Not on file     Minutes per session: Not on file   • Stress: Not on file   Relationships   • Social connections     Talks on phone: Not on file     Gets together: Not on file     Attends Jainism service: Not on file     Active member of club or organization: Not on file     Attends meetings of clubs or organizations: Not on file     Relationship status: Not on file   • Intimate partner violence     Fear of current or ex partner: Not on file     Emotionally abused: Not on file     Physically abused: Not on file     Forced sexual activity: Not on file   Other Topics Concern   • Not on file   Social History Narrative   • Not on file       Family History   Problem Relation Age of Onset   • Cancer Mother 45        breast cancer, lymph node,   • Heart Attack Father    • Gout Father    • Cancer Maternal Grandmother          breast cancer       Physical exam:  /70   Wt 68.9 kg (152 lb)     GENERAL APPEARANCE: healthy, alert, no distress  NECK nontender, no masses, thyromegaly or nodules  ABDOMEN Abdomen soft, non-tender. BS normal. No masses,  No organomegaly  FEMALE GYN: normal female external genitalia without lesions, BUS normal, minimal vaginal discharge noted, vulva pink without erythema or friability, urethra is normal without discharge or scarring, no bladder fullness or masses, normal external genitalia, no erythema, no discharge, normal uterus, size and consistency, normal adnexa without tenderness, normal vaginal mucosa, there is noted to be some anterior compartment prolapse with leading the edge approximately 2 to 3 cm from introitus.  Small rectocele noted, leading edge approximate 3 cm from hymenal opening.  Slight loss of urine when coughing during examination, small amount of blood noted in the vault, normal anus and perineum.  NEURO Awake, alert and oriented x 3, Normal gait, no sensory deficits  SKIN No rashes, or ulcers or lesions seen  PSYCHIATRIC: Patient shows appropriate affect, is alert and oriented x3, intact judgment and insight.      Assessment:  No diagnosis found.    Plan:    Given worsening symptoms of vaginal bleeding and pain at this time, and given prolonged vaginal bleeding, will order endometrial biopsy to assess lining.  Patient had an ultrasound performed less than 6 months ago, will not repeat at this time    Patient would like to still go ahead with original plan of hysterectomy, anterior, posterior repair, culdoplasty, sling placement and cystoscopy.  Given her worsening bleeding when compared to her prior examination, slight prolapse and urinary symptoms, I do agree with the decision.  Referral has been sent    Affirm and gonorrhea chlamydia testing ordered.  Will call patient with results.  If positive BV, will consider treatment with Flagyl and boric acid once again.      Questions answered    Spent  15 minutes in face-to-face patient contact in which greater than 50% of that visit was spent in counseling/coordination of care including medical and surgical options of care.

## 2020-02-27 NOTE — NON-PROVIDER
Pt here to f/u on pelvic pain.    Pt states she still has BV and meds have not helped  Good# [phone]  LMP: Unknown   Pharmacy confirmed.  Last PAP: 7/26/19, WNL

## 2020-03-04 ENCOUNTER — TELEPHONE (OUTPATIENT)
Dept: OBGYN | Facility: CLINIC | Age: 30
End: 2020-03-04

## 2020-03-04 NOTE — TELEPHONE ENCOUNTER
Pt called inquiring about her lab results for GCCT and vaginal pathogens. Advised patient that we have not received the results back from [facility] and it may take up to 2 weeks for us to receive the results. Pt also asked about EMB and surgery scheduling, advised patient that the referrals are still being processed. Pt verbalized understanding and had no further questions.

## 2020-03-10 DIAGNOSIS — N89.8 VAGINAL DISCHARGE: ICD-10-CM

## 2020-03-12 ENCOUNTER — TELEPHONE (OUTPATIENT)
Dept: OBGYN | Facility: CLINIC | Age: 30
End: 2020-03-12

## 2020-03-12 RX ORDER — METRONIDAZOLE 7.5 MG/G
1 GEL TOPICAL 2 TIMES DAILY
Qty: 1 TUBE | Refills: 1 | Status: SHIPPED | OUTPATIENT
Start: 2020-03-12 | End: 2021-02-22

## 2020-03-12 NOTE — TELEPHONE ENCOUNTER
Pt called requesting test results.  Was given results as negative for GC/CT, yeast and trichomonas. And positive for BV.  rx will be sending to pharmacy on file per pt request.    Dr. Bowen notified, will send Rx.    Verbalized understanding

## 2020-03-16 DIAGNOSIS — N89.8 VAGINAL DISCHARGE: ICD-10-CM

## 2020-03-23 ENCOUNTER — APPOINTMENT (OUTPATIENT)
Dept: HEMATOLOGY ONCOLOGY | Facility: MEDICAL CENTER | Age: 30
End: 2020-03-23
Payer: MEDICAID

## 2020-04-09 ENCOUNTER — GYNECOLOGY VISIT (OUTPATIENT)
Dept: OBGYN | Facility: CLINIC | Age: 30
End: 2020-04-09
Payer: MEDICAID

## 2020-04-09 VITALS
BODY MASS INDEX: 24.43 KG/M2 | HEIGHT: 65 IN | WEIGHT: 146.6 LBS | SYSTOLIC BLOOD PRESSURE: 120 MMHG | DIASTOLIC BLOOD PRESSURE: 80 MMHG

## 2020-04-09 DIAGNOSIS — N92.1 MENOMETRORRHAGIA: ICD-10-CM

## 2020-04-09 LAB
INT CON NEG: NEGATIVE
INT CON POS: POSITIVE
POC URINE PREGNANCY TEST: NEGATIVE

## 2020-04-09 PROCEDURE — 81025 URINE PREGNANCY TEST: CPT | Performed by: OBSTETRICS & GYNECOLOGY

## 2020-04-09 PROCEDURE — 58100 BIOPSY OF UTERUS LINING: CPT | Performed by: OBSTETRICS & GYNECOLOGY

## 2020-04-09 RX ORDER — IBUPROFEN 200 MG
600 TABLET ORAL PRN
COMMUNITY
End: 2021-02-27

## 2020-04-09 RX ORDER — IBUPROFEN 200 MG
200 TABLET ORAL ONCE
Status: CANCELLED | OUTPATIENT
Start: 2020-04-09 | End: 2020-04-09

## 2020-04-09 RX ORDER — OMEGA-3 FATTY ACIDS/FISH OIL 300-1000MG
200 CAPSULE ORAL ONCE
Qty: 2 CAP | Refills: 0 | COMMUNITY
Start: 2020-04-09 | End: 2020-04-09

## 2020-04-09 ASSESSMENT — FIBROSIS 4 INDEX: FIB4 SCORE: 1.24

## 2020-04-09 NOTE — NON-PROVIDER
Pt is here for a EMB procedure  UPT? Negative  Consent signed? yes  Pharmacy Verified  Phone # [phone]  LMP: a month ago approx

## 2020-04-09 NOTE — PROCEDURES
Procedures      Endometrial Biopsy  Procedure: After informed consent and discussion regarding the necessity for endometrial evaluation procedure was as follows    external genitalia normal, normal Bartholin's glands, urethra, [place]'s glands, no vulvar lesions, no cervical lesions, cystocele 2nd, rectocele 2nd, cervical prolapse 3rd        External genitalia,urethral meatus, urethra, bladder and vagina normal. Cervix, uterus and adnexa intact and normal.  Anus and perineum normal. Bimanual and rectovaginal without masses or tenderness.}  Cervix swabbed with Betadine solution x3   Cervix  Grasped with Tenaculum and Sterile Pipelle passed through endocervical canal to endometrial cavity.   Specimen obtain  With  ___scant ______  tissue .   Patient tolerated procedure well   Hemostasis assured   Specimen to pathology

## 2020-04-17 DIAGNOSIS — N92.1 MENOMETRORRHAGIA: ICD-10-CM

## 2020-05-25 NOTE — PROGRESS NOTES
"Non face to face prolonged services of clinical data and chart information reviewed for a total time of 30 performed on 5/25 and 5/21 for Lali Funes  Reviewed were:    Referral    Previous encounters    Imaging all imaging including mammography, colonoscopy, CT/tomography, MRI/PET    Previous procedures all biopsy and surgical procedures including pathology studies and interpretation    Notes Z80.3 (ICD-10-CM) - Family history of malignant neoplasm of breast                                 Media all personal and family histories from outside institutions including germline DNA studies and interpretations    Miscellaneous reports    Patient summaries family history as documented by referring clinician  Counseling, testing information and materials prepared  \"I spent 30 minutes  from 0800 to 0830 reviewing past records, prior to visit pertaining to genetic risk.\"   Landry Hoff MD PhD  edited  "

## 2020-05-26 ENCOUNTER — OFFICE VISIT (OUTPATIENT)
Dept: HEMATOLOGY ONCOLOGY | Facility: MEDICAL CENTER | Age: 30
End: 2020-05-26
Payer: MEDICAID

## 2020-05-26 VITALS
DIASTOLIC BLOOD PRESSURE: 82 MMHG | WEIGHT: 147.71 LBS | BODY MASS INDEX: 24.61 KG/M2 | SYSTOLIC BLOOD PRESSURE: 110 MMHG | TEMPERATURE: 97.8 F | HEIGHT: 65 IN | HEART RATE: 78 BPM | RESPIRATION RATE: 18 BRPM | OXYGEN SATURATION: 94 %

## 2020-05-26 DIAGNOSIS — Z15.01 BREAST CANCER GENETIC SUSCEPTIBILITY: ICD-10-CM

## 2020-05-26 DIAGNOSIS — Z80.3 FAMILY HISTORY OF BREAST CANCER: ICD-10-CM

## 2020-05-26 PROCEDURE — 99203 OFFICE O/P NEW LOW 30 MIN: CPT | Performed by: MEDICAL GENETICS

## 2020-05-26 ASSESSMENT — FIBROSIS 4 INDEX: FIB4 SCORE: 1.24

## 2020-05-26 NOTE — PROGRESS NOTES
GENETIC RISK ASSESSMENT    Lali Funes is a 29 y.o. year old patient who was referred by [facility] for cancer genetic risk assessment.    Chief Complaint: family history of breast cancer    HPI: The patient does not carry a cancer diagnosis  The patient's mother (breast-35-) GM (m-breast) GGM( breast) and 5 paternal aunts (breast  Review of personal and family histories suggested that a cancer genetic risk assessment was in order.    Review of Systems (ROS):  Constitutional N  Eyes N  ENT N   Respiratory N  Cardiovascular N  Gastrointestinal N  Genitourinary N  Musculoskeletal N  Skin N  Neurological N  Endocrine N  Psychiatric N  Hematologic/lymphatic N  Allergic/Immunologic none  All other systems reviewed and are negative.    History (Past/Family)  Family History:   Family History   Problem Relation Age of Onset   • Cancer Mother 45        breast cancer, lymph node,   • Heart Attack Father    • Gout Father    • Cancer Maternal Grandmother         breast cancer      3 generation pedigree built   Yes   Joby empiric risk calculation No   [place] II empiric risk calculation  No    Social History:       Social History     Socioeconomic History   • Marital status: Single     Spouse name: Not on file   • Number of children: Not on file   • Years of education: Not on file   • Highest education level: Not on file   Occupational History   • Not on file   Social Needs   • Financial resource strain: Not on file   • Food insecurity     Worry: Not on file     Inability: Not on file   • Transportation needs     Medical: Not on file     Non-medical: Not on file   Tobacco Use   • Smoking status: Former Smoker     Packs/day: 0.25     Years: 15.00     Pack years: 3.75     Types: Cigarettes     Last attempt to quit: 2019     Years since quittin.8   • Smokeless tobacco: Never Used   • Tobacco comment: Pt. states smoking 2-3 a day.    Substance and Sexual Activity   • Alcohol use: No   • Drug use: No   •  Sexual activity: Yes     Partners: Male     Birth control/protection: Surgical, Injection     Comment: none   Lifestyle   • Physical activity     Days per week: Not on file     Minutes per session: Not on file   • Stress: Not on file   Relationships   • Social connections     Talks on phone: Not on file     Gets together: Not on file     Attends Faith service: Not on file     Active member of club or organization: Not on file     Attends meetings of clubs or organizations: Not on file     Relationship status: Not on file   • Intimate partner violence     Fear of current or ex partner: Not on file     Emotionally abused: Not on file     Physically abused: Not on file     Forced sexual activity: Not on file   Other Topics Concern   • Not on file   Social History Narrative   • Not on file       Patient Past History:  Past Medical History:   Diagnosis Date   • Arthritis     toes   • Back pain    • Breastfeeding (infant) 06/2019   • Mastitis 05/2019   • Nausea/vomiting in pregnancy 10/5/2012   • Pain 06/2019    shoulders/back           NCCN Testing Criteria Met                            Yes    Physical Exam  Constitutional    There were no vitals taken for this visit.        No PE done for covid 19 protocol     Assessment and Plan:  Patient with significant family history of breast cancer    I spent a total of 30 minutes of face to face time with >50% of time spent in counseling and coordination of care discussing matters stated in above note.    Mayi panel ordered      Landry Hoff M.D.

## 2020-05-26 NOTE — NON-PROVIDER
Kit was sent home with patient to complete and send off to [facility] via [facility] due to COVID19 and Provider's preference.     Instructed patient on how to collect successful saliva specimen, all necessary documents were sent with the kit. Patient agreed and verbalized understanding.

## 2020-05-29 ENCOUNTER — OFFICE VISIT (OUTPATIENT)
Dept: ADMISSIONS | Facility: MEDICAL CENTER | Age: 30
End: 2020-05-29
Attending: OBSTETRICS & GYNECOLOGY
Payer: MEDICAID

## 2020-05-29 DIAGNOSIS — Z01.812 PRE-OPERATIVE LABORATORY EXAMINATION: ICD-10-CM

## 2020-05-29 LAB
ANION GAP SERPL CALC-SCNC: 11 MMOL/L (ref 7–16)
BUN SERPL-MCNC: 11 MG/DL (ref 8–22)
CALCIUM SERPL-MCNC: 9.2 MG/DL (ref 8.5–10.5)
CHLORIDE SERPL-SCNC: 104 MMOL/L (ref 96–112)
CO2 SERPL-SCNC: 24 MMOL/L (ref 20–33)
COVID ORDER STATUS COVID19: NORMAL
CREAT SERPL-MCNC: 0.88 MG/DL (ref 0.5–1.4)
ERYTHROCYTE [DISTWIDTH] IN BLOOD BY AUTOMATED COUNT: 42.9 FL (ref 35.9–50)
GLUCOSE SERPL-MCNC: 90 MG/DL (ref 65–99)
HCT VFR BLD AUTO: 43.3 % (ref 37–47)
HGB BLD-MCNC: 14.1 G/DL (ref 12–16)
MCH RBC QN AUTO: 30.1 PG (ref 27–33)
MCHC RBC AUTO-ENTMCNC: 32.6 G/DL (ref 33.6–35)
MCV RBC AUTO: 92.3 FL (ref 81.4–97.8)
PLATELET # BLD AUTO: 238 K/UL (ref 164–446)
PMV BLD AUTO: 11 FL (ref 9–12.9)
POTASSIUM SERPL-SCNC: 4.1 MMOL/L (ref 3.6–5.5)
RBC # BLD AUTO: 4.69 M/UL (ref 4.2–5.4)
SODIUM SERPL-SCNC: 139 MMOL/L (ref 135–145)
WBC # BLD AUTO: 6.6 K/UL (ref 4.8–10.8)

## 2020-05-29 PROCEDURE — 36415 COLL VENOUS BLD VENIPUNCTURE: CPT

## 2020-05-29 PROCEDURE — C9803 HOPD COVID-19 SPEC COLLECT: HCPCS

## 2020-05-29 PROCEDURE — 80048 BASIC METABOLIC PNL TOTAL CA: CPT

## 2020-05-29 PROCEDURE — 85027 COMPLETE CBC AUTOMATED: CPT

## 2020-05-29 RX ORDER — OMEPRAZOLE 20 MG/1
20 CAPSULE, DELAYED RELEASE ORAL DAILY
COMMUNITY
End: 2022-03-15 | Stop reason: SDUPTHER

## 2020-05-29 ASSESSMENT — FIBROSIS 4 INDEX: FIB4 SCORE: 1.24

## 2020-06-01 ENCOUNTER — GYNECOLOGY VISIT (OUTPATIENT)
Dept: OBGYN | Facility: CLINIC | Age: 30
End: 2020-06-01
Payer: MEDICAID

## 2020-06-01 ENCOUNTER — HOSPITAL ENCOUNTER (OUTPATIENT)
Dept: LAB | Facility: MEDICAL CENTER | Age: 30
End: 2020-06-01
Attending: OBSTETRICS & GYNECOLOGY
Payer: MEDICAID

## 2020-06-01 VITALS
WEIGHT: 148.9 LBS | DIASTOLIC BLOOD PRESSURE: 58 MMHG | SYSTOLIC BLOOD PRESSURE: 110 MMHG | BODY MASS INDEX: 24.81 KG/M2 | HEIGHT: 65 IN

## 2020-06-01 DIAGNOSIS — N93.9 ABNORMAL VAGINAL BLEEDING: ICD-10-CM

## 2020-06-01 DIAGNOSIS — E03.8 OTHER SPECIFIED HYPOTHYROIDISM: ICD-10-CM

## 2020-06-01 DIAGNOSIS — N81.6 RECTOCELE: ICD-10-CM

## 2020-06-01 DIAGNOSIS — N39.3 STRESS INCONTINENCE: ICD-10-CM

## 2020-06-01 DIAGNOSIS — N81.11 CYSTOCELE, MIDLINE: ICD-10-CM

## 2020-06-01 DIAGNOSIS — N81.4 UTERINE PROLAPSE: ICD-10-CM

## 2020-06-01 LAB
SARS-COV-2 RNA RESP QL NAA+PROBE: NOT DETECTED
SPECIMEN SOURCE: NORMAL
T4 FREE SERPL-MCNC: 1.09 NG/DL (ref 0.93–1.7)
TSH SERPL DL<=0.005 MIU/L-ACNC: 0.53 UIU/ML (ref 0.38–5.33)

## 2020-06-01 PROCEDURE — 84443 ASSAY THYROID STIM HORMONE: CPT

## 2020-06-01 PROCEDURE — 36415 COLL VENOUS BLD VENIPUNCTURE: CPT

## 2020-06-01 PROCEDURE — 84439 ASSAY OF FREE THYROXINE: CPT

## 2020-06-01 ASSESSMENT — FIBROSIS 4 INDEX: FIB4 SCORE: 1.17

## 2020-06-01 NOTE — NON-PROVIDER
Patient here for a Pre Op  Last seen on 04/09/2020  C/o  Pelvic and ovary pain  Pt states she gets really bad cramping   pharmacy verified.  Patient phone #: [phone]  hysterectomy, anterior, posterior repair, culdoplasty, sling placement and cystoscopy  Surgery on 06/03/2020

## 2020-06-01 NOTE — PROGRESS NOTES
History and Physical Exam    Chief Complaint   Patient presents with   • Gynecologic Exam     Pre OP       History of present illness: 29 y.o. presents with pelvic pain, menorrhagia, prolapse and desires surgical therapy. Risks, benefits and alternative therapies have been discussed and patient still desires surgical therapy. Questions answered.    55 dictation for full history.    Pertinent positives documented in HPI and all other systems reviewed & are negative      All PMH, PSH, allergies, social history and FH reviewed and updated today:  Past Medical History:   Diagnosis Date   • Abnormal uterine bleeding (AUB)    • Anxiety    • Arthritis     toes, hands, back   • Bowel habit changes     constipation/diarrhea   • GERD (gastroesophageal reflux disease)    • Mastitis 05/2019   • Nausea/vomiting in pregnancy 10/5/2012   • Pain 05/29/2020    pelvic and back, 5/10       Past Surgical History:   Procedure Laterality Date   • DURAN BY LAPAROSCOPY  10/25/2019    Procedure: CHOLECYSTECTOMY, LAPAROSCOPIC;  Surgeon: Efrain Ramesh M.D.;  Location: SURGERY [facility];  Service: General   • ERCP IN OR N/A 10/24/2019    Procedure: ERCP (ENDOSCOPIC RETROGRADE CHOLANGIOPANCREATOGRAPHY);  Surgeon: Temo Liao M.D.;  Location: SURGERY [facility];  Service: Gastroenterology   • PB LAP,DIAGNOSTIC ABDOMEN  6/18/2019    Procedure: PELVISCOPY;  Surgeon: Pilar Vuong M.D.;  Location: SURGERY SAME DAY [facility];  Service: Gynecology   • SALPINGECTOMY Bilateral 6/18/2019    Procedure: SALPINGECTOMY;  Surgeon: Pilar Vuong M.D.;  Location: SURGERY SAME DAY [facility];  Service: Gynecology       No current facility-administered medications for this visit.        Allergies:   Allergies   Allergen Reactions   • Nkda [No Known Drug Allergy]        Social History     Socioeconomic History   • Marital status: Single     Spouse name: Not on file   • Number of children: Not on file   • Years of education: Not on  "file   • Highest education level: Not on file   Occupational History   • Not on file   Social Needs   • Financial resource strain: Not on file   • Food insecurity     Worry: Not on file     Inability: Not on file   • Transportation needs     Medical: Not on file     Non-medical: Not on file   Tobacco Use   • Smoking status: Former Smoker     Packs/day: 0.25     Years: 15.00     Pack years: 3.75     Types: Cigarettes     Last attempt to quit: 2019     Years since quittin.9   • Smokeless tobacco: Never Used   Substance and Sexual Activity   • Alcohol use: Not Currently   • Drug use: Not Currently   • Sexual activity: Yes     Partners: Male     Birth control/protection: Surgical, Injection     Comment: none   Lifestyle   • Physical activity     Days per week: Not on file     Minutes per session: Not on file   • Stress: Not on file   Relationships   • Social connections     Talks on phone: Not on file     Gets together: Not on file     Attends Sikhism service: Not on file     Active member of club or organization: Not on file     Attends meetings of clubs or organizations: Not on file     Relationship status: Not on file   • Intimate partner violence     Fear of current or ex partner: Not on file     Emotionally abused: Not on file     Physically abused: Not on file     Forced sexual activity: Not on file   Other Topics Concern   • Not on file   Social History Narrative   • Not on file       Family History   Problem Relation Age of Onset   • Cancer Mother 45        breast cancer, lymph node,   • Heart Attack Father    • Gout Father    • Cancer Maternal Grandmother         breast cancer       Physical exam:  /58 (BP Location: Right arm, Patient Position: Sitting, BP Cuff Size: Adult)   Ht 1.651 m (5' 5\")   Wt 67.5 kg (148 lb 14.4 oz)     GENERAL APPEARANCE: healthy, alert, no distress  NECK nontender, no masses, thyromegaly or nodules  HEART RRR with normal S1 and S2 ,no murmurs, no gallops, no " peripheral edema  LUNG clear to auscultation, normal respiratory effort  ABDOMEN Abdomen soft, non-tender. BS normal. No masses,  No organomegaly  EXTREMITIES:negative clubbing, cyanosis, edema    NEURO Awake, alert and oriented x 3, Normal gait, no sensory deficits  SKIN No rashes, or ulcers or lesions seen  PSYCHIATRIC: Patient shows appropriate affect, is alert and oriented x3, intact judgment and insight.        1. Other specified hypothyroidism  TSH    FREE THYROXINE       Lali Funes is a 29 y.o.  female with menorrhagia, prolapse, pelvic pain who presents for surgical consultation for a prolapse of hysterectomy, anterior and posterior repair, culdoplasty, sling placement with cystoscopy.    Specific risks include but are not limited to pain, infection, bleeding, blood transfusion, pelvic pain, pelvic scarring, pain with intercourse, DVT/pulmonary embolism, mesh erosion/exposure, damage to bowel, bladder or ureters, need for laparotomy and anesthesia risks.    After discussion of risks and benefits, all questions regarding procedure were answered for patient. Consents were signed.    Needs to be NPO after midnight day of surgery. Clean abdomen and umbilicus w/ antibacterial soap morning of surgery.    Postoperative course discussed with patient. Patient should return to office or emergency room for #1 fever greater than 101, #2 heavy vaginal bleeding soaking greater than one pad per hour, #3 uncontrolled pain, #4 inability to tolerate regular diet pass gas or moved bowels.    Follow up in 2 weeks after surgery for postop check.     Spent 30 mins with the patient with over 50% of the time discussing the procedure, risk and benefits and obtaining consent.

## 2020-06-02 NOTE — OR NURSING
COVID-19 Pre-surgery screening:    a. Do you have an undiagnosed respiratory illness or symptoms such as coughing or sneezing? No     2. Do you have an unexplained fever greater than 100.4 degrees Fahrenheit or 38 degrees Celsius?    No    3. Have you had direct exposure to a patient who tested positive for Covid-19?   No    4. Have you traveled outside [facility] in the last 14 days? No    5. Have you had any lost of taste, smell, N/V or sore throat? No    Patient has been informed of visitor policy and asked to wear a mask upon entering the hospital

## 2020-06-03 ENCOUNTER — ANESTHESIA EVENT (OUTPATIENT)
Dept: SURGERY | Facility: MEDICAL CENTER | Age: 30
End: 2020-06-03
Payer: MEDICAID

## 2020-06-03 ENCOUNTER — HOSPITAL ENCOUNTER (OUTPATIENT)
Facility: MEDICAL CENTER | Age: 30
End: 2020-06-03
Attending: OBSTETRICS & GYNECOLOGY | Admitting: OBSTETRICS & GYNECOLOGY
Payer: MEDICAID

## 2020-06-03 ENCOUNTER — ANESTHESIA (OUTPATIENT)
Dept: SURGERY | Facility: MEDICAL CENTER | Age: 30
End: 2020-06-03
Payer: MEDICAID

## 2020-06-03 VITALS
WEIGHT: 147.71 LBS | TEMPERATURE: 97 F | OXYGEN SATURATION: 98 % | HEART RATE: 59 BPM | DIASTOLIC BLOOD PRESSURE: 69 MMHG | SYSTOLIC BLOOD PRESSURE: 109 MMHG | BODY MASS INDEX: 24.61 KG/M2 | HEIGHT: 65 IN | RESPIRATION RATE: 20 BRPM

## 2020-06-03 DIAGNOSIS — Z41.9 SURGERY, ELECTIVE: ICD-10-CM

## 2020-06-03 LAB — PATHOLOGY CONSULT NOTE: NORMAL

## 2020-06-03 PROCEDURE — 700105 HCHG RX REV CODE 258: Performed by: OBSTETRICS & GYNECOLOGY

## 2020-06-03 PROCEDURE — 88307 TISSUE EXAM BY PATHOLOGIST: CPT

## 2020-06-03 PROCEDURE — C1771 REP DEV, URINARY, W/SLING: HCPCS | Performed by: OBSTETRICS & GYNECOLOGY

## 2020-06-03 PROCEDURE — C2628 CATHETER, OCCLUSION: HCPCS | Performed by: OBSTETRICS & GYNECOLOGY

## 2020-06-03 PROCEDURE — 58571 TLH W/T/O 250 G OR LESS: CPT | Performed by: OBSTETRICS & GYNECOLOGY

## 2020-06-03 PROCEDURE — 160002 HCHG RECOVERY MINUTES (STAT): Performed by: OBSTETRICS & GYNECOLOGY

## 2020-06-03 PROCEDURE — 501411 HCHG SPONGE, BABY LAP W/O RINGS: Performed by: OBSTETRICS & GYNECOLOGY

## 2020-06-03 PROCEDURE — 700102 HCHG RX REV CODE 250 W/ 637 OVERRIDE(OP): Performed by: OBSTETRICS & GYNECOLOGY

## 2020-06-03 PROCEDURE — 501330 HCHG SET, CYSTO IRRIG TUBING: Performed by: OBSTETRICS & GYNECOLOGY

## 2020-06-03 PROCEDURE — 500892 HCHG PACK, PERI-GYN: Performed by: OBSTETRICS & GYNECOLOGY

## 2020-06-03 PROCEDURE — 57288 REPAIR BLADDER DEFECT: CPT | Mod: 80 | Performed by: OBSTETRICS & GYNECOLOGY

## 2020-06-03 PROCEDURE — 160036 HCHG PACU - EA ADDL 30 MINS PHASE I: Performed by: OBSTETRICS & GYNECOLOGY

## 2020-06-03 PROCEDURE — A4338 INDWELLING CATHETER LATEX: HCPCS | Performed by: OBSTETRICS & GYNECOLOGY

## 2020-06-03 PROCEDURE — 160041 HCHG SURGERY MINUTES - EA ADDL 1 MIN LEVEL 4: Performed by: OBSTETRICS & GYNECOLOGY

## 2020-06-03 PROCEDURE — 160009 HCHG ANES TIME/MIN: Performed by: OBSTETRICS & GYNECOLOGY

## 2020-06-03 PROCEDURE — 57288 REPAIR BLADDER DEFECT: CPT | Performed by: OBSTETRICS & GYNECOLOGY

## 2020-06-03 PROCEDURE — 700111 HCHG RX REV CODE 636 W/ 250 OVERRIDE (IP): Performed by: ANESTHESIOLOGY

## 2020-06-03 PROCEDURE — 160046 HCHG PACU - 1ST 60 MINS PHASE II: Performed by: OBSTETRICS & GYNECOLOGY

## 2020-06-03 PROCEDURE — 700101 HCHG RX REV CODE 250: Performed by: OBSTETRICS & GYNECOLOGY

## 2020-06-03 PROCEDURE — 58571 TLH W/T/O 250 G OR LESS: CPT | Mod: 80 | Performed by: OBSTETRICS & GYNECOLOGY

## 2020-06-03 PROCEDURE — 700102 HCHG RX REV CODE 250 W/ 637 OVERRIDE(OP): Performed by: ANESTHESIOLOGY

## 2020-06-03 PROCEDURE — 501664 HCHG TUBING, FILTER STRYKER: Performed by: OBSTETRICS & GYNECOLOGY

## 2020-06-03 PROCEDURE — 160029 HCHG SURGERY MINUTES - 1ST 30 MINS LEVEL 4: Performed by: OBSTETRICS & GYNECOLOGY

## 2020-06-03 PROCEDURE — A9270 NON-COVERED ITEM OR SERVICE: HCPCS | Performed by: ANESTHESIOLOGY

## 2020-06-03 PROCEDURE — 500868 HCHG NEEDLE, SURGI(VARES): Performed by: OBSTETRICS & GYNECOLOGY

## 2020-06-03 PROCEDURE — 160035 HCHG PACU - 1ST 60 MINS PHASE I: Performed by: OBSTETRICS & GYNECOLOGY

## 2020-06-03 PROCEDURE — 160025 RECOVERY II MINUTES (STATS): Performed by: OBSTETRICS & GYNECOLOGY

## 2020-06-03 PROCEDURE — 160047 HCHG PACU  - EA ADDL 30 MINS PHASE II: Performed by: OBSTETRICS & GYNECOLOGY

## 2020-06-03 PROCEDURE — 500002 HCHG ADHESIVE, DERMABOND: Performed by: OBSTETRICS & GYNECOLOGY

## 2020-06-03 PROCEDURE — A6404 STERILE GAUZE > 48 SQ IN: HCPCS | Performed by: OBSTETRICS & GYNECOLOGY

## 2020-06-03 PROCEDURE — 700111 HCHG RX REV CODE 636 W/ 250 OVERRIDE (IP): Performed by: OBSTETRICS & GYNECOLOGY

## 2020-06-03 PROCEDURE — 501581 HCHG TROCAR: Performed by: OBSTETRICS & GYNECOLOGY

## 2020-06-03 PROCEDURE — 700111 HCHG RX REV CODE 636 W/ 250 OVERRIDE (IP)

## 2020-06-03 PROCEDURE — 500886 HCHG PACK, LAPAROSCOPY: Performed by: OBSTETRICS & GYNECOLOGY

## 2020-06-03 PROCEDURE — A9270 NON-COVERED ITEM OR SERVICE: HCPCS | Performed by: OBSTETRICS & GYNECOLOGY

## 2020-06-03 PROCEDURE — 501838 HCHG SUTURE GENERAL: Performed by: OBSTETRICS & GYNECOLOGY

## 2020-06-03 PROCEDURE — 160048 HCHG OR STATISTICAL LEVEL 1-5: Performed by: OBSTETRICS & GYNECOLOGY

## 2020-06-03 PROCEDURE — 700101 HCHG RX REV CODE 250: Performed by: ANESTHESIOLOGY

## 2020-06-03 PROCEDURE — 110454 HCHG SHELL REV 250: Performed by: OBSTETRICS & GYNECOLOGY

## 2020-06-03 DEVICE — SYSTEM TRANSVAGINAL MID-URETHRAL SLING ADVANTAGE FIT: Type: IMPLANTABLE DEVICE | Site: VAGINA | Status: FUNCTIONAL

## 2020-06-03 RX ORDER — HYDROMORPHONE HYDROCHLORIDE 1 MG/ML
0.4 INJECTION, SOLUTION INTRAMUSCULAR; INTRAVENOUS; SUBCUTANEOUS
Status: DISCONTINUED | OUTPATIENT
Start: 2020-06-03 | End: 2020-06-03 | Stop reason: HOSPADM

## 2020-06-03 RX ORDER — ONDANSETRON 2 MG/ML
INJECTION INTRAMUSCULAR; INTRAVENOUS PRN
Status: DISCONTINUED | OUTPATIENT
Start: 2020-06-03 | End: 2020-06-03 | Stop reason: SURG

## 2020-06-03 RX ORDER — BUPIVACAINE HYDROCHLORIDE AND EPINEPHRINE 2.5; 5 MG/ML; UG/ML
INJECTION, SOLUTION EPIDURAL; INFILTRATION; INTRACAUDAL; PERINEURAL
Status: DISCONTINUED
Start: 2020-06-03 | End: 2020-06-03 | Stop reason: HOSPADM

## 2020-06-03 RX ORDER — HYDROMORPHONE HYDROCHLORIDE 1 MG/ML
0.1 INJECTION, SOLUTION INTRAMUSCULAR; INTRAVENOUS; SUBCUTANEOUS
Status: DISCONTINUED | OUTPATIENT
Start: 2020-06-03 | End: 2020-06-03 | Stop reason: HOSPADM

## 2020-06-03 RX ORDER — ONDANSETRON 2 MG/ML
4 INJECTION INTRAMUSCULAR; INTRAVENOUS
Status: DISCONTINUED | OUTPATIENT
Start: 2020-06-03 | End: 2020-06-03 | Stop reason: HOSPADM

## 2020-06-03 RX ORDER — SODIUM CHLORIDE, SODIUM LACTATE, POTASSIUM CHLORIDE, CALCIUM CHLORIDE 600; 310; 30; 20 MG/100ML; MG/100ML; MG/100ML; MG/100ML
INJECTION, SOLUTION INTRAVENOUS CONTINUOUS
Status: DISCONTINUED | OUTPATIENT
Start: 2020-06-03 | End: 2020-06-03 | Stop reason: HOSPADM

## 2020-06-03 RX ORDER — MIDAZOLAM HYDROCHLORIDE 1 MG/ML
INJECTION INTRAMUSCULAR; INTRAVENOUS PRN
Status: DISCONTINUED | OUTPATIENT
Start: 2020-06-03 | End: 2020-06-03 | Stop reason: SURG

## 2020-06-03 RX ORDER — HALOPERIDOL 5 MG/ML
1 INJECTION INTRAMUSCULAR
Status: DISCONTINUED | OUTPATIENT
Start: 2020-06-03 | End: 2020-06-03 | Stop reason: HOSPADM

## 2020-06-03 RX ORDER — OXYCODONE HCL 5 MG/5 ML
10 SOLUTION, ORAL ORAL
Status: COMPLETED | OUTPATIENT
Start: 2020-06-03 | End: 2020-06-03

## 2020-06-03 RX ORDER — MEPERIDINE HYDROCHLORIDE 25 MG/ML
12.5 INJECTION INTRAMUSCULAR; INTRAVENOUS; SUBCUTANEOUS
Status: DISCONTINUED | OUTPATIENT
Start: 2020-06-03 | End: 2020-06-03 | Stop reason: HOSPADM

## 2020-06-03 RX ORDER — BUPIVACAINE HYDROCHLORIDE AND EPINEPHRINE 2.5; 5 MG/ML; UG/ML
INJECTION, SOLUTION EPIDURAL; INFILTRATION; INTRACAUDAL; PERINEURAL
Status: DISCONTINUED | OUTPATIENT
Start: 2020-06-03 | End: 2020-06-03 | Stop reason: HOSPADM

## 2020-06-03 RX ORDER — HYDROMORPHONE HYDROCHLORIDE 1 MG/ML
0.2 INJECTION, SOLUTION INTRAMUSCULAR; INTRAVENOUS; SUBCUTANEOUS
Status: DISCONTINUED | OUTPATIENT
Start: 2020-06-03 | End: 2020-06-03 | Stop reason: HOSPADM

## 2020-06-03 RX ORDER — OXYCODONE HCL 5 MG/5 ML
5 SOLUTION, ORAL ORAL
Status: COMPLETED | OUTPATIENT
Start: 2020-06-03 | End: 2020-06-03

## 2020-06-03 RX ORDER — OXYCODONE HYDROCHLORIDE AND ACETAMINOPHEN 5; 325 MG/1; MG/1
1 TABLET ORAL EVERY 4 HOURS PRN
Qty: 20 TAB | Refills: 0 | Status: SHIPPED | OUTPATIENT
Start: 2020-06-03 | End: 2020-06-10

## 2020-06-03 RX ORDER — CEFAZOLIN SODIUM 1 G/3ML
INJECTION, POWDER, FOR SOLUTION INTRAMUSCULAR; INTRAVENOUS PRN
Status: DISCONTINUED | OUTPATIENT
Start: 2020-06-03 | End: 2020-06-03 | Stop reason: SURG

## 2020-06-03 RX ORDER — DIPHENHYDRAMINE HYDROCHLORIDE 50 MG/ML
12.5 INJECTION INTRAMUSCULAR; INTRAVENOUS
Status: DISCONTINUED | OUTPATIENT
Start: 2020-06-03 | End: 2020-06-03 | Stop reason: HOSPADM

## 2020-06-03 RX ORDER — NEOSTIGMINE METHYLSULFATE 1 MG/ML
INJECTION, SOLUTION INTRAVENOUS PRN
Status: DISCONTINUED | OUTPATIENT
Start: 2020-06-03 | End: 2020-06-03 | Stop reason: SURG

## 2020-06-03 RX ADMIN — ONDANSETRON 4 MG: 2 INJECTION INTRAMUSCULAR; INTRAVENOUS at 08:24

## 2020-06-03 RX ADMIN — FENTANYL CITRATE 25 MCG: 50 INJECTION INTRAMUSCULAR; INTRAVENOUS at 10:37

## 2020-06-03 RX ADMIN — FENTANYL CITRATE 50 MCG: 50 INJECTION INTRAMUSCULAR; INTRAVENOUS at 11:23

## 2020-06-03 RX ADMIN — METHYLENE BLUE 5 MG: 10 INJECTION INTRAVENOUS at 09:43

## 2020-06-03 RX ADMIN — ROCURONIUM BROMIDE 30 MG: 10 INJECTION, SOLUTION INTRAVENOUS at 07:52

## 2020-06-03 RX ADMIN — FENTANYL CITRATE 25 MCG: 50 INJECTION INTRAMUSCULAR; INTRAVENOUS at 09:07

## 2020-06-03 RX ADMIN — ROCURONIUM BROMIDE 10 MG: 10 INJECTION, SOLUTION INTRAVENOUS at 08:29

## 2020-06-03 RX ADMIN — MIDAZOLAM HYDROCHLORIDE 2 MG: 1 INJECTION, SOLUTION INTRAMUSCULAR; INTRAVENOUS at 07:51

## 2020-06-03 RX ADMIN — SODIUM CHLORIDE, POTASSIUM CHLORIDE, SODIUM LACTATE AND CALCIUM CHLORIDE: 600; 310; 30; 20 INJECTION, SOLUTION INTRAVENOUS at 07:51

## 2020-06-03 RX ADMIN — FENTANYL CITRATE 50 MCG: 50 INJECTION INTRAMUSCULAR; INTRAVENOUS at 07:52

## 2020-06-03 RX ADMIN — HYDROMORPHONE HYDROCHLORIDE 0.4 MG: 1 INJECTION, SOLUTION INTRAMUSCULAR; INTRAVENOUS; SUBCUTANEOUS at 11:53

## 2020-06-03 RX ADMIN — GLYCOPYRROLATE 0.2 MG: 0.2 INJECTION INTRAMUSCULAR; INTRAVENOUS at 09:42

## 2020-06-03 RX ADMIN — FENTANYL CITRATE 25 MCG: 50 INJECTION INTRAMUSCULAR; INTRAVENOUS at 10:40

## 2020-06-03 RX ADMIN — NEOSTIGMINE METHYLSULFATE 2.5 MG: 1 INJECTION INTRAVENOUS at 09:42

## 2020-06-03 RX ADMIN — SODIUM CHLORIDE, POTASSIUM CHLORIDE, SODIUM LACTATE AND CALCIUM CHLORIDE: 600; 310; 30; 20 INJECTION, SOLUTION INTRAVENOUS at 08:36

## 2020-06-03 RX ADMIN — HYDROMORPHONE HYDROCHLORIDE 0.4 MG: 1 INJECTION, SOLUTION INTRAMUSCULAR; INTRAVENOUS; SUBCUTANEOUS at 12:05

## 2020-06-03 RX ADMIN — OXYCODONE HYDROCHLORIDE 10 MG: 5 SOLUTION ORAL at 11:57

## 2020-06-03 RX ADMIN — CEFAZOLIN 2 G: 330 INJECTION, POWDER, FOR SOLUTION INTRAMUSCULAR; INTRAVENOUS at 07:51

## 2020-06-03 RX ADMIN — METHYLENE BLUE 5 MG: 10 INJECTION INTRAVENOUS at 09:40

## 2020-06-03 RX ADMIN — SODIUM CHLORIDE, POTASSIUM CHLORIDE, SODIUM LACTATE AND CALCIUM CHLORIDE: 600; 310; 30; 20 INJECTION, SOLUTION INTRAVENOUS at 06:45

## 2020-06-03 RX ADMIN — PROPOFOL 150 MG: 10 INJECTION, EMULSION INTRAVENOUS at 07:52

## 2020-06-03 RX ADMIN — FENTANYL CITRATE 50 MCG: 50 INJECTION INTRAMUSCULAR; INTRAVENOUS at 11:30

## 2020-06-03 RX ADMIN — FENTANYL CITRATE 50 MCG: 50 INJECTION INTRAMUSCULAR; INTRAVENOUS at 07:59

## 2020-06-03 RX ADMIN — EPHEDRINE SULFATE 10 MG: 50 INJECTION INTRAMUSCULAR; INTRAVENOUS; SUBCUTANEOUS at 08:10

## 2020-06-03 RX ADMIN — POVIDONE-IODINE 15 ML: 10 SOLUTION TOPICAL at 06:45

## 2020-06-03 RX ADMIN — EPHEDRINE SULFATE 10 MG: 50 INJECTION INTRAMUSCULAR; INTRAVENOUS; SUBCUTANEOUS at 10:18

## 2020-06-03 ASSESSMENT — PAIN SCALES - GENERAL: PAIN_LEVEL: 2

## 2020-06-03 ASSESSMENT — FIBROSIS 4 INDEX: FIB4 SCORE: 1.17

## 2020-06-03 NOTE — ANESTHESIA PROCEDURE NOTES
Airway    Date/Time: 6/3/2020 7:52 AM  Performed by: Andre Senior M.D.  Authorized by: Andre Senior M.D.     Location:  OR  Urgency:  Elective  Indications for Airway Management:  Anesthesia      Spontaneous Ventilation: absent    Sedation Level:  Deep  Preoxygenated: Yes    Patient Position:  Sniffing  Final Airway Type:  Endotracheal airway  Final Endotracheal Airway:  ETT  Cuffed: Yes    Technique Used for Successful ETT Placement:  Direct laryngoscopy    Insertion Site:  Oral  Blade Type:  Abner  Laryngoscope Blade/Videolaryngoscope Blade Size:  3  ETT Size (mm):  7.0  Measured from:  Teeth  ETT to Teeth (cm):  22  Placement Verified by: auscultation and capnometry    Cormack-Lehane Classification:  Grade IIa - partial view of glottis  Number of Attempts at Approach:  1

## 2020-06-03 NOTE — ANESTHESIA TIME REPORT
Anesthesia Start and Stop Event Times     Date Time Event    6/3/2020 0725 Ready for Procedure     0751 Anesthesia Start     1102 Anesthesia Stop        Responsible Staff  06/03/20    Name Role Begin End    Andre Senior M.D. Anesth 0751 1102        Preop Diagnosis (Free Text):  Pre-op Diagnosis     ABNORMAL VAGINAL BLEEDING        Preop Diagnosis (Codes):    Post op Diagnosis  Vaginal bleeding between periods      Premium Reason  Non-Premium    Comments:

## 2020-06-03 NOTE — OP REPORT
DATE OF OPERATION: Corinne 3, 2020    PREOPERATIVE DIAGNOSES:   Uterine prolapse, anterior and posterior prolapse, urethral hypermobility, urinary incontinence, pelvic pain    POSTOPERATIVE DIAGNOSES:   Same     SURGEON: Jose Bowen MD     ASSISTANT: Car Roberson MD     PROCEDURES PERFORMED:   Total Laparoscopic Hysterectomy, placement midurethral sling, cystoscopy    ANESTHESIA: General endotracheal anesthesia.     ANESTHESIOLOGIST: Andre Senior MD     ESTIMATED BLOOD LOSS: 200 mL     URINE OUTPUT: 150 mL of clear urine at the end of procedure.     FINDINGS: normal cervix, uterus sounded to 9 cm cm, normal uterus, normal ovaries bilaterally, bilateral tubes with evidence of prior tubal ligation but otherwise normal, normal appendix, normal liver edge and gallbladder.  Normal bladder with no evidence of injury, bilateral ureteral jets were noted after placement of Patino's culdoplasty as well as after placement of mid urethral sling.    COMPLICATIONS: none     PROCEDURE IN DETAIL: After informed consent was obtained, the patient was taken to the operating room where general endotracheal anesthesia was administered without difficulty. She was then prepped and draped in the usual sterile fashion in the lithotomy position with Vargas stirrups. A formal time out was called prior to the procedure and the preoperative antibiotics were given. Examination under anesthesia was performed and a Sauceda catheter was placed under sterile technique. Li uterine manipulator was placed without any difficulty and the uterus was sounded to 9 cm. The procedure went   towards the abdomen at this time. Marcaine with epinephrine was Infiltrated into the umbilicus and a 5mm incision was made in the umbilicus with a scalpel.  A Veress needle was then introduced into the abdomen and saline drop test was positive.  The abdomen was then insufflated to 15 mmHg.  A 5mm trochar was placed over the laparoscope and the scope/trochar were  advanced into the abdominal cavity under direct laparoscopic visualization.  The abdomen was insufflated with CO2 gas at a pressure of 15 mmHg and pneumoperitoneum was maintained at approximately 3 liters of CO2 gas. Intraabdominal placement was confirmed and also to note that there was no injury to the underlying tissue. A survey of the pelvis and abdomen was then performed as noted above. The ureters were identified and noted to have peristalsis.  Their location was noted during the entire case.  Marcaine with epinephrine was then injected into left lower quadrant approximately 2 cm medial to the anterior ischial spine and a 5 mm skin incision was made with a knife. The 5 mm trocar was then advanced under direct visualization with no injury to the underlying tissue. A 5mm trochar was placed in the right lower quadrant in the exact same fashion. The procedure was then started. Starting with the left side, the infundibulopelvic ligament was identified.  The round ligament on the left was then grasped, cauterized and cut with the Ligasure. The anterior and posterior leaf of the broad ligament were then incised along the bladder reflection to the midline and the uterine arteries were then skeletonized, grasped with bipolar cautery, cauterized and transected with the Ligasure. Again, hemostasis was noted.     The procedure at this point went towards the patient's right.. The round ligament was then grasped, cauterized and cut with the Ligasure. The anterior and posterior leaf of the broad ligament were then incised along the bladder reflection to the midline and the uterine arteries were then skeletonized and grasped with bipolar cautery, cauterized and transected with the Ligasure. Again, hemostasis was noted.  Ovaries were left in situ. the bladder was then gently dissected off the lower uterine segment and the cervix. The colpotomy was made in a circumferential fashion around the Li ring using the J hook and the  entire specimen was removed vaginally.. The pelvis was then copiously irrigated with normal saline and hemostasis was noted.    The vaginal part of the procedure was then started at this time.  The abdomen was desufflated and visualization on the incision site through the vagina showed no heavy bleeding.  The Patino's culdoplasty suture was then placed at this time.  A 2-0 PDS suture was then passed to the posterior aspect of the vaginal cuff then through the left uterosacral ligament then across the posterior peritoneum, then through the right uterosacral ligament and then out once again through the posterior vaginal cuff.  It was then tacked for future object identification.  The angles were then closed with a single suture of 0 Vicryl suture.  The cuff was then closed in a running locked fashion with 0 Vicryl suture as well.  A Patino's culdoplasty was then tied, there is noted to be significant elevation of the vaginal cuff at this time.  Cystoscopy was performed after the patient was given fluorescein intravenously.  Bilateral ureteral jets were seen, no evidence of any bladder injury was noted    The patient had significant urinary incontinence, a midureteral sling was then placed at this time.  The mid urethral SPECT of the urethra identified and the overlying vaginal mucosa was then incised after the area was infiltrated with Marcaine.  Bilateral dissection on either side of the urethra was then performed until the space of Retzius was entered.  The Sauceda catheter was then removed and the arms of the sling were then passed on either side of the urethra after the bladder was deflected to the contralateral side with the help of a catheter guide.  Cystoscopy was then performed at this time, no evidence of any ureteral injury was noted, no evidence of bladder injury was noted as well.  The sling was then tightened and cystoscopy performed once again.  Bilateral ureteral jets were once again noted.  The skin  overlying the sling was then closed with 2-0 Vicryl suture in a running locked fashion.    The abdomen was once again insufflated and visualization of the pelvis showed no bleeding.  However, there was noted to be a loop of bowel trapped within the Patino's culdoplasty, the culdoplasty was then removed vaginally and the loop of bowel freed.  No evidence of any injury was seen iand no evidence that suture had gone through the bowel.    All instruments were removed under direct visualization as was the CO2 gas.  The skin at all ports were reapproximated with 4-0 monocryl subcuticular fashion. Dermabond glue was placed over the incisions.     Sponge, needle, instrument, and lap counts were correct x2. Patient tolerated the procedure well and went to recovery room in stable condition.       ____________________________________   Jose Bowen MD

## 2020-06-03 NOTE — OR SURGEON
immediate Post OP Note    PreOp Diagnosis: Pelvic pain, urinary incontinence, anterior and posterior prolapse, uterine prolapse    PostOp Diagnosis: Same    Procedure(s):  HYSTERECTOMY, LAPAROSCOPIC- - Wound Class: Clean  CYSTOSCOPY- WITH MID URETHRAL SLING PLACEMENT - Wound Class: Clean Contaminated    Surgeon(s):  FLORENCIA Sweeney M.D.    Anesthesiologist/Type of Anesthesia:  Anesthesiologist: Andre Senior M.D./General    Surgical Staff:  Circulator: Viktoria Amaya R.N.  Scrub Person: Ely Bender; Bret Li    Specimens removed if any:  ID Type Source Tests Collected by Time Destination   A : Uterus, Cervix Tissue Uterus PATHOLOGY SPECIMEN Jose Bowen M.D. 6/3/2020 10:05 AM        Estimated Blood Loss: 150 mL    Findings: Bilateral normal ovaries seen, evidence of prior salpingectomy.  Normal-appearing uterus.  No evidence of endometriosis on diagnostic laparoscopy.  Small anterior and posterior prolapse noted.  Urethral hypermobility.  Normal-appearing bladder with bilateral ureteral jets and no evidence of bladder injury during cystoscopy    Complications: None        6/3/2020 11:35 AM Jose Bowen M.D.

## 2020-06-03 NOTE — ANESTHESIA QCDR
2019 [facility] Clinical Data Registry (for Quality Improvement)     Postoperative nausea/vomiting risk protocol (Adult = 18 yrs and Pediatric 3-17 yrs)- (430 and 463)  General inhalation anesthetic (NOT TIVA) with PONV risk factors: Yes  Provision of anti-emetic therapy with at least 2 different classes of agents: Yes   Patient DID NOT receive anti-emetic therapy and reason is documented in Medical Record:  N/A    Multimodal Pain Management- (477)  Non-emergent surgery AND patient age >= 18:   Use of Multimodal Pain Management, two or more drugs and/or interventions, NOT including systemic opioids:   Exception: Documented allergy to multiple classes of analgesics:     Smoking Abstinence (404)  Patient is current smoker (cigarette, pipe, e-cig, marijuanna):   Elective Surgery:   Abstinence instructions provided prior to day of surgery:   Patient abstained from smoking on day of surgery:     Pre-Op Beta-Blocker in Isolated CABG (44)  Isolated CABG AND patient age >= 18:   Beta-blocker admin within 24 hours of surgical incision:   Exception:of medical reason(s) for not administering beta blocker within 24 hours prior to surgical incision (e.g., not  indicated,other medical reason):     PACU assessment of acute postoperative pain prior to Anesthesia Care End- Applies to Patients Age = 18- (ABG7)  Initial PACU pain score is which of the following: < 7/10  Patient unable to report pain score: N/A    Post-anesthetic transfer of care checklist/protocol to PACU/ICU- (426 and 427)  Upon conclusion of case, patient transferred to which of the following locations: PACU/Non-ICU  Use of transfer checklist/protocol: Yes  Exclusion: Service Performed in Patient Hospital Room (and thus did not require transfer): N/A  Unplanned admission to ICU related to anesthesia service up through end of PACU care- (MD51)  Unplanned admission to ICU (not initially anticipated at anesthesia start time): Yes

## 2020-06-03 NOTE — DISCHARGE INSTRUCTIONS
ACTIVITY: Rest and take it easy for the first 24 hours.  A responsible adult is recommended to remain with you during that time.  It is normal to feel sleepy.  We encourage you to not do anything that requires balance, judgment or coordination.    MILD FLU-LIKE SYMPTOMS ARE NORMAL. YOU MAY EXPERIENCE GENERALIZED MUSCLE ACHES, THROAT IRRITATION, HEADACHE AND/OR SOME NAUSEA.    FOR 24 HOURS DO NOT:  Drive, operate machinery or run household appliances.  Drink beer or alcoholic beverages.   Make important decisions or sign legal documents.    SPECIAL INSTRUCTIONS: *see hysterectomy sheet**    DIET: To avoid nausea, slowly advance diet as tolerated, avoiding spicy or greasy foods for the first day.  Add more substantial food to your diet according to your physician's instructions.  Babies can be fed formula or breast milk as soon as they are hungry.  INCREASE FLUIDS AND FIBER TO AVOID CONSTIPATION.    SURGICAL DRESSING/BATHING: *see hysterectomy sheet**    FOLLOW-UP APPOINTMENT:  A follow-up appointment should be arranged with your doctor call to schedule.    You should CALL YOUR PHYSICIAN if you develop:  Fever greater than 101 degrees F.  Pain not relieved by medication, or persistent nausea or vomiting.  Excessive bleeding (blood soaking through dressing) or unexpected drainage from the wound.  Extreme redness or swelling around the incision site, drainage of pus or foul smelling drainage.  Inability to urinate or empty your bladder within 8 hours.  Problems with breathing or chest pain.    You should call 911 if you develop problems with breathing or chest pain.  If you are unable to contact your doctor or surgical center, you should go to the nearest emergency room or urgent care center.  Physician's telephone #: *[phone]**    If any questions arise, call your doctor.  If your doctor is not available, please feel free to call the Surgical Center at [phone].  The Center is open Monday through Friday  from 7AM to 7PM.  You can also call the HEALTH HOTLINE open 24 hours/day, 7 days/week and speak to a nurse at [phone], or toll free at [phone].    A registered nurse may call you a few days after your surgery to see how you are doing after your procedure.    MEDICATIONS: Resume taking daily medication.  Take prescribed pain medication with food.  If no medication is prescribed, you may take non-aspirin pain medication if needed.  PAIN MEDICATION CAN BE VERY CONSTIPATING.  Take a stool softener or laxative such as senokot, pericolace, or milk of magnesia if needed.    Prescription given for **percocet*.  Last pain medication given at *1200**.    If your physician has prescribed pain medication that includes Acetaminophen (Tylenol), do not take additional Acetaminophen (Tylenol) while taking the prescribed medication.    Depression / Suicide Risk    As you are discharged from this [facility] facility, it is important to learn how to keep safe from harming yourself.    Recognize the warning signs:  · Abrupt changes in personality, positive or negative- including increase in energy   · Giving away possessions  · Change in eating patterns- significant weight changes-  positive or negative  · Change in sleeping patterns- unable to sleep or sleeping all the time   · Unwillingness or inability to communicate  · Depression  · Unusual sadness, discouragement and loneliness  · Talk of wanting to die  · Neglect of personal appearance   · Rebelliousness- reckless behavior  · Withdrawal from people/activities they love  · Confusion- inability to concentrate     If you or a loved one observes any of these behaviors or has concerns about self-harm, here's what you can do:  · Talk about it- your feelings and reasons for harming yourself  · Remove any means that you might use to hurt yourself (examples: pills, rope, extension cords, firearm)  · Get professional help from the community (Mental Health, Substance Abuse,  psychological counseling)  · Do not be alone:Call your Safe Contact- someone whom you trust who will be there for you.  · Call your local CRISIS HOTLINE 180-9485 or 059-371-9780  · Call your local Children's Mobile Crisis Response Team [place] (146) 065-8983 or www.[employer]  · Call the toll free National Suicide Prevention Hotlines   · National Suicide Prevention Lifeline 723-825-XGTO (6721)  · National Hope Line Network 800-SUICIDE (315-9832)

## 2020-06-03 NOTE — ANESTHESIA POSTPROCEDURE EVALUATION
Patient: Lali Funes    Procedure Summary     Date:  06/03/20 Room / Location:  [facility] ROOM 25 / SURGERY SAME DAY [facility]    Anesthesia Start:  0751 Anesthesia Stop:  1102    Procedures:       HYSTERECTOMY, LAPAROSCOPIC- (N/A Abdomen)      COLPORRHAPHY, COMBINED ANTEROPOSTERIOR (N/A Abdomen)      CYSTOSCOPY- WITH MID URETHRAL SLING PLACEMENT (N/A Vagina ) Diagnosis:  (ABNORMAL VAGINAL BLEEDING)    Surgeon:  Jose Bowen M.D. Responsible Provider:  Andre Senior M.D.    Anesthesia Type:  general ASA Status:  2          Final Anesthesia Type: general  Last vitals  BP   Blood Pressure: (!) 98/62    Temp   36.1 °C (97 °F)    Pulse   Pulse: 62   Resp   16    SpO2   96 %      Anesthesia Post Evaluation    Patient location during evaluation: PACU  Patient participation: complete - patient participated  Level of consciousness: awake and alert  Pain score: 2    Airway patency: patent  Anesthetic complications: no  Cardiovascular status: hemodynamically stable  Respiratory status: acceptable  Hydration status: euvolemic    PONV: none           Nurse Pain Score: 4 (NPRS)

## 2020-06-03 NOTE — OR NURSING
1230 phase 2   1300 backfilled bladder  Per orders   1309 up to bathroom   1345 back to chair unable to void had some bleeding with clot  in hat showed output amounts to Dr chris states ok amount is normal for her surgery states ok to have more time if unable to void ok per him to replaced resendez cath and dc to home   1500 up again attempt to void still unable to void on own replaced resendez per orders bleeding now light no clots other inc with steri strips dry intact without bleeding or swelling   1520 called Dr chris again advised of above orders to dc to home with leg bag and pt to return to office Friday for removal of cath office to call her for appt time   1545 meets all dc criteria dc to home reviewed all dc instructions piv x2  removed declines wheelchair out ambulates out of unit accomp with sister with steady gait with all belongings

## 2020-06-03 NOTE — ANESTHESIA PREPROCEDURE EVALUATION
Relevant Problems   No relevant active problems       Physical Exam    Airway   Mallampati: II  TM distance: >3 FB  Neck ROM: full       Cardiovascular - normal exam  Rhythm: regular  Rate: normal  (-) murmur     Dental - normal exam           Pulmonary - normal exam  Breath sounds clear to auscultation     Abdominal   Abdomen: soft  Bowel sounds: normal   Neurological - normal exam                 Anesthesia Plan    ASA 2       Plan - general       Airway plan will be ETT        Induction: intravenous    Postoperative Plan: Postoperative administration of opioids is intended.    Pertinent diagnostic labs and testing reviewed    Informed Consent:    Anesthetic plan and risks discussed with patient.    Use of blood products discussed with: patient whom consented to blood products.

## 2020-06-05 ENCOUNTER — GYNECOLOGY VISIT (OUTPATIENT)
Dept: OBGYN | Facility: CLINIC | Age: 30
End: 2020-06-05
Payer: MEDICAID

## 2020-06-05 ENCOUNTER — TELEPHONE (OUTPATIENT)
Dept: OBGYN | Facility: CLINIC | Age: 30
End: 2020-06-05

## 2020-06-05 VITALS — SYSTOLIC BLOOD PRESSURE: 112 MMHG | WEIGHT: 157 LBS | DIASTOLIC BLOOD PRESSURE: 68 MMHG | BODY MASS INDEX: 26.13 KG/M2

## 2020-06-05 DIAGNOSIS — R33.9 URINARY RETENTION: ICD-10-CM

## 2020-06-05 DIAGNOSIS — Z41.9 SURGERY, ELECTIVE: ICD-10-CM

## 2020-06-05 PROCEDURE — 99024 POSTOP FOLLOW-UP VISIT: CPT | Performed by: OBSTETRICS & GYNECOLOGY

## 2020-06-05 RX ORDER — SULFAMETHOXAZOLE AND TRIMETHOPRIM 800; 160 MG/1; MG/1
1 TABLET ORAL 2 TIMES DAILY
Qty: 14 TAB | Refills: 0 | Status: ON HOLD | OUTPATIENT
Start: 2020-06-05 | End: 2020-06-09

## 2020-06-05 RX ORDER — OXYCODONE HYDROCHLORIDE AND ACETAMINOPHEN 5; 325 MG/1; MG/1
1 TABLET ORAL EVERY 4 HOURS PRN
Qty: 20 TAB | Refills: 0 | Status: ON HOLD | OUTPATIENT
Start: 2020-06-05 | End: 2020-06-09

## 2020-06-05 ASSESSMENT — FIBROSIS 4 INDEX: FIB4 SCORE: 1.17

## 2020-06-05 NOTE — NON-PROVIDER
Pt here for Voiding trial. TLH, placement of midline sling.     Pt states she is having a lot of pain   Good# [phone]  Pharmacy Confirmed.

## 2020-06-05 NOTE — TELEPHONE ENCOUNTER
Pt called regarding that Amber needed Dr. Bowen to authorize the rx since she has another active one. I called the pharmacy was able to authorize, pt notified.

## 2020-06-06 ENCOUNTER — APPOINTMENT (OUTPATIENT)
Dept: RADIOLOGY | Facility: MEDICAL CENTER | Age: 30
DRG: 862 | End: 2020-06-06
Attending: EMERGENCY MEDICINE
Payer: MEDICAID

## 2020-06-06 ENCOUNTER — HOSPITAL ENCOUNTER (INPATIENT)
Facility: MEDICAL CENTER | Age: 30
LOS: 2 days | DRG: 862 | End: 2020-06-09
Attending: EMERGENCY MEDICINE | Admitting: HOSPITALIST
Payer: MEDICAID

## 2020-06-06 DIAGNOSIS — N39.0 URINARY TRACT INFECTION WITHOUT HEMATURIA, SITE UNSPECIFIED: ICD-10-CM

## 2020-06-06 DIAGNOSIS — A41.9 SEPSIS WITHOUT ACUTE ORGAN DYSFUNCTION, DUE TO UNSPECIFIED ORGANISM (HCC): ICD-10-CM

## 2020-06-06 DIAGNOSIS — R10.9 ABDOMINAL PAIN, UNSPECIFIED ABDOMINAL LOCATION: ICD-10-CM

## 2020-06-06 LAB
ALBUMIN SERPL BCP-MCNC: 3.8 G/DL (ref 3.2–4.9)
ALBUMIN/GLOB SERPL: 1.4 G/DL
ALP SERPL-CCNC: 81 U/L (ref 30–99)
ALT SERPL-CCNC: 125 U/L (ref 2–50)
ANION GAP SERPL CALC-SCNC: 11 MMOL/L (ref 7–16)
APPEARANCE UR: ABNORMAL
AST SERPL-CCNC: 127 U/L (ref 12–45)
BACTERIA #/AREA URNS HPF: ABNORMAL /HPF
BASOPHILS # BLD AUTO: 0.1 % (ref 0–1.8)
BASOPHILS # BLD: 0.01 K/UL (ref 0–0.12)
BILIRUB SERPL-MCNC: 0.6 MG/DL (ref 0.1–1.5)
BILIRUB UR QL STRIP.AUTO: NEGATIVE
BUN SERPL-MCNC: 14 MG/DL (ref 8–22)
CALCIUM SERPL-MCNC: 9.1 MG/DL (ref 8.5–10.5)
CHLORIDE SERPL-SCNC: 109 MMOL/L (ref 96–112)
CO2 SERPL-SCNC: 21 MMOL/L (ref 20–33)
COLOR UR: YELLOW
CREAT SERPL-MCNC: 0.89 MG/DL (ref 0.5–1.4)
EOSINOPHIL # BLD AUTO: 0.04 K/UL (ref 0–0.51)
EOSINOPHIL NFR BLD: 0.3 % (ref 0–6.9)
EPI CELLS #/AREA URNS HPF: ABNORMAL /HPF
ERYTHROCYTE [DISTWIDTH] IN BLOOD BY AUTOMATED COUNT: 41.3 FL (ref 35.9–50)
GLOBULIN SER CALC-MCNC: 2.7 G/DL (ref 1.9–3.5)
GLUCOSE SERPL-MCNC: 116 MG/DL (ref 65–99)
GLUCOSE UR STRIP.AUTO-MCNC: NEGATIVE MG/DL
HCT VFR BLD AUTO: 39.4 % (ref 37–47)
HGB BLD-MCNC: 13.3 G/DL (ref 12–16)
HYALINE CASTS #/AREA URNS LPF: ABNORMAL /LPF
IMM GRANULOCYTES # BLD AUTO: 0.05 K/UL (ref 0–0.11)
IMM GRANULOCYTES NFR BLD AUTO: 0.4 % (ref 0–0.9)
KETONES UR STRIP.AUTO-MCNC: NEGATIVE MG/DL
LACTATE BLD-SCNC: 1.7 MMOL/L (ref 0.5–2)
LEUKOCYTE ESTERASE UR QL STRIP.AUTO: ABNORMAL
LYMPHOCYTES # BLD AUTO: 0.33 K/UL (ref 1–4.8)
LYMPHOCYTES NFR BLD: 2.7 % (ref 22–41)
MCH RBC QN AUTO: 30.2 PG (ref 27–33)
MCHC RBC AUTO-ENTMCNC: 33.8 G/DL (ref 33.6–35)
MCV RBC AUTO: 89.5 FL (ref 81.4–97.8)
MICRO URNS: ABNORMAL
MONOCYTES # BLD AUTO: 0.21 K/UL (ref 0–0.85)
MONOCYTES NFR BLD AUTO: 1.7 % (ref 0–13.4)
NEUTROPHILS # BLD AUTO: 11.64 K/UL (ref 2–7.15)
NEUTROPHILS NFR BLD: 94.8 % (ref 44–72)
NITRITE UR QL STRIP.AUTO: POSITIVE
NRBC # BLD AUTO: 0 K/UL
NRBC BLD-RTO: 0 /100 WBC
PH UR STRIP.AUTO: 7.5 [PH] (ref 5–8)
PLATELET # BLD AUTO: 203 K/UL (ref 164–446)
PMV BLD AUTO: 9.9 FL (ref 9–12.9)
POTASSIUM SERPL-SCNC: 4.1 MMOL/L (ref 3.6–5.5)
PROT SERPL-MCNC: 6.5 G/DL (ref 6–8.2)
PROT UR QL STRIP: 30 MG/DL
RBC # BLD AUTO: 4.4 M/UL (ref 4.2–5.4)
RBC # URNS HPF: ABNORMAL /HPF
RBC UR QL AUTO: ABNORMAL
SODIUM SERPL-SCNC: 141 MMOL/L (ref 135–145)
SP GR UR STRIP.AUTO: 1.02
UROBILINOGEN UR STRIP.AUTO-MCNC: 0.2 MG/DL
WBC # BLD AUTO: 12.3 K/UL (ref 4.8–10.8)
WBC #/AREA URNS HPF: ABNORMAL /HPF

## 2020-06-06 PROCEDURE — 71045 X-RAY EXAM CHEST 1 VIEW: CPT

## 2020-06-06 PROCEDURE — 87086 URINE CULTURE/COLONY COUNT: CPT

## 2020-06-06 PROCEDURE — 96375 TX/PRO/DX INJ NEW DRUG ADDON: CPT

## 2020-06-06 PROCEDURE — 99285 EMERGENCY DEPT VISIT HI MDM: CPT

## 2020-06-06 PROCEDURE — 80053 COMPREHEN METABOLIC PANEL: CPT

## 2020-06-06 PROCEDURE — 96365 THER/PROPH/DIAG IV INF INIT: CPT

## 2020-06-06 PROCEDURE — 700111 HCHG RX REV CODE 636 W/ 250 OVERRIDE (IP): Performed by: EMERGENCY MEDICINE

## 2020-06-06 PROCEDURE — 85025 COMPLETE CBC W/AUTO DIFF WBC: CPT

## 2020-06-06 PROCEDURE — 87186 SC STD MICRODIL/AGAR DIL: CPT

## 2020-06-06 PROCEDURE — 83605 ASSAY OF LACTIC ACID: CPT

## 2020-06-06 PROCEDURE — 36415 COLL VENOUS BLD VENIPUNCTURE: CPT

## 2020-06-06 PROCEDURE — 87040 BLOOD CULTURE FOR BACTERIA: CPT | Mod: 91

## 2020-06-06 PROCEDURE — 87077 CULTURE AEROBIC IDENTIFY: CPT

## 2020-06-06 PROCEDURE — 96376 TX/PRO/DX INJ SAME DRUG ADON: CPT

## 2020-06-06 PROCEDURE — 81001 URINALYSIS AUTO W/SCOPE: CPT

## 2020-06-06 PROCEDURE — 700111 HCHG RX REV CODE 636 W/ 250 OVERRIDE (IP): Performed by: STUDENT IN AN ORGANIZED HEALTH CARE EDUCATION/TRAINING PROGRAM

## 2020-06-06 PROCEDURE — 700117 HCHG RX CONTRAST REV CODE 255: Performed by: EMERGENCY MEDICINE

## 2020-06-06 PROCEDURE — A9270 NON-COVERED ITEM OR SERVICE: HCPCS | Performed by: EMERGENCY MEDICINE

## 2020-06-06 PROCEDURE — 700102 HCHG RX REV CODE 250 W/ 637 OVERRIDE(OP): Performed by: EMERGENCY MEDICINE

## 2020-06-06 PROCEDURE — 700105 HCHG RX REV CODE 258: Performed by: EMERGENCY MEDICINE

## 2020-06-06 PROCEDURE — 74177 CT ABD & PELVIS W/CONTRAST: CPT

## 2020-06-06 RX ORDER — SODIUM CHLORIDE 9 MG/ML
30 INJECTION, SOLUTION INTRAVENOUS ONCE
Status: COMPLETED | OUTPATIENT
Start: 2020-06-06 | End: 2020-06-07

## 2020-06-06 RX ORDER — ACETAMINOPHEN 325 MG/1
650 TABLET ORAL ONCE
Status: COMPLETED | OUTPATIENT
Start: 2020-06-06 | End: 2020-06-06

## 2020-06-06 RX ORDER — MORPHINE SULFATE 4 MG/ML
4 INJECTION, SOLUTION INTRAMUSCULAR; INTRAVENOUS ONCE
Status: COMPLETED | OUTPATIENT
Start: 2020-06-07 | End: 2020-06-06

## 2020-06-06 RX ORDER — ONDANSETRON 2 MG/ML
4 INJECTION INTRAMUSCULAR; INTRAVENOUS ONCE
Status: COMPLETED | OUTPATIENT
Start: 2020-06-06 | End: 2020-06-06

## 2020-06-06 RX ORDER — ONDANSETRON 2 MG/ML
4 INJECTION INTRAMUSCULAR; INTRAVENOUS ONCE
Status: COMPLETED | OUTPATIENT
Start: 2020-06-07 | End: 2020-06-06

## 2020-06-06 RX ADMIN — FENTANYL CITRATE 100 MCG: 50 INJECTION, SOLUTION INTRAMUSCULAR; INTRAVENOUS at 22:39

## 2020-06-06 RX ADMIN — MORPHINE SULFATE 4 MG: 4 INJECTION INTRAVENOUS at 23:53

## 2020-06-06 RX ADMIN — IOHEXOL 100 ML: 350 INJECTION, SOLUTION INTRAVENOUS at 22:25

## 2020-06-06 RX ADMIN — ACETAMINOPHEN 650 MG: 325 TABLET, FILM COATED ORAL at 22:14

## 2020-06-06 RX ADMIN — ONDANSETRON HYDROCHLORIDE 4 MG: 2 SOLUTION INTRAMUSCULAR; INTRAVENOUS at 22:39

## 2020-06-06 RX ADMIN — SODIUM CHLORIDE 2136 ML: 9 INJECTION, SOLUTION INTRAVENOUS at 22:15

## 2020-06-06 RX ADMIN — ONDANSETRON HYDROCHLORIDE 4 MG: 2 SOLUTION INTRAMUSCULAR; INTRAVENOUS at 23:53

## 2020-06-06 RX ADMIN — CEFTRIAXONE SODIUM 1 G: 1 INJECTION, POWDER, FOR SOLUTION INTRAMUSCULAR; INTRAVENOUS at 22:38

## 2020-06-06 ASSESSMENT — FIBROSIS 4 INDEX: FIB4 SCORE: 1.17

## 2020-06-06 ASSESSMENT — ENCOUNTER SYMPTOMS
BLURRED VISION: 1
HEADACHES: 1
EYE PAIN: 1
PHOTOPHOBIA: 1
ABDOMINAL PAIN: 1
SHORTNESS OF BREATH: 0
COUGH: 0
NAUSEA: 1
FEVER: 1

## 2020-06-07 PROBLEM — R93.5 ABNORMAL ABDOMINAL CT SCAN: Status: ACTIVE | Noted: 2020-06-07

## 2020-06-07 PROBLEM — A41.9 SEPSIS (HCC): Status: ACTIVE | Noted: 2020-06-07

## 2020-06-07 PROBLEM — N39.0 UTI (URINARY TRACT INFECTION): Status: ACTIVE | Noted: 2020-06-07

## 2020-06-07 PROCEDURE — 700105 HCHG RX REV CODE 258: Performed by: HOSPITALIST

## 2020-06-07 PROCEDURE — 700101 HCHG RX REV CODE 250: Performed by: STUDENT IN AN ORGANIZED HEALTH CARE EDUCATION/TRAINING PROGRAM

## 2020-06-07 PROCEDURE — 99223 1ST HOSP IP/OBS HIGH 75: CPT | Performed by: HOSPITALIST

## 2020-06-07 PROCEDURE — 96375 TX/PRO/DX INJ NEW DRUG ADDON: CPT

## 2020-06-07 PROCEDURE — 99232 SBSQ HOSP IP/OBS MODERATE 35: CPT | Performed by: OBSTETRICS & GYNECOLOGY

## 2020-06-07 PROCEDURE — A9270 NON-COVERED ITEM OR SERVICE: HCPCS | Performed by: STUDENT IN AN ORGANIZED HEALTH CARE EDUCATION/TRAINING PROGRAM

## 2020-06-07 PROCEDURE — 700102 HCHG RX REV CODE 250 W/ 637 OVERRIDE(OP): Performed by: STUDENT IN AN ORGANIZED HEALTH CARE EDUCATION/TRAINING PROGRAM

## 2020-06-07 PROCEDURE — 700105 HCHG RX REV CODE 258: Performed by: STUDENT IN AN ORGANIZED HEALTH CARE EDUCATION/TRAINING PROGRAM

## 2020-06-07 PROCEDURE — 700105 HCHG RX REV CODE 258: Performed by: INTERNAL MEDICINE

## 2020-06-07 PROCEDURE — A9270 NON-COVERED ITEM OR SERVICE: HCPCS | Performed by: HOSPITALIST

## 2020-06-07 PROCEDURE — 770006 HCHG ROOM/CARE - MED/SURG/GYN SEMI*

## 2020-06-07 PROCEDURE — 700111 HCHG RX REV CODE 636 W/ 250 OVERRIDE (IP): Performed by: HOSPITALIST

## 2020-06-07 PROCEDURE — 700111 HCHG RX REV CODE 636 W/ 250 OVERRIDE (IP): Performed by: EMERGENCY MEDICINE

## 2020-06-07 PROCEDURE — 700102 HCHG RX REV CODE 250 W/ 637 OVERRIDE(OP): Performed by: HOSPITALIST

## 2020-06-07 PROCEDURE — 76856 US EXAM PELVIC COMPLETE: CPT

## 2020-06-07 RX ORDER — KETOROLAC TROMETHAMINE 30 MG/ML
30 INJECTION, SOLUTION INTRAMUSCULAR; INTRAVENOUS ONCE
Status: COMPLETED | OUTPATIENT
Start: 2020-06-07 | End: 2020-06-07

## 2020-06-07 RX ORDER — PROCHLORPERAZINE EDISYLATE 5 MG/ML
5-10 INJECTION INTRAMUSCULAR; INTRAVENOUS EVERY 4 HOURS PRN
Status: DISCONTINUED | OUTPATIENT
Start: 2020-06-07 | End: 2020-06-09 | Stop reason: HOSPADM

## 2020-06-07 RX ORDER — SODIUM CHLORIDE 9 MG/ML
INJECTION, SOLUTION INTRAVENOUS CONTINUOUS
Status: DISCONTINUED | OUTPATIENT
Start: 2020-06-07 | End: 2020-06-09 | Stop reason: HOSPADM

## 2020-06-07 RX ORDER — SODIUM CHLORIDE, SODIUM LACTATE, POTASSIUM CHLORIDE, AND CALCIUM CHLORIDE .6; .31; .03; .02 G/100ML; G/100ML; G/100ML; G/100ML
1000 INJECTION, SOLUTION INTRAVENOUS ONCE
Status: COMPLETED | OUTPATIENT
Start: 2020-06-07 | End: 2020-06-07

## 2020-06-07 RX ORDER — ACETAMINOPHEN 325 MG/1
650 TABLET ORAL EVERY 6 HOURS PRN
Status: DISCONTINUED | OUTPATIENT
Start: 2020-06-07 | End: 2020-06-07

## 2020-06-07 RX ORDER — POLYETHYLENE GLYCOL 3350 17 G/17G
1 POWDER, FOR SOLUTION ORAL
Status: DISCONTINUED | OUTPATIENT
Start: 2020-06-07 | End: 2020-06-09 | Stop reason: HOSPADM

## 2020-06-07 RX ORDER — METHOCARBAMOL 750 MG/1
750 TABLET, FILM COATED ORAL EVERY MORNING
Status: DISCONTINUED | OUTPATIENT
Start: 2020-06-07 | End: 2020-06-09 | Stop reason: HOSPADM

## 2020-06-07 RX ORDER — TRAMADOL HYDROCHLORIDE 50 MG/1
50 TABLET ORAL EVERY 6 HOURS PRN
Status: DISCONTINUED | OUTPATIENT
Start: 2020-06-07 | End: 2020-06-09 | Stop reason: HOSPADM

## 2020-06-07 RX ORDER — MORPHINE SULFATE 4 MG/ML
.5-2 INJECTION, SOLUTION INTRAMUSCULAR; INTRAVENOUS
Status: DISCONTINUED | OUTPATIENT
Start: 2020-06-07 | End: 2020-06-09 | Stop reason: HOSPADM

## 2020-06-07 RX ORDER — OXYCODONE HYDROCHLORIDE AND ACETAMINOPHEN 5; 325 MG/1; MG/1
1 TABLET ORAL EVERY 4 HOURS PRN
Status: DISCONTINUED | OUTPATIENT
Start: 2020-06-07 | End: 2020-06-09 | Stop reason: HOSPADM

## 2020-06-07 RX ORDER — HEPARIN SODIUM 5000 [USP'U]/ML
5000 INJECTION, SOLUTION INTRAVENOUS; SUBCUTANEOUS EVERY 8 HOURS
Status: DISCONTINUED | OUTPATIENT
Start: 2020-06-07 | End: 2020-06-09 | Stop reason: HOSPADM

## 2020-06-07 RX ORDER — AMOXICILLIN 250 MG
2 CAPSULE ORAL 2 TIMES DAILY
Status: DISCONTINUED | OUTPATIENT
Start: 2020-06-07 | End: 2020-06-09 | Stop reason: HOSPADM

## 2020-06-07 RX ORDER — CARBOXYMETHYLCELLULOSE SODIUM 5 MG/ML
1 SOLUTION/ DROPS OPHTHALMIC PRN
Status: DISCONTINUED | OUTPATIENT
Start: 2020-06-07 | End: 2020-06-09 | Stop reason: HOSPADM

## 2020-06-07 RX ORDER — ONDANSETRON 2 MG/ML
4 INJECTION INTRAMUSCULAR; INTRAVENOUS EVERY 4 HOURS PRN
Status: DISCONTINUED | OUTPATIENT
Start: 2020-06-07 | End: 2020-06-09 | Stop reason: HOSPADM

## 2020-06-07 RX ORDER — PROMETHAZINE HYDROCHLORIDE 25 MG/1
12.5-25 SUPPOSITORY RECTAL EVERY 4 HOURS PRN
Status: DISCONTINUED | OUTPATIENT
Start: 2020-06-07 | End: 2020-06-09 | Stop reason: HOSPADM

## 2020-06-07 RX ORDER — SODIUM CHLORIDE, SODIUM LACTATE, POTASSIUM CHLORIDE, CALCIUM CHLORIDE 600; 310; 30; 20 MG/100ML; MG/100ML; MG/100ML; MG/100ML
2000 INJECTION, SOLUTION INTRAVENOUS ONCE
Status: COMPLETED | OUTPATIENT
Start: 2020-06-07 | End: 2020-06-07

## 2020-06-07 RX ORDER — OMEPRAZOLE 20 MG/1
20 CAPSULE, DELAYED RELEASE ORAL
Status: DISCONTINUED | OUTPATIENT
Start: 2020-06-07 | End: 2020-06-09 | Stop reason: HOSPADM

## 2020-06-07 RX ORDER — ONDANSETRON 4 MG/1
4 TABLET, ORALLY DISINTEGRATING ORAL EVERY 4 HOURS PRN
Status: DISCONTINUED | OUTPATIENT
Start: 2020-06-07 | End: 2020-06-09 | Stop reason: HOSPADM

## 2020-06-07 RX ORDER — PROMETHAZINE HYDROCHLORIDE 25 MG/1
12.5-25 TABLET ORAL EVERY 4 HOURS PRN
Status: DISCONTINUED | OUTPATIENT
Start: 2020-06-07 | End: 2020-06-09 | Stop reason: HOSPADM

## 2020-06-07 RX ORDER — SODIUM CHLORIDE 9 MG/ML
1000 INJECTION, SOLUTION INTRAVENOUS ONCE
Status: COMPLETED | OUTPATIENT
Start: 2020-06-07 | End: 2020-06-07

## 2020-06-07 RX ORDER — SODIUM CHLORIDE 9 MG/ML
500 INJECTION, SOLUTION INTRAVENOUS ONCE
Status: COMPLETED | OUTPATIENT
Start: 2020-06-07 | End: 2020-06-07

## 2020-06-07 RX ORDER — BISACODYL 10 MG
10 SUPPOSITORY, RECTAL RECTAL
Status: DISCONTINUED | OUTPATIENT
Start: 2020-06-07 | End: 2020-06-09 | Stop reason: HOSPADM

## 2020-06-07 RX ADMIN — SODIUM CHLORIDE, POTASSIUM CHLORIDE, SODIUM LACTATE AND CALCIUM CHLORIDE 1000 ML: 600; 310; 30; 20 INJECTION, SOLUTION INTRAVENOUS at 08:03

## 2020-06-07 RX ADMIN — OXYCODONE HYDROCHLORIDE AND ACETAMINOPHEN 1 TABLET: 5; 325 TABLET ORAL at 13:25

## 2020-06-07 RX ADMIN — ONDANSETRON 4 MG: 4 TABLET, ORALLY DISINTEGRATING ORAL at 13:26

## 2020-06-07 RX ADMIN — HEPARIN SODIUM 5000 UNITS: 5000 INJECTION, SOLUTION INTRAVENOUS; SUBCUTANEOUS at 22:11

## 2020-06-07 RX ADMIN — OXYCODONE HYDROCHLORIDE AND ACETAMINOPHEN 1 TABLET: 5; 325 TABLET ORAL at 13:26

## 2020-06-07 RX ADMIN — DOCUSATE SODIUM 50 MG AND SENNOSIDES 8.6 MG 2 TABLET: 8.6; 5 TABLET, FILM COATED ORAL at 17:01

## 2020-06-07 RX ADMIN — SODIUM CHLORIDE: 9 INJECTION, SOLUTION INTRAVENOUS at 09:17

## 2020-06-07 RX ADMIN — DOCUSATE SODIUM 50 MG AND SENNOSIDES 8.6 MG 2 TABLET: 8.6; 5 TABLET, FILM COATED ORAL at 05:18

## 2020-06-07 RX ADMIN — HEPARIN SODIUM 5000 UNITS: 5000 INJECTION, SOLUTION INTRAVENOUS; SUBCUTANEOUS at 05:18

## 2020-06-07 RX ADMIN — TRAMADOL HYDROCHLORIDE 50 MG: 50 TABLET, FILM COATED ORAL at 03:14

## 2020-06-07 RX ADMIN — SODIUM CHLORIDE 500 ML: 9 INJECTION, SOLUTION INTRAVENOUS at 05:36

## 2020-06-07 RX ADMIN — OXYCODONE HYDROCHLORIDE AND ACETAMINOPHEN 1 TABLET: 5; 325 TABLET ORAL at 22:10

## 2020-06-07 RX ADMIN — OMEPRAZOLE 20 MG: 20 CAPSULE, DELAYED RELEASE ORAL at 22:11

## 2020-06-07 RX ADMIN — HEPARIN SODIUM 5000 UNITS: 5000 INJECTION, SOLUTION INTRAVENOUS; SUBCUTANEOUS at 13:26

## 2020-06-07 RX ADMIN — METRONIDAZOLE 500 MG: 500 INJECTION, SOLUTION INTRAVENOUS at 22:13

## 2020-06-07 RX ADMIN — CARBOXYMETHYLCELLULOSE SODIUM 1 DROP: 5 SOLUTION/ DROPS OPHTHALMIC at 17:11

## 2020-06-07 RX ADMIN — POLYETHYLENE GLYCOL 3350 1 PACKET: 17 POWDER, FOR SOLUTION ORAL at 22:11

## 2020-06-07 RX ADMIN — OXYCODONE HYDROCHLORIDE AND ACETAMINOPHEN 1 TABLET: 5; 325 TABLET ORAL at 17:01

## 2020-06-07 RX ADMIN — CEFTRIAXONE SODIUM 2 G: 2 INJECTION, POWDER, FOR SOLUTION INTRAMUSCULAR; INTRAVENOUS at 17:00

## 2020-06-07 RX ADMIN — KETOROLAC TROMETHAMINE 30 MG: 30 INJECTION, SOLUTION INTRAMUSCULAR at 00:57

## 2020-06-07 RX ADMIN — TRAMADOL HYDROCHLORIDE 50 MG: 50 TABLET, FILM COATED ORAL at 08:05

## 2020-06-07 RX ADMIN — METRONIDAZOLE 500 MG: 500 INJECTION, SOLUTION INTRAVENOUS at 13:44

## 2020-06-07 RX ADMIN — ONDANSETRON 4 MG: 4 TABLET, ORALLY DISINTEGRATING ORAL at 09:19

## 2020-06-07 RX ADMIN — SODIUM CHLORIDE, POTASSIUM CHLORIDE, SODIUM LACTATE AND CALCIUM CHLORIDE 2000 ML: 600; 310; 30; 20 INJECTION, SOLUTION INTRAVENOUS at 01:50

## 2020-06-07 RX ADMIN — SODIUM CHLORIDE 1000 ML: 9 INJECTION, SOLUTION INTRAVENOUS at 03:38

## 2020-06-07 RX ADMIN — ONDANSETRON 4 MG: 4 TABLET, ORALLY DISINTEGRATING ORAL at 17:01

## 2020-06-07 SDOH — ECONOMIC STABILITY: TRANSPORTATION INSECURITY
IN THE PAST 12 MONTHS, HAS LACK OF TRANSPORTATION KEPT YOU FROM MEETINGS, WORK, OR FROM GETTING THINGS NEEDED FOR DAILY LIVING?: PATIENT DECLINED

## 2020-06-07 SDOH — ECONOMIC STABILITY: FOOD INSECURITY: WITHIN THE PAST 12 MONTHS, YOU WORRIED THAT YOUR FOOD WOULD RUN OUT BEFORE YOU GOT MONEY TO BUY MORE.: PATIENT DECLINED

## 2020-06-07 SDOH — ECONOMIC STABILITY: TRANSPORTATION INSECURITY
IN THE PAST 12 MONTHS, HAS THE LACK OF TRANSPORTATION KEPT YOU FROM MEDICAL APPOINTMENTS OR FROM GETTING MEDICATIONS?: PATIENT DECLINED

## 2020-06-07 SDOH — ECONOMIC STABILITY: FOOD INSECURITY: WITHIN THE PAST 12 MONTHS, THE FOOD YOU BOUGHT JUST DIDN'T LAST AND YOU DIDN'T HAVE MONEY TO GET MORE.: PATIENT DECLINED

## 2020-06-07 ASSESSMENT — ENCOUNTER SYMPTOMS
DEPRESSION: 0
WHEEZING: 0
PALPITATIONS: 0
FEVER: 1
ABDOMINAL PAIN: 0
SORE THROAT: 0
HEADACHES: 0
VOMITING: 0
SHORTNESS OF BREATH: 0
COUGH: 0
DIARRHEA: 0
MYALGIAS: 1
FOCAL WEAKNESS: 0
PHOTOPHOBIA: 0
CHILLS: 0
TINGLING: 0
DIZZINESS: 0
NAUSEA: 0
MYALGIAS: 0

## 2020-06-07 ASSESSMENT — COGNITIVE AND FUNCTIONAL STATUS - GENERAL
SUGGESTED CMS G CODE MODIFIER DAILY ACTIVITY: CH
DAILY ACTIVITIY SCORE: 24
MOBILITY SCORE: 24
SUGGESTED CMS G CODE MODIFIER MOBILITY: CH

## 2020-06-07 ASSESSMENT — LIFESTYLE VARIABLES
EVER_SMOKED: YES
ALCOHOL_USE: NO
HOW MANY TIMES IN THE PAST YEAR HAVE YOU HAD 5 OR MORE DRINKS IN A DAY: 0
AVERAGE NUMBER OF DAYS PER WEEK YOU HAVE A DRINK CONTAINING ALCOHOL: 0
DOES PATIENT WANT TO STOP DRINKING: CANNOT ASSESS
CONSUMPTION TOTAL: NEGATIVE
HAVE PEOPLE ANNOYED YOU BY CRITICIZING YOUR DRINKING: NO
TOTAL SCORE: 0
EVER FELT BAD OR GUILTY ABOUT YOUR DRINKING: NO
TOTAL SCORE: 0
TOTAL SCORE: 0
ON A TYPICAL DAY WHEN YOU DRINK ALCOHOL HOW MANY DRINKS DO YOU HAVE: 0
HAVE YOU EVER FELT YOU SHOULD CUT DOWN ON YOUR DRINKING: NO
EVER HAD A DRINK FIRST THING IN THE MORNING TO STEADY YOUR NERVES TO GET RID OF A HANGOVER: NO

## 2020-06-07 ASSESSMENT — PATIENT HEALTH QUESTIONNAIRE - PHQ9
SUM OF ALL RESPONSES TO PHQ9 QUESTIONS 1 AND 2: 0
2. FEELING DOWN, DEPRESSED, IRRITABLE, OR HOPELESS: NOT AT ALL
SUM OF ALL RESPONSES TO PHQ9 QUESTIONS 1 AND 2: 0
1. LITTLE INTEREST OR PLEASURE IN DOING THINGS: NOT AT ALL
1. LITTLE INTEREST OR PLEASURE IN DOING THINGS: NOT AT ALL
2. FEELING DOWN, DEPRESSED, IRRITABLE, OR HOPELESS: NOT AT ALL

## 2020-06-07 ASSESSMENT — FIBROSIS 4 INDEX: FIB4 SCORE: 1.68

## 2020-06-07 NOTE — ED TRIAGE NOTES
Patient had hysterectomy and bladder sling on Wednesday.  Has had issues with the pain since surgery, saw her surgeon yesterday.  Today started having fevers with headache and eye pain.  A/Ox4. Tachycardiac on arrival.  Denies cough or shortness of breath.

## 2020-06-07 NOTE — PROGRESS NOTES
2 RN skin check complete LAUREN Fuller.  Devices in place PIV and glasses.  Skin assessed under devices yes.  Confirmed pressure ulcers found on n/a.  New potential pressure ulcers noted on n/a. Wound consult placed n/a.  The following interventions in place 2 RN skin check on admission, pressure redistributing mattress, encourage pt to turn self often, and moisturizer.     Skin Assessment:    - steri strip dressing to abd and perineal from previous surgery 6/3/20.  - vaginal wound from previous surgery  - blanchable redness to buttocks

## 2020-06-07 NOTE — PROGRESS NOTES
BP 89/45 on recheck. MD Angeli Hylton of [facility] paged with order of 500ml bolus to be given. Order obtained through telephone with read back. WCM.

## 2020-06-07 NOTE — CARE PLAN
Problem: Safety  Goal: Will remain free from injury  Outcome: PROGRESSING AS EXPECTED  Goal: Will remain free from falls  Outcome: PROGRESSING AS EXPECTED     Problem: Discharge Barriers/Planning  Goal: Patient's continuum of care needs will be met  Outcome: PROGRESSING AS EXPECTED     Problem: Pain Management  Goal: Pain level will decrease to patient's comfort goal  Outcome: PROGRESSING AS EXPECTED

## 2020-06-07 NOTE — CARE PLAN
Problem: Infection  Goal: Will remain free from infection  Outcome: PROGRESSING AS EXPECTED     Problem: Venous Thromboembolism (VTW)/Deep Vein Thrombosis (DVT) Prevention:  Goal: Patient will participate in Venous Thrombosis (VTE)/Deep Vein Thrombosis (DVT)Prevention Measures  Outcome: PROGRESSING AS EXPECTED     Problem: Bowel/Gastric:  Goal: Normal bowel function is maintained or improved  Outcome: PROGRESSING AS EXPECTED  Goal: Will not experience complications related to bowel motility  Outcome: PROGRESSING AS EXPECTED     Problem: Discharge Barriers/Planning  Goal: Patient's continuum of care needs will be met  Outcome: PROGRESSING AS EXPECTED     Problem: Pain Management  Goal: Pain level will decrease to patient's comfort goal  Outcome: PROGRESSING SLOWER THAN EXPECTED     Problem: Communication  Goal: The ability to communicate needs accurately and effectively will improve  Outcome: MET     Problem: Safety  Goal: Will remain free from injury  Outcome: MET  Goal: Will remain free from falls  Outcome: MET     Problem: Knowledge Deficit  Goal: Knowledge of disease process/condition, treatment plan, diagnostic tests, and medications will improve  Outcome: MET  Goal: Knowledge of the prescribed therapeutic regimen will improve  Outcome: MET

## 2020-06-07 NOTE — H&P
[facility] Medicine History & Physical Note    Date of Service  6/7/2020    Primary Care Physician  Negra Carlson M.D.    Consultants  None    Code Status  Full    Chief Complaint  Chief Complaint   Patient presents with   • Fever     had bladder sling and hysterectomy on Wednesday.  today started having fevers, headache, and eye pain.         History of Presenting Illness  29 y.o. female who presented on 6/6/2020 with fever.  This is a pleasant young female who is approximately postop day #3 for hysterectomy and bladder sling.  Approximately 1 day ago, she began to have lower abdominal pain and had reported the symptom to her surgeon in postop follow-up.  The pain has persisted, it is primarily suprapubic, aggravated with movement and had minimal alleviation with over-the-counter medications.  Associated with this, she describes a subjective fever, nausea, and headache.  Otherwise she states that she had been recovering well until the pain began, she had no chest pain, breathing problems, diarrhea or dysuria.  Upon arrival in the emergency room, the patient is noted to be febrile to 102.4 degrees.    Review of Systems  Review of Systems   Constitutional: Positive for fever. Negative for chills.   HENT: Negative for congestion and sore throat.    Eyes: Negative for photophobia.   Respiratory: Negative for cough, shortness of breath and wheezing.    Cardiovascular: Negative for chest pain and palpitations.   Gastrointestinal: Negative for abdominal pain, diarrhea, nausea and vomiting.   Genitourinary: Negative for dysuria.   Musculoskeletal: Negative for myalgias.   Skin: Negative.    Neurological: Negative for dizziness, tingling, focal weakness and headaches.   Psychiatric/Behavioral: Negative for depression and suicidal ideas.       Past Medical History  Past Medical History:   Diagnosis Date   • Pain 05/29/2020    pelvic and back, 5/10   • Mastitis 05/2019   • Nausea/vomiting in pregnancy 10/5/2012   •  Abnormal uterine bleeding (AUB)    • Anxiety    • Arthritis     toes, hands, back   • Bowel habit changes     constipation/diarrhea   • GERD (gastroesophageal reflux disease)        Surgical History  Past Surgical History:   Procedure Laterality Date   • PB COMBINED ANT/POST COLPORRHAPHY N/A 6/3/2020    Procedure: COLPORRHAPHY, COMBINED ANTEROPOSTERIOR;  Surgeon: Jose Bowen M.D.;  Location: SURGERY SAME DAY [facility];  Service: Obstetrics   • HYSTERECTOMY LAPAROSCOPY N/A 6/3/2020    Procedure: HYSTERECTOMY, LAPAROSCOPIC-;  Surgeon: Jose Bowen M.D.;  Location: SURGERY SAME DAY [facility];  Service: Obstetrics   • CYSTOSCOPY N/A 6/3/2020    Procedure: CYSTOSCOPY- WITH MID URETHRAL SLING PLACEMENT;  Surgeon: Jose Bowen M.D.;  Location: SURGERY SAME DAY [facility];  Service: Obstetrics   • DURAN BY LAPAROSCOPY  10/25/2019    Procedure: CHOLECYSTECTOMY, LAPAROSCOPIC;  Surgeon: Efrain Ramesh M.D.;  Location: SURGERY [facility];  Service: General   • ERCP IN OR N/A 10/24/2019    Procedure: ERCP (ENDOSCOPIC RETROGRADE CHOLANGIOPANCREATOGRAPHY);  Surgeon: Temo Liao M.D.;  Location: SURGERY [facility];  Service: Gastroenterology   • PB LAP,DIAGNOSTIC ABDOMEN  6/18/2019    Procedure: PELVISCOPY;  Surgeon: Pilar Vuong M.D.;  Location: SURGERY SAME DAY [facility];  Service: Gynecology   • SALPINGECTOMY Bilateral 6/18/2019    Procedure: SALPINGECTOMY;  Surgeon: Pilar Vuong M.D.;  Location: SURGERY SAME DAY [facility];  Service: Gynecology       Family History  Family History   Problem Relation Age of Onset   • Cancer Mother 45        breast cancer, lymph node,   • Heart Attack Father    • Gout Father    • Cancer Maternal Grandmother         breast cancer       Social History  Social History     Tobacco Use   • Smoking status: Former Smoker     Packs/day: 0.25     Years: 15.00     Pack years: 3.75     Types: Cigarettes     Last attempt to quit: 7/7/2019     Years  since quittin.9   • Smokeless tobacco: Never Used   Substance Use Topics   • Alcohol use: Not Currently   • Drug use: Not Currently       Allergies  Allergies   Allergen Reactions   • Nkda [No Known Drug Allergy]        Medications  No current facility-administered medications on file prior to encounter.      Current Outpatient Medications on File Prior to Encounter   Medication Sig Dispense Refill   • sulfamethoxazole-trimethoprim (BACTRIM DS) 800-160 MG tablet Take 1 Tab by mouth 2 times a day. 14 Tab 0   • oxyCODONE-acetaminophen (PERCOCET) 5-325 MG Tab Take 1 Tab by mouth every four hours as needed for up to 7 days. 20 Tab 0   • oxyCODONE-acetaminophen (PERCOCET) 5-325 MG Tab Take 1 Tab by mouth every four hours as needed for up to 7 days. 20 Tab 0   • omeprazole (PRILOSEC) 20 MG delayed-release capsule Take 20 mg by mouth every bedtime. Indications: Gastroesophageal Reflux Disease     • ibuprofen (MOTRIN) 200 MG Tab Take 600 mg by mouth as needed. Indications: Mild to Moderate Pain     • metronidazole (METROGEL) 0.75 % gel Apply 1 Applicator to affected area(s) 2 times a day. (Patient not taking: Reported on 2020) 1 Tube 1   • tramadol (ULTRAM) 50 MG Tab Take 50 mg by mouth as needed (Pain).     • methocarbamol (ROBAXIN) 750 MG Tab Take 750 mg by mouth every morning. Indications: Musculoskeletal Pain         Physical Exam  Hemodynamics  Temp (24hrs), Av.1 °C (102.4 °F), Min:39.1 °C (102.4 °F), Max:39.1 °C (102.4 °F)   Temperature: (!) 39.1 °C (102.4 °F)  Pulse  Av.8  Min: 97  Max: 143    Blood Pressure: 115/56      Respiratory      Respiration: 16, Pulse Oximetry: 94 %             Physical Exam   Constitutional: She is oriented to person, place, and time. No distress.   HENT:   Head: Normocephalic and atraumatic.   Right Ear: External ear normal.   Left Ear: External ear normal.   Eyes: EOM are normal. Right eye exhibits no discharge. Left eye exhibits no discharge.   Neck: Neck supple. No JVD  present.   Cardiovascular: Normal rate, regular rhythm and normal heart sounds.   Pulmonary/Chest: Effort normal and breath sounds normal. No respiratory distress. She exhibits no tenderness.   Abdominal: Soft. Bowel sounds are normal. She exhibits no distension. There is no abdominal tenderness.   Musculoskeletal: Normal range of motion.         General: No edema.   Neurological: She is alert and oriented to person, place, and time. No cranial nerve deficit.   Skin: Skin is warm and dry. She is not diaphoretic. No erythema.   Psychiatric: She has a normal mood and affect. Her behavior is normal.   Nursing note and vitals reviewed.    Capillary refill less than 3 seconds, distal pulses intact    Laboratory:  Recent Labs     06/06/20  2206   WBC 12.3*   RBC 4.40   HEMOGLOBIN 13.3   HEMATOCRIT 39.4   MCV 89.5   MCH 30.2   MCHC 33.8   RDW 41.3   PLATELETCT 203   MPV 9.9     Recent Labs     06/06/20 2206   SODIUM 141   POTASSIUM 4.1   CHLORIDE 109   CO2 21   GLUCOSE 116*   BUN 14   CREATININE 0.89   CALCIUM 9.1     Recent Labs     06/06/20  2206   ALTSGPT 125*   ASTSGOT 127*   ALKPHOSPHAT 81   TBILIRUBIN 0.6   GLUCOSE 116*                 No results found for: TROPONINI    Imaging  Ct-abdomen-pelvis With    Result Date: 6/6/2020 6/6/2020 10:11 PM HISTORY/REASON FOR EXAM:  Abd pain, unspecified; Abdominal pain and fever status post hysterectomy TECHNIQUE/EXAM DESCRIPTION:   CT scan of the abdomen and pelvis with contrast. Contrast-enhanced helical scanning was obtained from the diaphragmatic domes through the pubic symphysis following the bolus administration of nonionic contrast without complication. 100 mL of Omnipaque 350 nonionic contrast was administered without complication. Low dose optimization technique was utilized for this CT exam including automated exposure control and adjustment of the mA and/or kV according to patient size. COMPARISON: 6/4/2013. FINDINGS: Lung bases: No pulmonary nodules at the lung  bases. No pleural or pericardial fluid. Abdomen: The liver is unremarkable. The spleen is unremarkable. The pancreas is unremarkable. The gallbladder is surgically absent. The adrenal glands are normal in size. The kidneys enhance symmetrically. The abdominal aorta is normal in caliber. There is no lymphadenopathy. No bowel wall thickening or bowel dilatation. Pelvis: The uterus is surgically absent. Trace amount of gas in the left anterior pelvis. Wall thickening of the urinary bladder. Small amount of gas in the urinary bladder. Questionable filling defect in the left ovarian vein (image 45 series 601 and image 51 series 2). No lymph node enlargement, free fluid, or free air in the abdomen or pelvis. No aggressive bone lesions are seen. ___________________________________     1. The uterus is surgically absent. Trace amount of gas in the left anterior pelvis could relate to postsurgical change. 2. Wall thickening of the urinary bladder could be postsurgical change or inflammation/cystitis. Small amount of gas in the urinary bladder. 3. No discrete intra-abdominal fluid collection seen. 4. Questionable filling defect in the mid left ovarian vein could relate to poor contrast mixing or thrombus.    Dx-chest-portable (1 View)    Result Date: 6/6/2020 6/6/2020 10:08 PM HISTORY/REASON FOR EXAM:  possible sepsis. TECHNIQUE/EXAM DESCRIPTION AND NUMBER OF VIEWS: Single portable view of the chest. COMPARISON: None FINDINGS: No pulmonary infiltrates or consolidations are noted. No pleural effusion. No pneumothorax. Normal cardiopericardial silhouette.     1. No acute cardiopulmonary abnormalities are identified.    Us-pelvic Transabdominal Only    Result Date: 6/7/2020 6/6/2020 11:46 PM HISTORY/REASON FOR EXAM:  Ovarian vein thrombosis TECHNIQUE/EXAM DESCRIPTION: Transabdominal pelvic ultrasound. COMPARISON:   CT 6/6/2020 FINDINGS: Transabdominal scanning was performed. Bowel gas throughout the abdomen and pelvis. Unable  to visualize the left ovarian vein. Bilateral ovaries are not visualized. Patient refused transvaginal exam due to pain.     Unable to visualize the left ovarian vein. MRI can be obtained for further evaluation.        Assessment/Plan:  Anticipate that patient will need greater than 2 midnights for management of the discussed medical issues.    * Sepsis (HCC)  Assessment & Plan  This is Sepsis Present on admission  SIRS criteria identified on my evaluation include: Fever, with temperature greater than 101 deg F and Tachycardia, with heart rate greater than 90 BPM  Source is urinary tract  Sepsis protocol initiated  Fluid resuscitation ordered per protocol  IV antibiotics as appropriate for source of sepsis  While organ dysfunction may be noted elsewhere in this problem list or in the chart, degree of organ dysfunction does not meet CMS criteria for severe sepsis    Patient has leukocytosis, she is febrile, and she has mild leukopenia.  Otherwise she has no lactic acidosis, and her blood pressure is stable.  UA is positive for nitrites and leukocyte esterase.  Patient be started on empiric antibiotic therapy with IV ceftriaxone and will monitor urine cultures for speciation and sensitivities in order to better guide our antibiotic choices.    UTI (urinary tract infection)  Assessment & Plan  Treatment as noted above.    Abnormal abdominal CT scan  Assessment & Plan  CT results report a questionable filling defect in the mid left ovarian vein could relate to poor contrast mixing or thrombus.  Transvaginal ultrasound was attempted for definitive diagnosis however patient was unable to tolerate given her recent vaginal approach hysterectomy.  As this finding is equivocal and incidental, I am holding off on full weight-based anticoagulation in this patient who is only 3 days from a major surgery.  However, we will consider reattempt at a transvaginal ultrasound potentially with sedation tomorrow for definitive  diagnosis.    Transaminitis- (present on admission)  Assessment & Plan  Review of the medical records shows that this is chronic, etiology unclear.  Continue to monitor for now and avoid hepatotoxic medications.      Prophylaxis: Heparin for DVT prophylaxis, home PPI indicated, bowel protocol as needed

## 2020-06-07 NOTE — PROGRESS NOTES
Pt BP after 500ml bolus remains the same at 89/45. MD Angeli Hylton paged. MD aware of BP. MD stated to increase LR to 150ml/hr. Pt c/o dry eyes and 7 out of 10 pain in abd. MD ordered over the phone with read back lubricant refresh for eyes and morphine 0.5-2mg Q3PRN. MD stated to start with 0.5mg for pain and call back if BP continues to drop any lower. MD sated pt BP may run soft in the 90s systolic. WCM.

## 2020-06-07 NOTE — PROGRESS NOTES
Patients vs upon check was bp 89/49 map 62 hr 71 temp 98.2 rr 18 c/o headache 7/10.. called md on call to inform of patietn condition change. Will f/u    md asrmiento ordered 1L LR bolus and continue maintenance ivf order. Will admin as ordered and continue to monitor.

## 2020-06-07 NOTE — ED PROVIDER NOTES
ED Provider Note    Scribed for Cielo Banuelos M.D. by Carmine Iglesias. 6/6/2020, 10:02 PM.    Primary care provider: Negra Carlson M.D.  Means of arrival: walk-in  History obtained from: patient  History limited by: none    CHIEF COMPLAINT  Chief Complaint   Patient presents with   • Fever     had bladder sling and hysterectomy on Wednesday.  today started having fevers, headache, and eye pain.         SUZETTE Funes is a 29 y.o. female who presents to the Emergency Department with complaints of a fever that acute onset earlier today. She took Ibuprofen earlier today. She is currently febrile upon presenting to the ED with a temperature of 102.4 °F. She further reports body aches and migraine headache.   She had a hysterectomy and bladder sling 3 days (6/3/20) ago by Dr. Bowen. She began having abdominal pain about 2 days after the surgery, and she was seen by her surgeon yesterday. She describes her pain as a 7-8/10 and localizes it to her suprapubic region. Her pain is worsened with movement. She states that she has nausea as well. She denies any cough, shortness of breath, or gross hematuria. She also had a catheter in place before following her surgery which was recently removed.     PPE Note: I personally donned full PPE for all patient encounters during this visit, including wearing an N95 respirator mask, gloves, and eye protection. Scribe remained outside the patient's room and did not have any contact with the patient for the duration of patient encounter.      REVIEW OF SYSTEMS  Review of Systems   Constitutional: Positive for fever.   Eyes: Positive for blurred vision, photophobia and pain.   Respiratory: Negative for cough and shortness of breath.    Cardiovascular: Negative for leg swelling.   Gastrointestinal: Positive for abdominal pain (suprapubic) and nausea.   Genitourinary: Negative for hematuria.   Musculoskeletal: Positive for myalgias.   Neurological: Positive for headaches.    All other systems reviewed and are negative.    PAST MEDICAL HISTORY   has a past medical history of Abnormal uterine bleeding (AUB), Anxiety, Arthritis, Bowel habit changes, GERD (gastroesophageal reflux disease), Mastitis (2019), Nausea/vomiting in pregnancy (10/5/2012), and Pain (2020).    SURGICAL HISTORY   has a past surgical history that includes lap,diagnostic abdomen (2019); ercp in or (N/A, 10/24/2019); mariaelena by laparoscopy (10/25/2019); salpingectomy (Bilateral, 2019); combined ant/post colporrhaphy (N/A, 6/3/2020); hysterectomy laparoscopy (N/A, 6/3/2020); and cystoscopy (N/A, 6/3/2020).    SOCIAL HISTORY  Social History     Tobacco Use   • Smoking status: Former Smoker     Packs/day: 0.25     Years: 15.00     Pack years: 3.75     Types: Cigarettes     Last attempt to quit: 2019     Years since quittin.9   • Smokeless tobacco: Never Used   Substance Use Topics   • Alcohol use: Not Currently   • Drug use: Not Currently      Social History     Substance and Sexual Activity   Drug Use Not Currently       FAMILY HISTORY  Family History   Problem Relation Age of Onset   • Cancer Mother 45        breast cancer, lymph node,   • Heart Attack Father    • Gout Father    • Cancer Maternal Grandmother         breast cancer       CURRENT MEDICATIONS  Current Outpatient Medications:   •  sulfamethoxazole-trimethoprim (BACTRIM DS) 800-160 MG tablet, Take 1 Tab by mouth 2 times a day., Disp: 14 Tab, Rfl: 0  •  oxyCODONE-acetaminophen (PERCOCET) 5-325 MG Tab, Take 1 Tab by mouth every four hours as needed for up to 7 days., Disp: 20 Tab, Rfl: 0  •  oxyCODONE-acetaminophen (PERCOCET) 5-325 MG Tab, Take 1 Tab by mouth every four hours as needed for up to 7 days., Disp: 20 Tab, Rfl: 0  •  omeprazole (PRILOSEC) 20 MG delayed-release capsule, Take 20 mg by mouth every bedtime. Indications: Gastroesophageal Reflux Disease, Disp: , Rfl:   •  ibuprofen (MOTRIN) 200 MG Tab, Take 600 mg by mouth as  "needed. Indications: Mild to Moderate Pain, Disp: , Rfl:   •  metronidazole (METROGEL) 0.75 % gel, Apply 1 Applicator to affected area(s) 2 times a day. (Patient not taking: Reported on 6/1/2020), Disp: 1 Tube, Rfl: 1  •  tramadol (ULTRAM) 50 MG Tab, Take 50 mg by mouth as needed (Pain)., Disp: , Rfl:   •  methocarbamol (ROBAXIN) 750 MG Tab, Take 750 mg by mouth every morning. Indications: Musculoskeletal Pain, Disp: , Rfl:      ALLERGIES  Allergies   Allergen Reactions   • Nkda [No Known Drug Allergy]        PHYSICAL EXAM  VITAL SIGNS: /75   Pulse (!) 143   Temp (!) 39.1 °C (102.4 °F) (Oral)   Resp 16   Ht 1.651 m (5' 5\")   Wt 71.2 kg (156 lb 15.5 oz)   LMP  (LMP Unknown)   SpO2 99%   BMI 26.12 kg/m²   Vitals reviewed by myself.  Physical Exam   Nursing note and vitals reviewed.  Constitutional: Well-developed and well-nourished. Uncomfortable appearing.  HENT: Head is normocephalic and atraumatic.  Eyes: extra-ocular movements intact  Cardiovascular: Tachycardic rate and regular rhythm. No murmur heard.  Pulmonary/Chest: Breath sounds normal. No wheezes or rales.   Abdominal: Suprapubic abdominal tenderness to palpation. No peritoneal signs.  No rebound, no guarding  Musculoskeletal: Extremities exhibit normal range of motion without edema or tenderness.   Neurological: Awake and alert  Skin: Skin is warm and dry. No rash.     DIAGNOSTIC STUDIES /  LABS  Labs Reviewed   CBC WITH DIFFERENTIAL - Abnormal; Notable for the following components:       Result Value    WBC 12.3 (*)     Neutrophils-Polys 94.80 (*)     Lymphocytes 2.70 (*)     Neutrophils (Absolute) 11.64 (*)     Lymphs (Absolute) 0.33 (*)     All other components within normal limits   COMP METABOLIC PANEL - Abnormal; Notable for the following components:    Glucose 116 (*)     AST(SGOT) 127 (*)     ALT(SGPT) 125 (*)     All other components within normal limits   URINALYSIS - Abnormal; Notable for the following components:    Character " "Cloudy (*)     Protein 30 (*)     Nitrite Positive (*)     Leukocyte Esterase Large (*)     Occult Blood Large (*)     All other components within normal limits    Narrative:     Indication for culture:->Septic Shock: Persistent  hypotension,  Lactic acid > 4, vasopressors/inotropes started   URINE MICROSCOPIC (W/UA) - Abnormal; Notable for the following components:    WBC Packed (*)     RBC 20-50 (*)     Bacteria Many (*)     All other components within normal limits    Narrative:     Indication for culture:->Septic Shock: Persistent  hypotension,  Lactic acid > 4, vasopressors/inotropes started   LACTIC ACID   URINE CULTURE(NEW)    Narrative:     Indication for culture:->Septic Shock: Persistent  hypotension,  Lactic acid > 4, vasopressors/inotropes started   BLOOD CULTURE    Narrative:     Per Hospital Policy: Only change Specimen Src: to \"Line\" if  specified by physician order.   BLOOD CULTURE    Narrative:     Per Hospital Policy: Only change Specimen Src: to \"Line\" if  specified by physician order.   ESTIMATED GFR       All labs reviewed by me.    RADIOLOGY  US-PELVIC TRANSABDOMINAL ONLY   Final Result      Unable to visualize the left ovarian vein. MRI can be obtained for further evaluation.      DX-CHEST-PORTABLE (1 VIEW)   Final Result         1. No acute cardiopulmonary abnormalities are identified.      CT-ABDOMEN-PELVIS WITH   Final Result         1. The uterus is surgically absent. Trace amount of gas in the left anterior pelvis could relate to postsurgical change.      2. Wall thickening of the urinary bladder could be postsurgical change or inflammation/cystitis. Small amount of gas in the urinary bladder.      3. No discrete intra-abdominal fluid collection seen.      4. Questionable filling defect in the mid left ovarian vein could relate to poor contrast mixing or thrombus.        The radiologist's interpretation of all radiological studies have been reviewed by me.    REASSESSMENT  10:02 PM " Patient presents to the ED with abdominal pain and fever 3 days s/p hysterectomy and bladder sling. Patient was initially evaluated by a resident, and then subsequently evaluated by myself. Ordered for labs and imaging to evaluate her symptoms. Patient's nausea will be treated with Zofran, pain treated with Fentanyl, and fever treated with Tylenol.    10:28 PM -  Patient's UA is grossly infected. Ordered Rocephin 1 g to treat. Reevaluated patient at bedside and discussed her lab results. I updated her on the plan of care.    11:26 PM I discussed the patient's case and the above findings with Dr. Mcclellan (Gynecology) who advised obtaining further imaging. Spoke with radiology in regards to the best imaging options, and they recommended obtaining an ultrasound. Patient's pain will be further treated with Morphine and Zofran.      HYDRATION: Based on the patient's presentation of Sepsis and Tachycardia the patient was given IV fluids. IV Hydration was used because oral hydration was not adequate alone. Upon recheck following hydration, the patient was improved.    COURSE & MEDICAL DECISION MAKING  Nursing notes, VS, PMSFHx reviewed in chart.    Patient is a 29-year-old female who comes in for abdominal pain and fever.  Differential diagnosis includes urinary tract infection, intra-abdominal infection, sepsis, postoperative complication.  Diagnostic work-up includes labs, urinalysis and CT of the abdomen.    On initial assessment patient appears uncomfortable, she is tachycardic and febrile.  Given concern for sepsis patient is started on IV fluids.  She is also treated with fentanyl, Zofran and Tylenol.  After treatment patient is feeling mildly improved and is further treated with Dilaudid.  Labs returned are notable for leukocytosis of 12.3.  Urinalysis is consistent with infection for which patient is started on ceftriaxone.  Patient's history, presentation and diagnostics are consistent with sepsis secondary to  urinary tract infection.  We obtained a CT of the abdomen to assess for postsurgical complication, patient was noted to have trace amount of gas in the pelvis secondary to recent laparoscopic surgery.  There was wall thickening of the urinary bladder consistent with urinary tract infection.  Of note there was a questionable filling defect in the mid left ovarian vein which could relate to poor contrast mixing versus thrombus.  I discussed the case with on-call gynecologist Dr. Mcclellan who advises that patient can have a pelvic ultrasound if needed and if she can tolerate it.  I discussed the case with radiologist Dr. Chpaman who advises the pelvic vein may be visualized on ultrasound, however CT of the abdomen with contrast is the best test.  CT of the abdomen can be repeated likely tomorrow after patient has received IV fluids as she has already received 1 contrast load today.  I discussed the case with patient and she is amenable to attempting pelvic ultrasound.  However when ultrasound tech attempted pelvic ultrasound patient was unable to tolerate it we are unable to view ovarian vein on transabdominal ultrasound.  Therefore plan will be to continue to treat patient's sepsis and hospitalize her for management of this, she will require repeat CT of the abdomen tomorrow to assess for possible ovarian vein thrombus.  Given her recent surgery I am reluctant to anticoagulate her at this time until we are certain whether or not she has a thrombus.  I discussed the case with Dr. Darby who will hospitalize the patient for further management.  Attempt hospitalization patient is in guarded condition.    DISPOSITION:  Patient will be hospitalized by Dr. Darby in guarded condition.    FINAL IMPRESSION  1. Sepsis without acute organ dysfunction, due to unspecified organism (HCC)    2. Urinary tract infection without hematuria, site unspecified    3. Abdominal pain, unspecified abdominal location          Carmine PORRAS  (Scribe), am scribing for, and in the presence of, Cielo Banuelos M.D..    Electronically signed by: Carmine Iglesias (Scribe), 6/6/2020    ICielo M.D. personally performed the services described in this documentation, as scribed by Carmine Iglesias in my presence, and it is both accurate and complete.    The note accurately reflects work and decisions made by me.  Cielo Banuelos M.D.  6/7/2020  1:11 AM

## 2020-06-07 NOTE — ASSESSMENT & PLAN NOTE
This is Sepsis Present on admission  SIRS criteria identified on my evaluation include: Fever, with temperature greater than 101 deg F and Tachycardia, with heart rate greater than 90 BPM  Source is lung  Sepsis protocol initiated  Fluid resuscitation ordered per protocol  IV antibiotics as appropriate for source of sepsis  While organ dysfunction may be noted elsewhere in this problem list or in the chart, degree of organ dysfunction does not meet CMS criteria for severe sepsis    Patient has leukocytosis, she is febrile, and she has mild leukopenia.  Otherwise she has no lactic acidosis, and her blood pressure is stable.  UA is positive for nitrites and leukocyte esterase.  Patient be started on empiric antibiotic therapy with IV ceftriaxone and Unasyn and will monitor urine cultures for speciation and sensitivities in order to better guide our antibiotic choices.

## 2020-06-07 NOTE — ASSESSMENT & PLAN NOTE
CT results report a questionable filling defect in the mid left ovarian vein could relate to poor contrast mixing or thrombus.  Transvaginal ultrasound was attempted for definitive diagnosis however patient was unable to tolerate given her recent vaginal approach hysterectomy.  As this finding is equivocal and incidental, I am holding off on full weight-based anticoagulation and the patient who is only 3 days from a major surgery.  However, we will consider reattempt at a transvaginal ultrasound potentially with sedation tomorrow for definitive diagnosis.

## 2020-06-07 NOTE — PROGRESS NOTES
Pt A&Ox4. Lungs clear on RA. C/o pain 5/10. Tramadol given with good relief. Pt c/o abd pain and headache. BP upon arrival from ED transfer was 90/39. BP was retaken of 86/40. MD Brar paged and 1 liter of NS ordered through telephone with read back. Bolus given and BP now 90/45. LR @ 100ml/hr restarted. Will recheck BP again in 1 hour and intervene if needed. Pt denies any light headness or dizziness. Pt resting comfortably in bed. Independent OOB. Safety precautions in place, bed low and locked, WCM.
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
## 2020-06-07 NOTE — PROGRESS NOTES
Patient discussed with Dr. Angeli Hylton, PGY 2 of [facility] family medicine.  This patient is followed by Dr. Carlson as her primary care provider.  The [facility] family medicine residency team will assume care of this patient in the morning.

## 2020-06-07 NOTE — ASSESSMENT & PLAN NOTE
Review the medical records shows that this is chronic, etiology unclear.  Continue to monitor for now and avoid hepatotoxic medications.

## 2020-06-08 LAB
ALBUMIN SERPL BCP-MCNC: 2.9 G/DL (ref 3.2–4.9)
ALBUMIN/GLOB SERPL: 1.3 G/DL
ALP SERPL-CCNC: 69 U/L (ref 30–99)
ALT SERPL-CCNC: 125 U/L (ref 2–50)
ANION GAP SERPL CALC-SCNC: 9 MMOL/L (ref 7–16)
AST SERPL-CCNC: 58 U/L (ref 12–45)
BASOPHILS # BLD AUTO: 0.2 % (ref 0–1.8)
BASOPHILS # BLD: 0.01 K/UL (ref 0–0.12)
BILIRUB SERPL-MCNC: 0.3 MG/DL (ref 0.1–1.5)
BUN SERPL-MCNC: 11 MG/DL (ref 8–22)
CALCIUM SERPL-MCNC: 7.9 MG/DL (ref 8.5–10.5)
CHLORIDE SERPL-SCNC: 106 MMOL/L (ref 96–112)
CO2 SERPL-SCNC: 22 MMOL/L (ref 20–33)
CREAT SERPL-MCNC: 0.63 MG/DL (ref 0.5–1.4)
EOSINOPHIL # BLD AUTO: 0.14 K/UL (ref 0–0.51)
EOSINOPHIL NFR BLD: 2.4 % (ref 0–6.9)
ERYTHROCYTE [DISTWIDTH] IN BLOOD BY AUTOMATED COUNT: 44.3 FL (ref 35.9–50)
GLOBULIN SER CALC-MCNC: 2.3 G/DL (ref 1.9–3.5)
GLUCOSE SERPL-MCNC: 124 MG/DL (ref 65–99)
HCT VFR BLD AUTO: 30.4 % (ref 37–47)
HGB BLD-MCNC: 10 G/DL (ref 12–16)
IMM GRANULOCYTES # BLD AUTO: 0.02 K/UL (ref 0–0.11)
IMM GRANULOCYTES NFR BLD AUTO: 0.3 % (ref 0–0.9)
LYMPHOCYTES # BLD AUTO: 1.83 K/UL (ref 1–4.8)
LYMPHOCYTES NFR BLD: 31.1 % (ref 22–41)
MCH RBC QN AUTO: 30.8 PG (ref 27–33)
MCHC RBC AUTO-ENTMCNC: 32.9 G/DL (ref 33.6–35)
MCV RBC AUTO: 93.5 FL (ref 81.4–97.8)
MONOCYTES # BLD AUTO: 0.39 K/UL (ref 0–0.85)
MONOCYTES NFR BLD AUTO: 6.6 % (ref 0–13.4)
NEUTROPHILS # BLD AUTO: 3.5 K/UL (ref 2–7.15)
NEUTROPHILS NFR BLD: 59.4 % (ref 44–72)
NRBC # BLD AUTO: 0 K/UL
NRBC BLD-RTO: 0 /100 WBC
PLATELET # BLD AUTO: 177 K/UL (ref 164–446)
PMV BLD AUTO: 10.3 FL (ref 9–12.9)
POTASSIUM SERPL-SCNC: 3.4 MMOL/L (ref 3.6–5.5)
PROT SERPL-MCNC: 5.2 G/DL (ref 6–8.2)
RBC # BLD AUTO: 3.25 M/UL (ref 4.2–5.4)
SODIUM SERPL-SCNC: 137 MMOL/L (ref 135–145)
WBC # BLD AUTO: 5.9 K/UL (ref 4.8–10.8)

## 2020-06-08 PROCEDURE — A9270 NON-COVERED ITEM OR SERVICE: HCPCS | Performed by: STUDENT IN AN ORGANIZED HEALTH CARE EDUCATION/TRAINING PROGRAM

## 2020-06-08 PROCEDURE — 36415 COLL VENOUS BLD VENIPUNCTURE: CPT

## 2020-06-08 PROCEDURE — A9270 NON-COVERED ITEM OR SERVICE: HCPCS | Performed by: HOSPITALIST

## 2020-06-08 PROCEDURE — 700102 HCHG RX REV CODE 250 W/ 637 OVERRIDE(OP): Performed by: STUDENT IN AN ORGANIZED HEALTH CARE EDUCATION/TRAINING PROGRAM

## 2020-06-08 PROCEDURE — 700105 HCHG RX REV CODE 258: Performed by: STUDENT IN AN ORGANIZED HEALTH CARE EDUCATION/TRAINING PROGRAM

## 2020-06-08 PROCEDURE — 700105 HCHG RX REV CODE 258: Performed by: HOSPITALIST

## 2020-06-08 PROCEDURE — 700101 HCHG RX REV CODE 250: Performed by: STUDENT IN AN ORGANIZED HEALTH CARE EDUCATION/TRAINING PROGRAM

## 2020-06-08 PROCEDURE — 770006 HCHG ROOM/CARE - MED/SURG/GYN SEMI*

## 2020-06-08 PROCEDURE — 700111 HCHG RX REV CODE 636 W/ 250 OVERRIDE (IP): Performed by: HOSPITALIST

## 2020-06-08 PROCEDURE — 85025 COMPLETE CBC W/AUTO DIFF WBC: CPT

## 2020-06-08 PROCEDURE — 700102 HCHG RX REV CODE 250 W/ 637 OVERRIDE(OP): Performed by: HOSPITALIST

## 2020-06-08 PROCEDURE — 80053 COMPREHEN METABOLIC PANEL: CPT

## 2020-06-08 RX ORDER — LOPERAMIDE HYDROCHLORIDE 2 MG/1
2 CAPSULE ORAL 4 TIMES DAILY PRN
Status: DISCONTINUED | OUTPATIENT
Start: 2020-06-08 | End: 2020-06-09 | Stop reason: HOSPADM

## 2020-06-08 RX ORDER — POTASSIUM CHLORIDE 20 MEQ/1
20 TABLET, EXTENDED RELEASE ORAL ONCE
Status: COMPLETED | OUTPATIENT
Start: 2020-06-08 | End: 2020-06-08

## 2020-06-08 RX ADMIN — HEPARIN SODIUM 5000 UNITS: 5000 INJECTION, SOLUTION INTRAVENOUS; SUBCUTANEOUS at 05:24

## 2020-06-08 RX ADMIN — LOPERAMIDE HYDROCHLORIDE 2 MG: 2 CAPSULE ORAL at 20:38

## 2020-06-08 RX ADMIN — SODIUM CHLORIDE: 9 INJECTION, SOLUTION INTRAVENOUS at 23:33

## 2020-06-08 RX ADMIN — METHOCARBAMOL TABLETS 750 MG: 750 TABLET, COATED ORAL at 05:24

## 2020-06-08 RX ADMIN — OMEPRAZOLE 20 MG: 20 CAPSULE, DELAYED RELEASE ORAL at 19:55

## 2020-06-08 RX ADMIN — METRONIDAZOLE 500 MG: 500 INJECTION, SOLUTION INTRAVENOUS at 14:15

## 2020-06-08 RX ADMIN — CEFTRIAXONE SODIUM 2 G: 2 INJECTION, POWDER, FOR SOLUTION INTRAMUSCULAR; INTRAVENOUS at 17:11

## 2020-06-08 RX ADMIN — OXYCODONE HYDROCHLORIDE AND ACETAMINOPHEN 1 TABLET: 5; 325 TABLET ORAL at 07:55

## 2020-06-08 RX ADMIN — MAGNESIUM HYDROXIDE 30 ML: 400 SUSPENSION ORAL at 14:11

## 2020-06-08 RX ADMIN — DOCUSATE SODIUM 50 MG AND SENNOSIDES 8.6 MG 2 TABLET: 8.6; 5 TABLET, FILM COATED ORAL at 05:24

## 2020-06-08 RX ADMIN — HEPARIN SODIUM 5000 UNITS: 5000 INJECTION, SOLUTION INTRAVENOUS; SUBCUTANEOUS at 23:22

## 2020-06-08 RX ADMIN — TRAMADOL HYDROCHLORIDE 50 MG: 50 TABLET, FILM COATED ORAL at 17:56

## 2020-06-08 RX ADMIN — METRONIDAZOLE 500 MG: 500 INJECTION, SOLUTION INTRAVENOUS at 05:25

## 2020-06-08 RX ADMIN — OXYCODONE HYDROCHLORIDE AND ACETAMINOPHEN 1 TABLET: 5; 325 TABLET ORAL at 20:43

## 2020-06-08 RX ADMIN — TRAMADOL HYDROCHLORIDE 50 MG: 50 TABLET, FILM COATED ORAL at 11:02

## 2020-06-08 RX ADMIN — SODIUM CHLORIDE: 9 INJECTION, SOLUTION INTRAVENOUS at 10:59

## 2020-06-08 RX ADMIN — METRONIDAZOLE 500 MG: 500 INJECTION, SOLUTION INTRAVENOUS at 20:03

## 2020-06-08 RX ADMIN — HEPARIN SODIUM 5000 UNITS: 5000 INJECTION, SOLUTION INTRAVENOUS; SUBCUTANEOUS at 14:14

## 2020-06-08 RX ADMIN — DOCUSATE SODIUM 50 MG AND SENNOSIDES 8.6 MG 2 TABLET: 8.6; 5 TABLET, FILM COATED ORAL at 17:11

## 2020-06-08 RX ADMIN — POTASSIUM CHLORIDE 20 MEQ: 1500 TABLET, EXTENDED RELEASE ORAL at 12:16

## 2020-06-08 ASSESSMENT — PATIENT HEALTH QUESTIONNAIRE - PHQ9
SUM OF ALL RESPONSES TO PHQ9 QUESTIONS 1 AND 2: 0
2. FEELING DOWN, DEPRESSED, IRRITABLE, OR HOPELESS: NOT AT ALL
1. LITTLE INTEREST OR PLEASURE IN DOING THINGS: NOT AT ALL

## 2020-06-08 NOTE — CARE PLAN
Problem: Bowel/Gastric:  Goal: Normal bowel function is maintained or improved  Outcome: PROGRESSING SLOWER THAN EXPECTED  Flowsheets (Taken 6/7/2020 2100)  Last BM: 06/06/20  Note: Pt verbalized that she has not had a bowel movement for the past few days and was having discomfort. Pt requested for a stool softener, provided Miralax 1 packet as ordered. Educated about the s/e of narcotics in relation to constipation. Verbalized understanding. Encouraged to increase mobility to facilitate bowel movement. Verbalized understanding.      Problem: Pain Management  Goal: Pain level will decrease to patient's comfort goal  Outcome: PROGRESSING AS EXPECTED  Intervention: Follow pain managment plan developed in collaboration with patient and Interdisciplinary Team  Note: C/o abdominal pain 7/10, requested pain medication. Provided Oxycodone tab for the pain. Placed in a comfortable position. Educated about Oxycodone pain med, verbalized understanding.

## 2020-06-08 NOTE — PROGRESS NOTES
Pt AOx4, able to make needs known. Not in acute distress. C/o abdominal pain 7/10, requested pain medication. Provided Oxycodone tab for the pain. Placed in a comfortable position. Pt verbalized that she has not had a bowel movement for the past few days and was having discomfort. Pt requested for a stool softener, provided Miralax 1 packet as ordered. Educated about the s/e of narcotics in relation to constipation. Verbalized understanding. Pt currently on RA with VSS, upself. Had vaginal discharge which the pt stated that it was expected because of her hysterectomy. Provided needs. Safety precaution in place. Bed locked and placed in lowest position. Treaded socks on. Call lights within reach. Hourly rounds done.

## 2020-06-08 NOTE — PROGRESS NOTES
[facility] [facility] [place]     Attending:   Dr. Serrano    Resident:   Sariah Tomlinson MD    PATIENT:   Lali Funes; [id number]; 1990    ID:   29 y.o. female admitted for abdominal pain was found to have UTI and Sepsis and Transminitis and filling defect in the mid left ovarian vein on CT. Patient is POD # 5 s/p TLH bladder sling.      SUBJECTIVE:   No acute events overnight, Denies abdominal pain. No nausea or vomiting. Reports no headache this morning. No fever or chills.     OBJECTIVE:  Vitals:    06/07/20 1300 06/07/20 1615 06/07/20 2001 06/08/20 0355   BP: 107/60 (!) 99/58 (!) 95/44 106/66   Pulse: 77 62 65 79   Resp:  18 18 18   Temp:  36.7 °C (98.1 °F) 36.6 °C (97.8 °F) 36.8 °C (98.2 °F)   TempSrc:  Temporal Temporal Temporal   SpO2:  94% 96% 96%   Weight:       Height:         No intake or output data in the 24 hours ending 06/08/20 0713    PHYSICAL EXAM:  General: No acute distress, afebrile, resting comfortably  HEENT: NC/AT. EOMI. MMM, neck supple without adenopathy or mass  Cardiovascular: RRR, NMGR, cap refill brisk.  Respiratory: CTAB, no tachypnea or retractions  Abdomen: soft, NT/ND, no masses, mild suprapubic abdominal tenderness  EXT:  MOLINA, no edema, no erythema/lesion   Neuro: Non-focal    LABS:  Recent Labs     06/06/20 2206 06/08/20 0211   WBC 12.3* 5.9   RBC 4.40 3.25*   HEMOGLOBIN 13.3 10.0*   HEMATOCRIT 39.4 30.4*   MCV 89.5 93.5   MCH 30.2 30.8   RDW 41.3 44.3   PLATELETCT 203 177   MPV 9.9 10.3   NEUTSPOLYS 94.80* 59.40   LYMPHOCYTES 2.70* 31.10   MONOCYTES 1.70 6.60   EOSINOPHILS 0.30 2.40   BASOPHILS 0.10 0.20     Recent Labs     06/06/20 2206 06/08/20 0211   SODIUM 141 137   POTASSIUM 4.1 3.4*   CHLORIDE 109 106   CO2 21 22   BUN 14 11   CREATININE 0.89 0.63   CALCIUM 9.1 7.9*   ALBUMIN 3.8 2.9*     Estimated GFR/CRCL = Estimated Creatinine Clearance: 129.7 mL/min (by C-G formula based on SCr of 0.63 mg/dL).  Recent Labs     06/06/20 2206 06/08/20  0211   GLUCOSE 116* 124*      Recent Labs     06/06/20 2206 06/08/20  0211   ASTSGOT 127* 58*   ALTSGPT 125* 125*   TBILIRUBIN 0.6 0.3   ALKPHOSPHAT 81 69   GLOBULIN 2.7 2.3             No results for input(s): INR, APTT, FIBRINOGEN in the last 72 hours.    Invalid input(s): DIMER      IMAGING:    CT Scan:  6/6/2020  IMPRESSION:        1. The uterus is surgically absent. Trace amount of gas in the left anterior pelvis could relate to postsurgical change.     2. Wall thickening of the urinary bladder could be postsurgical change or inflammation/cystitis. Small amount of gas in the urinary bladder.     3. No discrete intra-abdominal fluid collection seen.     4. Questionable filling defect in the mid left ovarian vein could relate to poor contrast mixing or thrombus.    US of the pelvis: 06/07/2020:    IMPRESSION:     Unable to visualize the left ovarian vein. MRI can be obtained for further evaluation.        MEDS:  Current Facility-Administered Medications   Medication Last Dose   • tramadol (ULTRAM) 50 MG tablet 50 mg 50 mg at 06/07/20 0805   • omeprazole (PRILOSEC) capsule 20 mg 20 mg at 06/07/20 2211   • oxyCODONE-acetaminophen (PERCOCET) 5-325 MG per tablet 1 Tab 1 Tab at 06/07/20 2210   • methocarbamol (ROBAXIN) tablet 750 mg 750 mg at 06/08/20 0524   • senna-docusate (PERICOLACE or SENOKOT S) 8.6-50 MG per tablet 2 Tab 2 Tab at 06/08/20 0524    And   • polyethylene glycol/lytes (MIRALAX) PACKET 1 Packet 1 Packet at 06/07/20 2211    And   • magnesium hydroxide (MILK OF MAGNESIA) suspension 30 mL      And   • bisacodyl (DULCOLAX) suppository 10 mg     • heparin injection 5,000 Units 5,000 Units at 06/08/20 0524   • cefTRIAXone (ROCEPHIN) 2 g in  mL IVPB Stopped at 06/07/20 1730   • ondansetron (ZOFRAN) syringe/vial injection 4 mg     • ondansetron (ZOFRAN ODT) dispertab 4 mg 4 mg at 06/07/20 1701   • promethazine (PHENERGAN) tablet 12.5-25 mg     • promethazine (PHENERGAN) suppository 12.5-25 mg     • prochlorperazine (COMPAZINE)  injection 5-10 mg     • carboxymethylcellulose (REFRESH TEARS) 0.5 % ophthalmic drops 1 Drop 1 Drop at 06/07/20 1711   • morphine (pf) 4 MG/ML injection 0.5-2 mg     • NS infusion     • metroNIDAZOLE (FLAGYL) IVPB 500 mg Stopped at 06/08/20 0625       ASSESSMENT/PLAN:    29 y.o. female admitted for abdominal pain was found to have UTI and Sepsis and Transminitis and filling defect in the mid left ovarian vein on CT. Patient is POD # 5 s/p TLH bladder sling.      #Sepsis:  -SIRS 3 out of 4. Fever, tachycardia and HR >90  -Soft BP and received boluses.  -sepsis protocol initiated and ressuscitation with fluids  -Source UTI  -Leukocytosis, fever, and leukopenia upon presentation  -WBC count normalized  -Blood culture NGTD       Plan:  - MIVF, will stop today if patient has adequate PO intake  -Antibiotics on board  -Continue monitoring BP  -CTM      #Urinary track infection   -abdominal pain and suprapubic tenderness   -UA +ve for UTI    Plan:  -C3 & Flagyl started. Will need to decided on abx to go home on.  -Follow up on urine culture  -CTM      #S/p TLH and bladder sling  -POD # 5  -surgical site clean dry and intact    Plan:  -OBGYN following , rec appreciated   -CTm       #Filling defect in mid left ovarian vein   -Was seen on the CT scan  -Subsequent US showed : unable to visualize left ovarian vein    Plan:  -Per OBGYN suspect that small filling defect in ovarian vein is artifact and would not recommend MRI tomorrow unless other suspicions  -CTM      Core Measures:  Lines:PIV   Tubes:none  Diet:regular diet  Abx:C3 and Flagyl  DVT Ppx:heparin  GI Ppx:Omeprazole  PCP:Dr. Carlson  CODE STATUS:Full       Dispo:inpatient for UTI tx with antibiotics.       Sariah Tomlinson MD

## 2020-06-08 NOTE — PROGRESS NOTES
"Pt has a schedule for MRI today. Complete MRI screening. As per pt, she has claustrophobia and would like to receive medication for it. Pt also has a bladder sling, which the nurse noted the MRI conditional in the screening. Charge nurse aware.     As per Gynecology notes, MD stated \"small filling defect in ovarian vein is artifact and would not recommend MRI tomorrow unless other suspicions.\" Charge nurse aware.   "

## 2020-06-08 NOTE — PROGRESS NOTES
C/o pain in ab 7/10 gave pain meds as ordered once bp was wnl. Up ad kimi steady independent. Nausea gave zofran per order. Safety precautions in place. Call light and belongings in reach. Vss. wcm.

## 2020-06-08 NOTE — PROGRESS NOTES
Gynecology evaluation note;        PATIENT ID:  NAME:  Lali Funes  MRN:               [id number]  YOB: 1990     29 y.o. female  at Unknown POD#4s/p TLH bladder sling      Subjective: Patient was admitted around midnight last night with epigastric and lower abdominal pain also with shoulder pain and headache.  Patient states that her abdominal pain and headache have completely resolved.  The patient denies vaginal bleeding or foul-smelling discharge.  Patient denies diarrhea patient denies nausea or vomiting.  Patient tolerating a regular diet  Patient had CT scan which is essentially normal there is appearance of a small filling defect in ovarian vein    Objective:    Vitals:    06/07/20 0710 06/07/20 0908 06/07/20 1300 06/07/20 1615   BP: (!) 79/44 (!) 98/52 107/60 (!) 99/58   Pulse: 71 68 77 62   Resp: 18 18  18   Temp: 36.8 °C (98.2 °F) 36.7 °C (98 °F)  36.7 °C (98.1 °F)   TempSrc: Temporal Temporal  Temporal   SpO2: 96% 99%  94%   Weight:       Height:         General: No acute distress, resting comfortably in bed.  HEENT: normocephalic, nontraumatic, PERRLA, EOMI  Cardiovascular: Heart RRR with no murmurs, rubs or gallops. Distal Pulses 2+  Respiratory: symmetric chest expansion, lungs CTA bilaterally with no wheezes rales or rhonci  Abdomen: soft, mildly tender, no rebound  incisions clean and dry which is clean, dry and intact, fundus firm, +BS  Genitourinary:  bi- Manual-cuff intact without masses or tenderness  Musculoskeletal: strength 5/5 in four extremities  Neuro: non focal with no numbness, tingling or changes in sensation      Recent Labs     06/06/20  2206   WBC 12.3*   RBC 4.40   HEMOGLOBIN 13.3   HEMATOCRIT 39.4   MCV 89.5   MCH 30.2   RDW 41.3   PLATELETCT 203   MPV 9.9   NEUTSPOLYS 94.80*   LYMPHOCYTES 2.70*   MONOCYTES 1.70   EOSINOPHILS 0.30   BASOPHILS 0.10     Recent Labs     06/06/20  2206   SODIUM 141   POTASSIUM 4.1   CHLORIDE 109   CO2 21   GLUCOSE 116*   BUN 14        Current Meds:   Current Facility-Administered Medications   Medication Dose Frequency Provider Last Rate Last Dose   • tramadol (ULTRAM) 50 MG tablet 50 mg  50 mg Q6HRS PRN Yaritza Darby M.D.   50 mg at 06/07/20 0805   • omeprazole (PRILOSEC) capsule 20 mg  20 mg QHS Yaritza Darby M.D.       • oxyCODONE-acetaminophen (PERCOCET) 5-325 MG per tablet 1 Tab  1 Tab Q4HRS PRN Yaritza Darby M.D.   1 Tab at 06/07/20 1701   • methocarbamol (ROBAXIN) tablet 750 mg  750 mg QAM Yaritza Darby M.D.   Stopped at 06/07/20 0600   • senna-docusate (PERICOLACE or SENOKOT S) 8.6-50 MG per tablet 2 Tab  2 Tab BID Yaritza Darby M.D.   2 Tab at 06/07/20 1701    And   • polyethylene glycol/lytes (MIRALAX) PACKET 1 Packet  1 Packet QDAY PRN Yaritza Darby M.D.        And   • magnesium hydroxide (MILK OF MAGNESIA) suspension 30 mL  30 mL QDAY PRN Yaritza Darby M.D.        And   • bisacodyl (DULCOLAX) suppository 10 mg  10 mg QDAY PRN Yaritza Darby M.D.       • heparin injection 5,000 Units  5,000 Units Q8HRS Yaritza Darby M.D.   5,000 Units at 06/07/20 1326   • cefTRIAXone (ROCEPHIN) 2 g in  mL IVPB  2 g Q24HRS Yaritza Darby M.D.   Stopped at 06/07/20 1730   • ondansetron (ZOFRAN) syringe/vial injection 4 mg  4 mg Q4HRS PRN Yaritza Darby M.D.       • ondansetron (ZOFRAN ODT) dispertab 4 mg  4 mg Q4HRS PRMARLO Darby M.D.   4 mg at 06/07/20 1701   • promethazine (PHENERGAN) tablet 12.5-25 mg  12.5-25 mg Q4HRS PRN Yaritza T. Darby, M.D.       • promethazine (PHENERGAN) suppository 12.5-25 mg  12.5-25 mg Q4HRS PRN Yaritza Darby M.D.       • prochlorperazine (COMPAZINE) injection 5-10 mg  5-10 mg Q4HRS PRN Yaritza Darby M.D.       • carboxymethylcellulose (REFRESH TEARS) 0.5 % ophthalmic drops 1 Drop  1 Drop PRN Angeli Hylton M.D.   1 Drop at 06/07/20 1711   • morphine (pf) 4 MG/ML injection 0.5-2 mg  0.5-2 mg Q3HRS PRN Angeli Hylton M.D.       • NS infusion   Continuous Alexis Hines,  M.D. 100 mL/hr at 06/07/20 0917     • metroNIDAZOLE (FLAGYL) IVPB 500 mg  500 mg Q8HRS Alexis Hines M.D.   Stopped at 06/07/20 1444   Last reviewed on 6/3/2020  6:59 AM by Chloé Engel R.N.          Assessment:  29 y.o. female  at Unknown POD#4 s/p TLH bladder sling with transient fever and slight white blood cell count elevation-no current evidence of infectious process.  I suspect that small filling defect in ovarian vein is artifact and would not recommend MRI tomorrow unless other suspicions    Plan:   1. Continue Rocephin and Flagyl for 24 to 48 hours and continued observation  2. Continue regular diet    Car Roberson MD

## 2020-06-08 NOTE — PROGRESS NOTES
nikolay c/o leg cramps. Reviewed labs. Noted Calcium low 7.9 explained to patient that could be why shes having cramps. Called md sarmiento to report labs. He acknowledges. No new orders at this time. I encouraged nikolay to ambulate in halls maybe will help cramps in legs. Wcm.

## 2020-06-08 NOTE — CARE PLAN
Problem: Infection  Goal: Will remain free from infection  Outcome: PROGRESSING AS EXPECTED     Problem: Venous Thromboembolism (VTW)/Deep Vein Thrombosis (DVT) Prevention:  Goal: Patient will participate in Venous Thrombosis (VTE)/Deep Vein Thrombosis (DVT)Prevention Measures  Outcome: PROGRESSING AS EXPECTED     Problem: Bowel/Gastric:  Goal: Normal bowel function is maintained or improved  Outcome: PROGRESSING AS EXPECTED  Goal: Will not experience complications related to bowel motility  Outcome: PROGRESSING AS EXPECTED     Problem: Discharge Barriers/Planning  Goal: Patient's continuum of care needs will be met  Outcome: PROGRESSING AS EXPECTED     Problem: Pain Management  Goal: Pain level will decrease to patient's comfort goal  Outcome: PROGRESSING AS EXPECTED     Problem: Fluid Volume:  Goal: Will maintain balanced intake and output  Outcome: PROGRESSING AS EXPECTED

## 2020-06-09 VITALS
HEART RATE: 60 BPM | OXYGEN SATURATION: 97 % | DIASTOLIC BLOOD PRESSURE: 55 MMHG | WEIGHT: 158.29 LBS | BODY MASS INDEX: 26.37 KG/M2 | TEMPERATURE: 97.6 F | HEIGHT: 65 IN | RESPIRATION RATE: 16 BRPM | SYSTOLIC BLOOD PRESSURE: 96 MMHG

## 2020-06-09 PROBLEM — R93.5 ABNORMAL ABDOMINAL CT SCAN: Status: RESOLVED | Noted: 2020-06-07 | Resolved: 2020-06-09

## 2020-06-09 PROBLEM — A41.9 SEPSIS (HCC): Status: RESOLVED | Noted: 2020-06-07 | Resolved: 2020-06-09

## 2020-06-09 PROCEDURE — 700102 HCHG RX REV CODE 250 W/ 637 OVERRIDE(OP): Performed by: HOSPITALIST

## 2020-06-09 PROCEDURE — 700105 HCHG RX REV CODE 258: Performed by: STUDENT IN AN ORGANIZED HEALTH CARE EDUCATION/TRAINING PROGRAM

## 2020-06-09 PROCEDURE — 700101 HCHG RX REV CODE 250: Performed by: STUDENT IN AN ORGANIZED HEALTH CARE EDUCATION/TRAINING PROGRAM

## 2020-06-09 PROCEDURE — 700111 HCHG RX REV CODE 636 W/ 250 OVERRIDE (IP): Performed by: HOSPITALIST

## 2020-06-09 PROCEDURE — A9270 NON-COVERED ITEM OR SERVICE: HCPCS | Performed by: HOSPITALIST

## 2020-06-09 RX ORDER — CEFDINIR 300 MG/1
300 CAPSULE ORAL 2 TIMES DAILY
Qty: 8 CAP | Refills: 0 | Status: SHIPPED | OUTPATIENT
Start: 2020-06-09 | End: 2021-02-27

## 2020-06-09 RX ADMIN — HEPARIN SODIUM 5000 UNITS: 5000 INJECTION, SOLUTION INTRAVENOUS; SUBCUTANEOUS at 05:01

## 2020-06-09 RX ADMIN — METHOCARBAMOL TABLETS 750 MG: 750 TABLET, COATED ORAL at 04:57

## 2020-06-09 RX ADMIN — METRONIDAZOLE 500 MG: 500 INJECTION, SOLUTION INTRAVENOUS at 04:57

## 2020-06-09 RX ADMIN — ONDANSETRON HYDROCHLORIDE 4 MG: 2 SOLUTION INTRAMUSCULAR; INTRAVENOUS at 04:57

## 2020-06-09 RX ADMIN — SODIUM CHLORIDE: 9 INJECTION, SOLUTION INTRAVENOUS at 10:31

## 2020-06-09 NOTE — DISCHARGE INSTRUCTIONS
Discharge Instructions    Discharged to home by car with relative. Discharged via wheelchair, hospital escort: Yes.  Special equipment needed: Not Applicable    Be sure to schedule a follow-up appointment with your primary care doctor or any specialists as instructed.     Discharge Plan:   Diet Plan: (P) Discussed  Activity Level: (P) Discussed  Smoking Cessation Offered: Patient Refused  Confirmed Follow up Appointment: (P) Patient to Call and Schedule Appointment  Confirmed Symptoms Management: (P) Discussed  Medication Reconciliation Updated: (P) Yes    I understand that a diet low in cholesterol, fat, and sodium is recommended for good health. Unless I have been given specific instructions below for another diet, I accept this instruction as my diet prescription.   Other diet: Regular    Special Instructions: None    · Is patient discharged on Warfarin / Coumadin?   No     Depression / Suicide Risk    As you are discharged from this [facility] facility, it is important to learn how to keep safe from harming yourself.    Recognize the warning signs:  · Abrupt changes in personality, positive or negative- including increase in energy   · Giving away possessions  · Change in eating patterns- significant weight changes-  positive or negative  · Change in sleeping patterns- unable to sleep or sleeping all the time   · Unwillingness or inability to communicate  · Depression  · Unusual sadness, discouragement and loneliness  · Talk of wanting to die  · Neglect of personal appearance   · Rebelliousness- reckless behavior  · Withdrawal from people/activities they love  · Confusion- inability to concentrate     If you or a loved one observes any of these behaviors or has concerns about self-harm, here's what you can do:  · Talk about it- your feelings and reasons for harming yourself  · Remove any means that you might use to hurt yourself (examples: pills, rope, extension cords, firearm)  · Get professional help from  the community (Mental Health, Substance Abuse, psychological counseling)  · Do not be alone:Call your Safe Contact- someone whom you trust who will be there for you.  · Call your local CRISIS HOTLINE 242-0857 or 207-101-2309  · Call your local Children's Mobile Crisis Response Team [place] (864) 680-1348 or www.Helium  · Call the toll free National Suicide Prevention Hotlines   · National Suicide Prevention Lifeline 320-256-BXBD (7698)  · National Hope Line Network 800-SUICIDE (061-4522)    Acute Urinary Retention, Female  Urinary retention means you are unable to pee completely or at all (empty your bladder).  Follow these instructions at home:  · Drink enough fluids to keep your pee (urine) clear or pale yellow.  · If you are sent home with a tube that drains the bladder (catheter), there will be a drainage bag attached to it. There are two types of bags. One is big that you can wear at night without having to empty it. One is smaller and needs to be emptied more often.  ¨ Keep the drainage bag emptied.  ¨ Keep the drainage bag lower than the tube.  · Only take medicine as told by your doctor.  Contact a doctor if:  · You have a low-grade fever.  · You have spasms or you are leaking pee when you have spasms.  Get help right away if:  · You have chills or a fever.  · Your catheter stops draining pee.  · Your catheter falls out.  · You have increased bleeding that does not stop after you have rested and increased the amount of fluids you had been drinking.  This information is not intended to replace advice given to you by your health care provider. Make sure you discuss any questions you have with your health care provider.  Document Released: 06/05/2009 Document Revised: 05/25/2017 Document Reviewed: 05/29/2014  [employer] Interactive Patient Education © 2017 [employer]  Sepsis, Adult  Sepsis is a serious infection of your blood or tissues that affects your whole body. The infection that causes  sepsis may be bacterial, viral, fungal, or parasitic. Sepsis may be life threatening. Sepsis can cause your blood pressure to drop. This may result in shock. Shock causes your central nervous system and your organs to stop working correctly.  What increases the risk?  Sepsis can happen in anyone, but it is more likely to happen in people who have weakened immune systems.  What are the signs or symptoms?  Symptoms of sepsis can include:  · Fever or low body temperature (hypothermia).  · Rapid breathing (hyperventilation).  · Chills.  · Rapid heartbeat (tachycardia).  · Confusion or light-headedness.  · Trouble breathing.  · Urinating much less than usual.  · Cool, clammy skin or red, flushed skin.  · Other problems with the heart, kidneys, or brain.  How is this diagnosed?  Your health care provider will likely do tests to look for an infection, to see if the infection has spread to your blood, and to see how serious your condition is. Tests can include:  · Blood tests, including cultures of your blood.  · Cultures of other fluids from your body, such as:  ¨ Urine.  ¨ Pus from wounds.  ¨ Mucus coughed up from your lungs.  · Urine tests other than cultures.  · X-ray exams or other imaging tests.  How is this treated?  Treatment will begin with elimination of the source of infection. If your sepsis is likely caused by a bacterial or fungal infection, you will be given antibiotic or antifungal medicines.  You may also receive:  · Oxygen.  · Fluids through an IV tube.  · Medicines to increase your blood pressure.  · A machine to clean your blood (dialysis) if your kidneys fail.  · A machine to help you breathe if your lungs fail.  Get help right away if:  You get an infection or develop any of the signs and symptoms of sepsis after surgery or a hospitalization.  This information is not intended to replace advice given to you by your health care provider. Make sure you discuss any questions you have with your health care  provider.  Document Released: 09/15/2004 Document Revised: 05/25/2017 Document Reviewed: 08/25/2014  [employer] Interactive Patient Education © 2017 [employer]  Hysterectomy Information  A hysterectomy is a surgery in which your uterus is removed. This surgery may be done to treat various medical problems. After the surgery, you will no longer have menstrual periods. The surgery will also make you unable to become pregnant (sterile). The fallopian tubes and ovaries can be removed (bilateral salpingo-oophorectomy) during this surgery as well.  Reasons for a hysterectomy  · Persistent, abnormal bleeding.  · Lasting (chronic) pelvic pain or infection.  · The lining of the uterus (endometrium) starts growing outside the uterus (endometriosis).  · The endometrium starts growing in the muscle of the uterus (adenomyosis).  · The uterus falls down into the vagina (pelvic organ prolapse).  · Noncancerous growths in the uterus (uterine fibroids) that cause symptoms.  · Precancerous cells.  · Cervical cancer or uterine cancer.  Types of hysterectomies  · Supracervical hysterectomy--In this type, the top part of the uterus is removed, but not the cervix.  · Total hysterectomy--The uterus and cervix are removed.  · Radical hysterectomy--The uterus, the cervix, and the fibrous tissue that holds the uterus in place in the pelvis (parametrium) are removed.  Ways a hysterectomy can be performed  · Abdominal hysterectomy--A large surgical cut (incision) is made in the abdomen. The uterus is removed through this incision.  · Vaginal hysterectomy--An incision is made in the vagina. The uterus is removed through this incision. There are no abdominal incisions.  · Conventional laparoscopic hysterectomy--Three or four small incisions are made in the abdomen. A thin, lighted tube with a camera (laparoscope) is inserted into one of the incisions. Other tools are put through the other incisions. The uterus is cut into small pieces. The  small pieces are removed through the incisions, or they are removed through the vagina.  · Laparoscopically assisted vaginal hysterectomy (LAVH)--Three or four small incisions are made in the abdomen. Part of the surgery is performed laparoscopically and part vaginally. The uterus is removed through the vagina.  · Robot-assisted laparoscopic hysterectomy--A laparoscope and other tools are inserted into 3 or 4 small incisions in the abdomen. A computer-controlled device is used to give the surgeon a 3D image and to help control the surgical instruments. This allows for more precise movements of surgical instruments. The uterus is cut into small pieces and removed through the incisions or removed through the vagina.  What are the risks?  Possible complications associated with this procedure include:  · Bleeding and risk of blood transfusion. Tell your health care provider if you do not want to receive any blood products.  · Blood clots in the legs or lung.  · Infection.  · Injury to surrounding organs.  · Problems or side effects related to anesthesia.  · Conversion to an abdominal hysterectomy from one of the other techniques.  What to expect after a hysterectomy  · You will be given pain medicine.  · You will need to have someone with you for the first 3-5 days after you go home.  · You will need to follow up with your surgeon in 2-4 weeks after surgery to evaluate your progress.  · You may have early menopause symptoms such as hot flashes, night sweats, and insomnia.  · If you had a hysterectomy for a problem that was not cancer or not a condition that could lead to cancer, then you no longer need Pap tests. However, even if you no longer need a Pap test, a regular exam is a good idea to make sure no other problems are starting.  This information is not intended to replace advice given to you by your health care provider. Make sure you discuss any questions you have with your health care provider.  Document  Released: 06/13/2002 Document Revised: 05/25/2017 Document Reviewed: 08/25/2014  [employer] Interactive Patient Education © 2017 [employer]

## 2020-06-09 NOTE — PROGRESS NOTES
Pt dc'd home. IV  Removed.  Pt left unit via wheelchair with escort. Personal belongings with pt when leaving unit. Pt given discharge instructions prior to leaving unit including prescription and when to visit with physician; verbalizes understanding. Copy of discharge instructions with pt and in the chart.

## 2020-06-09 NOTE — DISCHARGE SUMMARY
[facility] DISCHARGE SUMMARY     PATIENT ID:  Name:             Lali Fuens   YOB: 1990  Age:                 29 y.o.  female   MRN:               [id number]  Address:         [address] [place] [address] Road  [address] [facility]  [place] [address]  Phone:            [phone] (home)    ADMISSION DATE:   6/6/2020    DISCHARGE DATE:   6/9/2020    DISCHARGE DIAGNOSES:   No problems updated.    ATTENDING PHYSICIAN:   Dr. regan    RESIDENT:   Sariah Tomlinson MD    CONSULTANTS:    [facility]    PROCEDURES:    none    IMAGING:   CT scan of the abdomen:  CT of the abdomen   1. The uterus is surgically absent. Trace amount of gas in the left anterior pelvis could relate to postsurgical change.       2. Wall thickening of the urinary bladder could be postsurgical change or inflammation/cystitis. Small amount of gas in the urinary bladder.       3. No discrete intra-abdominal fluid collection seen.       4. Questionable filling defect in the mid left ovarian vein could relate to poor contrast mixing or thrombus.       US of the abdomen:   HISTORY/REASON FOR EXAM:  Ovarian vein thrombosis        TECHNIQUE/EXAM DESCRIPTION:  Transabdominal pelvic ultrasound.     COMPARISON:   CT 6/6/2020     FINDINGS:  Transabdominal scanning was performed.     Bowel gas throughout the abdomen and pelvis.     Unable to visualize the left ovarian vein. Bilateral ovaries are not visualized.     Patient refused transvaginal exam due to pain.     IMPRESSION:     Unable to visualize the left ovarian vein. MRI can be obtained for further evaluation.    LABS:  Recent Labs     06/06/20 2206 06/08/20 0211   WBC 12.3* 5.9   RBC 4.40 3.25*   HEMOGLOBIN 13.3 10.0*   HEMATOCRIT 39.4 30.4*   MCV 89.5 93.5   MCH 30.2 30.8   RDW 41.3 44.3   PLATELETCT 203 177   MPV 9.9 10.3   NEUTSPOLYS 94.80* 59.40   LYMPHOCYTES 2.70* 31.10   MONOCYTES 1.70 6.60   EOSINOPHILS 0.30 2.40   BASOPHILS 0.10 0.20     Recent Labs     06/06/20 2206 06/08/20  0211   SODIUM 141  "137   POTASSIUM 4.1 3.4*   CHLORIDE 109 106   CO2 21 22   GLUCOSE 116* 124*   BUN 14 11     No results found for: CHOLSTRLTOT, LDL, HDL, TRIGLYCERIDE    No results found for: TROPONINI, CKMB  No results found for: TROPONINI, CKMB       HISTORY OF PRESENT ILLNESS:  \"Per HPI  \"29 y.o. female who presented on 6/6/2020 with fever.  This is a pleasant young female who is approximately postop day #3 for hysterectomy and bladder sling.  Approximately 1 day ago, she began to have lower abdominal pain and had reported the symptom to her surgeon in postop follow-up.  The pain has persisted, it is primarily suprapubic, aggravated with movement and had minimal alleviation with over-the-counter medications.  Associated with this, she describes a subjective fever, nausea, and headache.  Otherwise she states that she had been recovering well until the pain began, she had no chest pain, breathing problems, diarrhea or dysuria.  Upon arrival in the emergency room, the patient is noted to be febrile to 102.4 degrees\"    ED course:  While in the ED patient blood pressure 121/75, pulse 143, temperature 102.4, respiratory rate 16, oxygen saturation 99%.  CBC showed WBC 12.3, absolute neutrophil 11.64  CMP showed glucose 116,    UA was collected which showed cloudy, protein 30, nitrate positive leukocyte large and occult blood was large UA microscopic showed WBC is back, many bacteria.  Lactic acid normal 1.7  Blood culture was obtained  Chest x-ray with no acute abnormality  CT of the abdomen   1. The uterus is surgically absent. Trace amount of gas in the left anterior pelvis could relate to postsurgical change.       2. Wall thickening of the urinary bladder could be postsurgical change or inflammation/cystitis. Small amount of gas in the urinary bladder.       3. No discrete intra-abdominal fluid collection seen.       4. Questionable filling defect in the mid left ovarian vein could relate to poor contrast mixing or " thrombus.       HOSPITAL COURSE:   Briefly, 29 y.o. female admitted for abdominal pain and was found to have UTI and sepsis and transaminitis and filling defect in the mid left lower radian when on CT.       #Sepsis:  Upon presentation to the ED, patient had sepsis 3 out of 4.  Fever, tachycardia, heart rate was greater than 90.  Patient was also having a soft BP.  She received sepsis sepsis boluses.  She was also started on sepsis protocol and fluid resuscitation.  The source was UTI.  She had leukocytosis fever and leukopenia upon presentation.  She was admitted to the floor and was started on ceftriaxone initially and then escalated to Flagyl per OB/GYN.  Next day she was feeling better her blood pressure normalized.  She denies abdominal pain nausea or vomiting.  Although this morning patient had a total of 9 episodes of watery diarrhea.  No blood or mucus.  She is afebrile.  Her blood pressure has been getting normal.  She was started on Imodium and her diarrhea resolved.  She also had an episode of nausea and received Zofran.  No more nausea.  She is stable and would like to go home today.  Patient has a good appetite.  Follow-up with PCP upon discharge and follow-up with OB/GYN upon discharge.     #Urinary track infection   Initially, patient was presented with abdominal pain and suprapubic tenderness.  UA was positive for infection.  Patient was initially started on C3.  Escalate antibiotics to Flagyl.  No more episode of fever.  Has been voiding normally.  Urine culture grew lactose fermenting gram-negative bashir.  Patient is stable and would like to go home today.  Will discharge patient on 300 mg twice daily cefdinir.  Close follow-up with PCP and OB/GYN.     #S/p TLH and bladder sling  POD #6.  Surgical site clean dry and intact.  While she was in the hospital we talked with OB/GYN, recommending close follow-up with OB/GYN.        #Filling defect in mid left ovarian vein   -Upon presentation to the ED  patient was complaining of abdominal pain.  CT was performed which showed defect in mid left ovarian vein.  Subsequent ultrasound of the pelvis show unable to visualize left ovarian vein.  While the patient in hospital we kept in touch with OB/GYN and they reported that small filling defect and ovarian likely an artifact and would not recommend an MRI.  Follow-up with OB/GYN and PCP.     DISCHARGE CONDITION:    Stable    DISPOSITION:   Home     DISCHARGE MEDICATIONS:    FunesLali   Home Medication Instructions SADIE:[id number]    Printed on:06/09/20 100   Medication Information                      ibuprofen (MOTRIN) 200 MG Tab  Take 600 mg by mouth as needed. Indications: Mild to Moderate Pain             methocarbamol (ROBAXIN) 750 MG Tab  Take 750 mg by mouth every morning. Indications: Musculoskeletal Pain             metronidazole (METROGEL) 0.75 % gel  Apply 1 Applicator to affected area(s) 2 times a day.             omeprazole (PRILOSEC) 20 MG delayed-release capsule  Take 20 mg by mouth every bedtime. Indications: Gastroesophageal Reflux Disease             oxyCODONE-acetaminophen (PERCOCET) 5-325 MG Tab  Take 1 Tab by mouth every four hours as needed for up to 7 days.             oxyCODONE-acetaminophen (PERCOCET) 5-325 MG Tab  Take 1 Tab by mouth every four hours as needed for up to 7 days.             sulfamethoxazole-trimethoprim (BACTRIM DS) 800-160 MG tablet  Take 1 Tab by mouth 2 times a day.             tramadol (ULTRAM) 50 MG Tab  Take 50 mg by mouth as needed (Pain).                  ACTIVITY:   Normal Activity as Tolerated.    DIET:   Healthy    DISCHARGE INSTRUCTIONS AND FOLLOW UP:  Patient is medically stable for discharge and will be discharged to home    Follow Up:  -Follow-up with PCP in 1 to 2-week of discharge  -Follow-up with OB/GYN in 1 to 2 weeks after discharge  -Continue taking 300 mg cefdinir twice daily for 4 more days.  Patient to ER if concern arises.    Discharge  Instructions:   Patient was instructed to return the ER in the event of worsening symptoms including but not limited to or any other major concerns. Patient understands that failure to do so may indicate worsening of his/her medical condition(s) and result in adverse clinical outcomes including fatality. We have counseled the patient on the importance of compliance and the patient has agreed to proceed with all medical recommendations and follow up plan indicated above. The patient understands that the failure to do so may result in result in adverse clinical outcomes including fatality.       CC:   Negra Carlson M.D.

## 2020-06-09 NOTE — PROGRESS NOTES
"Assumed care of pt. On RA with no signs of distress. Pt denies any pain at this time, States \" just soreness from my surgery\". Running NS @100 mL an hr. POC discussed with pt. All needs met at this time. Comfort measures and safety precautions and hourly rounding in place.  "

## 2020-06-09 NOTE — PROGRESS NOTES
Patient had 6 watery stool last night per patient,called MD on cll and got an order of imodium and given,patient still has few diarrhea.AT 0400,Patient was vomiting mostly saliva,zofrn was given,so far no n/ right now.

## 2020-06-09 NOTE — NON-PROVIDER
Internal Medicine Medical Student Note  Note Author: Jessica Melgoza, Student    Name Lali Funes     1990   Age/Sex 29 y.o. female   MRN [id number]   Code Status Full Code      Reason for interval visit  (Principal Problem)   Sepsis (HCC)    Interval Problem Daily Status Update  (problem status, last 24 hours, new history, new data )     POD 6 from hysterectomy and bladder sling performed by Dr. Bowen   [facility] day 4- admitted for sepsis with presumed source UTI (on Flagyl and Rocephin)     Patient evaluated at bedside. She reports 9 episode of watery diarrhea since last night with no blood or mucus. Per patient, she believes this diarrhea could be from her bowel regimen that she was taking for constipation. Patient also reports one episode of nausea and vomiting this morning which she states was improved with zofran. She denies fever. Patient still reporting some mild suprapubic tenderness but states it is significantly improved since admission. She is also reporting waxing and waning headache without aura/vision changes.       Physical Exam       Vitals:    20 1510 20 1915 20 0325 20 0700   BP: (!) 99/61 110/57 101/61 (!) 96/55   Pulse: 74 (!) 58 (!) 56 60   Resp: 17 18 17 16   Temp: 36 °C (96.8 °F) 36.1 °C (96.9 °F) 36.4 °C (97.6 °F) 36.4 °C (97.6 °F)   TempSrc: Temporal Temporal Temporal Temporal   SpO2: 95% 99% 97% 97%   Weight:       Height:         Body mass index is 26.34 kg/m².    Oxygen Therapy:  Pulse Oximetry: 97 %, O2 (LPM): 0, O2 Delivery Device: None - Room Air    Physical Exam   Constitutional: She is oriented to person, place, and time and well-developed, well-nourished, and in no distress.   HENT:   Head: Normocephalic and atraumatic.   Mouth/Throat: Oropharynx is clear and moist.   Eyes: Pupils are equal, round, and reactive to light. EOM are normal.   Cardiovascular: Normal rate, regular rhythm and normal heart sounds.   Pulmonary/Chest: Effort  normal and breath sounds normal.   Abdominal: Soft. There is abdominal tenderness (Mild, suprapubic ).   Well healing surgical incisions, no redness or obvious discharge    Musculoskeletal:         General: No edema.   Lymphadenopathy:     She has no cervical adenopathy.   Neurological: She is alert and oriented to person, place, and time.   Skin: Skin is warm and dry. No rash noted.   Psychiatric: Affect normal.         Assessment/Plan   Lali Funes is a 29 year old female post op day 6 from a hysterectomy/bladder sling who was admitted 4 days ago for sepsis with UTI as presumed source.     #Sepsis/ UTI  At admission, patient had fever (102.4), tachycardia (143) and WBC 12.4. UA with positive blood, nitrites, leukocyte esterase, and bacteria.   WBC count has normalized  Blood cultures no growth to date   Antibiotics: rocephin (day 4)  and Flagyl (day 3)    Improved. Patient can be discharged home with transition to oral antibiotics     #Diarrhea  Patient had 9 episodes of non-bloody diarrhea without mucus last night. Patient states that the stool was partially formed. Improving with immodium. Likely related to bowel regimen and metronidazole use. Low suspicion for C. Diff at this time     Immodium PRN   Oral hydration     #Nausea/ Vomiting   Zofran PRN     # S/P Hysterectomy and Bladder Sling   Post op day 6  Minimal surgical site pain  Surgical incisions clean, dry. No erythema or discharge     Follow up with OBGYN     #Filling defect in mid left ovarian vein   - seen on CT scan   -unable to visualize left ovarian vein with US     Per OBGYN, small filling defect likely artifact and he would not recommend MRI unless other suspicions.   Continue to monitor     Patient is stable for discharge home. She was given return precautions and has no questions at this time.

## 2020-06-09 NOTE — PROGRESS NOTES
Patient alert/oriented x4,room air,ambulatory with steady gait,patient has steri strip in her pannus cdi from the surgery  2days ago,call light and personal belongings within reach and bed in low position.

## 2020-06-09 NOTE — CARE PLAN
Problem: Infection  Goal: Will remain free from infection  Outcome: PROGRESSING AS EXPECTED     Problem: Venous Thromboembolism (VTW)/Deep Vein Thrombosis (DVT) Prevention:  Goal: Patient will participate in Venous Thrombosis (VTE)/Deep Vein Thrombosis (DVT)Prevention Measures  Outcome: PROGRESSING AS EXPECTED   Patient is on heparin for dvt prophylaxis  Problem: Pain Management  Goal: Pain level will decrease to patient's comfort goal  Outcome: PROGRESSING AS EXPECTED     Problem: Fluid Volume:  Goal: Will maintain balanced intake and output  Outcome: PROGRESSING AS EXPECTED

## 2020-06-09 NOTE — CARE PLAN
Problem: Discharge Barriers/Planning  Goal: Patient's continuum of care needs will be met  Outcome: PROGRESSING AS EXPECTED  Intervention: Explain discharge instructions and medication reconcilliation to patient and significant other/support system  Note: Discharge instructions will be explained to pt prior to discharge     Problem: Pain Management  Goal: Pain level will decrease to patient's comfort goal  Outcome: PROGRESSING AS EXPECTED  Intervention: Follow pain managment plan developed in collaboration with patient and Interdisciplinary Team  Note: Pt assessed for pain regularly and educated to call for any pain..

## 2020-06-10 LAB
BACTERIA UR CULT: ABNORMAL
BACTERIA UR CULT: ABNORMAL
SIGNIFICANT IND 70042: ABNORMAL
SITE SITE: ABNORMAL
SOURCE SOURCE: ABNORMAL

## 2020-06-12 LAB
BACTERIA BLD CULT: NORMAL
BACTERIA BLD CULT: NORMAL
SIGNIFICANT IND 70042: NORMAL
SIGNIFICANT IND 70042: NORMAL
SITE SITE: NORMAL
SITE SITE: NORMAL
SOURCE SOURCE: NORMAL
SOURCE SOURCE: NORMAL

## 2020-06-14 NOTE — PROGRESS NOTES
"Non face to face prolonged services of clinical data and chart information reviewed for a total time of 30 performed on 6/12,  6/13 and 6/14 for Lali Funes  Reviewed were:    Referral    Previous encounters    Imaging all mammographic studies, colonoscopies, CT/tomography, MRI and PET studies    Previous procedures all biopsy and surgical procedures including pathology studies and interpretation    Notes    Media all personal and family clinical data including germline DNA studies and interpretations    Miscellaneous reports    Patient summaries family history as documented by referring clinician  Counseling, testing information and materials prepared  \"I spent 30 minutes  from 1300 to 1330 reviewing past records, prior to visit pertaining to genetic risk.\"   Landry Hoff M.D.  edited  "

## 2020-06-16 ENCOUNTER — TELEMEDICINE (OUTPATIENT)
Dept: HEMATOLOGY ONCOLOGY | Facility: MEDICAL CENTER | Age: 30
End: 2020-06-16
Payer: MEDICAID

## 2020-06-16 DIAGNOSIS — Z80.9 FAMILY HISTORY OF CANCER: ICD-10-CM

## 2020-06-16 PROCEDURE — 99212 OFFICE O/P EST SF 10 MIN: CPT | Mod: CR | Performed by: MEDICAL GENETICS

## 2020-06-16 RX ORDER — HYDROXYZINE HYDROCHLORIDE 25 MG/1
25 TABLET, FILM COATED ORAL 3 TIMES DAILY PRN
COMMUNITY
End: 2021-07-03

## 2020-06-16 NOTE — PROGRESS NOTES
Telemedicine Visit: Established Patient     This encounter was conducted via Zoom .   Verbal consent was obtained. Patient's identity was verified.    Subjective:   CC:   Lali Funes is a 29 y.o. female presenting for evaluation and management of:    Patient originally referred for cancer genetic risk assessment, counseling and testing  Risks and benefits of testing reviewed and discrimination and familial significance discussed  Patient agreed to testing.    Patient presents today for results review    ROS   Denies any recent fevers or chills. No nausea or vomiting. No chest pains or shortness of breath.     Allergies   Allergen Reactions   • Nkda [No Known Drug Allergy]    • Cantaloupe    • Tape      Surgical tape       Current medicines (including changes today)  Current Outpatient Medications   Medication Sig Dispense Refill   • hydrOXYzine HCl (ATARAX) 25 MG Tab Take 25 mg by mouth 3 times a day as needed for Itching.     • cefdinir (OMNICEF) 300 MG Cap Take 1 Cap by mouth 2 times a day. 8 Cap 0   • omeprazole (PRILOSEC) 20 MG delayed-release capsule Take 20 mg by mouth every bedtime. Indications: Gastroesophageal Reflux Disease     • ibuprofen (MOTRIN) 200 MG Tab Take 600 mg by mouth as needed. Indications: Mild to Moderate Pain     • methocarbamol (ROBAXIN) 750 MG Tab Take 750 mg by mouth every morning. Indications: Musculoskeletal Pain     • metronidazole (METROGEL) 0.75 % gel Apply 1 Applicator to affected area(s) 2 times a day. (Patient not taking: Reported on 6/16/2020) 1 Tube 1   • tramadol (ULTRAM) 50 MG Tab Take 50 mg by mouth as needed (Pain).       No current facility-administered medications for this visit.        Patient Active Problem List    Diagnosis Date Noted   • UTI (urinary tract infection) 06/07/2020     Priority: High   • Transaminitis 10/23/2019     Priority: Low   • Eustachian tube dysfunction, bilateral 12/03/2019   • Toe pain, right 11/15/2019   • Current mild episode of major  depressive disorder without prior episode (HCC) 11/15/2019   • Uterine prolapse 11/05/2019   • Rectocele 11/05/2019   • Bacterial vaginosis 10/30/2019   • Leukocytosis 10/23/2019   • Abnormal vaginal bleeding 08/22/2019   • Stress incontinence 07/08/2019   • Cystocele, midline 06/27/2019   • Post-op pain 06/18/2019   • Mastitis 05/16/2019   • Low back pain with radiation 05/16/2019       Family History   Problem Relation Age of Onset   • Cancer Mother 45        breast cancer, lymph node,   • Heart Attack Father    • Gout Father    • Cancer Maternal Grandmother         breast cancer       She  has a past medical history of Abnormal uterine bleeding (AUB), AC (acromioclavicular) joint bone spurs, Anxiety, Arthritis, Bowel habit changes, GERD (gastroesophageal reflux disease), Mastitis (05/2019), Nausea/vomiting in pregnancy (10/5/2012), and Pain (05/29/2020). She also has no past medical history of Addisons disease (HCC), Adrenal disorder (HCC), Allergy, Anemia, Arrhythmia, Asthma, Blood transfusion without reported diagnosis, Cancer (HCC), Cataract, CHF (congestive heart failure) (HCC), Clotting disorder (HCC), COPD (chronic obstructive pulmonary disease) (HCC), Cushings syndrome (HCC), Diabetes (HCC), Diabetic neuropathy (HCC), Glaucoma, Goiter, Head ache, Headache(784.0), Heart attack (HCC), Heart murmur, HIV (human immunodeficiency virus infection) (HCC), Hyperlipidemia, Hypertension, IBD (inflammatory bowel disease), Kidney disease, Meningitis, Migraine, Muscle disorder, Osteoporosis, Parathyroid disorder (HCC), Pituitary disease (HCC), Pulmonary emphysema (HCC), Seizure (HCC), Sickle cell disease (HCC), Stroke (HCC), Substance abuse (HCC), Thyroid disease, Tuberculosis, Ulcer, or Urinary tract infection.  She  has a past surgical history that includes pr lap,diagnostic abdomen (6/18/2019); ercp in or (N/A, 10/24/2019); mariaelena by laparoscopy (10/25/2019); salpingectomy (Bilateral, 6/18/2019); pr combined ant/post  colporrhaphy (N/A, 6/3/2020); hysterectomy laparoscopy (N/A, 6/3/2020); and cystoscopy (N/A, 6/3/2020).       Objective:   LMP  (LMP Unknown)     Physical Exam:  Constitutional: Alert, no distress, well-groomed.  Skin: No rashes in visible areas.  Eye: Round. Conjunctiva clear, lids normal. No icterus.   ENMT: Lips pink without lesions, good dentition, moist mucous membranes. Phonation normal.  Neck: No masses, no thyromegaly. Moves freely without pain.  CV: Pulse as reported by patient  Respiratory: Unlabored respiratory effort, no cough or audible wheeze  Psych: Alert and oriented x3, normal affect and mood.       Assessment and Plan:       Results:    [employer]  NO PATHOGENIC VARIANTS IDENTIFIED  A VARIANT OF UNCERTAIN SIGNIFICANCE IDENTIFIED IN CHEK2.  THIS FINDING SHOULD HAVE NO EFFECT ON CLINICAL MANAGEMENT UNTIL FULLY CHARACTERIZED.    All questions answered  The following treatment plan was discussed:     There are no diagnoses linked to this encounter.      Follow-up: No follow-ups on file.

## 2020-06-17 ENCOUNTER — GYNECOLOGY VISIT (OUTPATIENT)
Dept: OBGYN | Facility: CLINIC | Age: 30
End: 2020-06-17
Payer: MEDICAID

## 2020-06-17 VITALS — SYSTOLIC BLOOD PRESSURE: 112 MMHG | WEIGHT: 147.6 LBS | DIASTOLIC BLOOD PRESSURE: 66 MMHG | BODY MASS INDEX: 24.56 KG/M2

## 2020-06-17 DIAGNOSIS — Z09 POSTOP CHECK: ICD-10-CM

## 2020-06-17 PROCEDURE — 99024 POSTOP FOLLOW-UP VISIT: CPT | Performed by: OBSTETRICS & GYNECOLOGY

## 2020-06-17 ASSESSMENT — FIBROSIS 4 INDEX: FIB4 SCORE: 0.88

## 2020-06-17 NOTE — PROGRESS NOTES
Subjective:      Lali Funes is a 29 y.o. female who presents for Gynecologic Exam (Post Op)            HPI patient is a 29-year-old who is 2-week postop status post laparoscopic hysterectomy and suburethral sling who presents today for postop visit.  Patient is doing well except for some minor complaints.  She states she stopped all pain medication and she is doing well except for some soreness once in a while.  She states she was constipated after surgery and after taking multiple different stool softener laxative, she has had some loose bowel movements.  She denies any nausea vomiting fevers or chills.  She states she is able to void but once in a while she gets some soreness under her bladder neck and symptoms are improving.  She reports rare vaginal spotting without any bright red bleeding.  She states she was readmitted after surgery with bladder infection.    ROS all organ systems were reviewed and were negative except for complaints in HPI       Objective:     /66   Wt 67 kg (147 lb 9.6 oz)   LMP  (LMP Unknown)   BMI 24.56 kg/m²      Physical Exam  Vitals signs and nursing note reviewed. Exam conducted with a chaperone present.   Constitutional:       General: She is not in acute distress.     Appearance: Normal appearance. She is not toxic-appearing.   HENT:      Head: Normocephalic and atraumatic.   Eyes:      General:         Right eye: No discharge.         Left eye: No discharge.      Conjunctiva/sclera: Conjunctivae normal.   Neck:      Musculoskeletal: Normal range of motion and neck supple. No neck rigidity.   Cardiovascular:      Rate and Rhythm: Normal rate and regular rhythm.      Pulses: Normal pulses.      Heart sounds: Normal heart sounds. No murmur.   Pulmonary:      Effort: Pulmonary effort is normal. No respiratory distress.      Breath sounds: Normal breath sounds. No wheezing.   Abdominal:      General: Abdomen is flat. Bowel sounds are normal. There is no distension.       Palpations: Abdomen is soft.      Comments: Laparoscopic incisions are healed and nontender   Musculoskeletal: Normal range of motion.      Right lower leg: No edema.      Left lower leg: No edema.   Skin:     General: Skin is warm and dry.   Neurological:      General: No focal deficit present.      Mental Status: She is alert and oriented to person, place, and time.   Psychiatric:         Mood and Affect: Mood normal.         Behavior: Behavior normal.         Thought Content: Thought content normal.         Judgment: Judgment normal.     Pelvic exam is deferred    Pathology report reviewed with patient and is benign            Assessment/Plan:       1. Postop check  Patient presents today for 2-week postop check status post laparoscopic hysterectomy and suburethral sling.  She is doing well overall and progressing without any significant complaints.    2.  Patient to continue postoperative restrictions    3.  Patient will follow-up in 1 week for reassessment    Precautions and plan of care discussed

## 2020-06-17 NOTE — PROGRESS NOTES
Pt here for Post Op Total Laparoscopic Hysterectomy, Placement midurethral Sling, Cystoscopy   # [phone]  Pt states when she urinates she has a muscle cramp wants to know if this is normal, spotting.

## 2020-06-19 PROBLEM — R33.9 URINARY RETENTION: Status: ACTIVE | Noted: 2020-06-19

## 2020-06-19 NOTE — PROGRESS NOTES
Chief Complaint   Patient presents with   • Gynecologic Exam     Voiding trial      Chief complaint: Voiding trial    History of present illness: 29 y.o. presents to office 2 days following placement of mid urethral sling.  Patient was unable to void following the procedure and was sent home with a catheter.  She reports to be doing well.  No new complaints at this time.  She still complaining of some incisional pain.  It is controlled with Percocet      Review of systems:  Pertinent positives documented in HPI and a comprehensive review of system is negative    All PMH, PSH, allergies, social history and FH reviewed and updated today:  Past Medical History:   Diagnosis Date   • Abnormal uterine bleeding (AUB)    • AC (acromioclavicular) joint bone spurs    • Anxiety    • Arthritis     toes, hands, back   • Bowel habit changes     constipation/diarrhea   • GERD (gastroesophageal reflux disease)    • Mastitis 05/2019   • Nausea/vomiting in pregnancy 10/5/2012   • Pain 05/29/2020    pelvic and back, 5/10       Past Surgical History:   Procedure Laterality Date   • PB COMBINED ANT/POST COLPORRHAPHY N/A 6/3/2020    Procedure: COLPORRHAPHY, COMBINED ANTEROPOSTERIOR;  Surgeon: Jose Bowen M.D.;  Location: SURGERY SAME DAY [facility];  Service: Obstetrics   • HYSTERECTOMY LAPAROSCOPY N/A 6/3/2020    Procedure: HYSTERECTOMY, LAPAROSCOPIC-;  Surgeon: Jose Bowen M.D.;  Location: SURGERY SAME DAY [facility];  Service: Obstetrics   • CYSTOSCOPY N/A 6/3/2020    Procedure: CYSTOSCOPY- WITH MID URETHRAL SLING PLACEMENT;  Surgeon: Jose Bowen M.D.;  Location: SURGERY SAME DAY [facility];  Service: Obstetrics   • DURAN BY LAPAROSCOPY  10/25/2019    Procedure: CHOLECYSTECTOMY, LAPAROSCOPIC;  Surgeon: Efrain Ramesh M.D.;  Location: SURGERY [facility];  Service: General   • ERCP IN OR N/A 10/24/2019    Procedure: ERCP (ENDOSCOPIC RETROGRADE CHOLANGIOPANCREATOGRAPHY);  Surgeon: Temo Liao M.D.;   Location: SURGERY [facility];  Service: Gastroenterology   • PB LAP,DIAGNOSTIC ABDOMEN  2019    Procedure: PELVISCOPY;  Surgeon: Pilar Vuong M.D.;  Location: SURGERY SAME DAY [facility];  Service: Gynecology   • SALPINGECTOMY Bilateral 2019    Procedure: SALPINGECTOMY;  Surgeon: Pilar Vuong M.D.;  Location: SURGERY SAME DAY [facility];  Service: Gynecology       Allergies:   Allergies   Allergen Reactions   • Nkda [No Known Drug Allergy]    • Cantaloupe    • Tape      Surgical tape       Social History     Socioeconomic History   • Marital status: Single     Spouse name: Not on file   • Number of children: Not on file   • Years of education: Not on file   • Highest education level: Not on file   Occupational History   • Not on file   Social Needs   • Financial resource strain: Not on file   • Food insecurity     Worry: Patient refused     Inability: Patient refused   • Transportation needs     Medical: Patient refused     Non-medical: Patient refused   Tobacco Use   • Smoking status: Former Smoker     Packs/day: 0.25     Years: 15.00     Pack years: 3.75     Types: Cigarettes     Last attempt to quit: 2019     Years since quittin.9   • Smokeless tobacco: Never Used   Substance and Sexual Activity   • Alcohol use: Not Currently   • Drug use: Not Currently   • Sexual activity: Yes     Partners: Male     Birth control/protection: Surgical, Injection     Comment: none   Lifestyle   • Physical activity     Days per week: Not on file     Minutes per session: Not on file   • Stress: Not on file   Relationships   • Social connections     Talks on phone: Not on file     Gets together: Not on file     Attends Nondenominational service: Not on file     Active member of club or organization: Not on file     Attends meetings of clubs or organizations: Not on file     Relationship status: Not on file   • Intimate partner violence     Fear of current or ex partner: Not on file     Emotionally  abused: Not on file     Physically abused: Not on file     Forced sexual activity: Not on file   Other Topics Concern   • Not on file   Social History Narrative   • Not on file       Family History   Problem Relation Age of Onset   • Cancer Mother 45        breast cancer, lymph node,   • Heart Attack Father    • Gout Father    • Cancer Maternal Grandmother         breast cancer       Physical exam:  /68   Wt 71.2 kg (157 lb)     GENERAL APPEARANCE: healthy, alert, no distress  ABDOMEN Abdomen soft, non-tender. BS normal. No masses,  No organomegaly  FEMALE GYN: normal female external genitalia without lesions, BUS normal, no vaginal discharge noted, vulva pink without erythema or friability, urethra is normal without discharge or scarring, inguinal hernia present, no bladder fullness or masses, normal external genitalia, no erythema, no discharge, normal anus and perineum.  NEURO Awake, alert and oriented x 3, Normal gait, no sensory deficits  SKIN No rashes, or ulcers or lesions seen  PSYCHIATRIC: Patient shows appropriate affect, is alert and oriented x3, intact judgment and insight.    Procedure:    The Sauceda bag was disconnected from the catheter and the bladder was backfilled with 300 mL of sterile saline.  The catheter was then removed and the patient was asked to attempt to void.  After approximate 5 minutes, the patient was able to void 280 mL.  Making this a successful voiding trial.    Assessment:  1. Surgery, elective  DISCONTINUED: oxyCODONE-acetaminophen (PERCOCET) 5-325 MG Tab       Plan:    Discussed with patient successful voiding trial.  Precautions also discussed with the patient.  She is return to clinic she is unable to void, heavy vaginal bleeding, pain uncontrolled by pain medications or if she has any questions or concerns.      Spent  15 minutes in face-to-face patient contact in which greater than 50% of that visit was spent in counseling/coordination of care including medical and  surgical options of care.

## 2020-06-24 ENCOUNTER — GYNECOLOGY VISIT (OUTPATIENT)
Dept: OBGYN | Facility: CLINIC | Age: 30
End: 2020-06-24
Payer: MEDICAID

## 2020-06-24 VITALS — WEIGHT: 149 LBS | DIASTOLIC BLOOD PRESSURE: 60 MMHG | SYSTOLIC BLOOD PRESSURE: 110 MMHG | BODY MASS INDEX: 24.79 KG/M2

## 2020-06-24 DIAGNOSIS — Z09 POSTOP CHECK: ICD-10-CM

## 2020-06-24 PROCEDURE — 99024 POSTOP FOLLOW-UP VISIT: CPT | Performed by: OBSTETRICS & GYNECOLOGY

## 2020-06-24 RX ORDER — ZOLPIDEM TARTRATE 10 MG/1
10 TABLET ORAL NIGHTLY PRN
COMMUNITY
End: 2021-10-08

## 2020-06-24 ASSESSMENT — FIBROSIS 4 INDEX: FIB4 SCORE: 0.88

## 2020-06-24 NOTE — PROGRESS NOTES
Chief complaint;    Lali Funes is a 29 y.o.  who presents for follow-up after TLH and bladder sling surgery patient was admitted to hospital for intravenous antibiotics for UTI.  Patient continues to have 3-4 watery/soft stools per day.  Previously the patient had constipation she is not using laxatives    Review of systems; denies fever chills abdominal pain, denies chest pain shortness of breath or urinary symptoms  Past medical history-  Past Medical History:   Diagnosis Date   • Abnormal uterine bleeding (AUB)    • AC (acromioclavicular) joint bone spurs    • Anxiety    • Arthritis     toes, hands, back   • Bowel habit changes     constipation/diarrhea   • GERD (gastroesophageal reflux disease)    • Mastitis 2019   • Nausea/vomiting in pregnancy 10/5/2012   • Pain 2020    pelvic and back, 5/10     Past surgical history-  Past Surgical History:   Procedure Laterality Date   • PB COMBINED ANT/POST COLPORRHAPHY N/A 6/3/2020    Procedure: COLPORRHAPHY, COMBINED ANTEROPOSTERIOR;  Surgeon: Jose Bowen M.D.;  Location: SURGERY SAME DAY [facility];  Service: Obstetrics   • HYSTERECTOMY LAPAROSCOPY N/A 6/3/2020    Procedure: HYSTERECTOMY, LAPAROSCOPIC-;  Surgeon: Jose Bowen M.D.;  Location: SURGERY SAME DAY [facility];  Service: Obstetrics   • CYSTOSCOPY N/A 6/3/2020    Procedure: CYSTOSCOPY- WITH MID URETHRAL SLING PLACEMENT;  Surgeon: Jose Bowen M.D.;  Location: SURGERY SAME DAY [facility];  Service: Obstetrics   • DURAN BY LAPAROSCOPY  10/25/2019    Procedure: CHOLECYSTECTOMY, LAPAROSCOPIC;  Surgeon: Efrain Ramesh M.D.;  Location: SURGERY [facility];  Service: General   • ERCP IN OR N/A 10/24/2019    Procedure: ERCP (ENDOSCOPIC RETROGRADE CHOLANGIOPANCREATOGRAPHY);  Surgeon: Temo Liao M.D.;  Location: SURGERY [facility];  Service: Gastroenterology   • PB LAP,DIAGNOSTIC ABDOMEN  2019    Procedure: PELVISCOPY;  Surgeon: Pilar Vuong,  M.D.;  Location: SURGERY SAME DAY [facility];  Service: Gynecology   • SALPINGECTOMY Bilateral 6/18/2019    Procedure: SALPINGECTOMY;  Surgeon: Pilar Vuong M.D.;  Location: SURGERY SAME DAY [facility];  Service: Gynecology     Allergies-Nkda [no known drug allergy]; Cantaloupe; and Tape  Medications-  Current Outpatient Medications on File Prior to Visit   Medication Sig Dispense Refill   • zolpidem (AMBIEN) 10 MG Tab Take 10 mg by mouth at bedtime as needed for Sleep.     • hydrOXYzine HCl (ATARAX) 25 MG Tab Take 25 mg by mouth 3 times a day as needed for Itching.     • ibuprofen (MOTRIN) 200 MG Tab Take 600 mg by mouth as needed. Indications: Mild to Moderate Pain     • tramadol (ULTRAM) 50 MG Tab Take 50 mg by mouth as needed (Pain).     • cefdinir (OMNICEF) 300 MG Cap Take 1 Cap by mouth 2 times a day. 8 Cap 0   • omeprazole (PRILOSEC) 20 MG delayed-release capsule Take 20 mg by mouth every bedtime. Indications: Gastroesophageal Reflux Disease     • metronidazole (METROGEL) 0.75 % gel Apply 1 Applicator to affected area(s) 2 times a day. (Patient not taking: Reported on 6/16/2020) 1 Tube 1   • methocarbamol (ROBAXIN) 750 MG Tab Take 750 mg by mouth every morning. Indications: Musculoskeletal Pain       No current facility-administered medications on file prior to visit.      Social history-  Social History     Socioeconomic History   • Marital status: Single     Spouse name: Not on file   • Number of children: Not on file   • Years of education: Not on file   • Highest education level: Not on file   Occupational History   • Not on file   Social Needs   • Financial resource strain: Not on file   • Food insecurity     Worry: Patient refused     Inability: Patient refused   • Transportation needs     Medical: Patient refused     Non-medical: Patient refused   Tobacco Use   • Smoking status: Former Smoker     Packs/day: 0.25     Years: 15.00     Pack years: 3.75     Types: Cigarettes     Last attempt to  quit: 2019     Years since quittin.9   • Smokeless tobacco: Never Used   Substance and Sexual Activity   • Alcohol use: Not Currently   • Drug use: Not Currently   • Sexual activity: Yes     Partners: Male     Birth control/protection: Surgical, Injection     Comment: none   Lifestyle   • Physical activity     Days per week: Not on file     Minutes per session: Not on file   • Stress: Not on file   Relationships   • Social connections     Talks on phone: Not on file     Gets together: Not on file     Attends [employer] service: Not on file     Active member of club or organization: Not on file     Attends meetings of clubs or organizations: Not on file     Relationship status: Not on file   • Intimate partner violence     Fear of current or ex partner: Not on file     Emotionally abused: Not on file     Physically abused: Not on file     Forced sexual activity: Not on file   Other Topics Concern   • Not on file   Social History Narrative   • Not on file     Past Family History-no history of breast or ovarian cancer    Physical examination;  Alert and oriented x3  General a thin well-developed well-nourished female in no apparent distress  Vitals:    20 1105   BP: 110/60   BP Location: Right arm   Patient Position: Sitting   Weight: 67.6 kg (149 lb)     Skin is warm and dry  Neck-supple  HEENT-head-atraumatic, normocephalic, EOMI, PERRLA  Cardiovascular-regular rate and rhythm, normal S1-S2, no murmurs or gallops  Lungs-clear to auscultation bilaterally  Back-negative CVA tenderness  Abdomen-nondistended positive bowel sounds soft nontender no masses or hepatosplenomegaly laparoscopic incisions healing well    Extremities without cyanosis clubbing or edema  Neurologic exam grossly intact    Impression;  Postop TLH  Mild diarrhea-unlikely to be C. difficile colitis    Plan;  Continue brat diet  Reviewed bladder training  Patient is scheduled for full postoperative appointment on

## 2020-06-24 NOTE — NON-PROVIDER
Patient here today for GYN visit.  Had Laparoscopic hysterectomy and Suburethral sling on 06/03/2020.  Had post-op visit on 06/17/2020, and needed to f/u for reassessment.  C/o patient still having diarrhea, having a foul odor and discharge (milky white) patient thinks it's BV because she has had it multiple times.  Phone # [phone]  Pharmacy verified.

## 2020-06-28 NOTE — DOCUMENTATION QUERY
[facility] hospital                                                                       Query Response Note      PATIENT:               CYNTHIA PONCE  ACCT #:                  [id number]  MRN:                     [id number]  :                      1990  ADMIT DATE:       2020 9:45 PM  DISCH DATE:        2020 2:42 PM  RESPONDING  PROVIDER #:        [id number]           QUERY TEXT:    Sepsis is documented in the medical record, and patient recently had a procedure on 6/3. Please clarify if there is any relation between sepsis and recent procedure.     NOTE:  If an appropriate response is not listed below, please respond with a new note.      The patient's Clinical Indicators include:  Patient was admitted for sepsis due to UTI. (temp 102.4, bp 115/56, pulse 143, resp 16, wbc 12.3)   Per H&P , Patient presents with abdominal suprapubic pain, fever, nausea, and headache.CT results report filling defect in the mid left ovarian vein and was unable to tolerate transvaginal us approach due to surgery.  Urine culture grew lactos fermenting gram-neg bashir. She was approximately postop day#3 for hysterectomy and bladder sling.  Postoperative complication was noted in ED  as a differential diagnosis. Please clarify if sepsis is due to a postoperative complication.    Treatment: iv antibiotics  Related Conditions: UTI, Filling defect mid left ovarian vein  Options provided:   -- Sepsis is due to or associated with postoperative complication   -- Sepsis is not due to or associated with postoperative complication   -- Unable to determine      Query created by: Marko Lewis on 2020 2:24 PM    RESPONSE TEXT:    Sepsis is due to or associated with postoperative complication          Electronically signed by:  WILLIAN MORALES MD 2020 11:32 AM

## 2020-07-02 ENCOUNTER — GYNECOLOGY VISIT (OUTPATIENT)
Dept: OBGYN | Facility: CLINIC | Age: 30
End: 2020-07-02
Payer: MEDICAID

## 2020-07-02 VITALS — DIASTOLIC BLOOD PRESSURE: 58 MMHG | SYSTOLIC BLOOD PRESSURE: 96 MMHG | BODY MASS INDEX: 25.13 KG/M2 | WEIGHT: 151 LBS

## 2020-07-02 DIAGNOSIS — N89.8 VAGINAL DISCHARGE: ICD-10-CM

## 2020-07-02 DIAGNOSIS — Z48.89 POSTOPERATIVE VISIT: ICD-10-CM

## 2020-07-02 PROCEDURE — 99024 POSTOP FOLLOW-UP VISIT: CPT | Performed by: OBSTETRICS & GYNECOLOGY

## 2020-07-02 ASSESSMENT — FIBROSIS 4 INDEX: FIB4 SCORE: 0.88

## 2020-07-02 NOTE — PROGRESS NOTES
Patient here for a GYN follow up.   Last seen on 6/24/2020  C/o Pt states that she believes that the BV is back  pharmacy verified.  Patient phone #: [phone]

## 2020-07-02 NOTE — PROGRESS NOTES
Chief complaint: Return visit    SUBJECTIVE:  Lali Funes presents to the clinic for weeks following total laparoscopic hysterectomy, placement urethral sling    Eating a regular diet without difficulty.   Bowel movement are Normal.    The patient is not having any pain.   Spotting.   Patient Denies Incisional pain, drainage or redness    Patient concerned she may have bacterial vaginosis infection again.  She reports foul-smelling discharge.    OBJECTIVE:  BP (!) 96/58   Wt 68.5 kg (151 lb)   BMI 25.13 kg/m²   Current Outpatient Medications on File Prior to Visit   Medication Sig Dispense Refill   • zolpidem (AMBIEN) 10 MG Tab Take 10 mg by mouth at bedtime as needed for Sleep.     • hydrOXYzine HCl (ATARAX) 25 MG Tab Take 25 mg by mouth 3 times a day as needed for Itching.     • omeprazole (PRILOSEC) 20 MG delayed-release capsule Take 20 mg by mouth every bedtime. Indications: Gastroesophageal Reflux Disease     • ibuprofen (MOTRIN) 200 MG Tab Take 600 mg by mouth as needed. Indications: Mild to Moderate Pain     • tramadol (ULTRAM) 50 MG Tab Take 50 mg by mouth as needed (Pain).     • methocarbamol (ROBAXIN) 750 MG Tab Take 750 mg by mouth every morning. Indications: Musculoskeletal Pain     • cefdinir (OMNICEF) 300 MG Cap Take 1 Cap by mouth 2 times a day. 8 Cap 0   • metronidazole (METROGEL) 0.75 % gel Apply 1 Applicator to affected area(s) 2 times a day. (Patient not taking: Reported on 6/16/2020) 1 Tube 1     No current facility-administered medications on file prior to visit.        Constitutional:  alert, healthy, no distress.  Abdomen:  soft, bowel sounds active, non-tender, non-distended.  Incision:  healing well, no drainage, no erythema, no hernia, no seroma, no swelling, no dehiscence, incision well approximated.  Pelvic: Vaginal courses healing.  Cuff is intact with no signs of dehiscence.  Small amount of discharge noted.  No foul smell noted today's examination.  Vaginal pathogen swab  obtained    IMPRESSION: Doing well postoperatively.  Pt is to increase activities as tolerated.    Lab:   Recent Results (from the past 1008 hour(s))   Basic Metabolic Panel    Collection Time: 05/29/20  2:56 PM   Result Value Ref Range    Sodium 139 135 - 145 mmol/L    Potassium 4.1 3.6 - 5.5 mmol/L    Chloride 104 96 - 112 mmol/L    Co2 24 20 - 33 mmol/L    Glucose 90 65 - 99 mg/dL    Bun 11 8 - 22 mg/dL    Creatinine 0.88 0.50 - 1.40 mg/dL    Calcium 9.2 8.5 - 10.5 mg/dL    Anion Gap 11.0 7.0 - 16.0   CBC Without Differential    Collection Time: 05/29/20  2:56 PM   Result Value Ref Range    WBC 6.6 4.8 - 10.8 K/uL    RBC 4.69 4.20 - 5.40 M/uL    Hemoglobin 14.1 12.0 - 16.0 g/dL    Hematocrit 43.3 37.0 - 47.0 %    MCV 92.3 81.4 - 97.8 fL    MCH 30.1 27.0 - 33.0 pg    MCHC 32.6 (L) 33.6 - 35.0 g/dL    RDW 42.9 35.9 - 50.0 fL    Platelet Count 238 164 - 446 K/uL    MPV 11.0 9.0 - 12.9 fL   ESTIMATED GFR    Collection Time: 05/29/20  2:56 PM   Result Value Ref Range    GFR If African American >60 >60 mL/min/1.73 m 2    GFR If Non African American >60 >60 mL/min/1.73 m 2   COVID/SARS CoV-2    Collection Time: 05/29/20  3:45 PM    Specimen: Nasopharyngeal; Respirate   Result Value Ref Range    COVID Order Status PREAD-Recvd    2019-nCoV RNA ([facility])    Collection Time: 05/29/20  3:45 PM   Result Value Ref Range    2019-nCoV Source NP Swab     2019-nCoV RNA Interp Not detected    FREE THYROXINE    Collection Time: 06/01/20 12:56 PM   Result Value Ref Range    Free T-4 1.09 0.93 - 1.70 ng/dL   TSH    Collection Time: 06/01/20 12:56 PM   Result Value Ref Range    TSH 0.534 0.380 - 5.330 uIU/mL   Histology Request    Collection Time: 06/03/20 11:39 AM   Result Value Ref Range    Pathology Request Sent to Histo    Lactic acid (lactate)    Collection Time: 06/06/20 10:06 PM   Result Value Ref Range    Lactic Acid 1.7 0.5 - 2.0 mmol/L   CBC WITH DIFFERENTIAL    Collection Time: 06/06/20 10:06 PM   Result Value Ref Range    WBC  12.3 (H) 4.8 - 10.8 K/uL    RBC 4.40 4.20 - 5.40 M/uL    Hemoglobin 13.3 12.0 - 16.0 g/dL    Hematocrit 39.4 37.0 - 47.0 %    MCV 89.5 81.4 - 97.8 fL    MCH 30.2 27.0 - 33.0 pg    MCHC 33.8 33.6 - 35.0 g/dL    RDW 41.3 35.9 - 50.0 fL    Platelet Count 203 164 - 446 K/uL    MPV 9.9 9.0 - 12.9 fL    Neutrophils-Polys 94.80 (H) 44.00 - 72.00 %    Lymphocytes 2.70 (L) 22.00 - 41.00 %    Monocytes 1.70 0.00 - 13.40 %    Eosinophils 0.30 0.00 - 6.90 %    Basophils 0.10 0.00 - 1.80 %    Immature Granulocytes 0.40 0.00 - 0.90 %    Nucleated RBC 0.00 /100 WBC    Neutrophils (Absolute) 11.64 (H) 2.00 - 7.15 K/uL    Lymphs (Absolute) 0.33 (L) 1.00 - 4.80 K/uL    Monos (Absolute) 0.21 0.00 - 0.85 K/uL    Eos (Absolute) 0.04 0.00 - 0.51 K/uL    Baso (Absolute) 0.01 0.00 - 0.12 K/uL    Immature Granulocytes (abs) 0.05 0.00 - 0.11 K/uL    NRBC (Absolute) 0.00 K/uL   COMP METABOLIC PANEL    Collection Time: 06/06/20 10:06 PM   Result Value Ref Range    Sodium 141 135 - 145 mmol/L    Potassium 4.1 3.6 - 5.5 mmol/L    Chloride 109 96 - 112 mmol/L    Co2 21 20 - 33 mmol/L    Anion Gap 11.0 7.0 - 16.0    Glucose 116 (H) 65 - 99 mg/dL    Bun 14 8 - 22 mg/dL    Creatinine 0.89 0.50 - 1.40 mg/dL    Calcium 9.1 8.5 - 10.5 mg/dL    AST(SGOT) 127 (H) 12 - 45 U/L    ALT(SGPT) 125 (H) 2 - 50 U/L    Alkaline Phosphatase 81 30 - 99 U/L    Total Bilirubin 0.6 0.1 - 1.5 mg/dL    Albumin 3.8 3.2 - 4.9 g/dL    Total Protein 6.5 6.0 - 8.2 g/dL    Globulin 2.7 1.9 - 3.5 g/dL    A-G Ratio 1.4 g/dL   URINALYSIS    Collection Time: 06/06/20 10:06 PM    Specimen: Blood   Result Value Ref Range    Color Yellow     Character Cloudy (A)     Specific Gravity 1.019 <1.035    Ph 7.5 5.0 - 8.0    Glucose Negative Negative mg/dL    Ketones Negative Negative mg/dL    Protein 30 (A) Negative mg/dL    Bilirubin Negative Negative    Urobilinogen, Urine 0.2 Negative    Nitrite Positive (A) Negative    Leukocyte Esterase Large (A) Negative    Occult Blood Large (A)  Negative    Micro Urine Req Microscopic    URINE CULTURE(NEW)    Collection Time: 06/06/20 10:06 PM    Specimen: Urine   Result Value Ref Range    Significant Indicator POS (POS)     Source UR     Site -     Culture Result - (A)     Culture Result Escherichia coli  >100,000 cfu/mL   (A)        Susceptibility    Escherichia coli - JANE     Ampicillin >16 Resistant mcg/mL     Ceftriaxone <=1 Sensitive mcg/mL     Ceftazidime <=1 Sensitive mcg/mL     Cefotaxime <=2 Sensitive mcg/mL     Cefazolin >16 Resistant mcg/mL     Ciprofloxacin <=1 Sensitive mcg/mL     Ampicillin/sulbactam >16/8 Resistant mcg/mL     Cefepime <=2 Sensitive mcg/mL     Tobramycin <=4 Sensitive mcg/mL     Cefotetan <=16 Sensitive mcg/mL     Nitrofurantoin <=32 Sensitive mcg/mL     Gentamicin <=4 Sensitive mcg/mL     Levofloxacin <=2 Sensitive mcg/mL     Pip/Tazobactam 64 Intermediate mcg/mL     Trimeth/Sulfa >2/38 Resistant mcg/mL   BLOOD CULTURE    Collection Time: 06/06/20 10:06 PM    Specimen: Peripheral; Blood   Result Value Ref Range    Significant Indicator NEG     Source BLD     Site PERIPHERAL     Culture Result No growth after 5 days of incubation.    URINE MICROSCOPIC (W/UA)    Collection Time: 06/06/20 10:06 PM   Result Value Ref Range    WBC Packed (A) /hpf    RBC 20-50 (A) /hpf    Bacteria Many (A) None /hpf    Epithelial Cells Few /hpf    Hyaline Cast 0-2 /lpf   ESTIMATED GFR    Collection Time: 06/06/20 10:06 PM   Result Value Ref Range    GFR If African American >60 >60 mL/min/1.73 m 2    GFR If Non African American >60 >60 mL/min/1.73 m 2   BLOOD CULTURE    Collection Time: 06/06/20 10:45 PM    Specimen: Peripheral; Blood   Result Value Ref Range    Significant Indicator NEG     Source BLD     Site PERIPHERAL     Culture Result No growth after 5 days of incubation.    CBC WITH DIFFERENTIAL    Collection Time: 06/08/20  2:11 AM   Result Value Ref Range    WBC 5.9 4.8 - 10.8 K/uL    RBC 3.25 (L) 4.20 - 5.40 M/uL    Hemoglobin 10.0 (L)  12.0 - 16.0 g/dL    Hematocrit 30.4 (L) 37.0 - 47.0 %    MCV 93.5 81.4 - 97.8 fL    MCH 30.8 27.0 - 33.0 pg    MCHC 32.9 (L) 33.6 - 35.0 g/dL    RDW 44.3 35.9 - 50.0 fL    Platelet Count 177 164 - 446 K/uL    MPV 10.3 9.0 - 12.9 fL    Neutrophils-Polys 59.40 44.00 - 72.00 %    Lymphocytes 31.10 22.00 - 41.00 %    Monocytes 6.60 0.00 - 13.40 %    Eosinophils 2.40 0.00 - 6.90 %    Basophils 0.20 0.00 - 1.80 %    Immature Granulocytes 0.30 0.00 - 0.90 %    Nucleated RBC 0.00 /100 WBC    Neutrophils (Absolute) 3.50 2.00 - 7.15 K/uL    Lymphs (Absolute) 1.83 1.00 - 4.80 K/uL    Monos (Absolute) 0.39 0.00 - 0.85 K/uL    Eos (Absolute) 0.14 0.00 - 0.51 K/uL    Baso (Absolute) 0.01 0.00 - 0.12 K/uL    Immature Granulocytes (abs) 0.02 0.00 - 0.11 K/uL    NRBC (Absolute) 0.00 K/uL   Comp Metabolic Panel    Collection Time: 06/08/20  2:11 AM   Result Value Ref Range    Sodium 137 135 - 145 mmol/L    Potassium 3.4 (L) 3.6 - 5.5 mmol/L    Chloride 106 96 - 112 mmol/L    Co2 22 20 - 33 mmol/L    Glucose 124 (H) 65 - 99 mg/dL    Bun 11 8 - 22 mg/dL    Creatinine 0.63 0.50 - 1.40 mg/dL    Calcium 7.9 (L) 8.5 - 10.5 mg/dL    Anion Gap 9.0 7.0 - 16.0    AST(SGOT) 58 (H) 12 - 45 U/L    ALT(SGPT) 125 (H) 2 - 50 U/L    Alkaline Phosphatase 69 30 - 99 U/L    Total Bilirubin 0.3 0.1 - 1.5 mg/dL    Albumin 2.9 (L) 3.2 - 4.9 g/dL    Total Protein 5.2 (L) 6.0 - 8.2 g/dL    Globulin 2.3 1.9 - 3.5 g/dL    A-G Ratio 1.3 g/dL   ESTIMATED GFR    Collection Time: 06/08/20  2:11 AM   Result Value Ref Range    GFR If African American >60 >60 mL/min/1.73 m 2    GFR If Non African American >60 >60 mL/min/1.73 m 2       PLAN:  Continue any current medications.  (See Med List for details.)  Return to clinic: in 4 week(s).    Vaginal pathogen swab obtained.  Will call patient with results.    Healing well.  All questions answered

## 2020-07-14 ENCOUNTER — GYNECOLOGY VISIT (OUTPATIENT)
Dept: OBGYN | Facility: CLINIC | Age: 30
End: 2020-07-14
Payer: MEDICAID

## 2020-07-14 VITALS
SYSTOLIC BLOOD PRESSURE: 120 MMHG | HEIGHT: 65 IN | BODY MASS INDEX: 25.16 KG/M2 | WEIGHT: 151 LBS | DIASTOLIC BLOOD PRESSURE: 72 MMHG

## 2020-07-14 DIAGNOSIS — N89.8 VAGINAL DISCHARGE: Primary | ICD-10-CM

## 2020-07-14 PROCEDURE — 99212 OFFICE O/P EST SF 10 MIN: CPT | Performed by: NURSE PRACTITIONER

## 2020-07-14 ASSESSMENT — FIBROSIS 4 INDEX: FIB4 SCORE: 0.88

## 2020-07-14 NOTE — PROGRESS NOTES
HPI Comments:  Lali Funes is a 29 y.o. y.o. female who presents for problem gyn visit: recollection of vaginal pathogen swab.  Had this test collected at last visit but it was mishandled at the lab. Pt has complaints of vaginal DC with iching and odor.  She had a hystorectomy 6/3/20.  She is not sure if the cuff has healed.  Has appt Friday with Dr. Reeves.        Review of Systems :  Constitutional: none  EENT: none  Cardio: none  Resp: none  GI: none  : vaginal DC with odor and itching.    Pertinent positives documented in HPI and all other systems reviewed & are negative    All PMH, PSH, allergies, social history and FH reviewed and updated today:  Past Medical History:   Diagnosis Date   • Abnormal uterine bleeding (AUB)    • AC (acromioclavicular) joint bone spurs    • Anxiety    • Arthritis     toes, hands, back   • Bowel habit changes     constipation/diarrhea   • GERD (gastroesophageal reflux disease)    • Mastitis 05/2019   • Nausea/vomiting in pregnancy 10/5/2012   • Pain 05/29/2020    pelvic and back, 5/10     Past Surgical History:   Procedure Laterality Date   • PB COMBINED ANT/POST COLPORRHAPHY N/A 6/3/2020    Procedure: COLPORRHAPHY, COMBINED ANTEROPOSTERIOR;  Surgeon: Jose Bowen M.D.;  Location: SURGERY SAME DAY [facility];  Service: Obstetrics   • HYSTERECTOMY LAPAROSCOPY N/A 6/3/2020    Procedure: HYSTERECTOMY, LAPAROSCOPIC-;  Surgeon: Jose Bowen M.D.;  Location: SURGERY SAME DAY [facility];  Service: Obstetrics   • CYSTOSCOPY N/A 6/3/2020    Procedure: CYSTOSCOPY- WITH MID URETHRAL SLING PLACEMENT;  Surgeon: Jose Bowen M.D.;  Location: SURGERY SAME DAY [facility];  Service: Obstetrics   • DURAN BY LAPAROSCOPY  10/25/2019    Procedure: CHOLECYSTECTOMY, LAPAROSCOPIC;  Surgeon: Efrain Ramesh M.D.;  Location: SURGERY [facility];  Service: General   • ERCP IN OR N/A 10/24/2019    Procedure: ERCP (ENDOSCOPIC RETROGRADE CHOLANGIOPANCREATOGRAPHY);   Surgeon: Temo Liao M.D.;  Location: SURGERY [facility];  Service: Gastroenterology   • PB LAP,DIAGNOSTIC ABDOMEN  2019    Procedure: PELVISCOPY;  Surgeon: Pilar Vuong M.D.;  Location: SURGERY SAME DAY [facility];  Service: Gynecology   • SALPINGECTOMY Bilateral 2019    Procedure: SALPINGECTOMY;  Surgeon: Pilar Vuong M.D.;  Location: SURGERY SAME DAY [facility];  Service: Gynecology     Nkda [no known drug allergy]; Cantaloupe; and Tape  Social History     Socioeconomic History   • Marital status: Single     Spouse name: Not on file   • Number of children: Not on file   • Years of education: Not on file   • Highest education level: Not on file   Occupational History   • Not on file   Social Needs   • Financial resource strain: Not on file   • Food insecurity     Worry: Patient refused     Inability: Patient refused   • Transportation needs     Medical: Patient refused     Non-medical: Patient refused   Tobacco Use   • Smoking status: Former Smoker     Packs/day: 0.25     Years: 15.00     Pack years: 3.75     Types: Cigarettes     Last attempt to quit: 2019     Years since quittin.0   • Smokeless tobacco: Never Used   Substance and Sexual Activity   • Alcohol use: Not Currently   • Drug use: Not Currently   • Sexual activity: Yes     Partners: Male     Birth control/protection: Surgical, Injection     Comment: none   Lifestyle   • Physical activity     Days per week: Not on file     Minutes per session: Not on file   • Stress: Not on file   Relationships   • Social connections     Talks on phone: Not on file     Gets together: Not on file     Attends Ep[employer] service: Not on file     Active member of club or organization: Not on file     Attends meetings of clubs or organizations: Not on file     Relationship status: Not on file   • Intimate partner violence     Fear of current or ex partner: Not on file     Emotionally abused: Not on file     Physically abused: Not on  "file     Forced sexual activity: Not on file   Other Topics Concern   • Not on file   Social History Narrative   • Not on file     Family History   Problem Relation Age of Onset   • Cancer Mother 45        breast cancer, lymph node,   • Heart Attack Father    • Gout Father    • Cancer Maternal Grandmother         breast cancer     Medications:   Current Outpatient Medications Ordered in Epic   Medication Sig Dispense Refill   • zolpidem (AMBIEN) 10 MG Tab Take 10 mg by mouth at bedtime as needed for Sleep.     • hydrOXYzine HCl (ATARAX) 25 MG Tab Take 25 mg by mouth 3 times a day as needed for Itching.     • cefdinir (OMNICEF) 300 MG Cap Take 1 Cap by mouth 2 times a day. 8 Cap 0   • omeprazole (PRILOSEC) 20 MG delayed-release capsule Take 20 mg by mouth every bedtime. Indications: Gastroesophageal Reflux Disease     • ibuprofen (MOTRIN) 200 MG Tab Take 600 mg by mouth as needed. Indications: Mild to Moderate Pain     • metronidazole (METROGEL) 0.75 % gel Apply 1 Applicator to affected area(s) 2 times a day. (Patient not taking: Reported on 6/16/2020) 1 Tube 1   • tramadol (ULTRAM) 50 MG Tab Take 50 mg by mouth as needed (Pain).     • methocarbamol (ROBAXIN) 750 MG Tab Take 750 mg by mouth every morning. Indications: Musculoskeletal Pain       No current Epic-ordered facility-administered medications on file.           Objective:   Vital measurements:  /72 (BP Location: Right arm, Patient Position: Sitting)   Ht 1.651 m (5' 5\")   Wt 68.5 kg (151 lb)   Body mass index is 25.13 kg/m². (Goal BM I>18 <25)    Physical Exam   Nursing note and vitals reviewed.  Constitutional: She is oriented to person, place, and time. She appears well-developed and well-nourished. No distress.     Genitourinary:  Pelvic exam was performed with patient supine.  External genitalia with no abnormal pigmentation, labial fusion,rash, tenderness, lesion or injury to the labia bilaterally.  Vagina is moist with no lesions, erythema, " tenderness or bleeding. Vaginal DC noted.    Neurological: She is alert and oriented to person, place, and time. She exhibits normal muscle tone.     Skin: Skin is warm and dry. No rash noted. She is not diaphoretic. No erythema. No pallor.     Psychiatric: She has a normal mood and affect. Her behavior is normal. Judgment and thought content normal.        Assessment:     1. Vaginal discharge  VAGINAL PATHOGENS DNA PANEL         Plan:   Limited gyn exam performed   Vaginal pathogen swab obtained.  Keep f/u appt as scheduled w MD - Friday    Chaperone offered and provided by Angeli Hansen MA.

## 2020-07-14 NOTE — PATIENT INSTRUCTIONS
Plan:   Limited gyn exam performed   Vaginal pathogen swab obtained.  Keep f/u appt as scheduled w MD - Friday

## 2020-07-17 ENCOUNTER — GYNECOLOGY VISIT (OUTPATIENT)
Dept: OBGYN | Facility: CLINIC | Age: 30
End: 2020-07-17
Payer: MEDICAID

## 2020-07-17 VITALS — WEIGHT: 152.2 LBS | SYSTOLIC BLOOD PRESSURE: 90 MMHG | DIASTOLIC BLOOD PRESSURE: 62 MMHG | BODY MASS INDEX: 25.33 KG/M2

## 2020-07-17 DIAGNOSIS — B96.89 BV (BACTERIAL VAGINOSIS): ICD-10-CM

## 2020-07-17 DIAGNOSIS — N76.0 BV (BACTERIAL VAGINOSIS): ICD-10-CM

## 2020-07-17 PROCEDURE — 99214 OFFICE O/P EST MOD 30 MIN: CPT | Mod: 24 | Performed by: OBSTETRICS & GYNECOLOGY

## 2020-07-17 RX ORDER — METRONIDAZOLE 500 MG/1
500 TABLET ORAL 2 TIMES DAILY
Qty: 14 TAB | Refills: 0 | Status: SHIPPED | OUTPATIENT
Start: 2020-07-17 | End: 2021-02-22

## 2020-07-17 ASSESSMENT — FIBROSIS 4 INDEX: FIB4 SCORE: 0.88

## 2020-07-17 NOTE — PROGRESS NOTES
29 y.o.  female previously seen for : Chief Complaint:  Vaginal D/C , smell , post op     Specialty Problems     None      . Patient now here in follow up. Patient underwent  LAVH, mid urethral sling  6 weeks ago , had re admit for UTI/Sepsis     No LMP recorded. Patient has had an injection.      Subjective: Abdominal Pain: positive    Vaginal Bleedingnegative  Menstrual Cycle: normal: negative  Dysmenorrhea:positive:   Dyspareunia:negative  Urinary Symptoms: negative   Vaginal Discharge:{Yes, yellowish d/c , with amine odor           Current Outpatient Medications:   •  metroNIDAZOLE (FLAGYL) 500 MG Tab, Take 1 Tab by mouth 2 Times a Day., Disp: 14 Tab, Rfl: 0  •  zolpidem (AMBIEN) 10 MG Tab, Take 10 mg by mouth at bedtime as needed for Sleep., Disp: , Rfl:   •  hydrOXYzine HCl (ATARAX) 25 MG Tab, Take 25 mg by mouth 3 times a day as needed for Itching., Disp: , Rfl:   •  cefdinir (OMNICEF) 300 MG Cap, Take 1 Cap by mouth 2 times a day., Disp: 8 Cap, Rfl: 0  •  omeprazole (PRILOSEC) 20 MG delayed-release capsule, Take 20 mg by mouth every bedtime. Indications: Gastroesophageal Reflux Disease, Disp: , Rfl:   •  ibuprofen (MOTRIN) 200 MG Tab, Take 600 mg by mouth as needed. Indications: Mild to Moderate Pain, Disp: , Rfl:   •  metronidazole (METROGEL) 0.75 % gel, Apply 1 Applicator to affected area(s) 2 times a day. (Patient not taking: Reported on 2020), Disp: 1 Tube, Rfl: 1  •  tramadol (ULTRAM) 50 MG Tab, Take 50 mg by mouth as needed (Pain)., Disp: , Rfl:   •  methocarbamol (ROBAXIN) 750 MG Tab, Take 750 mg by mouth every morning. Indications: Musculoskeletal Pain, Disp: , Rfl:   ROS: no change in ROS since visit of : 2020.  :No results found for this or any previous visit (from the past 336 hour(s)).    Vitals:    20 0808   BP: (!) 90/62   Weight: 69 kg (152 lb 3.2 oz)     Past Medical History:   Diagnosis Date   • Abnormal uterine bleeding (AUB)    • AC (acromioclavicular) joint bone  spurs    • Anxiety    • Arthritis     toes, hands, back   • Bowel habit changes     constipation/diarrhea   • GERD (gastroesophageal reflux disease)    • Mastitis 2019   • Nausea/vomiting in pregnancy 10/5/2012   • Pain 2020    pelvic and back, 5/10     PGYN: Prior Uterine Surgery  Social History     Socioeconomic History   • Marital status: Single     Spouse name: Not on file   • Number of children: Not on file   • Years of education: Not on file   • Highest education level: Not on file   Occupational History   • Not on file   Social Needs   • Financial resource strain: Not on file   • Food insecurity     Worry: Patient refused     Inability: Patient refused   • Transportation needs     Medical: Patient refused     Non-medical: Patient refused   Tobacco Use   • Smoking status: Former Smoker     Packs/day: 0.25     Years: 15.00     Pack years: 3.75     Types: Cigarettes     Last attempt to quit: 2019     Years since quittin.0   • Smokeless tobacco: Never Used   Substance and Sexual Activity   • Alcohol use: Not Currently   • Drug use: Not Currently   • Sexual activity: Yes     Partners: Male     Birth control/protection: Surgical, Injection     Comment: none   Lifestyle   • Physical activity     Days per week: Not on file     Minutes per session: Not on file   • Stress: Not on file   Relationships   • Social connections     Talks on phone: Not on file     Gets together: Not on file     Attends Denominational service: Not on file     Active member of club or organization: Not on file     Attends meetings of clubs or organizations: Not on file     Relationship status: Not on file   • Intimate partner violence     Fear of current or ex partner: Not on file     Emotionally abused: Not on file     Physically abused: Not on file     Forced sexual activity: Not on file   Other Topics Concern   • Not on file   Social History Narrative   • Not on file     Family History   Problem Relation Age of Onset   • Cancer  Mother 45        breast cancer, lymph node,   • Heart Attack Father    • Gout Father    • Cancer Maternal Grandmother         breast cancer     Past Surgical History:   Procedure Laterality Date   • PB COMBINED ANT/POST COLPORRHAPHY N/A 6/3/2020    Procedure: COLPORRHAPHY, COMBINED ANTEROPOSTERIOR;  Surgeon: Jose Bowen M.D.;  Location: SURGERY SAME DAY [facility];  Service: Obstetrics   • HYSTERECTOMY LAPAROSCOPY N/A 6/3/2020    Procedure: HYSTERECTOMY, LAPAROSCOPIC-;  Surgeon: Jose Bowen M.D.;  Location: SURGERY SAME DAY [facility];  Service: Obstetrics   • CYSTOSCOPY N/A 6/3/2020    Procedure: CYSTOSCOPY- WITH MID URETHRAL SLING PLACEMENT;  Surgeon: Jose Bowen M.D.;  Location: SURGERY SAME DAY [facility];  Service: Obstetrics   • DURAN BY LAPAROSCOPY  10/25/2019    Procedure: CHOLECYSTECTOMY, LAPAROSCOPIC;  Surgeon: Efrain Ramesh M.D.;  Location: SURGERY [facility];  Service: General   • ERCP IN OR N/A 10/24/2019    Procedure: ERCP (ENDOSCOPIC RETROGRADE CHOLANGIOPANCREATOGRAPHY);  Surgeon: Temo Liao M.D.;  Location: SURGERY [facility];  Service: Gastroenterology   • PB LAP,DIAGNOSTIC ABDOMEN  6/18/2019    Procedure: PELVISCOPY;  Surgeon: Pilar Vuong M.D.;  Location: SURGERY SAME DAY [facility];  Service: Gynecology   • SALPINGECTOMY Bilateral 6/18/2019    Procedure: SALPINGECTOMY;  Surgeon: Pilar Vuong M.D.;  Location: SURGERY SAME DAY [facility];  Service: Gynecology         Exam:   General : Awake, alert and oriented x 3, Cranial nerves II-XII grossly intact, Reflexes symmetrical  Abdominal : no masses, nontender, soft no rebound or guarding  Pelvic :  external genitalia normal, Bartholin's glands, urethra, Rudolph's glands negative;  vaginal discharge - yellow, mucoid and  Vaginal cuff intact   Pelvic Support system : normal      Ass: BV, post op     Spent 205minutes with the patient ; Face to Face, with >50% of this time spent in counseling and  coordination of care, surrounding the above mentioned issues as well as:     P. Flagyl 500 mg BID     Follow up : Return visit in 4 week(s)

## 2020-07-31 ENCOUNTER — GYNECOLOGY VISIT (OUTPATIENT)
Dept: OBGYN | Facility: CLINIC | Age: 30
End: 2020-07-31
Payer: MEDICAID

## 2020-07-31 VITALS — DIASTOLIC BLOOD PRESSURE: 72 MMHG | BODY MASS INDEX: 24.96 KG/M2 | WEIGHT: 150 LBS | SYSTOLIC BLOOD PRESSURE: 118 MMHG

## 2020-07-31 DIAGNOSIS — N89.8 VAGINAL DISCHARGE: ICD-10-CM

## 2020-07-31 PROCEDURE — 99024 POSTOP FOLLOW-UP VISIT: CPT | Performed by: OBSTETRICS & GYNECOLOGY

## 2020-07-31 ASSESSMENT — FIBROSIS 4 INDEX: FIB4 SCORE: 0.88

## 2020-07-31 NOTE — NON-PROVIDER
Pt here for GYN f/u.    Pt states no complaints. Denies, odor and discharge.   Good# [phone]  Pharmacy confirmed.

## 2020-07-31 NOTE — PROGRESS NOTES
Chief Complaint   Patient presents with   • Gynecologic Exam     Chief complaint: Follow-up visit        History of present illness: 29 y.o. presents to office for follow-up after a hysterectomy and placement with urethral sling.  Patient reports been doing well, she denies any pain at this time.  She is able to void but has trouble stopping her stream when she starts voiding.  She denies any loss of urine.    Patient concern for possible bacterial vaginosis as she has been having some increased vaginal discharge while on antibiotics for a dental infection.    Patient has not been sexually active.      Review of systems:  Pertinent positives documented in HPI and a comprehensive review of system is negative    All PMH, PSH, allergies, social history and FH reviewed and updated today:  Past Medical History:   Diagnosis Date   • Abnormal uterine bleeding (AUB)    • AC (acromioclavicular) joint bone spurs    • Anxiety    • Arthritis     toes, hands, back   • Bowel habit changes     constipation/diarrhea   • GERD (gastroesophageal reflux disease)    • Mastitis 05/2019   • Nausea/vomiting in pregnancy 10/5/2012   • Pain 05/29/2020    pelvic and back, 5/10       Past Surgical History:   Procedure Laterality Date   • PB COMBINED ANT/POST COLPORRHAPHY N/A 6/3/2020    Procedure: COLPORRHAPHY, COMBINED ANTEROPOSTERIOR;  Surgeon: Jose Bowen M.D.;  Location: SURGERY SAME DAY [facility];  Service: Obstetrics   • HYSTERECTOMY LAPAROSCOPY N/A 6/3/2020    Procedure: HYSTERECTOMY, LAPAROSCOPIC-;  Surgeon: Jose Bowen M.D.;  Location: SURGERY SAME DAY [facility];  Service: Obstetrics   • CYSTOSCOPY N/A 6/3/2020    Procedure: CYSTOSCOPY- WITH MID URETHRAL SLING PLACEMENT;  Surgeon: Jose Bowen M.D.;  Location: SURGERY SAME DAY [facility];  Service: Obstetrics   • DURAN BY LAPAROSCOPY  10/25/2019    Procedure: CHOLECYSTECTOMY, LAPAROSCOPIC;  Surgeon: Efrain Ramesh M.D.;  Location: [facility]  [facility];  Service: General   • ERCP IN OR N/A 10/24/2019    Procedure: ERCP (ENDOSCOPIC RETROGRADE CHOLANGIOPANCREATOGRAPHY);  Surgeon: Temo Liao M.D.;  Location: SURGERY [facility];  Service: Gastroenterology   • PB LAP,DIAGNOSTIC ABDOMEN  2019    Procedure: PELVISCOPY;  Surgeon: Pilar Vuong M.D.;  Location: SURGERY SAME DAY [facility];  Service: Gynecology   • SALPINGECTOMY Bilateral 2019    Procedure: SALPINGECTOMY;  Surgeon: Pilar Vuong M.D.;  Location: SURGERY SAME DAY [facility];  Service: Gynecology       Allergies:   Allergies   Allergen Reactions   • Nkda [No Known Drug Allergy]    • Cantaloupe    • Tape      Surgical tape       Social History     Socioeconomic History   • Marital status: Single     Spouse name: Not on file   • Number of children: Not on file   • Years of education: Not on file   • Highest education level: Not on file   Occupational History   • Not on file   Social Needs   • Financial resource strain: Not on file   • Food insecurity     Worry: Patient refused     Inability: Patient refused   • Transportation needs     Medical: Patient refused     Non-medical: Patient refused   Tobacco Use   • Smoking status: Former Smoker     Packs/day: 0.25     Years: 15.00     Pack years: 3.75     Types: Cigarettes     Last attempt to quit: 2019     Years since quittin.0   • Smokeless tobacco: Never Used   Substance and Sexual Activity   • Alcohol use: Not Currently   • Drug use: Not Currently   • Sexual activity: Yes     Partners: Male     Birth control/protection: Surgical, Injection     Comment: none   Lifestyle   • Physical activity     Days per week: Not on file     Minutes per session: Not on file   • Stress: Not on file   Relationships   • Social connections     Talks on phone: Not on file     Gets together: Not on file     Attends Shinto service: Not on file     Active member of club or organization: Not on file     Attends meetings of clubs or  organizations: Not on file     Relationship status: Not on file   • Intimate partner violence     Fear of current or ex partner: Not on file     Emotionally abused: Not on file     Physically abused: Not on file     Forced sexual activity: Not on file   Other Topics Concern   • Not on file   Social History Narrative   • Not on file       Family History   Problem Relation Age of Onset   • Cancer Mother 45        breast cancer, lymph node,   • Heart Attack Father    • Gout Father    • Cancer Maternal Grandmother         breast cancer       Physical exam:  /72   Wt 68 kg (150 lb)     GENERAL APPEARANCE: healthy, alert, no distress  NECK nontender, no masses, thyromegaly or nodules  ABDOMEN Abdomen soft, non-tender. BS normal. No masses,  No organomegaly  FEMALE GYN: normal female external genitalia without lesions, BUS normal, white vaginal discharge noted, vulva pink without erythema or friability, urethra is normal without discharge or scarring, no bladder fullness or masses, normal external genitalia, no erythema, no discharge, normal vagina and normal vaginal tone, cuff healing well,, normal anus and perineum.  NEURO Awake, alert and oriented x 3, Normal gait, no sensory deficits  SKIN No rashes, or ulcers or lesions seen  PSYCHIATRIC: Patient shows appropriate affect, is alert and oriented x3, intact judgment and insight.      Assessment:  1. Vaginal discharge         Plan:    Vaginal pathogen obtained, will call patient with results.    Low suspicion for bacterial vaginosis, discharge appears normal physiologic, no odor    Follow-up in 4 to 6 weeks.  If no improvement in her symptoms, may have her see urology    Questions answered    Spent  15 minutes in face-to-face patient contact in which greater than 50% of that visit was spent in counseling/coordination of care including medical and surgical options of care.

## 2020-08-03 DIAGNOSIS — N89.8 VAGINAL DISCHARGE: ICD-10-CM

## 2020-08-21 ENCOUNTER — TELEPHONE (OUTPATIENT)
Dept: OBGYN | Facility: CLINIC | Age: 30
End: 2020-08-21

## 2020-08-21 NOTE — TELEPHONE ENCOUNTER
Patient notified and states that she is still having some odor problems and is scheduled for an appointment on 08/27/2020.  ----- Message from Jose Bowen M.D. sent at 8/7/2020 12:10 PM PDT -----  Negative vaginal infection panel

## 2020-08-27 ENCOUNTER — GYNECOLOGY VISIT (OUTPATIENT)
Dept: OBGYN | Facility: CLINIC | Age: 30
End: 2020-08-27
Payer: MEDICAID

## 2020-08-27 VITALS — BODY MASS INDEX: 25.39 KG/M2 | SYSTOLIC BLOOD PRESSURE: 102 MMHG | WEIGHT: 152.6 LBS | DIASTOLIC BLOOD PRESSURE: 60 MMHG

## 2020-08-27 DIAGNOSIS — N76.0 ACUTE VAGINITIS: ICD-10-CM

## 2020-08-27 PROCEDURE — 99213 OFFICE O/P EST LOW 20 MIN: CPT | Performed by: OBSTETRICS & GYNECOLOGY

## 2020-08-27 RX ORDER — GABAPENTIN 100 MG/1
100 CAPSULE ORAL 3 TIMES DAILY
COMMUNITY
End: 2021-10-08

## 2020-08-27 ASSESSMENT — FIBROSIS 4 INDEX: FIB4 SCORE: 0.88

## 2020-08-27 NOTE — NON-PROVIDER
Patient here for a GYN follow up.   Last seen on   C/o Pt states that she believes that the BV is back, she states having some odor,and itching  pharmacy verified.  Patient phone #: [phone]

## 2020-08-27 NOTE — PROGRESS NOTES
Subjective:      Lali Funes is a 29 y.o. female who presents with Gynecologic Exam (Follow up)            HPI patient is a 29-year-old multiparous female who presents today with complaints of acute vaginitis symptoms.  Symptoms started a week ago.  She reports a malodorous discharge and vaginal irritation symptoms.  States she has had some episode of intermittent pain with intercourse since her hysterectomy but symptoms are not consistent.  She has chronic back pain and is taking tramadol daily.  She reports her bowel and bladder functions are unchanged.  No new sexual partner.  Denies any nausea vomiting fevers or dysuria    ROS all organ systems were reviewed and were negative except for complaints in HPI       Objective:     /60   Wt 69.2 kg (152 lb 9.6 oz)   BMI 25.39 kg/m²      Physical Exam  Vitals signs and nursing note reviewed. Exam conducted with a chaperone present.   Constitutional:       General: She is not in acute distress.     Appearance: Normal appearance. She is normal weight. She is not toxic-appearing.   HENT:      Head: Normocephalic and atraumatic.   Neck:      Musculoskeletal: Normal range of motion and neck supple. No neck rigidity.   Cardiovascular:      Rate and Rhythm: Normal rate and regular rhythm.      Pulses: Normal pulses.      Heart sounds: Normal heart sounds. No murmur.   Pulmonary:      Effort: Pulmonary effort is normal. No respiratory distress.      Breath sounds: Normal breath sounds. No wheezing.   Abdominal:      General: Abdomen is flat. Bowel sounds are normal. There is no distension.      Palpations: Abdomen is soft.      Tenderness: There is no abdominal tenderness.   Genitourinary:     General: Normal vulva.      Labia:         Right: No rash, tenderness, lesion or injury.         Left: No rash, tenderness, lesion or injury.       Vagina: Vaginal discharge (Grayish discharge present) present. No tenderness or bleeding.      Uterus: Absent.       Adnexa:          Right: No mass, tenderness or fullness.          Left: No mass, tenderness or fullness.        Comments: Mild soreness to palpation in upper vagina on bimanual exam.  No masses palpable.  Bladder is nontender on palpation  Musculoskeletal: Normal range of motion.      Right lower leg: No edema.      Left lower leg: No edema.   Lymphadenopathy:      Cervical: No cervical adenopathy.   Skin:     General: Skin is warm and dry.      Coloration: Skin is not jaundiced.      Findings: No bruising.   Neurological:      General: No focal deficit present.      Mental Status: She is alert and oriented to person, place, and time.      Gait: Gait normal.   Psychiatric:         Mood and Affect: Mood normal.         Behavior: Behavior normal.         Thought Content: Thought content normal.         Judgment: Judgment normal.                 Assessment/Plan:        1. Acute vaginitis  29-year-old multiparous female here with acute vaginitis symptoms.  Patient counseled on findings.  Vaginal pathogen swab obtained.  Request gonorrhea chlamydia testing.  - Chlamydia/GC PCR Urine Or Swab; Future  - VAGINAL PATHOGENS DNA PANEL; Future    2.  Intermittent pelvic pain/dyspareunia.  Comfort measures discussed.  Symptoms are not consistent.  Patient will observe symptoms and if symptoms worsen, she will follow-up.    3.  Precautions and plan of care were discussed

## 2020-09-01 DIAGNOSIS — N76.0 ACUTE VAGINITIS: ICD-10-CM

## 2020-09-02 DIAGNOSIS — A74.9 CHLAMYDIA INFECTION: ICD-10-CM

## 2020-09-02 RX ORDER — AZITHROMYCIN 500 MG/1
1000 TABLET, FILM COATED ORAL ONCE
Qty: 2 TAB | Refills: 0 | Status: SHIPPED | OUTPATIENT
Start: 2020-09-02 | End: 2020-09-09 | Stop reason: SDUPTHER

## 2020-09-09 ENCOUNTER — TELEPHONE (OUTPATIENT)
Dept: OBGYN | Facility: CLINIC | Age: 30
End: 2020-09-09

## 2020-09-09 DIAGNOSIS — A74.9 CHLAMYDIA INFECTION: ICD-10-CM

## 2020-09-09 RX ORDER — AZITHROMYCIN 500 MG/1
1000 TABLET, FILM COATED ORAL ONCE
Qty: 2 TAB | Refills: 0 | Status: SHIPPED | OUTPATIENT
Start: 2020-09-09 | End: 2020-09-09

## 2020-09-09 RX ORDER — METRONIDAZOLE 500 MG/1
500 TABLET ORAL 2 TIMES DAILY
Qty: 14 TAB | Refills: 0 | Status: SHIPPED | OUTPATIENT
Start: 2020-09-09 | End: 2020-09-16

## 2020-09-09 NOTE — TELEPHONE ENCOUNTER
Pt called about her labs results. Pt was informed she has tested positive for CT pt understood and also asked if we could send a prescription for BV. Consulted with pardeep and she sent over a prescription. Pt was aware and had no questions.

## 2020-09-16 ENCOUNTER — GYNECOLOGY VISIT (OUTPATIENT)
Dept: OBGYN | Facility: CLINIC | Age: 30
End: 2020-09-16
Payer: MEDICAID

## 2020-09-16 VITALS
BODY MASS INDEX: 25.76 KG/M2 | WEIGHT: 154.6 LBS | HEIGHT: 65 IN | DIASTOLIC BLOOD PRESSURE: 60 MMHG | SYSTOLIC BLOOD PRESSURE: 110 MMHG

## 2020-09-16 DIAGNOSIS — Z11.3 ROUTINE SCREENING FOR STI (SEXUALLY TRANSMITTED INFECTION): Primary | ICD-10-CM

## 2020-09-16 PROCEDURE — 99213 OFFICE O/P EST LOW 20 MIN: CPT | Performed by: NURSE PRACTITIONER

## 2020-09-16 ASSESSMENT — FIBROSIS 4 INDEX: FIB4 SCORE: 0.88

## 2020-09-16 NOTE — NON-PROVIDER
Patient here for a GYN follow up.   Last seen on 08/27/2020  C/o  A bump on vaginal   Dead fingers for over a week   pharmacy verified.  Patient phone #: [phone]

## 2020-09-16 NOTE — PROGRESS NOTES
HPI Comments:  Lali Funes is a 29 y.o. y.o. female who presents for problem gyn visit: STI testing. Pt has complaints related to recent infidelity by partner. She also states she had a lump on her external labia which had some drainage/pus. No LMP recorded. Patient has had an injection.      Lali is here for STI testing. Her partner recently cheated on her, and she was infected with Chlamydia as a result. She was treated last week for that infection, and would now like a full STI panel.   She also states she has lump on her right labia that has been oozing pus for about 1 week, and wants to make sure its not another type of STI.  She has chronic back pain, and is experiencing some numbness in her left hand as a result. Will follow this up with her pain management MD.    Review of Systems :  Constitutional: alert and oriented x 4, no acute distress  EENT: neg, wears glasses  Cardio: neg  Resp: neg  GI: neg  : neg  Pertinent positives documented in HPI and all other systems reviewed & are negative    All PMH, PSH, allergies, social history and FH reviewed and updated today:  Past Medical History:   Diagnosis Date   • Abnormal uterine bleeding (AUB)    • AC (acromioclavicular) joint bone spurs    • Anxiety    • Arthritis     toes, hands, back   • Bowel habit changes     constipation/diarrhea   • GERD (gastroesophageal reflux disease)    • Mastitis 05/2019   • Nausea/vomiting in pregnancy 10/5/2012   • Pain 05/29/2020    pelvic and back, 5/10     Past Surgical History:   Procedure Laterality Date   • PB COMBINED ANT/POST COLPORRHAPHY N/A 6/3/2020    Procedure: COLPORRHAPHY, COMBINED ANTEROPOSTERIOR;  Surgeon: Jose Bowen M.D.;  Location: SURGERY SAME DAY [facility];  Service: Obstetrics   • HYSTERECTOMY LAPAROSCOPY N/A 6/3/2020    Procedure: HYSTERECTOMY, LAPAROSCOPIC-;  Surgeon: Jose Bowen M.D.;  Location: SURGERY SAME DAY [facility];  Service: Obstetrics   • CYSTOSCOPY N/A 6/3/2020     Procedure: CYSTOSCOPY- WITH MID URETHRAL SLING PLACEMENT;  Surgeon: Jose Bowen M.D.;  Location: SURGERY SAME DAY [facility];  Service: Obstetrics   • DURAN BY LAPAROSCOPY  10/25/2019    Procedure: CHOLECYSTECTOMY, LAPAROSCOPIC;  Surgeon: Efrain Ramesh M.D.;  Location: SURGERY [facility];  Service: General   • ERCP IN OR N/A 10/24/2019    Procedure: ERCP (ENDOSCOPIC RETROGRADE CHOLANGIOPANCREATOGRAPHY);  Surgeon: Temo Liao M.D.;  Location: SURGERY [facility];  Service: Gastroenterology   • PB LAP,DIAGNOSTIC ABDOMEN  2019    Procedure: PELVISCOPY;  Surgeon: Pilar Vuong M.D.;  Location: SURGERY SAME DAY [facility];  Service: Gynecology   • SALPINGECTOMY Bilateral 2019    Procedure: SALPINGECTOMY;  Surgeon: Pilar Vuong M.D.;  Location: SURGERY SAME DAY [facility];  Service: Gynecology     Nkda [no known drug allergy], Cantaloupe, and Tape  Social History     Socioeconomic History   • Marital status: Single     Spouse name: Not on file   • Number of children: Not on file   • Years of education: Not on file   • Highest education level: Not on file   Occupational History   • Not on file   Social Needs   • Financial resource strain: Not on file   • Food insecurity     Worry: Patient refused     Inability: Patient refused   • Transportation needs     Medical: Patient refused     Non-medical: Patient refused   Tobacco Use   • Smoking status: Former Smoker     Packs/day: 0.25     Years: 15.00     Pack years: 3.75     Types: Cigarettes     Quit date: 2019     Years since quittin.1   • Smokeless tobacco: Never Used   Substance and Sexual Activity   • Alcohol use: Not Currently   • Drug use: Not Currently   • Sexual activity: Yes     Partners: Male     Birth control/protection: Surgical, Injection     Comment: none   Lifestyle   • Physical activity     Days per week: Not on file     Minutes per session: Not on file   • Stress: Not on file   Relationships   •  Social connections     Talks on phone: Not on file     Gets together: Not on file     Attends Mosque service: Not on file     Active member of club or organization: Not on file     Attends meetings of clubs or organizations: Not on file     Relationship status: Not on file   • Intimate partner violence     Fear of current or ex partner: Not on file     Emotionally abused: Not on file     Physically abused: Not on file     Forced sexual activity: Not on file   Other Topics Concern   • Not on file   Social History Narrative   • Not on file     Family History   Problem Relation Age of Onset   • Cancer Mother 45        breast cancer, lymph node,   • Heart Attack Father    • Gout Father    • Cancer Maternal Grandmother         breast cancer     Medications:   Current Outpatient Medications Ordered in Epic   Medication Sig Dispense Refill   • gabapentin (NEURONTIN) 100 MG Cap Take 100 mg by mouth 3 times a day.     • zolpidem (AMBIEN) 10 MG Tab Take 10 mg by mouth at bedtime as needed for Sleep.     • hydrOXYzine HCl (ATARAX) 25 MG Tab Take 25 mg by mouth 3 times a day as needed for Itching.     • omeprazole (PRILOSEC) 20 MG delayed-release capsule Take 20 mg by mouth every bedtime. Indications: Gastroesophageal Reflux Disease     • ibuprofen (MOTRIN) 200 MG Tab Take 600 mg by mouth as needed. Indications: Mild to Moderate Pain     • tramadol (ULTRAM) 50 MG Tab Take 50 mg by mouth as needed (Pain).     • methocarbamol (ROBAXIN) 750 MG Tab Take 750 mg by mouth every morning. Indications: Musculoskeletal Pain     • metroNIDAZOLE (FLAGYL) 500 MG Tab Take 1 Tab by mouth 2 Times a Day for 7 days. (Patient not taking: Reported on 9/16/2020) 14 Tab 0   • metroNIDAZOLE (FLAGYL) 500 MG Tab Take 1 Tab by mouth 2 Times a Day. (Patient not taking: Reported on 8/27/2020) 14 Tab 0   • cefdinir (OMNICEF) 300 MG Cap Take 1 Cap by mouth 2 times a day. 8 Cap 0   • metronidazole (METROGEL) 0.75 % gel Apply 1 Applicator to affected  "area(s) 2 times a day. (Patient not taking: Reported on 6/16/2020) 1 Tube 1     No current Epic-ordered facility-administered medications on file.           Objective:   Vital measurements:  /60 (BP Location: Right arm, Patient Position: Sitting, BP Cuff Size: Adult)   Ht 1.651 m (5' 5\")   Wt 70.1 kg (154 lb 9.6 oz)   Body mass index is 25.73 kg/m². (Goal BM I>18 <25)    Physical Exam   Nursing note and vitals reviewed.  Constitutional: She is oriented to person, place, and time. She appears well-developed and well-nourished. No distress.     Abdominal: Soft. Bowel sounds are normal. She exhibits no distension and no mass. No tenderness. She has no rebound and no guarding.     Breast:  Symmetrical, normal consistency without masses., No dimpling or skin changes, Normal nipples without discharge, no axillary lymphadenopathy, negative, Inspection negative. No nipple discharge or bleeding, No masses    Genitourinary:  Pelvic exam was performed with patient supine.  External genitalia with no abnormal pigmentation, labial fusion,rash, tenderness, lesion or injury to the labia bilaterally.  Vagina is moist with no lesions, foul discharge, erythema, tenderness or bleeding. No foreign body around the vagina or signs of injury.   Uterus surgically absent    Neurological: She is alert and oriented to person, place, and time. She exhibits normal muscle tone.     Skin: Skin is warm and dry. No rash noted. She is not diaphoretic. No erythema. No pallor.     Psychiatric: She has a normal mood and affect. Her behavior is normal. Judgment and thought content normal.        Assessment:     1. Routine screening for STI (sexually transmitted infection)  HEP B SURFACE ANTIGEN    HEP C VIRUS ANTIBODY    RPR QUAL W/REFLEX    HIV-1/HIV-2 SINGLE RESULT     Blood work ordered for STI testing and vaginal pathogens done  Last GC/CT was 9/2/20- and positive for chlamydia. Can return for JEFF after 3-4 weeks post treatment    Plan: "   Gyn exam performed   Monthly SBE.  Counseling: breast self exam, mammography screening, STD prevention, menopause, osteoporosis and adequate intake of calcium and vitamin D  Encourage exercise and proper diet.  See medications and orders placed in encounter report.    Follow up for repeat testing as desired  Will call with results    Pamela Vaca CNM, APRN    No follow-ups on file.

## 2020-09-28 DIAGNOSIS — Z11.3 ROUTINE SCREENING FOR STI (SEXUALLY TRANSMITTED INFECTION): ICD-10-CM

## 2020-10-20 ENCOUNTER — GYNECOLOGY VISIT (OUTPATIENT)
Dept: OBGYN | Facility: CLINIC | Age: 30
End: 2020-10-20
Payer: MEDICAID

## 2020-10-20 ENCOUNTER — TELEPHONE (OUTPATIENT)
Dept: OBGYN | Facility: CLINIC | Age: 30
End: 2020-10-20

## 2020-10-20 VITALS — DIASTOLIC BLOOD PRESSURE: 62 MMHG | SYSTOLIC BLOOD PRESSURE: 112 MMHG | BODY MASS INDEX: 25.63 KG/M2 | WEIGHT: 154 LBS

## 2020-10-20 DIAGNOSIS — Z98.890 STATUS POST CREATION OF URETHRAL SLING BY SUPRAPUBIC APPROACH: ICD-10-CM

## 2020-10-20 DIAGNOSIS — Z11.3 ROUTINE SCREENING FOR STI (SEXUALLY TRANSMITTED INFECTION): Primary | ICD-10-CM

## 2020-10-20 DIAGNOSIS — N89.8 VAGINAL DISCHARGE: ICD-10-CM

## 2020-10-20 PROCEDURE — 99213 OFFICE O/P EST LOW 20 MIN: CPT | Performed by: NURSE PRACTITIONER

## 2020-10-20 ASSESSMENT — FIBROSIS 4 INDEX: FIB4 SCORE: 0.91

## 2020-10-20 NOTE — PROGRESS NOTES
HPI Comments:  Lali Funes is a 30 y.o. y.o. female who presents for problem gyn visit: follow up STI testing. Pt has complaints of discharge with odor and mild itchiness. No LMP recorded. Patient has had an injection.      Lali was seen 9/16/20 for STI testing. At that time she had just finished treatment for BV and chlamydia. Desired other STI testing, so blood work was done. Lab results were normal. She is here today for JEFF for BV and chlamydia.     Review of Systems :  Constitutional: alert and oriented x 4  EENT: positive for glasses  Cardio: neg  Resp: neg  GI: neg  : positive for discharge and itching  Pertinent positives documented in HPI and all other systems reviewed & are negative    All PMH, PSH, allergies, social history and FH reviewed and updated today:  Past Medical History:   Diagnosis Date   • Abnormal uterine bleeding (AUB)    • AC (acromioclavicular) joint bone spurs    • Anxiety    • Arthritis     toes, hands, back   • Bowel habit changes     constipation/diarrhea   • GERD (gastroesophageal reflux disease)    • Mastitis 05/2019   • Nausea/vomiting in pregnancy 10/5/2012   • Pain 05/29/2020    pelvic and back, 5/10     Past Surgical History:   Procedure Laterality Date   • PB COMBINED ANT/POST COLPORRHAPHY N/A 6/3/2020    Procedure: COLPORRHAPHY, COMBINED ANTEROPOSTERIOR;  Surgeon: Jose Bowen M.D.;  Location: SURGERY SAME DAY [facility];  Service: Obstetrics   • HYSTERECTOMY LAPAROSCOPY N/A 6/3/2020    Procedure: HYSTERECTOMY, LAPAROSCOPIC-;  Surgeon: Jose Bowen M.D.;  Location: SURGERY SAME DAY [facility];  Service: Obstetrics   • CYSTOSCOPY N/A 6/3/2020    Procedure: CYSTOSCOPY- WITH MID URETHRAL SLING PLACEMENT;  Surgeon: Jose Bowen M.D.;  Location: SURGERY SAME DAY [facility];  Service: Obstetrics   • DURAN BY LAPAROSCOPY  10/25/2019    Procedure: CHOLECYSTECTOMY, LAPAROSCOPIC;  Surgeon: Efrain Ramesh M.D.;  Location: SURGERY [facility];   Service: General   • ERCP IN OR N/A 10/24/2019    Procedure: ERCP (ENDOSCOPIC RETROGRADE CHOLANGIOPANCREATOGRAPHY);  Surgeon: Temo Liao M.D.;  Location: SURGERY [facility];  Service: Gastroenterology   • PB LAP,DIAGNOSTIC ABDOMEN  2019    Procedure: PELVISCOPY;  Surgeon: Pilar Vuong M.D.;  Location: SURGERY SAME DAY [facility];  Service: Gynecology   • SALPINGECTOMY Bilateral 2019    Procedure: SALPINGECTOMY;  Surgeon: Pilar Vuong M.D.;  Location: SURGERY SAME DAY [facility];  Service: Gynecology     Nkda [no known drug allergy], Cantaloupe, and Tape  Social History     Socioeconomic History   • Marital status: Single     Spouse name: Not on file   • Number of children: Not on file   • Years of education: Not on file   • Highest education level: Not on file   Occupational History   • Not on file   Social Needs   • Financial resource strain: Not on file   • Food insecurity     Worry: Patient refused     Inability: Patient refused   • Transportation needs     Medical: Patient refused     Non-medical: Patient refused   Tobacco Use   • Smoking status: Former Smoker     Packs/day: 0.25     Years: 15.00     Pack years: 3.75     Types: Cigarettes     Quit date: 2019     Years since quittin.2   • Smokeless tobacco: Never Used   Substance and Sexual Activity   • Alcohol use: Not Currently   • Drug use: Not Currently   • Sexual activity: Yes     Partners: Male     Birth control/protection: Surgical, Injection     Comment: none   Lifestyle   • Physical activity     Days per week: Not on file     Minutes per session: Not on file   • Stress: Not on file   Relationships   • Social connections     Talks on phone: Not on file     Gets together: Not on file     Attends Rastafarian service: Not on file     Active member of club or organization: Not on file     Attends meetings of clubs or organizations: Not on file     Relationship status: Not on file   • Intimate partner violence     Fear  of current or ex partner: Not on file     Emotionally abused: Not on file     Physically abused: Not on file     Forced sexual activity: Not on file   Other Topics Concern   • Not on file   Social History Narrative   • Not on file     Family History   Problem Relation Age of Onset   • Cancer Mother 45        breast cancer, lymph node,   • Heart Attack Father    • Gout Father    • Cancer Maternal Grandmother         breast cancer     Medications:   Current Outpatient Medications Ordered in Epic   Medication Sig Dispense Refill   • gabapentin (NEURONTIN) 100 MG Cap Take 100 mg by mouth 3 times a day.     • zolpidem (AMBIEN) 10 MG Tab Take 10 mg by mouth at bedtime as needed for Sleep.     • hydrOXYzine HCl (ATARAX) 25 MG Tab Take 25 mg by mouth 3 times a day as needed for Itching.     • omeprazole (PRILOSEC) 20 MG delayed-release capsule Take 20 mg by mouth every bedtime. Indications: Gastroesophageal Reflux Disease     • ibuprofen (MOTRIN) 200 MG Tab Take 600 mg by mouth as needed. Indications: Mild to Moderate Pain     • tramadol (ULTRAM) 50 MG Tab Take 50 mg by mouth as needed (Pain).     • metroNIDAZOLE (FLAGYL) 500 MG Tab Take 1 Tab by mouth 2 Times a Day. (Patient not taking: Reported on 8/27/2020) 14 Tab 0   • cefdinir (OMNICEF) 300 MG Cap Take 1 Cap by mouth 2 times a day. 8 Cap 0   • metronidazole (METROGEL) 0.75 % gel Apply 1 Applicator to affected area(s) 2 times a day. (Patient not taking: Reported on 6/16/2020) 1 Tube 1   • methocarbamol (ROBAXIN) 750 MG Tab Take 750 mg by mouth every morning. Indications: Musculoskeletal Pain       No current Epic-ordered facility-administered medications on file.           Objective:   Vital measurements:  There were no vitals taken for this visit.  There is no height or weight on file to calculate BMI. (Goal BM I>18 <25)    Physical Exam   Nursing note and vitals reviewed.  Constitutional: She is oriented to person, place, and time. She appears well-developed and  well-nourished. No distress.     Abdominal: Soft. Bowel sounds are normal. She exhibits no distension and no mass. No tenderness. She has no rebound and no guarding.     Breast:  Inspection negative. No nipple discharge or bleeding    Genitourinary:  Pelvic exam was performed with patient supine.  External genitalia with no abnormal pigmentation, labial fusion,rash, tenderness, lesion or injury to the labia bilaterally.  Vagina is moist with no lesions, foul discharge, erythema, tenderness or bleeding. No foreign body around the vagina or signs of injury.   Uterus and cervix are surgically absent  Right adnexum displays no mass, no tenderness and no fullness. Left adnexum displays no mass, no tenderness and no fullness.     Neurological: She is alert and oriented to person, place, and time. She exhibits normal muscle tone.     Skin: Skin is warm and dry. No rash noted. She is not diaphoretic. No erythema. No pallor.     Psychiatric: She has a normal mood and affect. Her behavior is normal. Judgment and thought content normal.        Assessment:     1. Routine screening for STI (sexually transmitted infection)  Chlamydia/GC PCR Urine Or Swab   2. Vaginal discharge  VAGINAL PATHOGENS DNA PANEL         Plan:   Gyn exam performed   Monthly SBE.  Counseling: breast self exam, mammography screening and STD prevention  Encourage exercise and proper diet.  See medications and orders placed in encounter report.    No follow-ups on file.     Follow up annually or sooner PRN  Desires referral to urology, which I placed today  Will call with lab results    Pamela Vaca CNM, APRN

## 2020-10-20 NOTE — TELEPHONE ENCOUNTER
Per Pamela Vaca C.N.M. called pt to ask if she can come early for her appt today. No answer. Left Vm for pt to call back.

## 2020-10-20 NOTE — PROGRESS NOTES
GYN visit for STD follow up  JEFF to be done today. Pt states she took medication for Chlamydia on 09/12.  Pt states she was prescribed a vaginal cream for BV through her PCP and still having  vaginal itching, odor and a little burning.   Good # [phone]

## 2020-10-26 ENCOUNTER — TELEPHONE (OUTPATIENT)
Dept: OBGYN | Facility: CLINIC | Age: 30
End: 2020-10-26

## 2020-10-26 NOTE — TELEPHONE ENCOUNTER
Received a fax from [facility] stating unable to perform vaginal pathogen and  GC/CT. Called [facility] spoke to Christie and new number provider #[id number]. Will send new number to run test. Christie stated we should receive results in about 4 days.

## 2020-10-27 ENCOUNTER — TELEPHONE (OUTPATIENT)
Dept: OBGYN | Facility: CLINIC | Age: 30
End: 2020-10-27

## 2020-10-27 NOTE — TELEPHONE ENCOUNTER
PT LM on  inquiring about test results  Spoke with pt, checked on [facility] website and results are not completed yet. Informed pt, voiced understanding and will check back

## 2020-11-04 ENCOUNTER — HOSPITAL ENCOUNTER (OUTPATIENT)
Facility: MEDICAL CENTER | Age: 30
End: 2020-11-04
Attending: FAMILY MEDICINE
Payer: MEDICAID

## 2020-11-04 ENCOUNTER — OFFICE VISIT (OUTPATIENT)
Dept: URGENT CARE | Facility: CLINIC | Age: 30
End: 2020-11-04
Payer: MEDICAID

## 2020-11-04 VITALS
WEIGHT: 153 LBS | BODY MASS INDEX: 25.49 KG/M2 | TEMPERATURE: 97.4 F | SYSTOLIC BLOOD PRESSURE: 102 MMHG | HEIGHT: 65 IN | OXYGEN SATURATION: 98 % | HEART RATE: 96 BPM | DIASTOLIC BLOOD PRESSURE: 72 MMHG | RESPIRATION RATE: 12 BRPM

## 2020-11-04 DIAGNOSIS — Z20.822 EXPOSURE TO COVID-19 VIRUS: ICD-10-CM

## 2020-11-04 DIAGNOSIS — J02.9 PHARYNGITIS, UNSPECIFIED ETIOLOGY: ICD-10-CM

## 2020-11-04 DIAGNOSIS — Z20.822 SUSPECTED COVID-19 VIRUS INFECTION: ICD-10-CM

## 2020-11-04 LAB
COVID ORDER STATUS COVID19: NORMAL
INT CON NEG: NEGATIVE
INT CON POS: POSITIVE
S PYO AG THROAT QL: NEGATIVE

## 2020-11-04 PROCEDURE — 87880 STREP A ASSAY W/OPTIC: CPT | Mod: CS | Performed by: FAMILY MEDICINE

## 2020-11-04 PROCEDURE — 99214 OFFICE O/P EST MOD 30 MIN: CPT | Mod: CS | Performed by: FAMILY MEDICINE

## 2020-11-04 PROCEDURE — U0003 INFECTIOUS AGENT DETECTION BY NUCLEIC ACID (DNA OR RNA); SEVERE ACUTE RESPIRATORY SYNDROME CORONAVIRUS 2 (SARS-COV-2) (CORONAVIRUS DISEASE [COVID-19]), AMPLIFIED PROBE TECHNIQUE, MAKING USE OF HIGH THROUGHPUT TECHNOLOGIES AS DESCRIBED BY CMS-2020-01-R: HCPCS

## 2020-11-04 ASSESSMENT — ENCOUNTER SYMPTOMS
HEADACHES: 1
CHILLS: 0
COUGH: 0
MYALGIAS: 0
SORE THROAT: 1
DIZZINESS: 0
FEVER: 0
NAUSEA: 0
SHORTNESS OF BREATH: 0
TROUBLE SWALLOWING: 0
VOMITING: 0

## 2020-11-04 ASSESSMENT — FIBROSIS 4 INDEX: FIB4 SCORE: 0.91

## 2020-11-04 NOTE — PROGRESS NOTES
Subjective:   Lali Funes is a 30 y.o. female who presents for Sore Throat (x 5 days on and off, headache.  Coworkers tested Positive for Covid 19. )        Pharyngitis   This is a new problem. Episode onset: 5 days. Associated symptoms include headaches. Pertinent negatives include no coughing, shortness of breath, trouble swallowing or vomiting. Associated symptoms comments: There has been community-wide COVID-19 exposure, the patient reports known COVID-19 exposure from colleagues at work  . She has tried NSAIDs for the symptoms. The treatment provided mild relief.     PMH:  has a past medical history of Abnormal uterine bleeding (AUB), AC (acromioclavicular) joint bone spurs, Anxiety, Arthritis, Bowel habit changes, GERD (gastroesophageal reflux disease), Mastitis (05/2019), Nausea/vomiting in pregnancy (10/5/2012), and Pain (05/29/2020). She also has no past medical history of Addisons disease (formerly [facility]), Adrenal disorder (formerly [facility]), Allergy, Anemia, Arrhythmia, Asthma, Blood transfusion without reported diagnosis, Cancer (formerly [facility]), Cataract, CHF (congestive heart failure) (formerly [facility]), Clotting disorder (formerly [facility]), COPD (chronic obstructive pulmonary disease) (formerly [facility]), Cushings syndrome (formerly [facility]), Diabetes (formerly [facility]), Diabetic neuropathy (formerly [facility]), Glaucoma, Goiter, Head ache, Headache(784.0), Heart attack (formerly [facility]), Heart murmur, HIV (human immunodeficiency virus infection) (formerly [facility]), Hyperlipidemia, Hypertension, IBD (inflammatory bowel disease), Kidney disease, Meningitis, Migraine, Muscle disorder, Osteoporosis, Parathyroid disorder (formerly [facility]), Pituitary disease (formerly [facility]), Pulmonary emphysema (formerly [facility]), Seizure (formerly [facility]), Sickle cell disease (formerly [facility]), Stroke (formerly [facility]), Substance abuse (formerly [facility]), Thyroid disease, Tuberculosis, Ulcer, or Urinary tract infection.  MEDS:   Current Outpatient Medications:   •  gabapentin (NEURONTIN) 100 MG Cap, Take 100 mg by mouth 3 times a day., Disp: , Rfl:   •  metroNIDAZOLE (FLAGYL) 500 MG Tab, Take 1 Tab by mouth 2 Times a Day. (Patient not  taking: Reported on 8/27/2020), Disp: 14 Tab, Rfl: 0  •  zolpidem (AMBIEN) 10 MG Tab, Take 10 mg by mouth at bedtime as needed for Sleep., Disp: , Rfl:   •  hydrOXYzine HCl (ATARAX) 25 MG Tab, Take 25 mg by mouth 3 times a day as needed for Itching., Disp: , Rfl:   •  cefdinir (OMNICEF) 300 MG Cap, Take 1 Cap by mouth 2 times a day., Disp: 8 Cap, Rfl: 0  •  omeprazole (PRILOSEC) 20 MG delayed-release capsule, Take 20 mg by mouth every bedtime. Indications: Gastroesophageal Reflux Disease, Disp: , Rfl:   •  ibuprofen (MOTRIN) 200 MG Tab, Take 600 mg by mouth as needed. Indications: Mild to Moderate Pain, Disp: , Rfl:   •  metronidazole (METROGEL) 0.75 % gel, Apply 1 Applicator to affected area(s) 2 times a day. (Patient not taking: Reported on 6/16/2020), Disp: 1 Tube, Rfl: 1  •  tramadol (ULTRAM) 50 MG Tab, Take 50 mg by mouth as needed (Pain)., Disp: , Rfl:   •  methocarbamol (ROBAXIN) 750 MG Tab, Take 750 mg by mouth every morning. Indications: Musculoskeletal Pain, Disp: , Rfl:   ALLERGIES:   Allergies   Allergen Reactions   • Nkda [No Known Drug Allergy]    • Cantaloupe    • Tape      Surgical tape     SURGHX:   Past Surgical History:   Procedure Laterality Date   • PB COMBINED ANT/POST COLPORRHAPHY N/A 6/3/2020    Procedure: COLPORRHAPHY, COMBINED ANTEROPOSTERIOR;  Surgeon: Jose Bowen M.D.;  Location: SURGERY SAME DAY [facility];  Service: Obstetrics   • HYSTERECTOMY LAPAROSCOPY N/A 6/3/2020    Procedure: HYSTERECTOMY, LAPAROSCOPIC-;  Surgeon: Jose Bowen M.D.;  Location: SURGERY SAME DAY [facility];  Service: Obstetrics   • CYSTOSCOPY N/A 6/3/2020    Procedure: CYSTOSCOPY- WITH MID URETHRAL SLING PLACEMENT;  Surgeon: Jose Bowen M.D.;  Location: SURGERY SAME DAY [facility];  Service: Obstetrics   • DURAN BY LAPAROSCOPY  10/25/2019    Procedure: CHOLECYSTECTOMY, LAPAROSCOPIC;  Surgeon: Efrain Ramesh M.D.;  Location: SURGERY [facility];  Service: General   • ERCP IN OR N/A  "10/24/2019    Procedure: ERCP (ENDOSCOPIC RETROGRADE CHOLANGIOPANCREATOGRAPHY);  Surgeon: Temo Liao M.D.;  Location: SURGERY [facility];  Service: Gastroenterology   • PB LAP,DIAGNOSTIC ABDOMEN  6/18/2019    Procedure: PELVISCOPY;  Surgeon: Pilar Vuong M.D.;  Location: SURGERY SAME DAY [facility];  Service: Gynecology   • SALPINGECTOMY Bilateral 6/18/2019    Procedure: SALPINGECTOMY;  Surgeon: Pilar Vuong M.D.;  Location: SURGERY SAME DAY [facility];  Service: Gynecology     SOCHX:  reports that she quit smoking about 15 months ago. Her smoking use included cigarettes. She has a 3.75 pack-year smoking history. She has never used smokeless tobacco. She reports previous alcohol use. She reports previous drug use.  FH:   Family History   Problem Relation Age of Onset   • Cancer Mother 45        breast cancer, lymph node,   • Heart Attack Father    • Gout Father    • Cancer Maternal Grandmother         breast cancer     Review of Systems   Constitutional: Negative for chills and fever.   HENT: Positive for sore throat. Negative for trouble swallowing.    Respiratory: Negative for cough and shortness of breath.    Gastrointestinal: Negative for nausea and vomiting.   Genitourinary: Negative for dysuria.   Musculoskeletal: Negative for myalgias.   Skin: Negative for rash.   Neurological: Positive for headaches. Negative for dizziness.   All other systems reviewed and are negative.       Objective:   /72   Pulse 96   Temp 36.3 °C (97.4 °F) (Temporal)   Resp 12   Ht 1.651 m (5' 5\")   Wt 69.4 kg (153 lb)   LMP  (LMP Unknown)   SpO2 98%   BMI 25.46 kg/m²   Physical Exam  Vitals signs and nursing note reviewed.   Constitutional:       General: She is not in acute distress.     Appearance: She is well-developed.   HENT:      Head: Normocephalic and atraumatic.      Right Ear: External ear normal.      Left Ear: External ear normal.      Nose: Nose normal.      Mouth/Throat:      Mouth: " Mucous membranes are moist.      Pharynx: Posterior oropharyngeal erythema present. No oropharyngeal exudate.   Eyes:      Conjunctiva/sclera: Conjunctivae normal.   Cardiovascular:      Rate and Rhythm: Normal rate and regular rhythm.   Pulmonary:      Effort: Pulmonary effort is normal. No respiratory distress.      Breath sounds: Normal breath sounds. No wheezing or rhonchi.   Abdominal:      General: There is no distension.   Musculoskeletal: Normal range of motion.   Skin:     General: Skin is warm and dry.   Neurological:      General: No focal deficit present.      Mental Status: She is alert and oriented to person, place, and time. Mental status is at baseline.      Gait: Gait (gait at baseline) normal.   Psychiatric:         Judgment: Judgment normal.           Assessment/Plan:   1. Exposure to COVID-19 virus  - COVID/SARS COV-2 PCR; Future    2. Pharyngitis, unspecified etiology  - POCT Rapid Strep A    3. Suspected COVID-19 virus infection  - COVID/SARS COV-2 PCR; Future      Advised routine [facility] social distancing guielines, symptomatic and supportive measures      Discussed close monitoring, return precautions, and supportive measures of maintaining adequate fluid hydration and caloric intake, relative rest and symptom management as needed for pain and/or fever.    Differential diagnosis, natural history, supportive care, and indications for immediate follow-up discussed.     Advised the patient to follow-up with the primary care physician for recheck, reevaluation, and consideration of further management.    Please note that this dictation was created using voice recognition software. I have worked with consultants from the vendor as well as technical experts from [employer] [facility] [employer] to optimize the interface. I have made every reasonable attempt to correct obvious errors, but I expect that there are errors of grammar and possibly content that I did not discover before finalizing the note.

## 2020-11-04 NOTE — LETTER
November 4, 2020         Patient: Lali Funes   YOB: 1990   Date of Visit: 11/4/2020           To Whom it May Concern:    Lali Funes was seen in my clinic on 11/4/2020. She {Return to school/sport/work:74364}    If you have any questions or concerns, please don't hesitate to call.        Sincerely,           Landry Bennett M.D.  Electronically Signed

## 2020-11-05 LAB
SARS-COV-2 RNA RESP QL NAA+PROBE: NOTDETECTED
SPECIMEN SOURCE: NORMAL

## 2020-11-05 NOTE — RESULT ENCOUNTER NOTE
Please call the patient and let them know the coronavirus test was negative and specifically SARS CoV-2 by PCR was not detected.  Please advise them to follow the routine CDC social distancing guidelines.  Thank you.

## 2021-02-17 ENCOUNTER — APPOINTMENT (OUTPATIENT)
Dept: RADIOLOGY | Facility: MEDICAL CENTER | Age: 31
End: 2021-02-17
Attending: EMERGENCY MEDICINE
Payer: MEDICAID

## 2021-02-17 ENCOUNTER — HOSPITAL ENCOUNTER (EMERGENCY)
Facility: MEDICAL CENTER | Age: 31
End: 2021-02-17
Attending: EMERGENCY MEDICINE
Payer: MEDICAID

## 2021-02-17 VITALS
HEIGHT: 65 IN | WEIGHT: 148.59 LBS | HEART RATE: 50 BPM | OXYGEN SATURATION: 97 % | RESPIRATION RATE: 14 BRPM | BODY MASS INDEX: 24.76 KG/M2 | SYSTOLIC BLOOD PRESSURE: 102 MMHG | TEMPERATURE: 97.2 F | DIASTOLIC BLOOD PRESSURE: 58 MMHG

## 2021-02-17 DIAGNOSIS — R10.9 ABDOMINAL PAIN, UNSPECIFIED ABDOMINAL LOCATION: ICD-10-CM

## 2021-02-17 LAB
ALBUMIN SERPL BCP-MCNC: 4.3 G/DL (ref 3.2–4.9)
ALBUMIN/GLOB SERPL: 1.5 G/DL
ALP SERPL-CCNC: 82 U/L (ref 30–99)
ALT SERPL-CCNC: 25 U/L (ref 2–50)
ANION GAP SERPL CALC-SCNC: 11 MMOL/L (ref 7–16)
APPEARANCE UR: CLEAR
AST SERPL-CCNC: 22 U/L (ref 12–45)
BASOPHILS # BLD AUTO: 0.5 % (ref 0–1.8)
BASOPHILS # BLD: 0.03 K/UL (ref 0–0.12)
BILIRUB SERPL-MCNC: 0.9 MG/DL (ref 0.1–1.5)
BILIRUB UR QL STRIP.AUTO: NEGATIVE
BUN SERPL-MCNC: 12 MG/DL (ref 8–22)
CALCIUM SERPL-MCNC: 9.5 MG/DL (ref 8.5–10.5)
CHLORIDE SERPL-SCNC: 103 MMOL/L (ref 96–112)
CO2 SERPL-SCNC: 23 MMOL/L (ref 20–33)
COLOR UR: YELLOW
CREAT SERPL-MCNC: 0.58 MG/DL (ref 0.5–1.4)
EOSINOPHIL # BLD AUTO: 0.08 K/UL (ref 0–0.51)
EOSINOPHIL NFR BLD: 1.3 % (ref 0–6.9)
ERYTHROCYTE [DISTWIDTH] IN BLOOD BY AUTOMATED COUNT: 41.1 FL (ref 35.9–50)
GLOBULIN SER CALC-MCNC: 2.9 G/DL (ref 1.9–3.5)
GLUCOSE SERPL-MCNC: 86 MG/DL (ref 65–99)
GLUCOSE UR STRIP.AUTO-MCNC: NEGATIVE MG/DL
HCG SERPL QL: NEGATIVE
HCT VFR BLD AUTO: 45.8 % (ref 37–47)
HGB BLD-MCNC: 15.2 G/DL (ref 12–16)
IMM GRANULOCYTES # BLD AUTO: 0.02 K/UL (ref 0–0.11)
IMM GRANULOCYTES NFR BLD AUTO: 0.3 % (ref 0–0.9)
KETONES UR STRIP.AUTO-MCNC: NEGATIVE MG/DL
LEUKOCYTE ESTERASE UR QL STRIP.AUTO: NEGATIVE
LIPASE SERPL-CCNC: 32 U/L (ref 11–82)
LYMPHOCYTES # BLD AUTO: 2.71 K/UL (ref 1–4.8)
LYMPHOCYTES NFR BLD: 43.1 % (ref 22–41)
MCH RBC QN AUTO: 29.2 PG (ref 27–33)
MCHC RBC AUTO-ENTMCNC: 33.2 G/DL (ref 33.6–35)
MCV RBC AUTO: 88.1 FL (ref 81.4–97.8)
MICRO URNS: NORMAL
MONOCYTES # BLD AUTO: 0.42 K/UL (ref 0–0.85)
MONOCYTES NFR BLD AUTO: 6.7 % (ref 0–13.4)
NEUTROPHILS # BLD AUTO: 3.03 K/UL (ref 2–7.15)
NEUTROPHILS NFR BLD: 48.1 % (ref 44–72)
NITRITE UR QL STRIP.AUTO: NEGATIVE
NRBC # BLD AUTO: 0 K/UL
NRBC BLD-RTO: 0 /100 WBC
PH UR STRIP.AUTO: 7 [PH] (ref 5–8)
PLATELET # BLD AUTO: 239 K/UL (ref 164–446)
PMV BLD AUTO: 10.9 FL (ref 9–12.9)
POTASSIUM SERPL-SCNC: 4 MMOL/L (ref 3.6–5.5)
PROT SERPL-MCNC: 7.2 G/DL (ref 6–8.2)
PROT UR QL STRIP: NEGATIVE MG/DL
RBC # BLD AUTO: 5.2 M/UL (ref 4.2–5.4)
RBC UR QL AUTO: NEGATIVE
SODIUM SERPL-SCNC: 137 MMOL/L (ref 135–145)
SP GR UR STRIP.AUTO: 1.02
UROBILINOGEN UR STRIP.AUTO-MCNC: 0.2 MG/DL
WBC # BLD AUTO: 6.3 K/UL (ref 4.8–10.8)

## 2021-02-17 PROCEDURE — 99284 EMERGENCY DEPT VISIT MOD MDM: CPT

## 2021-02-17 PROCEDURE — 36415 COLL VENOUS BLD VENIPUNCTURE: CPT

## 2021-02-17 PROCEDURE — 84703 CHORIONIC GONADOTROPIN ASSAY: CPT

## 2021-02-17 PROCEDURE — A9270 NON-COVERED ITEM OR SERVICE: HCPCS | Performed by: EMERGENCY MEDICINE

## 2021-02-17 PROCEDURE — 81003 URINALYSIS AUTO W/O SCOPE: CPT

## 2021-02-17 PROCEDURE — 700102 HCHG RX REV CODE 250 W/ 637 OVERRIDE(OP): Performed by: EMERGENCY MEDICINE

## 2021-02-17 PROCEDURE — 80053 COMPREHEN METABOLIC PANEL: CPT

## 2021-02-17 PROCEDURE — 83690 ASSAY OF LIPASE: CPT

## 2021-02-17 PROCEDURE — 85025 COMPLETE CBC W/AUTO DIFF WBC: CPT

## 2021-02-17 PROCEDURE — 74018 RADEX ABDOMEN 1 VIEW: CPT

## 2021-02-17 RX ORDER — FAMOTIDINE 40 MG/1
40 TABLET, FILM COATED ORAL DAILY
Qty: 30 TABLET | Refills: 0 | Status: SHIPPED | OUTPATIENT
Start: 2021-02-17 | End: 2021-07-03

## 2021-02-17 RX ORDER — DOCUSATE SODIUM 100 MG/1
100 CAPSULE, LIQUID FILLED ORAL 2 TIMES DAILY
Qty: 30 CAPSULE | Refills: 0 | Status: SHIPPED | OUTPATIENT
Start: 2021-02-17 | End: 2022-03-23 | Stop reason: SDUPTHER

## 2021-02-17 RX ADMIN — LIDOCAINE HYDROCHLORIDE 30 ML: 20 SOLUTION OROPHARYNGEAL at 21:24

## 2021-02-17 ASSESSMENT — FIBROSIS 4 INDEX: FIB4 SCORE: 0.91

## 2021-02-17 NOTE — Clinical Note
Lali Funes was seen and treated in our emergency department on 2/17/2021.  She may return to work on 02/20/2021.       If you have any questions or concerns, please don't hesitate to call.      Miki Sandoval D.O.

## 2021-02-18 NOTE — ED TRIAGE NOTES
Lali Funes  30 y.o.  Chief Complaint   Patient presents with   • Epigastric Pain     started at noon yesterday, dull aching pain that turns into stabbing pain after she eats. Patient states she has her gallbladder removed 2 years ago. Patient took ibuprofen without any relief. Patient is also taking Prilosec prn. Patient denies any chest pain.   • Nausea     after eating      Patient to triage with the above complaints.     Vitals:    02/17/21 2002   BP: 113/46   Pulse: (Abnormal) 54   Resp: 12   Temp: 36.2 °C (97.2 °F)   SpO2: 98%       Triage process explained to patient, apologized for wait time, and returned to lobby.  Pt informed to notify staff of any change in condition.

## 2021-02-18 NOTE — ED NOTES
Pt ambulated to purple 80, family at bedside.  Agree with triage note.  Pt in gown and placed on monitors.  ERP to see.

## 2021-02-18 NOTE — ED NOTES
Pt resting in bed talking to family, in no apparent distress. Pt and family updated regarding status of test results and waiting for review and further orders from ERP, both demonstrated understanding. Will continue to monitor.

## 2021-02-18 NOTE — ED NOTES
IV started and blood sent to lab. Pt also ambulatory to bathroom without assistance, urine sample obtained and sent to lab.

## 2021-02-22 ENCOUNTER — APPOINTMENT (OUTPATIENT)
Dept: RADIOLOGY | Facility: MEDICAL CENTER | Age: 31
End: 2021-02-22
Attending: EMERGENCY MEDICINE
Payer: MEDICAID

## 2021-02-22 ENCOUNTER — HOSPITAL ENCOUNTER (EMERGENCY)
Facility: MEDICAL CENTER | Age: 31
End: 2021-02-22
Attending: EMERGENCY MEDICINE
Payer: MEDICAID

## 2021-02-22 VITALS
HEART RATE: 78 BPM | RESPIRATION RATE: 19 BRPM | TEMPERATURE: 97.7 F | HEIGHT: 65 IN | WEIGHT: 146.16 LBS | SYSTOLIC BLOOD PRESSURE: 102 MMHG | DIASTOLIC BLOOD PRESSURE: 54 MMHG | OXYGEN SATURATION: 99 % | BODY MASS INDEX: 24.35 KG/M2

## 2021-02-22 DIAGNOSIS — G43.109 MIGRAINE WITH AURA AND WITHOUT STATUS MIGRAINOSUS, NOT INTRACTABLE: ICD-10-CM

## 2021-02-22 PROCEDURE — 99284 EMERGENCY DEPT VISIT MOD MDM: CPT

## 2021-02-22 PROCEDURE — 70450 CT HEAD/BRAIN W/O DYE: CPT

## 2021-02-22 PROCEDURE — 96374 THER/PROPH/DIAG INJ IV PUSH: CPT

## 2021-02-22 PROCEDURE — 700111 HCHG RX REV CODE 636 W/ 250 OVERRIDE (IP): Performed by: EMERGENCY MEDICINE

## 2021-02-22 PROCEDURE — 96376 TX/PRO/DX INJ SAME DRUG ADON: CPT

## 2021-02-22 PROCEDURE — 96375 TX/PRO/DX INJ NEW DRUG ADDON: CPT

## 2021-02-22 RX ORDER — PROCHLORPERAZINE EDISYLATE 5 MG/ML
10 INJECTION INTRAMUSCULAR; INTRAVENOUS ONCE
Status: COMPLETED | OUTPATIENT
Start: 2021-02-22 | End: 2021-02-22

## 2021-02-22 RX ORDER — SUMATRIPTAN 25 MG/1
25-50 TABLET, FILM COATED ORAL
Qty: 10 TABLET | Refills: 0 | Status: SHIPPED | OUTPATIENT
Start: 2021-02-22 | End: 2021-07-03

## 2021-02-22 RX ORDER — KETOROLAC TROMETHAMINE 30 MG/ML
30 INJECTION, SOLUTION INTRAMUSCULAR; INTRAVENOUS ONCE
Status: COMPLETED | OUTPATIENT
Start: 2021-02-22 | End: 2021-02-22

## 2021-02-22 RX ORDER — DIPHENHYDRAMINE HYDROCHLORIDE 50 MG/ML
12.5 INJECTION INTRAMUSCULAR; INTRAVENOUS ONCE
Status: COMPLETED | OUTPATIENT
Start: 2021-02-22 | End: 2021-02-22

## 2021-02-22 RX ORDER — KETOROLAC TROMETHAMINE 30 MG/ML
INJECTION, SOLUTION INTRAMUSCULAR; INTRAVENOUS
Status: DISCONTINUED
Start: 2021-02-22 | End: 2021-02-22 | Stop reason: HOSPADM

## 2021-02-22 RX ORDER — ONDANSETRON 4 MG/1
4 TABLET, ORALLY DISINTEGRATING ORAL ONCE
Status: COMPLETED | OUTPATIENT
Start: 2021-02-22 | End: 2021-02-22

## 2021-02-22 RX ADMIN — KETOROLAC TROMETHAMINE 30 MG: 30 INJECTION, SOLUTION INTRAMUSCULAR; INTRAVENOUS at 16:11

## 2021-02-22 RX ADMIN — DIPHENHYDRAMINE HYDROCHLORIDE 12.5 MG: 50 INJECTION INTRAMUSCULAR; INTRAVENOUS at 16:10

## 2021-02-22 RX ADMIN — PROCHLORPERAZINE EDISYLATE 10 MG: 5 INJECTION INTRAMUSCULAR; INTRAVENOUS at 16:10

## 2021-02-22 RX ADMIN — PROCHLORPERAZINE EDISYLATE 10 MG: 5 INJECTION INTRAMUSCULAR; INTRAVENOUS at 17:12

## 2021-02-22 RX ADMIN — ONDANSETRON 4 MG: 4 TABLET, ORALLY DISINTEGRATING ORAL at 16:29

## 2021-02-22 ASSESSMENT — FIBROSIS 4 INDEX: FIB4 SCORE: 0.57

## 2021-02-22 NOTE — ED PROVIDER NOTES
ED Provider Note    CHIEF COMPLAINT  Chief Complaint   Patient presents with   • Blurred Vision     Onset x1 hr, pt had her bladder scoped this am approx 1100. Post procedure pt states her vision became blurry. Pt has hx of bladder sling, with followed on septic recation 2020.   • Headache     POnset post blurred vision       HPI  Lali Funes is a 30 y.o. female who presents with an 8 of 10 frontal minimal throbbing headache developing over 15 to 20 minutes after cystoscopy today with Dr. Yang.  She has mild photophobia and aura phobia.  There is nausea but no vomiting.  She has headaches about once a month and this is more severe than her average headache.  She has had 2 headaches she would consider to be migraines in the past.  No family history of migraine or aneurysm.  Today she received a dose of Bactrim and local lidocaine before her procedure.  No fever.  No trauma.  No prior head imaging.  Also reports some difficulty focusing her vision    REVIEW OF SYSTEMS  Pertinent positives include: Headache, vision change, nausea, mild light and sound sensitivity.  Pertinent negatives include: Fever, tooth pain, joint pain, weakness, numbness, diplopia, pregnancy.    PAST MEDICAL HISTORY  Past Medical History:   Diagnosis Date   • Abnormal uterine bleeding (AUB)    • AC (acromioclavicular) joint bone spurs    • Anxiety    • Arthritis     toes, hands, back   • Bowel habit changes     constipation/diarrhea   • GERD (gastroesophageal reflux disease)    • Mastitis 2019   • Nausea/vomiting in pregnancy 10/5/2012   • Pain 2020    pelvic and back, 5/10       SOCIAL HISTORY  Social History     Tobacco Use   • Smoking status: Former Smoker     Packs/day: 0.25     Years: 15.00     Pack years: 3.75     Types: Cigarettes     Quit date: 2019     Years since quittin.6   • Smokeless tobacco: Never Used   Substance Use Topics   • Alcohol use: Not Currently   • Drug use: Not Currently     .    CURRENT  "MEDICATIONS  Home Medications     Reviewed by Jorge Luis Yoon R.N. (Registered Nurse) on 02/22/21 at 1338  Med List Status: Partial   Medication Last Dose Status   cefdinir (OMNICEF) 300 MG Cap  Active   docusate sodium (COLACE) 100 MG Cap  Active   famotidine (PEPCID) 40 MG Tab  Active   gabapentin (NEURONTIN) 100 MG Cap  Active   hydrOXYzine HCl (ATARAX) 25 MG Tab  Active   ibuprofen (MOTRIN) 200 MG Tab  Active   methocarbamol (ROBAXIN) 750 MG Tab  Active   omeprazole (PRILOSEC) 20 MG delayed-release capsule  Active   tramadol (ULTRAM) 50 MG Tab  Active   zolpidem (AMBIEN) 10 MG Tab  Active                ALLERGIES  Allergies   Allergen Reactions   • Nkda [No Known Drug Allergy]    • Cantaloupe    • Tape      Surgical tape       PHYSICAL EXAM  VITAL SIGNS: BP (!) 99/57   Pulse 87   Temp 36.5 °C (97.7 °F) (Oral)   Resp 16   Ht 1.651 m (5' 5\")   Wt 66.3 kg (146 lb 2.6 oz)   LMP  (LMP Unknown)   SpO2 100%   BMI 24.32 kg/m² . Reviewed and afebrile  Constitutional :  Well developed, Well nourished, well-appearing, appears to be in pain, keeping eyes closed.   HNT: atraumatic, wearing a mask.  Mild left TMJ crepitus, no tooth tenderness, wearing braces  Ears: external ears normal.  Eyes: pupils reactive without eye discharge nor conjunctival hyperemia.  Extraocular movements intact  Neck: Normal range of motion, No tenderness, Supple, No stridor.   Lymphatic: No cervical adenopathy.   Cardiovascular: Regular rhythm, No murmurs, No rubs, No gallops.  No cyanosis.   Respiratory: No rales, rhonchi, wheeze, cough  Abdomen:  Soft, nontender  Skin: Warm, dry, no erythema, no rash.   Musculoskeletal: no limb deformities.  Neurologic:Cranial nerves II-XII are intact, Grasp, biceps, extensor hallucis longus, ankle plantar flexion are 5/5 and symmetric, Finger nose finger and fine motor normal. Sensation is intact to light touch in all 4 limbs.  No focal deficits noted.      RADIOLOGY:  CT-HEAD W/O   Final Result      No " acute intracranial abnormality.        INTERVENTIONS:  Medications   prochlorperazine (COMPAZINE) injection 10 mg (has no administration in time range)   diphenhydrAMINE (BENADRYL) injection 12.5 mg (has no administration in time range)   ketorolac (TORADOL) injection 30 mg (has no administration in time range)     Case discussed with Dr. Davis who read her procedure note from today and confirmed that no medicines administered other than Bactrim and topical lidocaine.    Response: Improved pain to 4 of 10    COURSE & MEDICAL DECISION MAKING  This patient presents with a moderately severe headache with migraine features after cystoscopy today.  Onset was over 15 to 20 minutes.  This was one of the most severe aches the patient had ever had.  Never had imaging.  CT obtained given the possibility of intracranial hemorrhage.  Fortunately this was negative.  Patient responded well to migraine medication.  There is no evidence at this time of venous sinus thrombosis intracranial mass or infectious cause of her headache.    This patient has borderline or elevated blood pressure as recorded above and was instructed to followup with primary physician for comprehensive blood pressure evaluation and yearly fasting cholesterol assessment according to to CMS protocol.    PLAN:  Discharge Medication List as of 2/22/2021  6:25 PM      START taking these medications    Details   SUMAtriptan (IMITREX) 25 MG Tab tablet Take 1-2 Tablets by mouth one time as needed for Migraine for up to 1 dose., Disp-10 tablet, R-0, Print Rx Paper             NSAIDs at onset of headache  Migraine handout given  Return for thunderclap headache headache and fever headache and neurologic deficit    Khushbu Fields M.D.  [address] [facility] [address]  [phone]    Schedule an appointment as soon as possible for a visit   As needed if not better 1-2 days      CONDITION:  Good.    FINAL IMPRESSION:  1. Migraine with aura and without status  migrainosus, not intractable          Electronically signed by: Ramses Mcfadden M.D., 2/22/2021

## 2021-02-22 NOTE — ED TRIAGE NOTES
Lali Funes  30 y.o. female  Chief Complaint   Patient presents with   • Blurred Vision     Onset x1 hr, pt had her bladder scoped this am approx 1100. Post procedure pt states her vision became blurry. Pt has hx of bladder sling, with followed on septic recation 6/6/2020.   • Headache     POnset post blurred vision       Pt ambulatory to triage with steady gait for above complaint.   Pt is alert and oriented, speaking in full sentences, follows commands and responds appropriately to questions. Resp are even and unlabored. No behavioral indicators of pain.   Pt placed in lobby. Pt educated on triage process. Pt encouraged to alert staff for any changes. This RN masked and in appropriate PPE during encounter.

## 2021-02-23 NOTE — ED NOTES
Pt discharged home per provider in stable condition. Paperwork provided to pt via RN and discharge instructions went over with by RN - pt verbalized understanding of teaching with no questions or concerns and is A/Ox4, VSS. Paperwork in hand on discharge.    Pt offered wheelchair for discharge, refused and ambulated out of ED. All belongings and discharge paperwork in hand on discharge. Pt had no questions or concerns. Prescription in hand.

## 2021-02-23 NOTE — DISCHARGE INSTRUCTIONS
Migraine Headache    Take ibuprofen or Excedrin Migraine and Imitrex immediately at the first hint of headache.  Return for sudden onset, severe headache, headache and fever or headache and weakness.    You had a borderline or high normal blood pressure reading today.  This does not necessarily mean you have hypertension.  Please followup with your/a primary physician for comprehensive blood pressure evaluation and yearly fasting cholesterol assessment.  BP Readings from Last 3 Encounters:   02/22/21 104/52   02/17/21 102/58   11/04/20 102/72         You have a migraine headache. Symptoms of migraines include a throbbing headache, made worse by activity. Sometimes only one side of the head hurts. Nausea, vomiting and sinus pain or stuffiness is common with migraines. Visual symptoms such as light sensitivity, blind spots, or flashing lights may also occur. Loud noises may make migraine pain worse. Migraine headaches are caused by changes in the size of the blood vessels in the head and neck. Many factors may cause this problem including:  Emotional stress, lack of sleep, and menstrual periods.  Alcohol and some drugs (such as birth control pills).  Diet factors (fasting, caffeine, food preservatives, chocolate).  Environmental factors (weather changes, bright lights, odors, smoke).  Jarring motions and loud noises may also bring on a migraine. Prevention of migraines includes dealing with the trigger factors.    Treatment may require medicines for pain, inflammation, and vomiting. Injections for pain and IV fluids can be used if the headache is very severe, or if you are vomiting repeatedly. Get plenty of rest over the next 24 hours.  Lie down in a quiet, dark place and try to sleep  Medicines to prevent the blood vessel changes may be prescribed.  For people with frequent migraines, other medicines such as beta blockers and antidepressants may be helpful. Please see your caregiver if you are not better in 1-2 days.      seek immediate medical care if:  You develop a high fever, repeated vomiting, dehydration, or extreme weakness  You develop fainting, worsening headache, confusion, or seizures  You develop numbness or weakness of the limbs    [employer]® Patient Information ©2007 [employer].

## 2021-02-23 NOTE — ED NOTES
This RN returned the Ketorolac that was a cabinet override, and pulled the Ketorolac that was verified and able to be dispensed from the omni.

## 2021-02-27 ENCOUNTER — OFFICE VISIT (OUTPATIENT)
Dept: URGENT CARE | Facility: CLINIC | Age: 31
End: 2021-02-27
Payer: MEDICAID

## 2021-02-27 VITALS
BODY MASS INDEX: 24.22 KG/M2 | TEMPERATURE: 97.3 F | DIASTOLIC BLOOD PRESSURE: 66 MMHG | WEIGHT: 145.4 LBS | OXYGEN SATURATION: 97 % | RESPIRATION RATE: 16 BRPM | HEART RATE: 87 BPM | HEIGHT: 65 IN | SYSTOLIC BLOOD PRESSURE: 106 MMHG

## 2021-02-27 DIAGNOSIS — R51.9 NONINTRACTABLE HEADACHE, UNSPECIFIED CHRONICITY PATTERN, UNSPECIFIED HEADACHE TYPE: ICD-10-CM

## 2021-02-27 PROBLEM — E78.01 FAMILIAL HYPERCHOLESTEROLEMIA: Status: ACTIVE | Noted: 2020-12-18

## 2021-02-27 PROBLEM — F41.8 MIXED ANXIETY DEPRESSIVE DISORDER: Status: ACTIVE | Noted: 2020-11-24

## 2021-02-27 PROBLEM — G43.111 MIGRAINE WITH AURA, INTRACTABLE, WITH STATUS MIGRAINOSUS: Status: ACTIVE | Noted: 2021-02-25

## 2021-02-27 PROBLEM — Z90.710: Status: ACTIVE | Noted: 2020-06-12

## 2021-02-27 PROBLEM — Z80.3 FAMILY HISTORY OF MALIGNANT NEOPLASM OF BREAST IN FIRST DEGREE RELATIVE: Status: ACTIVE | Noted: 2020-02-26

## 2021-02-27 PROBLEM — G47.00 INSOMNIA: Status: ACTIVE | Noted: 2020-06-12

## 2021-02-27 PROBLEM — R20.0 NUMBNESS OF EXTREMITY: Status: ACTIVE | Noted: 2020-04-16

## 2021-02-27 PROBLEM — M54.9 BACK PAIN: Status: ACTIVE | Noted: 2020-04-16

## 2021-02-27 PROBLEM — K59.09 CONSTIPATION, CHRONIC: Status: ACTIVE | Noted: 2021-02-18

## 2021-02-27 PROCEDURE — 99214 OFFICE O/P EST MOD 30 MIN: CPT | Performed by: PHYSICIAN ASSISTANT

## 2021-02-27 RX ORDER — BUTALBITAL, ACETAMINOPHEN AND CAFFEINE 50; 325; 40 MG/1; MG/1; MG/1
1 TABLET ORAL EVERY 4 HOURS PRN
Qty: 30 TABLET | Refills: 0 | Status: SHIPPED | OUTPATIENT
Start: 2021-02-27 | End: 2021-03-13

## 2021-02-27 RX ORDER — PROMETHAZINE HYDROCHLORIDE 25 MG/1
25 TABLET ORAL EVERY 6 HOURS PRN
Qty: 30 TABLET | Refills: 0 | Status: SHIPPED | OUTPATIENT
Start: 2021-02-27 | End: 2021-07-03

## 2021-02-27 RX ORDER — ONDANSETRON 4 MG/1
TABLET, ORALLY DISINTEGRATING ORAL
COMMUNITY
Start: 2021-02-25 | End: 2021-04-28

## 2021-02-27 RX ORDER — DOCUSATE SODIUM 100 MG/1
CAPSULE, LIQUID FILLED ORAL
COMMUNITY
Start: 2021-02-18 | End: 2021-07-03

## 2021-02-27 RX ORDER — KETOROLAC TROMETHAMINE 30 MG/ML
30 INJECTION, SOLUTION INTRAMUSCULAR; INTRAVENOUS ONCE
Status: COMPLETED | OUTPATIENT
Start: 2021-02-27 | End: 2021-02-27

## 2021-02-27 RX ORDER — KETOROLAC TROMETHAMINE 30 MG/ML
30 INJECTION, SOLUTION INTRAMUSCULAR; INTRAVENOUS ONCE
Status: DISCONTINUED | OUTPATIENT
Start: 2021-02-27 | End: 2021-02-27

## 2021-02-27 RX ORDER — CLINDAMYCIN PHOSPHATE 20 MG/G
CREAM VAGINAL
COMMUNITY
Start: 2020-10-02 | End: 2021-07-03

## 2021-02-27 RX ORDER — CELECOXIB 200 MG/1
CAPSULE ORAL
COMMUNITY
Start: 2020-02-26 | End: 2021-04-28

## 2021-02-27 RX ADMIN — KETOROLAC TROMETHAMINE 30 MG: 30 INJECTION, SOLUTION INTRAMUSCULAR; INTRAVENOUS at 11:52

## 2021-02-27 ASSESSMENT — ENCOUNTER SYMPTOMS
EYE DISCHARGE: 0
ABDOMINAL PAIN: 0
CHILLS: 0
EYE PAIN: 0
SPUTUM PRODUCTION: 0
VOMITING: 0
FEVER: 0
EYE REDNESS: 0
DOUBLE VISION: 0
NAUSEA: 0
HEADACHES: 1
PHOTOPHOBIA: 0
SHORTNESS OF BREATH: 0
WHEEZING: 0
DIARRHEA: 0
BLURRED VISION: 1
COUGH: 0

## 2021-02-27 ASSESSMENT — FIBROSIS 4 INDEX: FIB4 SCORE: 0.57

## 2021-02-27 NOTE — LETTER
February 27, 2021       Patient: Lali Funes   YOB: 1990   Date of Visit: 2/27/2021         To Whom It May Concern:    In my medical opinion, I recommend that Lali Funes should be excused from work for today.  She is permitted to return to normally scheduled duty thereafter.      If you have any questions or concerns, please don't hesitate to call [phone]          Sincerely,          Willie Shah P.A.-C.  Electronically Signed      OFFICE VISIT      Patient: Guillermo Borjas Date of Service: 2020   : 2013 MRN: [id number]     SUBJECTIVE:   HISTORY OF PRESENT ILLNESS:  Guillermo Borjas is a 7 year old female who presents today for       Patient is doing well but mother wants patient to be tested for covid          PAST MEDICAL HISTORY:  No asthma  Past Medical History:   Diagnosis Date   • No known problems        MEDICATIONS:  Current Outpatient Medications   Medication Sig   • cetirizine ([facility] Allergy) 5 MG/5ML solution Take 5 mLs by mouth at bedtime.     No current facility-administered medications for this visit.        ALLERGIES:  ALLERGIES:  No Known Allergies    PAST SURGICAL HISTORY:  Past Surgical History:   Procedure Laterality Date   • No past surgeries         FAMILY HISTORY:  Mother +allergic rhinitis/asthma  SOCIAL HISTORY:  Social History     Tobacco Use   • Smoking status: Never Smoker   • Smokeless tobacco: Never Used   Substance Use Topics   • Alcohol use: Not on file   • Drug use: Not on file       Review of Systems   Constitutional: Negative.    HENT: Negative.    Eyes: Negative.    Respiratory: Negative.    Cardiovascular: Negative.    Gastrointestinal: Negative.    Endocrine: Negative.    Genitourinary: Negative.    Musculoskeletal: Negative.    Skin: Negative.    Allergic/Immunologic: Negative.    Neurological: Negative.    Hematological: Negative.    Psychiatric/Behavioral: Negative.    All other systems reviewed and are negative.        OBJECTIVE:     Physical Exam   Constitutional: She is well-developed, well-nourished, and in no distress. No distress.   HENT:   Head: Normocephalic and atraumatic.   Nose: Nose normal.   Mouth/Throat: Oropharynx is clear and moist. No oropharyngeal exudate.   Pale nasal turbinates with clear d/c  Cerumen in external b/l   Eyes: Pupils are equal, round, and reactive to light. Conjunctivae and EOM are normal. Right eye exhibits no discharge. Left eye exhibits no discharge.  No scleral icterus.   Neck: Normal range of motion. Neck supple. No thyromegaly present.   Cardiovascular: Normal rate, regular rhythm, normal heart sounds and intact distal pulses.   No murmur heard.  Pulmonary/Chest: Effort normal and breath sounds normal. No respiratory distress. She has no wheezes. She has no rales. She exhibits no tenderness.   No intercostal retraction   Abdominal: Soft. Bowel sounds are normal. She exhibits no distension and no mass. There is no abdominal tenderness. There is no rebound and no guarding.   Musculoskeletal: Normal range of motion.         General: No tenderness, deformity or edema.   Lymphadenopathy:     She has no cervical adenopathy.   Neurological: She is alert. No cranial nerve deficit. She exhibits normal muscle tone. Gait normal. Coordination normal.   Skin: Skin is warm. No rash noted. She is not diaphoretic. No erythema. No pallor.   Cap refill is less than 2 sec  Mucosa is pink and moist  No rash, no pallor, no jaundice, no petechia   Psychiatric: Mood and affect normal.   Nursing note and vitals reviewed.      Visit Vitals  Pulse 99   Temp 97.8 °F (36.6 °C)   Resp 21   Wt 31.4 kg (69 lb 5.4 oz)   SpO2 97%       DIAGNOSTIC STUDIES:   LAB RESULTS:    No results found for this visit on 11/11/20.    Assessment AND PLAN:   This is a 7 year old year-old female who presents with   .    1. Patient request for diagnostic testing    2. Allergic rhinitis, unspecified seasonality, unspecified trigger    rapid covid  covid prevention  Please take medications as directed.  Supportive care and monitoring, and Follow up with your PCp or Go to ER if it is needed as discussed.        Instructions provided as documented in the AVS.          The patient and mother indicated understanding of the diagnosis and agreed with the plan of care.

## 2021-02-27 NOTE — PROGRESS NOTES
Subjective:   Lali Funes  is a 30 y.o. female who presents for Headache (calf pain, eye redness and blurred vission x 1 week ) and Migraine (was taking imitrex 25 mg tab not helping her. )      Headache   This is a recurrent problem. The current episode started in the past 7 days. Associated symptoms include blurred vision ( waxing waning). Pertinent negatives include no abdominal pain, coughing, ear pain, eye pain, eye redness, fever, nausea, photophobia, tinnitus or vomiting.   Patient comes clinic complaining of headache.  She notes headache this been present for the last 5 days.  Headache did begin shortly after she had a cystoscopy procedure 5 days ago.  Patient was evaluated to the emergency department the evening of onset.  She was treated with medications and worked up with a CT scan.  She reports waxing and waning headache with some persistent since.  She states she had missed work today and came to clinic for work note.  She locates frontal area of head with headache but also complains of some occipital component when lying flat.  She notes nausea with 2 episodes of vomiting over the course of the week most recently being 2 days ago.  She denies numbness tingling or weakness.  She complains of achiness to calfs that waxes and wanes over the interim as well.  She states she was able to have a telemedicine evaluation with her primary care doctor over the interim and they recommended naproxen alternating with extra strength Tylenol.  She was discharged from the hospital with sumatriptan which she reports been minimally helpful.  She comes to clinic with persistent headache.  She notes past medical history of headaches but notes her migraines are more rare having had 2-3 of them over her whole life.    Review of Systems   Constitutional: Negative for chills and fever.   HENT: Negative for congestion, ear pain and tinnitus.    Eyes: Positive for blurred vision ( waxing waning). Negative for double  "vision, photophobia, pain, discharge and redness.   Respiratory: Negative for cough, sputum production, shortness of breath and wheezing.    Gastrointestinal: Negative for abdominal pain, diarrhea, nausea and vomiting.   Skin: Negative for rash.   Neurological: Positive for headaches.       Allergies   Allergen Reactions   • Nkda [No Known Drug Allergy]    • Cantaloupe    • Tape      Surgical tape        Objective:   /66 (BP Location: Right arm, Patient Position: Sitting, BP Cuff Size: Adult)   Pulse 87   Temp 36.3 °C (97.3 °F) (Temporal)   Resp 16   Ht 1.651 m (5' 5\")   Wt 66 kg (145 lb 6.4 oz)   LMP  (LMP Unknown)   SpO2 97%   BMI 24.20 kg/m²     Physical Exam  Vitals and nursing note reviewed.   Constitutional:       General: She is not in acute distress.     Appearance: She is well-developed. She is not diaphoretic.   HENT:      Head: Normocephalic and atraumatic.      Right Ear: Tympanic membrane, ear canal and external ear normal.      Left Ear: Tympanic membrane, ear canal and external ear normal.      Nose: Nose normal.      Mouth/Throat:      Pharynx: Uvula midline. Posterior oropharyngeal erythema ( mild PND) present. No oropharyngeal exudate.      Tonsils: No tonsillar abscesses.   Eyes:      General: Lids are normal. No scleral icterus.        Right eye: No discharge.         Left eye: No discharge.      Conjunctiva/sclera: Conjunctivae normal.      Right eye: Right conjunctiva is not injected. No exudate or hemorrhage.     Left eye: Left conjunctiva is not injected. No exudate or hemorrhage.     Pupils: Pupils are equal, round, and reactive to light.   Cardiovascular:      Rate and Rhythm: Normal rate and regular rhythm.   Pulmonary:      Effort: Pulmonary effort is normal. No respiratory distress.      Breath sounds: No decreased breath sounds, wheezing, rhonchi or rales.   Musculoskeletal:         General: Normal range of motion.      Cervical back: Neck supple.   Lymphadenopathy:      " Cervical: No cervical adenopathy.   Skin:     General: Skin is warm and dry.      Coloration: Skin is not pale.      Findings: No erythema.   Neurological:      Mental Status: She is alert and oriented to person, place, and time. She is not disoriented.      GCS: GCS eye subscore is 4. GCS verbal subscore is 5. GCS motor subscore is 6.      Cranial Nerves: No cranial nerve deficit.      Sensory: No sensory deficit.      Coordination: Coordination normal.      Gait: Gait normal.   Psychiatric:         Speech: Speech normal.         Behavior: Behavior normal.       Toradol 30 mg IM-tolerates well    Assessment/Plan:   1. Nonintractable headache, unspecified chronicity pattern, unspecified headache type  - butalbital/apap/caffeine -40 mg (FIORICET) -40 MG Tab; Take 1 tablet by mouth every four hours as needed for Headache or Migraine for up to 14 days.  Dispense: 30 tablet; Refill: 0  - promethazine (PHENERGAN) 25 MG Tab; Take 1 tablet by mouth every 6 hours as needed for Nausea/Vomiting (or headache).  Dispense: 30 tablet; Refill: 0  - REFERRAL TO NEUROLOGY  - ketorolac (TORADOL) injection 30 mg  Supportive care is reviewed with patient/caregiver - recommend to push PO fluids and electrolytes, Cautioned regarding potential for sedation with medication.  Continue OTC Tylenol alternating with NSAID, follow-up with primary care with persistence, sent neurology headache clinic referral, ER precautions with worsening  Return to clinic with lack of resolution or progression of symptoms.  ER precautions with any worsening symptoms are reviewed with patient/caregiver and they do express understanding    I have worn an N95 mask, gloves and eye protection for the entire encounter with this patient.     My total time spent caring for the patient on the day of the encounter was 30 minutes.   This does not include time spent on separately billable procedures/tests.      Differential diagnosis, natural history,  supportive care, and indications for immediate follow-up discussed.

## 2021-04-28 ENCOUNTER — OFFICE VISIT (OUTPATIENT)
Dept: URGENT CARE | Facility: CLINIC | Age: 31
End: 2021-04-28
Payer: MEDICAID

## 2021-04-28 VITALS
OXYGEN SATURATION: 97 % | HEART RATE: 88 BPM | DIASTOLIC BLOOD PRESSURE: 58 MMHG | RESPIRATION RATE: 14 BRPM | SYSTOLIC BLOOD PRESSURE: 96 MMHG | BODY MASS INDEX: 23.36 KG/M2 | HEIGHT: 65 IN | WEIGHT: 140.2 LBS | TEMPERATURE: 97.2 F

## 2021-04-28 DIAGNOSIS — G43.919 INTRACTABLE MIGRAINE WITHOUT STATUS MIGRAINOSUS, UNSPECIFIED MIGRAINE TYPE: ICD-10-CM

## 2021-04-28 PROBLEM — R51.9 HEADACHE: Status: ACTIVE | Noted: 2021-03-04

## 2021-04-28 PROBLEM — L28.2 PRURITIC RASH: Status: ACTIVE | Noted: 2021-03-24

## 2021-04-28 PROBLEM — M54.9 BACKACHE: Status: ACTIVE | Noted: 2019-05-16

## 2021-04-28 PROBLEM — Z87.898 HISTORY OF DISEASE: Status: ACTIVE | Noted: 2021-04-16

## 2021-04-28 PROCEDURE — 99214 OFFICE O/P EST MOD 30 MIN: CPT | Performed by: NURSE PRACTITIONER

## 2021-04-28 RX ORDER — OXYBUTYNIN CHLORIDE 5 MG/1
TABLET ORAL
COMMUNITY
Start: 2021-04-01 | End: 2022-01-14

## 2021-04-28 RX ORDER — KETOROLAC TROMETHAMINE 30 MG/ML
30 INJECTION, SOLUTION INTRAMUSCULAR; INTRAVENOUS ONCE
Status: COMPLETED | OUTPATIENT
Start: 2021-04-28 | End: 2021-04-28

## 2021-04-28 RX ORDER — LORATADINE 10 MG/1
TABLET ORAL
COMMUNITY
Start: 2021-03-24 | End: 2021-07-03

## 2021-04-28 RX ORDER — KETOROLAC TROMETHAMINE 30 MG/ML
30 INJECTION, SOLUTION INTRAMUSCULAR; INTRAVENOUS ONCE
Status: DISCONTINUED | OUTPATIENT
Start: 2021-04-28 | End: 2021-04-28

## 2021-04-28 RX ORDER — ONDANSETRON 4 MG/1
4 TABLET, ORALLY DISINTEGRATING ORAL EVERY 8 HOURS PRN
Qty: 20 TABLET | Refills: 0 | Status: SHIPPED | OUTPATIENT
Start: 2021-04-28 | End: 2021-07-03

## 2021-04-28 RX ORDER — TOPIRAMATE 50 MG/1
TABLET, FILM COATED ORAL
COMMUNITY
Start: 2021-03-04 | End: 2021-10-08

## 2021-04-28 RX ADMIN — KETOROLAC TROMETHAMINE 30 MG: 30 INJECTION, SOLUTION INTRAMUSCULAR; INTRAVENOUS at 12:20

## 2021-04-28 ASSESSMENT — LIFESTYLE VARIABLES: SUBSTANCE_ABUSE: 0

## 2021-04-28 ASSESSMENT — ENCOUNTER SYMPTOMS
FOCAL WEAKNESS: 0
COUGH: 0
NAUSEA: 1
VOMITING: 0
FEVER: 0
SENSORY CHANGE: 0
TREMORS: 0
SHORTNESS OF BREATH: 0
HEADACHES: 1
ABDOMINAL PAIN: 0
MYALGIAS: 0
DIZZINESS: 0
TINGLING: 0

## 2021-04-28 ASSESSMENT — FIBROSIS 4 INDEX: FIB4 SCORE: 0.57

## 2021-04-28 NOTE — PROGRESS NOTES
"Lali Funes is a 30 y.o. female who presents for Migraine (x 3 days with nausea and weakness in bilateral legs.)      HPI This is a new problem.  C/o Migraine headache present for 3 days. Headache is 7/10. Vision is slightly blurred. Both of her lower legs are feeling weak when she gets up. Nausea - took ondansetron at 0930 - helped reduce her nausea.   Took  A medication at 0930 for her headache. Medication starts with \"T\" but she denies that it was tramadol. Can't remember name of medication.   No other aggravating or alleviating factors. Cannot take Ibuprofen for 'GI issues\" gastritis. This is the same type of headache.   Was unable to go to neurology due to \"insurance problem\".   Hard to get rest because she works full time and has kids at home.   No other aggravating or alleviating factors.           Review of Systems   Constitutional: Negative for fever and malaise/fatigue.   Respiratory: Negative for cough and shortness of breath.    Cardiovascular: Negative for chest pain.   Gastrointestinal: Positive for nausea. Negative for abdominal pain and vomiting.   Musculoskeletal: Negative for myalgias.   Neurological: Positive for headaches. Negative for dizziness, tingling, tremors, sensory change and focal weakness.   Endo/Heme/Allergies: Negative for environmental allergies.   Psychiatric/Behavioral: Negative for substance abuse.       Allergies   Allergen Reactions   • Nkda [No Known Drug Allergy]    • Cantaloupe    • Tape      Surgical tape     Past Medical History:   Diagnosis Date   • Abnormal uterine bleeding (AUB)    • AC (acromioclavicular) joint bone spurs    • Anxiety    • Arthritis     toes, hands, back   • Bowel habit changes     constipation/diarrhea   • Familial hypercholesterolemia 12/18/2020   • GERD (gastroesophageal reflux disease)    • Mastitis 05/2019   • Migraine with aura, intractable, with status migrainosus 2/25/2021   • Nausea/vomiting in pregnancy 10/5/2012   • Pain 05/29/2020    " pelvic and back, 5/10     Past Surgical History:   Procedure Laterality Date   • PB COMBINED ANT/POST COLPORRHAPHY N/A 6/3/2020    Procedure: COLPORRHAPHY, COMBINED ANTEROPOSTERIOR;  Surgeon: Jose Bowen M.D.;  Location: SURGERY SAME DAY [facility];  Service: Obstetrics   • HYSTERECTOMY LAPAROSCOPY N/A 6/3/2020    Procedure: HYSTERECTOMY, LAPAROSCOPIC-;  Surgeon: Jose Bowen M.D.;  Location: SURGERY SAME DAY [facility];  Service: Obstetrics   • CYSTOSCOPY N/A 6/3/2020    Procedure: CYSTOSCOPY- WITH MID URETHRAL SLING PLACEMENT;  Surgeon: Jose Bowen M.D.;  Location: SURGERY SAME DAY [facility];  Service: Obstetrics   • DURAN BY LAPAROSCOPY  10/25/2019    Procedure: CHOLECYSTECTOMY, LAPAROSCOPIC;  Surgeon: Efrain Ramesh M.D.;  Location: SURGERY [facility];  Service: General   • ERCP IN OR N/A 10/24/2019    Procedure: ERCP (ENDOSCOPIC RETROGRADE CHOLANGIOPANCREATOGRAPHY);  Surgeon: Temo Liao M.D.;  Location: SURGERY [facility];  Service: Gastroenterology   • PB LAP,DIAGNOSTIC ABDOMEN  2019    Procedure: PELVISCOPY;  Surgeon: Pilar Vuong M.D.;  Location: SURGERY SAME DAY [facility];  Service: Gynecology   • SALPINGECTOMY Bilateral 2019    Procedure: SALPINGECTOMY;  Surgeon: Pilar Vuong M.D.;  Location: SURGERY SAME DAY [facility];  Service: Gynecology     Family History   Problem Relation Age of Onset   • Cancer Mother 45        breast cancer, lymph node,   • Heart Attack Father    • Gout Father    • Cancer Maternal Grandmother         breast cancer     Social History     Tobacco Use   • Smoking status: Former Smoker     Packs/day: 0.25     Years: 15.00     Pack years: 3.75     Types: Cigarettes     Quit date: 2019     Years since quittin.8   • Smokeless tobacco: Never Used   Substance Use Topics   • Alcohol use: Not Currently     Patient Active Problem List   Diagnosis   • Mastitis   • Low back pain with radiation   • Post-op pain   •  Cystocele, midline   • Stress incontinence   • Abnormal vaginal bleeding   • Transaminitis   • Leukocytosis   • Bacterial vaginosis   • Uterine prolapse   • Rectocele   • Toe pain, right   • Current mild episode of major depressive disorder without prior episode (HCC)   • Eustachian tube dysfunction, bilateral   • UTI (urinary tract infection)   • Urinary retention   • Postoperative visit   • Vaginal discharge   • Familial hypercholesterolemia   • Family history of malignant neoplasm of breast in first degree relative   • Insomnia   • Migraine with aura, intractable, with status migrainosus   • Mixed anxiety depressive disorder   • Numbness of extremity   • Status post radical vaginal hysterectomy   • Constipation, chronic   • Back pain     Current Outpatient Medications on File Prior to Visit   Medication Sig Dispense Refill   • ondansetron (ZOFRAN ODT) 4 MG TABLET DISPERSIBLE ONDANSETRON 4 MG TBDP     • docusate sodium (COLACE) 100 MG Cap Take 1 capsule by mouth 2 times a day. 30 capsule 0   • gabapentin (NEURONTIN) 100 MG Cap Take 100 mg by mouth 3 times a day.     • zolpidem (AMBIEN) 10 MG Tab Take 10 mg by mouth at bedtime as needed for Sleep.     • hydrOXYzine HCl (ATARAX) 25 MG Tab Take 25 mg by mouth 3 times a day as needed for Itching.     • omeprazole (PRILOSEC) 20 MG delayed-release capsule Take 20 mg by mouth every bedtime. Indications: Gastroesophageal Reflux Disease     • tramadol (ULTRAM) 50 MG Tab Take 50 mg by mouth as needed (Pain).     • methocarbamol (ROBAXIN) 750 MG Tab Take 750 mg by mouth every morning. Indications: Musculoskeletal Pain     • promethazine (PHENERGAN) 25 MG Tab Take 1 tablet by mouth every 6 hours as needed for Nausea/Vomiting (or headache). (Patient not taking: Reported on 4/28/2021) 30 tablet 0   • celecoxib (CELEBREX) 200 MG Cap CELEBREX 200 MG CAPS     • clindamycin (CLEOCIN) 2 % vaginal cream CLEOCIN 2 % CREA     • sertraline (ZOLOFT) 50 MG Tab SERTRALINE HCL 50 MG TABS    "  • docusate sodium (COLACE) 100 MG Cap COLACE 100 MG CAPS     • SUMAtriptan (IMITREX) 25 MG Tab tablet Take 1-2 Tablets by mouth one time as needed for Migraine for up to 1 dose. (Patient not taking: Reported on 4/28/2021) 10 tablet 0   • famotidine (PEPCID) 40 MG Tab Take 1 tablet by mouth every day. (Patient not taking: Reported on 4/28/2021) 30 tablet 0     No current facility-administered medications on file prior to visit.          Objective:     BP (!) 96/58   Pulse 88   Temp 36.2 °C (97.2 °F) (Temporal)   Resp 14   Ht 1.651 m (5' 5\")   Wt 63.6 kg (140 lb 3.2 oz)   LMP  (LMP Unknown)   SpO2 97%   BMI 23.33 kg/m²     Physical Exam  Vitals and nursing note reviewed.   Constitutional:       General: She is not in acute distress.     Appearance: Normal appearance. She is normal weight. She is not ill-appearing or toxic-appearing.   HENT:      Head: Normocephalic.      Right Ear: Tympanic membrane, ear canal and external ear normal.      Left Ear: Tympanic membrane, ear canal and external ear normal.      Nose: Nose normal.      Mouth/Throat:      Lips: Pink.      Mouth: Mucous membranes are moist.   Eyes:      Extraocular Movements: Extraocular movements intact.      Conjunctiva/sclera: Conjunctivae normal.      Pupils: Pupils are equal, round, and reactive to light.   Cardiovascular:      Pulses: Normal pulses.      Heart sounds: Normal heart sounds.   Pulmonary:      Effort: Pulmonary effort is normal.      Breath sounds: Normal breath sounds.   Musculoskeletal:         General: Normal range of motion.      Cervical back: Full passive range of motion without pain, normal range of motion and neck supple.   Lymphadenopathy:      Cervical: No cervical adenopathy.      Upper Body:      Right upper body: No supraclavicular adenopathy.      Left upper body: No supraclavicular adenopathy.   Skin:     General: Skin is warm and dry.      Capillary Refill: Capillary refill takes less than 2 seconds.      " Findings: No rash.   Neurological:      General: No focal deficit present.      Mental Status: She is alert and oriented to person, place, and time.      Cranial Nerves: Cranial nerves are intact.      Sensory: Sensation is intact.      Motor: Motor function is intact.      Coordination: Coordination is intact.      Gait: Gait is intact.   Psychiatric:         Attention and Perception: Attention and perception normal.         Mood and Affect: Mood normal.         Behavior: Behavior normal.         Thought Content: Thought content normal.     Toradol given in clinic today. Tolerated well without adverse effects. Advised not to take NSAIDS for 6-8 hours post injection.       Assessment /Associated Orders:      1. Intractable migraine without status migrainosus, unspecified migraine type  ondansetron (ZOFRAN ODT) 4 MG TABLET DISPERSIBLE    ketorolac (TORADOL) injection 30 mg    DISCONTINUED: ketorolac (TORADOL) injection 30 mg         Medical Decision Making:    Pt is clinically stable at today's acute urgent care visit.  No acute distress noted. Appropriate for outpatient management at this time.   Resume all prior  RX medications. Take as prescribed.   Avoid migraine triggers.   Keep ha diary  Keep well hydrated  Get adequate rest  Schedule FV with Khushbu Fields M.D.  Schedule: [address], [address]  [place] [address]  Phone: [phone]    Khushbu Fields M.D. has been seen 3 weeks ago. She did not discuss her headaches with her PCP.     Advised the patient to follow-up with the primary care provider for recheck, reevaluation, and consideration of further management if necessary.   Discussed management options (risks,benefits, and alternatives to treatment). Pt expresses understanding and the treatment plan was agreed upon. Questions were encouraged and answered       Return to urgent care prn if new or worsening sx or if there is no improvement in condition prn . Red flags discussed  and indications to immediately call 911 or present to the Emergency Department.     I personally reviewed prior external notes and test results pertinent to today's visit.  I have independently reviewed and interpreted all diagnostics ordered during this urgent care acute visit.     CT of head 02/2021 - negative for mass, neurological pathology.     Time spent evaluating this patient was at least 30 minutes and includes preparing for visit, counseling/education, exam and evaluation, obtaining history, independent interpretation, ordering lab/test/procedures,medication management and documentation.

## 2021-05-12 ENCOUNTER — OFFICE VISIT (OUTPATIENT)
Dept: URGENT CARE | Facility: CLINIC | Age: 31
End: 2021-05-12
Payer: MEDICAID

## 2021-05-12 VITALS
RESPIRATION RATE: 16 BRPM | HEART RATE: 77 BPM | TEMPERATURE: 97.2 F | OXYGEN SATURATION: 99 % | BODY MASS INDEX: 22.99 KG/M2 | HEIGHT: 65 IN | DIASTOLIC BLOOD PRESSURE: 62 MMHG | WEIGHT: 138 LBS | SYSTOLIC BLOOD PRESSURE: 100 MMHG

## 2021-05-12 DIAGNOSIS — M75.52 BURSITIS OF BOTH SHOULDERS: ICD-10-CM

## 2021-05-12 DIAGNOSIS — M70.61 TROCHANTERIC BURSITIS OF BOTH HIPS: ICD-10-CM

## 2021-05-12 DIAGNOSIS — M75.51 BURSITIS OF BOTH SHOULDERS: ICD-10-CM

## 2021-05-12 DIAGNOSIS — M70.62 TROCHANTERIC BURSITIS OF BOTH HIPS: ICD-10-CM

## 2021-05-12 PROCEDURE — 99213 OFFICE O/P EST LOW 20 MIN: CPT | Performed by: FAMILY MEDICINE

## 2021-05-12 RX ORDER — SULFAMETHOXAZOLE AND TRIMETHOPRIM 800; 160 MG/1; MG/1
TABLET ORAL
COMMUNITY
Start: 2021-04-26 | End: 2021-07-03

## 2021-05-12 ASSESSMENT — FIBROSIS 4 INDEX: FIB4 SCORE: 0.57

## 2021-05-12 NOTE — LETTER
May 12, 2021         Patient: Lali Funes   YOB: 1990   Date of Visit: 5/12/2021           To Whom it May Concern:    Lali Funes was seen in my clinic on 5/12/2021. She may return to work on 5/14.    If you have any questions or concerns, please don't hesitate to call.        Sincerely,           Joseluis Graf M.D.  Electronically Signed

## 2021-05-12 NOTE — PROGRESS NOTES
Chief Complaint   Patient presents with   • Joint Pain     hips and shoulders          HPI:      Pt c/o bilat hip and shoulder pain x 3 d.    She received 2nd COVID vaccine on .    She denies any injury.   She states that the hip pain is dull and achy and worse with walking.    States that the shoulder pain is also dull, constant, and worse with trying to lift arms above the head.    She denies fevers or chills or diffuse muscle aches.    She has tried motrin and voltaren gel for the pain, but has not been helping.    She admits that she does to a lot of walking and lifting at work, but again, denies any acute injury.         Past Medical History:   Diagnosis Date   • Abnormal uterine bleeding (AUB)    • AC (acromioclavicular) joint bone spurs    • Anxiety    • Arthritis     toes, hands, back   • Bowel habit changes     constipation/diarrhea   • Familial hypercholesterolemia 2020   • GERD (gastroesophageal reflux disease)    • Mastitis 2019   • Migraine with aura, intractable, with status migrainosus 2021   • Nausea/vomiting in pregnancy 10/5/2012   • Pain 2020    pelvic and back, 5/10     Social History     Tobacco Use   • Smoking status: Former Smoker     Packs/day: 0.25     Years: 15.00     Pack years: 3.75     Types: Cigarettes     Quit date: 2019     Years since quittin.8   • Smokeless tobacco: Never Used   Vaping Use   • Vaping Use: Every day   • Substances: Nicotine, Flavoring   • Devices: Refillable tank   Substance Use Topics   • Alcohol use: Not Currently   • Drug use: Not Currently             Review of Systems   Constitutional: Negative for fever, chills and malaise/fatigue.   Eyes: Negative for vision changes, d/c.    Respiratory: Negative for cough and sputum production.    Cardiovascular: Negative for chest pain and palpitations.   Gastrointestinal: Negative for nausea, vomiting, abdominal pain, diarrhea and constipation.   Genitourinary: Negative for dysuria, urgency  "and frequency.   Skin: Negative for rash or  itching.   Neurological: Negative for dizziness and tingling.   Psychiatric/Behavioral: Negative for depression.   Hematologic/lymphatic - denies bruising or excessive bleeding  All other systems reviewed and are negative.      OBJECTIVE  /62 (BP Location: Left arm, Patient Position: Sitting, BP Cuff Size: Adult long)   Pulse 77   Temp 36.2 °C (97.2 °F) (Temporal)   Resp 16   Ht 1.651 m (5' 5\")   Wt 62.6 kg (138 lb)   SpO2 99%   HEENT - PERRLA, EOMI  Neuro - alert and oriented x3. CN 2-12 grossly intact.  Lungs - CTA. No wheezes, rhonchi or rales.  Heart - regular rate and rhythm without murmur.  Abdomen - soft and non-tender, bowel sounds active x4.  Musculoskeletal -   Left shoulder:   + TTP over acromion, medial deltoid.   No muscle spasms.   Unable to lift arm > 90 degrees due to pain.   Strength 5/5.   No erythema, swelling  Rt shoulder:   Full AROM.  + TTP over acromion.   No spasms. No erythema or swelling.    Deltoid strength 5/5  Left hip:  +TTP over greater trochanter.   Full AROM.   No swelling, erythema  Rt hip: + TTP over greater trochanter.   Full AROM.   No swelling, erythema      A/P:      1. Trochanteric bursitis of both hips  2. Bursitis of both shoulders     No sx septic arthritis    Will try otc alleve, prn    Follow up in one week if no improvement, sooner if symptoms worsen.       "

## 2021-06-24 ENCOUNTER — HOSPITAL ENCOUNTER (OUTPATIENT)
Facility: MEDICAL CENTER | Age: 31
End: 2021-06-24
Attending: UROLOGY
Payer: MEDICAID

## 2021-06-24 PROCEDURE — 87086 URINE CULTURE/COLONY COUNT: CPT

## 2021-06-24 PROCEDURE — 87077 CULTURE AEROBIC IDENTIFY: CPT

## 2021-06-24 PROCEDURE — 87186 SC STD MICRODIL/AGAR DIL: CPT

## 2021-06-29 ENCOUNTER — HOSPITAL ENCOUNTER (OUTPATIENT)
Dept: RADIOLOGY | Facility: MEDICAL CENTER | Age: 31
End: 2021-06-29
Attending: UROLOGY
Payer: MEDICAID

## 2021-06-29 DIAGNOSIS — N39.0 URINARY TRACT INFECTION WITHOUT HEMATURIA, SITE UNSPECIFIED: ICD-10-CM

## 2021-06-29 PROCEDURE — 76775 US EXAM ABDO BACK WALL LIM: CPT

## 2021-07-03 ENCOUNTER — OFFICE VISIT (OUTPATIENT)
Dept: URGENT CARE | Facility: CLINIC | Age: 31
End: 2021-07-03
Payer: MEDICAID

## 2021-07-03 VITALS
DIASTOLIC BLOOD PRESSURE: 60 MMHG | HEART RATE: 68 BPM | WEIGHT: 141.7 LBS | BODY MASS INDEX: 23.61 KG/M2 | OXYGEN SATURATION: 96 % | SYSTOLIC BLOOD PRESSURE: 108 MMHG | RESPIRATION RATE: 14 BRPM | HEIGHT: 65 IN | TEMPERATURE: 97.7 F

## 2021-07-03 DIAGNOSIS — B00.2 ORAL HERPES: ICD-10-CM

## 2021-07-03 PROCEDURE — 99213 OFFICE O/P EST LOW 20 MIN: CPT | Performed by: PHYSICIAN ASSISTANT

## 2021-07-03 RX ORDER — VALACYCLOVIR HYDROCHLORIDE 1 G/1
1000 TABLET, FILM COATED ORAL 3 TIMES DAILY
Qty: 21 TABLET | Refills: 0 | Status: SHIPPED | OUTPATIENT
Start: 2021-07-03 | End: 2021-07-10

## 2021-07-03 ASSESSMENT — ENCOUNTER SYMPTOMS
NAUSEA: 0
SORE THROAT: 0
MYALGIAS: 0
HEADACHES: 0
EYE PAIN: 0
DIARRHEA: 0
CHILLS: 0
CONSTIPATION: 0
FEVER: 0
SHORTNESS OF BREATH: 0
VOMITING: 0
COUGH: 0
ABDOMINAL PAIN: 0

## 2021-07-03 ASSESSMENT — FIBROSIS 4 INDEX: FIB4 SCORE: 0.57

## 2021-07-04 NOTE — PROGRESS NOTES
Subjective:   Lali Funes is a 30 y.o. female who presents for Allergic Reaction (x 1 day pt has a swollen lip pt thinks it could be an allergic reaction )      HPI:  This is a pleasant 30-year-old female presents complaining of lower lip pain, swelling, and scattered lesions.  Thinks it is of the lip allergic reaction because she has never had any cold sores.  She also has some generalized malaise.    Review of Systems   Constitutional: Negative for chills and fever.   HENT: Negative for congestion, ear pain and sore throat.    Eyes: Negative for pain.   Respiratory: Negative for cough and shortness of breath.    Cardiovascular: Negative for chest pain.   Gastrointestinal: Negative for abdominal pain, constipation, diarrhea, nausea and vomiting.   Genitourinary: Negative for dysuria.   Musculoskeletal: Negative for myalgias.   Skin: Negative for rash.   Neurological: Negative for headaches.       Medications:    • clindamycin  • docusate sodium Caps  • famotidine Tabs  • gabapentin Caps  • hydrOXYzine HCl Tabs  • loratadine Tabs  • methocarbamol Tabs  • omeprazole  • ondansetron Tbdp  • oxybutynin Tabs  • promethazine Tabs  • sulfamethoxazole-trimethoprim  • SUMAtriptan Tabs  • topiramate  • traMADol Tabs  • valacyclovir Tabs  • zolpidem Tabs    Allergies: Cantaloupe, Tape, and Celecoxib    Problem List: Lali Funes does not have any pertinent problems on file.    Surgical History:  Past Surgical History:   Procedure Laterality Date   • PB COMBINED ANT/POST COLPORRHAPHY N/A 6/3/2020    Procedure: COLPORRHAPHY, COMBINED ANTEROPOSTERIOR;  Surgeon: Jose Bowen M.D.;  Location: SURGERY SAME DAY [facility];  Service: Obstetrics   • HYSTERECTOMY LAPAROSCOPY N/A 6/3/2020    Procedure: HYSTERECTOMY, LAPAROSCOPIC-;  Surgeon: Jose Bowen M.D.;  Location: SURGERY SAME DAY [facility];  Service: Obstetrics   • CYSTOSCOPY N/A 6/3/2020    Procedure: CYSTOSCOPY- WITH MID URETHRAL SLING PLACEMENT;   "Surgeon: Jose Bowen M.D.;  Location: SURGERY SAME DAY [facility];  Service: Obstetrics   • DURAN BY LAPAROSCOPY  10/25/2019    Procedure: CHOLECYSTECTOMY, LAPAROSCOPIC;  Surgeon: Efrain Ramesh M.D.;  Location: SURGERY [facility];  Service: General   • ERCP IN OR N/A 10/24/2019    Procedure: ERCP (ENDOSCOPIC RETROGRADE CHOLANGIOPANCREATOGRAPHY);  Surgeon: Temo Liao M.D.;  Location: SURGERY [facility];  Service: Gastroenterology   • PB LAP,DIAGNOSTIC ABDOMEN  6/18/2019    Procedure: PELVISCOPY;  Surgeon: Pilar Vuong M.D.;  Location: SURGERY SAME DAY [facility];  Service: Gynecology   • SALPINGECTOMY Bilateral 6/18/2019    Procedure: SALPINGECTOMY;  Surgeon: Pilar Vuong M.D.;  Location: SURGERY SAME DAY [facility];  Service: Gynecology       Past Social Hx: Lali Funes  reports that she quit smoking about 1 years ago. Her smoking use included cigarettes. She has a 3.75 pack-year smoking history. She has never used smokeless tobacco. She reports previous alcohol use. She reports that she does not use drugs.     Past Family Hx:  Lali Funes family history includes Cancer in her maternal grandmother; Cancer (age of onset: 45) in her mother; Gout in her father; Heart Attack in her father.     Problem list, medications, and allergies reviewed by myself today in Epic.     Objective:     /60 (BP Location: Right arm, Patient Position: Sitting, BP Cuff Size: Adult)   Pulse 68   Temp 36.5 °C (97.7 °F) (Temporal)   Resp 14   Ht 1.651 m (5' 5\")   Wt 64.3 kg (141 lb 11.2 oz)   LMP  (LMP Unknown)   SpO2 96%   BMI 23.58 kg/m²     Physical Exam  Vitals reviewed.   Constitutional:       Appearance: Normal appearance.   HENT:      Head: Normocephalic and atraumatic.      Right Ear: External ear normal.      Left Ear: External ear normal.      Nose: Nose normal.      Mouth/Throat:      Mouth: Mucous membranes are moist.      Comments: Lower lip with some vesicles " on erythematous base, lower lip edema.  No lesions of the buccal mucosa.  No tongue swelling, patent airway.  Eyes:      Conjunctiva/sclera: Conjunctivae normal.   Cardiovascular:      Rate and Rhythm: Normal rate.   Pulmonary:      Effort: Pulmonary effort is normal.   Skin:     General: Skin is warm and dry.      Capillary Refill: Capillary refill takes less than 2 seconds.   Neurological:      Mental Status: She is alert and oriented to person, place, and time.         Assessment/Plan:     Diagnosis and associated orders:     1. Oral herpes  valacyclovir (VALTREX) 1 GM Tab      Comments/MDM:     • History and physical support the diagnosis of oral herpes based on the presence of lesions.  Likely first oral herpes outbreak.  Discussed the pathophysiology, treatment, contagiousness of this to the patient and she demonstrated clear verbal understanding         Differential diagnosis, natural history, supportive care, and indications for immediate follow-up discussed.    Advised the patient to follow-up with the primary care physician for recheck, reevaluation, and consideration of further management.    Please note that this dictation was created using voice recognition software. I have made a reasonable attempt to correct obvious errors, but I expect that there are errors of grammar and possibly content that I did not discover before finalizing the note.    This note was electronically signed by Jayson Cain PA-C

## 2021-08-21 ENCOUNTER — OFFICE VISIT (OUTPATIENT)
Dept: URGENT CARE | Facility: CLINIC | Age: 31
End: 2021-08-21
Payer: MEDICAID

## 2021-08-21 ENCOUNTER — HOSPITAL ENCOUNTER (OUTPATIENT)
Facility: MEDICAL CENTER | Age: 31
End: 2021-08-21
Attending: PHYSICIAN ASSISTANT
Payer: MEDICAID

## 2021-08-21 VITALS
DIASTOLIC BLOOD PRESSURE: 60 MMHG | RESPIRATION RATE: 16 BRPM | TEMPERATURE: 97.3 F | SYSTOLIC BLOOD PRESSURE: 100 MMHG | WEIGHT: 139 LBS | BODY MASS INDEX: 23.16 KG/M2 | HEIGHT: 65 IN | OXYGEN SATURATION: 98 % | HEART RATE: 107 BPM

## 2021-08-21 DIAGNOSIS — Z20.818 EXPOSURE TO STREP THROAT: ICD-10-CM

## 2021-08-21 DIAGNOSIS — J06.9 VIRAL URI: ICD-10-CM

## 2021-08-21 DIAGNOSIS — J02.0 PHARYNGITIS DUE TO STREPTOCOCCUS SPECIES: ICD-10-CM

## 2021-08-21 PROBLEM — H93.13 SUBJECTIVE TINNITUS OF BOTH EARS: Status: ACTIVE | Noted: 2021-07-20

## 2021-08-21 PROBLEM — N30.20 CHRONIC CYSTITIS: Status: ACTIVE | Noted: 2021-07-20

## 2021-08-21 LAB
INT CON NEG: NEGATIVE
INT CON POS: POSITIVE
S PYO AG THROAT QL: POSITIVE

## 2021-08-21 PROCEDURE — 87880 STREP A ASSAY W/OPTIC: CPT | Performed by: PHYSICIAN ASSISTANT

## 2021-08-21 PROCEDURE — U0003 INFECTIOUS AGENT DETECTION BY NUCLEIC ACID (DNA OR RNA); SEVERE ACUTE RESPIRATORY SYNDROME CORONAVIRUS 2 (SARS-COV-2) (CORONAVIRUS DISEASE [COVID-19]), AMPLIFIED PROBE TECHNIQUE, MAKING USE OF HIGH THROUGHPUT TECHNOLOGIES AS DESCRIBED BY CMS-2020-01-R: HCPCS

## 2021-08-21 PROCEDURE — 99213 OFFICE O/P EST LOW 20 MIN: CPT | Performed by: PHYSICIAN ASSISTANT

## 2021-08-21 PROCEDURE — U0005 INFEC AGEN DETEC AMPLI PROBE: HCPCS

## 2021-08-21 RX ORDER — AMOXICILLIN 500 MG/1
500 CAPSULE ORAL 2 TIMES DAILY
Qty: 20 CAPSULE | Refills: 0 | Status: SHIPPED | OUTPATIENT
Start: 2021-08-21 | End: 2021-08-31

## 2021-08-21 RX ORDER — DICLOFENAC POTASSIUM 50 MG/1
TABLET, FILM COATED ORAL
COMMUNITY
Start: 2021-07-14

## 2021-08-21 ASSESSMENT — ENCOUNTER SYMPTOMS
VOMITING: 0
SINUS PAIN: 0
CHILLS: 0
FEVER: 0
HOARSE VOICE: 0
PALPITATIONS: 0
DIZZINESS: 0
COUGH: 1
MYALGIAS: 0
TROUBLE SWALLOWING: 0
BLURRED VISION: 0
SWOLLEN GLANDS: 1
SHORTNESS OF BREATH: 0
NAUSEA: 0
EYE PAIN: 0
DIARRHEA: 0
SORE THROAT: 1
ABDOMINAL PAIN: 0
HEADACHES: 1

## 2021-08-21 ASSESSMENT — FIBROSIS 4 INDEX: FIB4 SCORE: 0.57

## 2021-08-21 NOTE — PROGRESS NOTES
Subjective     Lali Funes is a 30 y.o. female who presents with Sore Throat (runny nose, cough, x3 days )       Pharyngitis   This is a new problem. The current episode started in the past 7 days (started 3 days ago). The problem has been unchanged. Neither side of throat is experiencing more pain than the other. There has been no fever. Associated symptoms include congestion, coughing (dry), headaches and swollen glands. Pertinent negatives include no abdominal pain, diarrhea, ear pain, hoarse voice, shortness of breath, trouble swallowing or vomiting. She has had exposure to strep. Exposure to: Patient son tested +2 days ago. Treatments tried: Hay[place]-Peel cold/flu. The treatment provided no relief.   Patient denies any other sick contacts and notes that she is vaccinated for COVID-19 virus.    Review of Systems   Constitutional: Positive for malaise/fatigue. Negative for chills and fever.   HENT: Positive for congestion and sore throat. Negative for ear pain, hoarse voice, sinus pain and trouble swallowing.    Eyes: Negative for blurred vision and pain.   Respiratory: Positive for cough (dry). Negative for shortness of breath.    Cardiovascular: Negative for chest pain and palpitations.   Gastrointestinal: Negative for abdominal pain, diarrhea, nausea and vomiting.   Musculoskeletal: Negative for myalgias.   Skin: Negative for rash.   Neurological: Positive for headaches. Negative for dizziness.         PMH:  has a past medical history of Abnormal uterine bleeding (AUB), AC (acromioclavicular) joint bone spurs, Anxiety, Arthritis, Bowel habit changes, Familial hypercholesterolemia (12/18/2020), GERD (gastroesophageal reflux disease), Mastitis (05/2019), Migraine with aura, intractable, with status migrainosus (2/25/2021), Nausea/vomiting in pregnancy (10/5/2012), and Pain (05/29/2020). She also has no past medical history of Addisons disease (HCC), Adrenal disorder (HCC), Allergy, Anemia, Arrhythmia,  Asthma, Blood transfusion without reported diagnosis, Cancer (HCC), Cataract, CHF (congestive heart failure) (HCC), Clotting disorder (HCC), COPD (chronic obstructive pulmonary disease) (HCC), Cushings syndrome (HCC), Diabetes (HCC), Diabetic neuropathy (HCC), Glaucoma, Goiter, Head ache, Headache(784.0), Heart attack (HCC), Heart murmur, HIV (human immunodeficiency virus infection) (HCC), Hypertension, IBD (inflammatory bowel disease), Kidney disease, Meningitis, Muscle disorder, Osteoporosis, Parathyroid disorder (HCC), Pituitary disease (HCC), Pulmonary emphysema (HCC), Seizure (HCC), Sickle cell disease (HCC), Stroke (HCC), Substance abuse (HCC), Thyroid disease, Tuberculosis, Ulcer, or Urinary tract infection.  MEDS:   Current Outpatient Medications:   •  diclofenac (CATAFLAM) 50 MG tablet, DICLOFENAC POTASSIUM 50 MG TABS, Disp: , Rfl:   •  oxybutynin (DITROPAN) 5 MG Tab, OXYBUTYNIN CHLORIDE 5 MG TABS, Disp: , Rfl:   •  topiramate (TOPAMAX) 50 MG tablet, TOPAMAX 50 MG TABS, Disp: , Rfl:   •  docusate sodium (COLACE) 100 MG Cap, Take 1 capsule by mouth 2 times a day., Disp: 30 capsule, Rfl: 0  •  gabapentin (NEURONTIN) 100 MG Cap, Take 100 mg by mouth 3 times a day., Disp: , Rfl:   •  zolpidem (AMBIEN) 10 MG Tab, Take 10 mg by mouth at bedtime as needed for Sleep., Disp: , Rfl:   •  omeprazole (PRILOSEC) 20 MG delayed-release capsule, Take 20 mg by mouth every bedtime. Indications: Gastroesophageal Reflux Disease, Disp: , Rfl:   •  tramadol (ULTRAM) 50 MG Tab, Take 50 mg by mouth as needed (Pain)., Disp: , Rfl:   ALLERGIES:   Allergies   Allergen Reactions   • Cantaloupe    • Tape      Surgical tape   • Celecoxib      Fatigue, tiredness pt states      SURGHX:   Past Surgical History:   Procedure Laterality Date   • PB COMBINED ANT/POST COLPORRHAPHY N/A 6/3/2020    Procedure: COLPORRHAPHY, COMBINED ANTEROPOSTERIOR;  Surgeon: Jose Bowen M.D.;  Location: SURGERY SAME DAY [facility];  Service: Obstetrics  "  • HYSTERECTOMY LAPAROSCOPY N/A 6/3/2020    Procedure: HYSTERECTOMY, LAPAROSCOPIC-;  Surgeon: Jose Bowen M.D.;  Location: SURGERY SAME DAY [facility];  Service: Obstetrics   • CYSTOSCOPY N/A 6/3/2020    Procedure: CYSTOSCOPY- WITH MID URETHRAL SLING PLACEMENT;  Surgeon: Jose Bowen M.D.;  Location: SURGERY SAME DAY [facility];  Service: Obstetrics   • DURAN BY LAPAROSCOPY  10/25/2019    Procedure: CHOLECYSTECTOMY, LAPAROSCOPIC;  Surgeon: Efrain Ramesh M.D.;  Location: SURGERY [facility];  Service: General   • ERCP IN OR N/A 10/24/2019    Procedure: ERCP (ENDOSCOPIC RETROGRADE CHOLANGIOPANCREATOGRAPHY);  Surgeon: Temo Liao M.D.;  Location: SURGERY [facility];  Service: Gastroenterology   • PB LAP,DIAGNOSTIC ABDOMEN  6/18/2019    Procedure: PELVISCOPY;  Surgeon: Pilar Vuong M.D.;  Location: SURGERY SAME DAY [facility];  Service: Gynecology   • SALPINGECTOMY Bilateral 6/18/2019    Procedure: SALPINGECTOMY;  Surgeon: Pilar Vuong M.D.;  Location: SURGERY SAME DAY [facility];  Service: Gynecology     SOCHX:  reports that she quit smoking about 2 years ago. Her smoking use included cigarettes. She has a 3.75 pack-year smoking history. She has never used smokeless tobacco. She reports previous alcohol use. She reports that she does not use drugs.  FH: Family history was reviewed, no pertinent findings to report    Objective     /60 (BP Location: Left arm, Patient Position: Standing, BP Cuff Size: Adult long)   Pulse (!) 107   Temp 36.3 °C (97.3 °F) (Temporal)   Resp 16   Ht 1.651 m (5' 5\")   Wt 63 kg (139 lb)   LMP  (LMP Unknown)   SpO2 98%   BMI 23.13 kg/m²      Physical Exam  Constitutional:       Appearance: She is well-developed.   HENT:      Head: Normocephalic and atraumatic.      Right Ear: Tympanic membrane, ear canal and external ear normal.      Left Ear: Tympanic membrane, ear canal and external ear normal.      Nose: Mucosal edema, congestion " and rhinorrhea present. Rhinorrhea is clear.      Mouth/Throat:      Lips: Pink.      Mouth: Mucous membranes are moist.      Pharynx: Uvula midline. Posterior oropharyngeal erythema present. No uvula swelling.      Tonsils: No tonsillar exudate. 1+ on the right. 1+ on the left.   Eyes:      Conjunctiva/sclera: Conjunctivae normal.      Pupils: Pupils are equal, round, and reactive to light.   Cardiovascular:      Rate and Rhythm: Normal rate and regular rhythm.      Heart sounds: Normal heart sounds. No murmur heard.     Pulmonary:      Effort: Pulmonary effort is normal.      Breath sounds: Normal breath sounds. No wheezing.   Musculoskeletal:      Cervical back: Normal range of motion.   Lymphadenopathy:      Cervical: Cervical adenopathy present.   Skin:     General: Skin is warm and dry.      Capillary Refill: Capillary refill takes less than 2 seconds.   Neurological:      Mental Status: She is alert and oriented to person, place, and time.   Psychiatric:         Behavior: Behavior normal.         Judgment: Judgment normal.          POCT Rapid Strep A - POSITIVE      Assessment & Plan     1. Pharyngitis due to Streptococcus species  - POCT Rapid Strep A  - amoxicillin (AMOXIL) 500 MG Cap; Take 1 Capsule by mouth 2 times a day for 10 days.  Dispense: 20 Capsule; Refill: 0  -Supportive care discussed to include salt water gargles, throat lozenges, and increased fluid intake    2. Exposure to strep throat  - POCT Rapid Strep A    3. Viral URI  - COVID/SARS CoV-2 PCR; Future  - OTC cold/flu medications  - PO fluids  - Rest  - Tylenol or ibuprofen as needed for fever > 100.4 F      *Patient had a nasal swab to test for COVID-19 virus.  Patient was advised to stay home and self isolate/self quarantine while awaiting the results.  Supportive care was reiterated.  Return/ER precautions discussed.       Differential Diagnosis, natural history, and supportive care discussed. Return to the Urgent Care or follow up with  your PCP if symptoms fail to resolve, or for any new or worsening symptoms. Emergency room precautions discussed. Patient and/or family appears understanding of information.

## 2021-08-22 DIAGNOSIS — J06.9 VIRAL URI: ICD-10-CM

## 2021-08-22 LAB
COVID ORDER STATUS COVID19: NORMAL
SARS-COV-2 RNA RESP QL NAA+PROBE: NOTDETECTED
SPECIMEN SOURCE: NORMAL

## 2021-09-24 ENCOUNTER — OFFICE VISIT (OUTPATIENT)
Dept: URGENT CARE | Facility: CLINIC | Age: 31
End: 2021-09-24
Payer: MEDICAID

## 2021-09-24 VITALS
RESPIRATION RATE: 14 BRPM | SYSTOLIC BLOOD PRESSURE: 102 MMHG | HEIGHT: 65 IN | OXYGEN SATURATION: 96 % | DIASTOLIC BLOOD PRESSURE: 60 MMHG | WEIGHT: 145 LBS | HEART RATE: 94 BPM | TEMPERATURE: 97.4 F | BODY MASS INDEX: 24.16 KG/M2

## 2021-09-24 DIAGNOSIS — J02.9 PHARYNGITIS, UNSPECIFIED ETIOLOGY: ICD-10-CM

## 2021-09-24 DIAGNOSIS — K21.9 GASTROESOPHAGEAL REFLUX DISEASE, UNSPECIFIED WHETHER ESOPHAGITIS PRESENT: ICD-10-CM

## 2021-09-24 DIAGNOSIS — R10.13 EPIGASTRIC PAIN: ICD-10-CM

## 2021-09-24 LAB
APPEARANCE UR: NORMAL
BILIRUB UR STRIP-MCNC: NEGATIVE MG/DL
COLOR UR AUTO: NORMAL
GLUCOSE UR STRIP.AUTO-MCNC: NEGATIVE MG/DL
INT CON NEG: NEGATIVE
INT CON POS: POSITIVE
KETONES UR STRIP.AUTO-MCNC: NEGATIVE MG/DL
LEUKOCYTE ESTERASE UR QL STRIP.AUTO: NEGATIVE
NITRITE UR QL STRIP.AUTO: NEGATIVE
PH UR STRIP.AUTO: 6 [PH] (ref 5–8)
PROT UR QL STRIP: NEGATIVE MG/DL
RBC UR QL AUTO: NEGATIVE
S PYO AG THROAT QL: NEGATIVE
SP GR UR STRIP.AUTO: 1.02
UROBILINOGEN UR STRIP-MCNC: 0.2 MG/DL

## 2021-09-24 PROCEDURE — 81002 URINALYSIS NONAUTO W/O SCOPE: CPT | Performed by: PHYSICIAN ASSISTANT

## 2021-09-24 PROCEDURE — 99214 OFFICE O/P EST MOD 30 MIN: CPT | Performed by: PHYSICIAN ASSISTANT

## 2021-09-24 PROCEDURE — 87880 STREP A ASSAY W/OPTIC: CPT | Performed by: PHYSICIAN ASSISTANT

## 2021-09-24 RX ORDER — ONDANSETRON 4 MG/1
4 TABLET, ORALLY DISINTEGRATING ORAL EVERY 6 HOURS PRN
Qty: 20 TABLET | Refills: 0 | Status: SHIPPED | OUTPATIENT
Start: 2021-09-24 | End: 2022-02-01

## 2021-09-24 RX ORDER — OMEPRAZOLE 20 MG/1
20 CAPSULE, DELAYED RELEASE ORAL 2 TIMES DAILY
Qty: 60 CAPSULE | Refills: 0 | Status: SHIPPED | OUTPATIENT
Start: 2021-09-24 | End: 2021-10-08

## 2021-09-24 RX ORDER — SUCRALFATE 1 G/1
1 TABLET ORAL
Qty: 120 TABLET | Refills: 3 | Status: SHIPPED | OUTPATIENT
Start: 2021-09-24 | End: 2022-01-14

## 2021-09-24 ASSESSMENT — ENCOUNTER SYMPTOMS
WEIGHT LOSS: 0
CHILLS: 0
ABDOMINAL PAIN: 1
PALPITATIONS: 0
FLANK PAIN: 0
VOMITING: 0
FEVER: 0
SORE THROAT: 1
DIARRHEA: 0
SHORTNESS OF BREATH: 0
CONSTIPATION: 0
DIAPHORESIS: 0
DIZZINESS: 0
MYALGIAS: 0
COUGH: 0
HEADACHES: 0
NAUSEA: 1
WEAKNESS: 0
BLOOD IN STOOL: 0
HEARTBURN: 1

## 2021-09-24 ASSESSMENT — FIBROSIS 4 INDEX: FIB4 SCORE: 0.57

## 2021-09-24 NOTE — PROGRESS NOTES
Subjective:   Lali Funes is a 30 y.o. female who presents for Abdominal Pain (cramping x 4 day  (Pt states has upcoming colonoscopy))      HPI:  This is a very pleasant 30-year-old female presenting to the clinic with abdominal pain, cramping and reflux x4 days.  Pain is predominantly located in the epigastric region.  Pain seems to come and go at random.  Does not seem to be aggravated after meals on all occasions.  Described as a burning and cramping sensation.  Occasionally has reflux into the esophagus.  Does have a history of GERD.  Currently takes 20 mg omeprazole daily.  Has a history of cholecystectomy as well as hysterectomy.  Does have a history of recurrent GI issues.  Has a colonoscopy scheduled for the end of October to evaluate for potential IBD.  Patient feels mildly nauseous on occasion.  She has not had any episodes of vomiting or diarrhea.  No constipation.  No blood or mucus in the stool.  No urinary complaints or flank pain.  Has not been running a fever still tolerating oral intake.  Also like to be tested for strep as she started to have a mild sore throat yesterday and multiple contacts she has had lately have tested positive for strep.    Review of Systems   Constitutional: Negative for chills, diaphoresis, fever, malaise/fatigue and weight loss.   HENT: Positive for sore throat. Negative for congestion.    Respiratory: Negative for cough and shortness of breath.    Cardiovascular: Negative for chest pain and palpitations.   Gastrointestinal: Positive for abdominal pain, heartburn and nausea. Negative for blood in stool, constipation, diarrhea, melena and vomiting.   Genitourinary: Negative for dysuria, flank pain, frequency, hematuria and urgency.   Musculoskeletal: Negative for myalgias.   Skin: Negative for rash.   Neurological: Negative for dizziness, weakness and headaches.       Medications:    • diclofenac  • docusate sodium Caps  • gabapentin Caps  • omeprazole  • oxybutynin  Tabs  • sucralfate Tabs  • topiramate  • traMADol Tabs  • VITAMIN D PO  • VITAMINS C E PO  • zolpidem Tabs    Allergies: Cantaloupe, Tape, and Celecoxib    Problem List: Lali Funes does not have any pertinent problems on file.    Surgical History:  Past Surgical History:   Procedure Laterality Date   • PB COMBINED ANT/POST COLPORRHAPHY N/A 6/3/2020    Procedure: COLPORRHAPHY, COMBINED ANTEROPOSTERIOR;  Surgeon: Jose Bowen M.D.;  Location: SURGERY SAME DAY [facility];  Service: Obstetrics   • HYSTERECTOMY LAPAROSCOPY N/A 6/3/2020    Procedure: HYSTERECTOMY, LAPAROSCOPIC-;  Surgeon: Jose Bowen M.D.;  Location: SURGERY SAME DAY [facility];  Service: Obstetrics   • CYSTOSCOPY N/A 6/3/2020    Procedure: CYSTOSCOPY- WITH MID URETHRAL SLING PLACEMENT;  Surgeon: Jose Bowen M.D.;  Location: SURGERY SAME DAY [facility];  Service: Obstetrics   • DURAN BY LAPAROSCOPY  10/25/2019    Procedure: CHOLECYSTECTOMY, LAPAROSCOPIC;  Surgeon: Efrain Ramesh M.D.;  Location: SURGERY [facility];  Service: General   • ERCP IN OR N/A 10/24/2019    Procedure: ERCP (ENDOSCOPIC RETROGRADE CHOLANGIOPANCREATOGRAPHY);  Surgeon: Temo Liao M.D.;  Location: SURGERY [facility];  Service: Gastroenterology   • PB LAP,DIAGNOSTIC ABDOMEN  6/18/2019    Procedure: PELVISCOPY;  Surgeon: Pilar Vuong M.D.;  Location: SURGERY SAME DAY [facility];  Service: Gynecology   • SALPINGECTOMY Bilateral 6/18/2019    Procedure: SALPINGECTOMY;  Surgeon: Pilar Vuong M.D.;  Location: SURGERY SAME DAY [facility];  Service: Gynecology       Past Social Hx: Lali Funes  reports that she quit smoking about 2 years ago. Her smoking use included cigarettes. She has a 3.75 pack-year smoking history. She has never used smokeless tobacco. She reports previous alcohol use. She reports that she does not use drugs.     Past Family Hx:  Lali Funes family history includes Cancer in her maternal  "grandmother; Cancer (age of onset: 45) in her mother; Gout in her father; Heart Attack in her father.     Problem list, medications, and allergies reviewed by myself today in Epic.     Objective:     /60   Pulse 94   Temp 36.3 °C (97.4 °F)   Resp 14   Ht 1.651 m (5' 5\")   Wt 65.8 kg (145 lb)   LMP  (LMP Unknown)   SpO2 96%   BMI 24.13 kg/m²     Physical Exam  Constitutional:       General: She is not in acute distress.     Appearance: Normal appearance. She is not ill-appearing, toxic-appearing or diaphoretic.   HENT:      Head: Normocephalic and atraumatic.      Mouth/Throat:      Mouth: Mucous membranes are moist.      Pharynx: Posterior oropharyngeal erythema present. No oropharyngeal exudate.   Eyes:      Conjunctiva/sclera: Conjunctivae normal.   Cardiovascular:      Rate and Rhythm: Normal rate and regular rhythm.      Pulses: Normal pulses.      Heart sounds: Normal heart sounds.   Pulmonary:      Effort: Pulmonary effort is normal.      Breath sounds: Normal breath sounds. No wheezing.   Abdominal:      General: Bowel sounds are normal. There is no distension.      Palpations: Abdomen is soft. There is no hepatomegaly, splenomegaly or mass.      Tenderness: There is abdominal tenderness in the epigastric area. There is no right CVA tenderness, left CVA tenderness, guarding or rebound. Negative signs include McBurney's sign, psoas sign and obturator sign.      Hernia: No hernia is present.          Comments: Mild tenderness to deep palpation over the epigastric region.   Musculoskeletal:         General: Normal range of motion.      Cervical back: Normal range of motion. No muscular tenderness.   Lymphadenopathy:      Cervical: No cervical adenopathy.   Skin:     General: Skin is warm and dry.      Capillary Refill: Capillary refill takes less than 2 seconds.   Neurological:      General: No focal deficit present.      Mental Status: She is alert and oriented to person, place, and time. Mental " status is at baseline.   Psychiatric:         Mood and Affect: Mood normal.         Thought Content: Thought content normal.       Lab Results/POC Test Results   Results for orders placed or performed in visit on 09/24/21   POCT Urinalysis   Result Value Ref Range    POC Color Dark Yellow Negative    POC Appearance Slightly Cloudy Negative    POC Leukocyte Esterase Negative Negative    POC Nitrites Negative Negative    POC Urobiligen 0.2 Negative (0.2) mg/dL    POC Protein Negative Negative mg/dL    POC Urine PH 6 5.0 - 8.0    POC Blood Negative Negative    POC Specific Gravity 1.020 <1.005 - >1.030    POC Ketones Negative Negative mg/dL    POC Bilirubin Negative Negative mg/dL    POC Glucose Negative Negative mg/dL   POCT Rapid Strep A   Result Value Ref Range    Rapid Strep Screen Negative     Internal Control Positive Positive     Internal Control Negative Negative                Assessment/Plan:     Comments/MDM:     • Differentials and treatment options were discussed with the patient at length today.  Patient has a long history of recurrent abdominal pain.  Does have a history of GERD.  On physical exam mild tenderness to the epigastric region.  She has some burning and reflux over the last 4 days.  She has felt nauseous without any episodes of vomiting.  No diarrhea, melena or blood in the stool.  No signs of acute abdomen on exam today.  Normal urine analysis in clinic.  Negative strep.  Supportive care recommendations discussed at this time.  Diet and lifestyle recommendations discussed for potential GERD/gastritis.  Strict ER precautions discussed for any sharp abdominal pain especially in the periumbilical/right lower quadrant region.  Follow-up with gastro for colonoscopy in the coming month.  Please call with questions or concerns.     Diagnosis and associated orders:     1. Epigastric pain    - omeprazole (PRILOSEC) 20 MG delayed-release capsule; Take 1 Capsule by mouth 2 times a day.  Dispense: 60  Capsule; Refill: 0  - sucralfate (CARAFATE) 1 GM Tab; Take 1 Tablet by mouth 4 Times a Day,Before Meals and at Bedtime.  Dispense: 120 Tablet; Refill: 3  - POCT Urinalysis  - ondansetron (ZOFRAN ODT) 4 MG TABLET DISPERSIBLE; Take 1 Tablet by mouth every 6 hours as needed for Nausea.  Dispense: 20 Tablet; Refill: 0    2. Gastroesophageal reflux disease, unspecified whether esophagitis present    - omeprazole (PRILOSEC) 20 MG delayed-release capsule; Take 1 Capsule by mouth 2 times a day.  Dispense: 60 Capsule; Refill: 0  - sucralfate (CARAFATE) 1 GM Tab; Take 1 Tablet by mouth 4 Times a Day,Before Meals and at Bedtime.  Dispense: 120 Tablet; Refill: 3  - ondansetron (ZOFRAN ODT) 4 MG TABLET DISPERSIBLE; Take 1 Tablet by mouth every 6 hours as needed for Nausea.  Dispense: 20 Tablet; Refill: 0    3. Pharyngitis, unspecified etiology    - POCT Rapid Strep A           Differential diagnosis, natural history, supportive care, and indications for immediate follow-up discussed.    Advised the patient to follow-up with the primary care physician for recheck, reevaluation, and consideration of further management.    Please note that this dictation was created using voice recognition software. I have made reasonable attempt to correct obvious errors, but I expect that there are errors of grammar and possibly content that I did not discover before finalizing the note.    This note was electronically signed by MARS Montiel PA-C

## 2021-10-08 ENCOUNTER — OFFICE VISIT (OUTPATIENT)
Dept: MEDICAL GROUP | Facility: CLINIC | Age: 31
End: 2021-10-08
Payer: MEDICAID

## 2021-10-08 VITALS
TEMPERATURE: 98.4 F | HEIGHT: 65 IN | RESPIRATION RATE: 16 BRPM | BODY MASS INDEX: 24.66 KG/M2 | WEIGHT: 148 LBS | HEART RATE: 83 BPM | OXYGEN SATURATION: 95 % | DIASTOLIC BLOOD PRESSURE: 62 MMHG | SYSTOLIC BLOOD PRESSURE: 95 MMHG

## 2021-10-08 DIAGNOSIS — B96.89 BACTERIAL VAGINOSIS: ICD-10-CM

## 2021-10-08 DIAGNOSIS — N63.0 BREAST MASS IN FEMALE: ICD-10-CM

## 2021-10-08 DIAGNOSIS — N76.0 BACTERIAL VAGINOSIS: ICD-10-CM

## 2021-10-08 DIAGNOSIS — N39.0 URINARY TRACT INFECTION WITHOUT HEMATURIA, SITE UNSPECIFIED: ICD-10-CM

## 2021-10-08 DIAGNOSIS — R10.2 PELVIC PAIN: ICD-10-CM

## 2021-10-08 DIAGNOSIS — R10.10 PAIN OF UPPER ABDOMEN: ICD-10-CM

## 2021-10-08 DIAGNOSIS — G43.111 MIGRAINE WITH AURA, INTRACTABLE, WITH STATUS MIGRAINOSUS: ICD-10-CM

## 2021-10-08 PROBLEM — R51.9 HEADACHE: Status: RESOLVED | Noted: 2021-03-04 | Resolved: 2021-10-08

## 2021-10-08 PROBLEM — Z48.89 POSTOPERATIVE VISIT: Status: RESOLVED | Noted: 2020-07-02 | Resolved: 2021-10-08

## 2021-10-08 PROBLEM — G89.18 POST-OP PAIN: Status: RESOLVED | Noted: 2019-06-18 | Resolved: 2021-10-08

## 2021-10-08 PROBLEM — N81.11 CYSTOCELE, MIDLINE: Status: RESOLVED | Noted: 2019-06-27 | Resolved: 2021-10-08

## 2021-10-08 PROBLEM — E78.01 FAMILIAL HYPERCHOLESTEROLEMIA: Status: RESOLVED | Noted: 2020-12-18 | Resolved: 2021-10-08

## 2021-10-08 PROBLEM — M54.9 BACKACHE: Status: RESOLVED | Noted: 2019-05-16 | Resolved: 2021-10-08

## 2021-10-08 PROBLEM — R74.01 TRANSAMINITIS: Status: RESOLVED | Noted: 2019-10-23 | Resolved: 2021-10-08

## 2021-10-08 PROBLEM — Z90.710: Status: RESOLVED | Noted: 2020-06-12 | Resolved: 2021-10-08

## 2021-10-08 PROBLEM — G47.00 INSOMNIA: Status: RESOLVED | Noted: 2020-06-12 | Resolved: 2021-10-08

## 2021-10-08 PROBLEM — L28.2 PRURITIC RASH: Status: RESOLVED | Noted: 2021-03-24 | Resolved: 2021-10-08

## 2021-10-08 PROBLEM — Z87.898 HISTORY OF DISEASE: Status: RESOLVED | Noted: 2021-04-16 | Resolved: 2021-10-08

## 2021-10-08 PROBLEM — M79.674 TOE PAIN, RIGHT: Status: RESOLVED | Noted: 2019-11-15 | Resolved: 2021-10-08

## 2021-10-08 PROBLEM — R20.0 NUMBNESS OF EXTREMITY: Status: RESOLVED | Noted: 2020-04-16 | Resolved: 2021-10-08

## 2021-10-08 PROBLEM — N89.8 VAGINAL DISCHARGE: Status: RESOLVED | Noted: 2020-07-31 | Resolved: 2021-10-08

## 2021-10-08 PROBLEM — D72.829 LEUKOCYTOSIS: Status: RESOLVED | Noted: 2019-10-23 | Resolved: 2021-10-08

## 2021-10-08 PROBLEM — H69.93 EUSTACHIAN TUBE DYSFUNCTION, BILATERAL: Status: RESOLVED | Noted: 2019-12-03 | Resolved: 2021-10-08

## 2021-10-08 PROBLEM — K59.09 CONSTIPATION, CHRONIC: Status: RESOLVED | Noted: 2021-02-18 | Resolved: 2021-10-08

## 2021-10-08 PROBLEM — N39.3 STRESS INCONTINENCE: Status: RESOLVED | Noted: 2019-07-08 | Resolved: 2021-10-08

## 2021-10-08 PROBLEM — R33.9 URINARY RETENTION: Status: RESOLVED | Noted: 2020-06-19 | Resolved: 2021-10-08

## 2021-10-08 PROBLEM — H93.13 SUBJECTIVE TINNITUS OF BOTH EARS: Status: RESOLVED | Noted: 2021-07-20 | Resolved: 2021-10-08

## 2021-10-08 PROBLEM — Z80.3 FAMILY HISTORY OF MALIGNANT NEOPLASM OF BREAST IN FIRST DEGREE RELATIVE: Status: RESOLVED | Noted: 2020-02-26 | Resolved: 2021-10-08

## 2021-10-08 PROBLEM — M54.9 BACK PAIN: Status: RESOLVED | Noted: 2020-04-16 | Resolved: 2021-10-08

## 2021-10-08 PROBLEM — N81.6 RECTOCELE: Status: RESOLVED | Noted: 2019-11-05 | Resolved: 2021-10-08

## 2021-10-08 PROBLEM — N93.9 ABNORMAL VAGINAL BLEEDING: Status: RESOLVED | Noted: 2019-08-22 | Resolved: 2021-10-08

## 2021-10-08 PROBLEM — F32.0 CURRENT MILD EPISODE OF MAJOR DEPRESSIVE DISORDER WITHOUT PRIOR EPISODE (HCC): Status: RESOLVED | Noted: 2019-11-15 | Resolved: 2021-10-08

## 2021-10-08 PROBLEM — N81.4 UTERINE PROLAPSE: Status: RESOLVED | Noted: 2019-11-05 | Resolved: 2021-10-08

## 2021-10-08 PROBLEM — N61.0 MASTITIS: Status: RESOLVED | Noted: 2019-05-16 | Resolved: 2021-10-08

## 2021-10-08 PROBLEM — N30.20 CHRONIC CYSTITIS: Status: RESOLVED | Noted: 2021-07-20 | Resolved: 2021-10-08

## 2021-10-08 PROCEDURE — 99214 OFFICE O/P EST MOD 30 MIN: CPT | Performed by: FAMILY MEDICINE

## 2021-10-08 RX ORDER — SUMATRIPTAN 25 MG/1
25-100 TABLET, FILM COATED ORAL
COMMUNITY

## 2021-10-08 ASSESSMENT — FIBROSIS 4 INDEX: FIB4 SCORE: 0.57

## 2021-10-08 NOTE — LETTER
October 8, 2021    To Whom It May Concern:         This is confirmation that Lali Funes attended her scheduled appointment with Anand Zamora D.O. on 10/08/21.    Patient is cleared to return to work from a medical standpoint. Please allow patient to return to work 10/21/21         If you have any questions please do not hesitate to call me at the phone number listed below.    Sincerely,      Anand Zamora D.O.  [phone]

## 2021-10-08 NOTE — ASSESSMENT & PLAN NOTE
-Normal mammogram 2020  -Patient with strong family history of breast cancer in mother and grandmother  -MRI breast pending per patient's request  -Follow-up on MRI results

## 2021-10-08 NOTE — ASSESSMENT & PLAN NOTE
-Patient with chronic BV  -Treated with metronidazole multiple times  -Most recently took for 7 days but as soon as she stopped symptoms came back   -Takes a probiotic and activia   -Patient may continue to take metronidazole as needed  -Recommended follow-up, patient still having BV symptoms recheck for BV

## 2021-10-08 NOTE — PROGRESS NOTES
[facility] MEDICINE     PATIENT ID:  NAME:  Lali Funes  MRN:               [id number]  YOB: 1990    Date: 2:06 PM      Resident: Anand Zamora DO    CC:  F/u vaginal discharge      HPI: Lali Funes is a 30 y.o. female who presented for f/u for abd pain and vaginal discharge     Problem   Pelvic Pain    -History of chronic pelvic pain  -Patient status post hysterectomy bladder sling performed by urology [place] in 2019.  -Since then has had recurrent UTIs and treated for with antibiotics  -States that she has had chronic pelvic pain ongoing, family history of endometriosis  -Concern for possible endometriosis; ever, patient would not like to have laparoscopy to evaluate further  -Started oral contraceptive pills at last appointment       Pain of Upper Abdomen    - Abd pain, mostly epigastric, for years, but worse since she had gallbladder removed   - Sharp, stabbing pain. No foods or specific activities seem to worsen it. Hx of IBS per patient   - Taking omeprazole - helping her GERD symptoms a little   - Planning to have colonoscopy soon by GI and breath test soon     Breast Mass in Female    -Normal mammogram 2020  -Patient with strong family history of breast cancer in mother and grandmother  -MRI breast pending per patient's request     Migraine With Aura, Intractable, With Status Migrainosus    History of frontal migraines, sensitivity to light and sound  -Takes Aleve and Tylenol as needed  -Also takes topiramate daily  -Sumatriptan as needed     Uti (Urinary Tract Infection)    -Patient with hysterectomy and bladder sling in 2019  -Has had chronic UTI since this time, treated with antibiotics multiple times  -Evaluated by Dr. Nunez of uro-gyn      Bacterial Vaginosis    -Patient with chronic BV  -Treated with metronidazole multiple times  -Most recently took for 7 days but as soon as she stopped symptoms came back   -Takes a probiotic and activia          REVIEW OF SYSTEMS:   Ten systems  reviewed and were negative except as noted in the HPI.                PROBLEM LIST  Patient Active Problem List   Diagnosis   • Bacterial vaginosis   • UTI (urinary tract infection)   • Migraine with aura, intractable, with status migrainosus   • Mixed anxiety depressive disorder   • Pelvic pain   • Pain of upper abdomen   • Breast mass in female        PAST SURGICAL HISTORY:  Past Surgical History:   Procedure Laterality Date   • PB COMBINED ANT/POST COLPORRHAPHY N/A 6/3/2020    Procedure: COLPORRHAPHY, COMBINED ANTEROPOSTERIOR;  Surgeon: Jose Bowen M.D.;  Location: SURGERY SAME DAY [facility];  Service: Obstetrics   • HYSTERECTOMY LAPAROSCOPY N/A 6/3/2020    Procedure: HYSTERECTOMY, LAPAROSCOPIC-;  Surgeon: Jose Bowen M.D.;  Location: SURGERY SAME DAY [facility];  Service: Obstetrics   • CYSTOSCOPY N/A 6/3/2020    Procedure: CYSTOSCOPY- WITH MID URETHRAL SLING PLACEMENT;  Surgeon: Jose Bowen M.D.;  Location: SURGERY SAME DAY [facility];  Service: Obstetrics   • DURAN BY LAPAROSCOPY  10/25/2019    Procedure: CHOLECYSTECTOMY, LAPAROSCOPIC;  Surgeon: Efrain Ramesh M.D.;  Location: [facility];  Service: General   • ERCP IN OR N/A 10/24/2019    Procedure: ERCP (ENDOSCOPIC RETROGRADE CHOLANGIOPANCREATOGRAPHY);  Surgeon: Temo Liao M.D.;  Location: [facility];  Service: Gastroenterology   • PB LAP,DIAGNOSTIC ABDOMEN  6/18/2019    Procedure: PELVISCOPY;  Surgeon: Pilar Vuong M.D.;  Location: SURGERY SAME DAY [facility];  Service: Gynecology   • SALPINGECTOMY Bilateral 6/18/2019    Procedure: SALPINGECTOMY;  Surgeon: Pilar Vuong M.D.;  Location: SURGERY SAME DAY [facility];  Service: Gynecology       FAMILY HISTORY:  Family History   Problem Relation Age of Onset   • Cancer Mother 45        breast cancer, lymph node,   • Heart Attack Father    • Gout Father    • Cancer Maternal Grandmother         breast cancer       SOCIAL HISTORY:   Social  "History     Tobacco Use   • Smoking status: Former Smoker     Packs/day: 0.25     Years: 15.00     Pack years: 3.75     Types: Cigarettes     Quit date: 2019     Years since quittin.2   • Smokeless tobacco: Never Used   Substance Use Topics   • Alcohol use: Not Currently       ALLERGIES:  Allergies   Allergen Reactions   • Cantaloupe    • Tape      Surgical tape   • Celecoxib      Fatigue, tiredness pt states        OUTPATIENT MEDICATIONS:    Current Outpatient Medications:   •  SUMAtriptan (IMITREX) 25 MG Tab tablet, Take  mg by mouth one time as needed. 1-2 times daily PRN for headaches, Disp: , Rfl:   •  VITAMIN D PO, Take  by mouth., Disp: , Rfl:   •  VITAMINS C E PO, Take  by mouth., Disp: , Rfl:   •  sucralfate (CARAFATE) 1 GM Tab, Take 1 Tablet by mouth 4 Times a Day,Before Meals and at Bedtime., Disp: 120 Tablet, Rfl: 3  •  ondansetron (ZOFRAN ODT) 4 MG TABLET DISPERSIBLE, Take 1 Tablet by mouth every 6 hours as needed for Nausea., Disp: 20 Tablet, Rfl: 0  •  diclofenac (CATAFLAM) 50 MG tablet, DICLOFENAC POTASSIUM 50 MG TABS (Patient not taking: Reported on 2021), Disp: , Rfl:   •  oxybutynin (DITROPAN) 5 MG Tab, OXYBUTYNIN CHLORIDE 5 MG TABS, Disp: , Rfl:   •  docusate sodium (COLACE) 100 MG Cap, Take 1 capsule by mouth 2 times a day., Disp: 30 capsule, Rfl: 0  •  omeprazole (PRILOSEC) 20 MG delayed-release capsule, Take 20 mg by mouth every bedtime. Indications: Gastroesophageal Reflux Disease, Disp: , Rfl:   •  tramadol (ULTRAM) 50 MG Tab, Take 50 mg by mouth as needed (Pain)., Disp: , Rfl:     PHYSICAL EXAM:  Vitals:    10/08/21 1259   BP: (!) 95/62   BP Location: Right arm   Patient Position: Sitting   BP Cuff Size: Adult   Pulse: 83   Resp: 16   Temp: 36.9 °C (98.4 °F)   TempSrc: Oral   SpO2: 95%   Weight: 67.1 kg (148 lb)   Height: 1.651 m (5' 5\")       General: Pt resting in NAD, cooperative   Skin:  Pink, warm and dry.  HEENT: NC/AT. EOMI.  Lungs:  Symmetrical.  CTAB, good air " movement   Cardiovascular:  S1/S2 RRR   Abdomen:  Abdomen is soft, mild epigastric pain on palpation w/o rebound/guarding  Extremities:  Full range of motion.  CNS:  Muscle tone is normal. No gross focal neurologic deficits      ASSESSMENT/PLAN:   30 y.o. female     Problem List Items Addressed This Visit     Bacterial vaginosis     -Patient with chronic BV  -Treated with metronidazole multiple times  -Most recently took for 7 days but as soon as she stopped symptoms came back   -Takes a probiotic and activia   -Patient may continue to take metronidazole as needed  -Recommended follow-up, patient still having BV symptoms recheck for BV         UTI (urinary tract infection)    Migraine with aura, intractable, with status migrainosus    Relevant Medications    SUMAtriptan (IMITREX) 25 MG Tab tablet    Pelvic pain     - F/u w/ GYN w/ Dr. Willard soon  - Prescribed sprintec last time but decided not to take it         Pain of upper abdomen     -History of chronic pain in the epigastrium after gallbladder removed.  History of IBS and GERD symptoms  - Colonoscopy scheduled on 10/20/21   - GI consultants          Breast mass in female     -Normal mammogram 2020  -Patient with strong family history of breast cancer in mother and grandmother  -MRI breast pending per patient's request  -Follow-up on MRI results               Anand Zamora, DO  PGY-3  [facility]

## 2021-11-02 ENCOUNTER — OFFICE VISIT (OUTPATIENT)
Dept: URGENT CARE | Facility: CLINIC | Age: 31
End: 2021-11-02
Payer: MEDICAID

## 2021-11-02 VITALS
RESPIRATION RATE: 16 BRPM | BODY MASS INDEX: 24.99 KG/M2 | HEART RATE: 63 BPM | TEMPERATURE: 97.5 F | SYSTOLIC BLOOD PRESSURE: 108 MMHG | DIASTOLIC BLOOD PRESSURE: 60 MMHG | WEIGHT: 150 LBS | OXYGEN SATURATION: 96 % | HEIGHT: 65 IN

## 2021-11-02 DIAGNOSIS — Z20.818 EXPOSURE TO STREP THROAT: ICD-10-CM

## 2021-11-02 DIAGNOSIS — J02.9 SORE THROAT: ICD-10-CM

## 2021-11-02 PROBLEM — R10.2 PELVIC PAIN: Status: ACTIVE | Noted: 2021-09-16

## 2021-11-02 PROBLEM — N81.10 FEMALE CYSTOCELE: Status: ACTIVE | Noted: 2019-06-27

## 2021-11-02 PROCEDURE — 99213 OFFICE O/P EST LOW 20 MIN: CPT | Performed by: NURSE PRACTITIONER

## 2021-11-02 RX ORDER — PENICILLIN V POTASSIUM 500 MG/1
500 TABLET ORAL 3 TIMES DAILY
Qty: 30 TABLET | Refills: 0 | Status: SHIPPED | OUTPATIENT
Start: 2021-11-02 | End: 2021-11-12

## 2021-11-02 RX ORDER — NORGESTIMATE AND ETHINYL ESTRADIOL 0.25-0.035
1 KIT ORAL DAILY
COMMUNITY
Start: 2021-09-16 | End: 2022-02-02

## 2021-11-02 ASSESSMENT — ENCOUNTER SYMPTOMS
COUGH: 1
SORE THROAT: 1
SWOLLEN GLANDS: 1

## 2021-11-02 ASSESSMENT — FIBROSIS 4 INDEX: FIB4 SCORE: 0.59

## 2021-11-03 NOTE — PROGRESS NOTES
Subjective     Lali Funes is a 31 y.o. female who presents with Pharyngitis (Cough x 2 days )            Pharyngitis   This is a new problem. Episode onset: reports new onset of scratchy throat that started 2 days ago. Notes a mild cough. Her children currently all have strep. No COVID exposures she is aware of. She is fully vaccinated for COVID. Associated symptoms include coughing and swollen glands. She has had exposure to strep.       Review of Systems   HENT: Positive for sore throat.    Respiratory: Positive for cough.    All other systems reviewed and are negative.           Past Medical History:   Diagnosis Date   • Abnormal uterine bleeding (AUB)    • AC (acromioclavicular) joint bone spurs    • Anxiety    • Arthritis     toes, hands, back   • Bowel habit changes     constipation/diarrhea   • Familial hypercholesterolemia 12/18/2020   • GERD (gastroesophageal reflux disease)    • Mastitis 05/2019   • Migraine with aura, intractable, with status migrainosus 2/25/2021   • Nausea/vomiting in pregnancy 10/5/2012   • Pain 05/29/2020    pelvic and back, 5/10      Past Surgical History:   Procedure Laterality Date   • PB COMBINED ANT/POST COLPORRHAPHY N/A 6/3/2020    Procedure: COLPORRHAPHY, COMBINED ANTEROPOSTERIOR;  Surgeon: Jose Bowen M.D.;  Location: SURGERY SAME DAY [facility];  Service: Obstetrics   • HYSTERECTOMY LAPAROSCOPY N/A 6/3/2020    Procedure: HYSTERECTOMY, LAPAROSCOPIC-;  Surgeon: Jose Bowen M.D.;  Location: SURGERY SAME DAY [facility];  Service: Obstetrics   • CYSTOSCOPY N/A 6/3/2020    Procedure: CYSTOSCOPY- WITH MID URETHRAL SLING PLACEMENT;  Surgeon: Jose Bowen M.D.;  Location: SURGERY SAME DAY [facility];  Service: Obstetrics   • DURAN BY LAPAROSCOPY  10/25/2019    Procedure: CHOLECYSTECTOMY, LAPAROSCOPIC;  Surgeon: Efrain Ramesh M.D.;  Location: SURGERY [facility];  Service: General   • ERCP IN OR N/A 10/24/2019    Procedure: ERCP (ENDOSCOPIC  RETROGRADE CHOLANGIOPANCREATOGRAPHY);  Surgeon: Temo Liao M.D.;  Location: SURGERY [facility];  Service: Gastroenterology   • PB LAP,DIAGNOSTIC ABDOMEN  2019    Procedure: PELVISCOPY;  Surgeon: Pilar Vuong M.D.;  Location: SURGERY SAME DAY [facility];  Service: Gynecology   • SALPINGECTOMY Bilateral 2019    Procedure: SALPINGECTOMY;  Surgeon: Pilar Vuong M.D.;  Location: SURGERY SAME DAY [facility];  Service: Gynecology      Social History     Socioeconomic History   • Marital status: Single     Spouse name: Not on file   • Number of children: Not on file   • Years of education: Not on file   • Highest education level: Not on file   Occupational History   • Not on file   Tobacco Use   • Smoking status: Former Smoker     Packs/day: 0.25     Years: 15.00     Pack years: 3.75     Types: Cigarettes     Quit date: 2019     Years since quittin.3   • Smokeless tobacco: Never Used   Vaping Use   • Vaping Use: Every day   • Substances: Nicotine, Flavoring   • Devices: Refillable tank   Substance and Sexual Activity   • Alcohol use: Not Currently   • Drug use: Never   • Sexual activity: Yes     Partners: Male     Birth control/protection: Surgical, Injection     Comment: none   Other Topics Concern   • Not on file   Social History Narrative   • Not on file     Social Determinants of Health     Financial Resource Strain:    • Difficulty of Paying Living Expenses:    Food Insecurity:    • Worried About Running Out of Food in the Last Year:    • Ran Out of Food in the Last Year:    Transportation Needs:    • Lack of Transportation (Medical):    • Lack of Transportation (Non-Medical):    Physical Activity:    • Days of Exercise per Week:    • Minutes of Exercise per Session:    Stress:    • Feeling of Stress :    Social Connections:    • Frequency of Communication with Friends and Family:    • Frequency of Social Gatherings with Friends and Family:    • Attends Sabian[employer] Services:   "  • Active Member of Clubs or Organizations:    • Attends Club or Organization Meetings:    • Marital Status:    Intimate Partner Violence:    • Fear of Current or Ex-Partner:    • Emotionally Abused:    • Physically Abused:    • Sexually Abused:        Objective     /60   Pulse 63   Temp 36.4 °C (97.5 °F)   Resp 16   Ht 1.651 m (5' 5\")   Wt 68 kg (150 lb)   LMP  (LMP Unknown)   SpO2 96%   BMI 24.96 kg/m²      Physical Exam  Vitals and nursing note reviewed.   Constitutional:       Appearance: Normal appearance. She is normal weight.   HENT:      Head: Normocephalic and atraumatic.      Right Ear: Tympanic membrane and external ear normal.      Left Ear: Tympanic membrane and external ear normal.      Nose: Nose normal.      Mouth/Throat:      Mouth: Mucous membranes are moist.      Pharynx: Oropharynx is clear.   Eyes:      Extraocular Movements: Extraocular movements intact.      Pupils: Pupils are equal, round, and reactive to light.   Cardiovascular:      Rate and Rhythm: Normal rate and regular rhythm.   Pulmonary:      Effort: Pulmonary effort is normal.   Musculoskeletal:         General: Normal range of motion.      Cervical back: Normal range of motion.   Skin:     General: Skin is warm and dry.      Capillary Refill: Capillary refill takes less than 2 seconds.   Neurological:      General: No focal deficit present.      Mental Status: She is alert and oriented to person, place, and time. Mental status is at baseline.   Psychiatric:         Mood and Affect: Mood normal.         Speech: Speech normal.         Thought Content: Thought content normal.         Judgment: Judgment normal.                             Assessment & Plan        1. Sore throat  - penicillin v potassium (VEETID) 500 MG Tab; Take 1 Tablet by mouth 3 times a day for 10 days.  Dispense: 30 Tablet; Refill: 0    2. Exposure to strep throat  - penicillin v potassium (VEETID) 500 MG Tab; Take 1 Tablet by mouth 3 times a day for " 10 days.  Dispense: 30 Tablet; Refill: 0    Contingent antibiotic prescription given to patient to fill upon meeting criteria of guidelines discussed.   Warm salt water gargles  Alternate tylenol and ibuprofen for pain  Soft foods and cool liquids  Throat lozenges as directed  Supportive care, differential diagnoses, and indications for immediate follow-up discussed with patient.    Pathogenesis of diagnosis discussed including typical length and natural progression.      Instructed to return to  or nearest emergency department if symptoms fail to improve, for any change in condition, further concerns, or new concerning symptoms.  Patient states understanding of the plan of care and discharge instructions.

## 2021-12-22 ENCOUNTER — OFFICE VISIT (OUTPATIENT)
Dept: URGENT CARE | Facility: CLINIC | Age: 31
End: 2021-12-22
Payer: MEDICAID

## 2021-12-22 VITALS
SYSTOLIC BLOOD PRESSURE: 90 MMHG | DIASTOLIC BLOOD PRESSURE: 62 MMHG | HEART RATE: 96 BPM | RESPIRATION RATE: 16 BRPM | BODY MASS INDEX: 24.99 KG/M2 | HEIGHT: 65 IN | WEIGHT: 150 LBS | OXYGEN SATURATION: 98 % | TEMPERATURE: 96.7 F

## 2021-12-22 DIAGNOSIS — J02.0 STREP THROAT: ICD-10-CM

## 2021-12-22 DIAGNOSIS — J02.9 SORE THROAT: ICD-10-CM

## 2021-12-22 LAB
INT CON NEG: NEGATIVE
INT CON POS: POSITIVE
S PYO AG THROAT QL: POSITIVE

## 2021-12-22 PROCEDURE — 87880 STREP A ASSAY W/OPTIC: CPT | Performed by: FAMILY MEDICINE

## 2021-12-22 PROCEDURE — 99214 OFFICE O/P EST MOD 30 MIN: CPT | Performed by: FAMILY MEDICINE

## 2021-12-22 RX ORDER — AMOXICILLIN 875 MG/1
875 TABLET, COATED ORAL 2 TIMES DAILY
Qty: 20 TABLET | Refills: 0 | Status: SHIPPED | OUTPATIENT
Start: 2021-12-22 | End: 2022-01-01

## 2021-12-22 ASSESSMENT — FIBROSIS 4 INDEX: FIB4 SCORE: 0.59

## 2021-12-22 NOTE — PROGRESS NOTES
"Subjective:     CC:  presents with Pharyngitis            Pharyngitis   This is a new problem. The current episode started in the past 3 days. The problem has been unchanged. There has been no fever. The pain is moderate.  . Pertinent negatives include no abdominal pain,  , diarrhea, headaches, shortness of breath or vomiting. no exposure to strep or mono.   has tried acetaminophen for the symptoms. The treatment provided mild relief.     Social History     Tobacco Use   • Smoking status: Former Smoker     Packs/day: 0.25     Years: 15.00     Pack years: 3.75     Types: Cigarettes     Quit date: 2019     Years since quittin.4   • Smokeless tobacco: Never Used   Vaping Use   • Vaping Use: Every day   • Substances: Nicotine, Flavoring   • Devices: [employer]   Substance Use Topics   • Alcohol use: Not Currently   • Drug use: Never       Past Medical History:   Diagnosis Date   • Abnormal uterine bleeding (AUB)    • AC (acromioclavicular) joint bone spurs    • Anxiety    • Arthritis     toes, hands, back   • Bowel habit changes     constipation/diarrhea   • Familial hypercholesterolemia 2020   • GERD (gastroesophageal reflux disease)    • Mastitis 2019   • Migraine with aura, intractable, with status migrainosus 2021   • Nausea/vomiting in pregnancy 10/5/2012   • Pain 2020    pelvic and back, 5/10       Review of Systems   Constitutional: Positive for malaise/fatigue. Negative for fever and weight loss.       Respiratory: Negative for cough, sputum production and shortness of breath.    Cardiovascular: Negative for chest pain.   Gastrointestinal: Negative for nausea, vomiting, abdominal pain and diarrhea.   Genitourinary: Negative.    Neurological: Negative for dizziness and headaches.   All other systems reviewed and are negative.         Objective:   BP (!) 90/62   Pulse 96   Temp 35.9 °C (96.7 °F) (Temporal)   Resp 16   Ht 1.651 m (5' 5\")   Wt 68 kg (150 lb)   SpO2 98% "         Physical Exam   Constitutional:   oriented to person, place, and time.  appears well-developed and well-nourished. No distress.   HENT:   Head: Normocephalic and atraumatic.   Right Ear: External ear normal.   Left Ear: External ear normal.   Nose: Mucosal edema present. Right sinus exhibits no maxillary sinus tenderness and no frontal sinus tenderness. Left sinus exhibits no maxillary sinus tenderness and no frontal sinus tenderness.   Mouth/Throat: no posterior oropharyngeal exudate.   There is posterior oropharyngeal erythema present. No posterior oropharyngeal edema.   Tonsils 2+ bilaterally     Eyes: Conjunctivae and EOM are normal. Pupils are equal, round, and reactive to light. Right eye exhibits no discharge. Left eye exhibits no discharge. No scleral icterus.   Neck: Normal range of motion. Neck supple. No JVD present. No tracheal deviation present. No thyromegaly present.   Cardiovascular: Normal rate, regular rhythm, normal heart sounds and intact distal pulses.  Exam reveals no friction rub.    No murmur heard.  Pulmonary/Chest: Effort normal and breath sounds normal. No respiratory distress.   no wheezes.   no rales.    Musculoskeletal:  exhibits no edema.   Lymphadenopathy:     Positive Cervical adenopathy.   Neurological:   alert and oriented to person, place, and time.   Skin: Skin is warm and dry. No erythema.   Psychiatric:   normal mood and affect.   Nursing note and vitals reviewed.             Assessment/Plan:     1.   Strep throat  Rapid strep positive  Pt presents with sore throat and systemic symptoms     - amoxicillin (AMOXIL) 875 MG tablet; Take 1 Tablet by mouth 2 times a day for 10 days.  Dispense: 20 Tablet; Refill: 0      Follow up in one week if no improvement, sooner if symptoms worsen.

## 2022-01-14 ENCOUNTER — OFFICE VISIT (OUTPATIENT)
Dept: MEDICAL GROUP | Facility: CLINIC | Age: 32
End: 2022-01-14
Payer: COMMERCIAL

## 2022-01-14 VITALS
DIASTOLIC BLOOD PRESSURE: 74 MMHG | HEIGHT: 65 IN | RESPIRATION RATE: 18 BRPM | HEART RATE: 83 BPM | BODY MASS INDEX: 25.97 KG/M2 | SYSTOLIC BLOOD PRESSURE: 111 MMHG | WEIGHT: 155.9 LBS | TEMPERATURE: 98.9 F | OXYGEN SATURATION: 94 %

## 2022-01-14 DIAGNOSIS — B37.31 VAGINAL CANDIDIASIS: ICD-10-CM

## 2022-01-14 DIAGNOSIS — R10.10 PAIN OF UPPER ABDOMEN: ICD-10-CM

## 2022-01-14 DIAGNOSIS — K21.9 GASTROESOPHAGEAL REFLUX DISEASE, UNSPECIFIED WHETHER ESOPHAGITIS PRESENT: ICD-10-CM

## 2022-01-14 DIAGNOSIS — Z23 NEED FOR VACCINATION: ICD-10-CM

## 2022-01-14 LAB
HBA1C MFR BLD: 5.3 % (ref 0–5.6)
INT CON NEG: NORMAL
INT CON POS: NORMAL

## 2022-01-14 PROCEDURE — 90686 IIV4 VACC NO PRSV 0.5 ML IM: CPT | Performed by: STUDENT IN AN ORGANIZED HEALTH CARE EDUCATION/TRAINING PROGRAM

## 2022-01-14 PROCEDURE — 99213 OFFICE O/P EST LOW 20 MIN: CPT | Mod: GE | Performed by: STUDENT IN AN ORGANIZED HEALTH CARE EDUCATION/TRAINING PROGRAM

## 2022-01-14 PROCEDURE — 83036 HEMOGLOBIN GLYCOSYLATED A1C: CPT | Mod: GC | Performed by: STUDENT IN AN ORGANIZED HEALTH CARE EDUCATION/TRAINING PROGRAM

## 2022-01-14 PROCEDURE — 90471 IMMUNIZATION ADMIN: CPT | Performed by: STUDENT IN AN ORGANIZED HEALTH CARE EDUCATION/TRAINING PROGRAM

## 2022-01-14 RX ORDER — FLUCONAZOLE 150 MG/1
150 TABLET ORAL ONCE
Qty: 1 TABLET | Refills: 1 | Status: SHIPPED | OUTPATIENT
Start: 2022-01-14 | End: 2022-01-14

## 2022-01-14 RX ORDER — OXYBUTYNIN CHLORIDE 10 MG/1
10 TABLET, EXTENDED RELEASE ORAL
COMMUNITY
Start: 2021-12-14 | End: 2022-08-09

## 2022-01-14 RX ORDER — OXYBUTYNIN CHLORIDE 5 MG/1
5 TABLET ORAL DAILY
Qty: 90 TABLET | Refills: 1
Start: 2022-01-14 | End: 2022-04-04 | Stop reason: SDUPTHER

## 2022-01-14 RX ORDER — LIDOCAINE 50 MG/G
OINTMENT TOPICAL
COMMUNITY
Start: 2022-01-11 | End: 2022-08-09

## 2022-01-14 RX ORDER — SUCRALFATE 1 G/1
1 TABLET ORAL DAILY
Qty: 100 TABLET | Refills: 1
Start: 2022-01-14 | End: 2023-03-24

## 2022-01-14 ASSESSMENT — PATIENT HEALTH QUESTIONNAIRE - PHQ9: CLINICAL INTERPRETATION OF PHQ2 SCORE: 0

## 2022-01-14 ASSESSMENT — PAIN SCALES - GENERAL: PAINLEVEL: 10=SEVERE PAIN

## 2022-01-14 ASSESSMENT — FIBROSIS 4 INDEX: FIB4 SCORE: 0.59

## 2022-01-14 NOTE — ASSESSMENT & PLAN NOTE
-Fluconazole 150 mg p.o. once  -Also checked her mobility and seen today to see if she was diabetic. This came back as 5.3%.  -She also wants to check her  to see if he is diabetic, in case this is possibly causing her recurrences  -I do not believe this is related to her hysterectomy she had in June 2020

## 2022-01-14 NOTE — ASSESSMENT & PLAN NOTE
-Patient is agreeable to doing a barium swallow study  -This may be a hiatal hernia causing some pain intermittently  -If the barium swallow study does not show much, I will likely refer her to GI for EGD  -In the meantime, I encouraged her to pay close attention to the timing of her pain, aggravating and alleviating factors, and to write it down

## 2022-01-14 NOTE — PROGRESS NOTES
Subjective:     CC: Yeast infection    HPI:   Lali is a 31 y.o. female who presents today for the following problems:    Problem   Vaginal Candidiasis    -Started having itching and white discharge from vagina about 3 weeks ago  -Had hysterectomy in June 2020 and since then she has been having more episodes of this  -She has tried the OTC monistat but this has not worked     Gastroesophageal Reflux Disease    -She takes omeprazole and it seems to help with her heart burn symptoms.     Pain of Upper Abdomen    -Abd pain, mostly epigastric, for years, but worse since she had gallbladder removed   -Sharp, stabbing pain. No foods or specific activities seem to worsen it. Hx of IBS per patient   -The pain seems to happen weekly and lasts for a couple days on and off.   -Pain seems to get better when she lays down.  -Taking omeprazole - helping her GERD symptoms a little   -Had colonoscopy in October and they didn't find anything. She says they did not do an EGD.         Current Outpatient Medications Ordered in Epic   Medication Sig Dispense Refill   • oxybutynin SR (DITROPAN-XL) 10 MG CR tablet Take 10 mg by mouth every day.     • diclofenac sodium (VOLTAREN) 1 % Gel      • lidocaine (XYLOCAINE) 5 % Ointment      • sucralfate (CARAFATE) 1 GM Tab Take 1 Tablet by mouth every day. 100 Tablet 1   • oxybutynin (DITROPAN) 5 MG Tab Take 1 Tablet by mouth every day. 90 Tablet 1   • fluconazole (DIFLUCAN) 150 MG tablet Take 1 Tablet by mouth one time for 1 dose. 1 Tablet 1   • norgestimate-ethinyl estradiol (SPRINTEC 28) 0.25-35 MG-MCG per tablet Take 1 Tablet by mouth every day.     • SUMAtriptan (IMITREX) 25 MG Tab tablet Take  mg by mouth one time as needed. 1-2 times daily PRN for headaches     • VITAMIN D PO Take  by mouth.     • VITAMINS C E PO Take  by mouth.     • ondansetron (ZOFRAN ODT) 4 MG TABLET DISPERSIBLE Take 1 Tablet by mouth every 6 hours as needed for Nausea. 20 Tablet 0   • diclofenac (CATAFLAM)  "50 MG tablet DICLOFENAC POTASSIUM 50 MG TABS     • docusate sodium (COLACE) 100 MG Cap Take 1 capsule by mouth 2 times a day. 30 capsule 0   • omeprazole (PRILOSEC) 20 MG delayed-release capsule Take 20 mg by mouth every day. Indications: Gastroesophageal Reflux Disease     • tramadol (ULTRAM) 50 MG Tab Take 50 mg by mouth every day.       No current [facility]-ordered facility-administered medications on file.     ROS:  See HPI      Objective:     Exam:  /74 (BP Location: Right arm, Patient Position: Sitting, BP Cuff Size: Adult)   Pulse 83   Temp 37.2 °C (98.9 °F) (Tympanic)   Resp 18   Ht 1.651 m (5' 5\")   Wt 70.7 kg (155 lb 14.4 oz)   LMP  (LMP Unknown)   SpO2 94%   BMI 25.94 kg/m²  Body mass index is 25.94 kg/m².    Gen:     Alert and oriented, No apparent distress.  HEENT:   EOMI, PERRL  Neck:     Neck is supple without lymphadenopathy.  Lungs:     Normal effort, CTA bilaterally, no wheezes, rhonchi, or rales  CV:     Regular rate and rhythm. No murmurs, rubs, or gallops.  Ext:     No clubbing, cyanosis, edema.  Abdomen: Very minimal tenderness to palpation, normal bowel sounds, nondistended      Assessment & Plan:     31 y.o. female with the following -     Problem List Items Addressed This Visit     Pain of upper abdomen     -Patient is agreeable to doing a barium swallow study  -This may be a hiatal hernia causing some pain intermittently  -If the barium swallow study does not show much, I will likely refer her to GI for EGD  -In the meantime, I encouraged her to pay close attention to the timing of her pain, aggravating and alleviating factors, and to write it down         Relevant Orders    DX-JOSE M. FLUORO (BARIUM SWALLOW)    Vaginal candidiasis     -Fluconazole 150 mg p.o. once  -Also checked her mobility and seen today to see if she was diabetic. This came back as 5.3%.  -She also wants to check her  to see if he is diabetic, in case this is possibly causing her recurrences  -I do not " believe this is related to her hysterectomy she had in June 2020         Relevant Orders    POCT Hemoglobin A1C (Completed)    Gastroesophageal reflux disease     -She'll continue her omeprazole 20 mg daily         Relevant Medications    sucralfate (CARAFATE) 1 GM Tab      Other Visit Diagnoses     Need for vaccination        Relevant Orders    INFLUENZA VACCINE QUAD INJ (PF) (Completed)          Return in about 4 weeks (around 2/11/2022).

## 2022-02-01 ENCOUNTER — HOSPITAL ENCOUNTER (OUTPATIENT)
Facility: MEDICAL CENTER | Age: 32
End: 2022-02-01
Attending: NURSE PRACTITIONER
Payer: COMMERCIAL

## 2022-02-01 ENCOUNTER — OFFICE VISIT (OUTPATIENT)
Dept: URGENT CARE | Facility: CLINIC | Age: 32
End: 2022-02-01
Payer: MEDICAID

## 2022-02-01 VITALS
HEART RATE: 89 BPM | OXYGEN SATURATION: 95 % | BODY MASS INDEX: 25.66 KG/M2 | DIASTOLIC BLOOD PRESSURE: 58 MMHG | HEIGHT: 65 IN | SYSTOLIC BLOOD PRESSURE: 115 MMHG | WEIGHT: 154 LBS | RESPIRATION RATE: 16 BRPM | TEMPERATURE: 97.6 F

## 2022-02-01 DIAGNOSIS — Z20.818 EXPOSURE TO STREP THROAT: ICD-10-CM

## 2022-02-01 DIAGNOSIS — J03.90 TONSILLITIS: ICD-10-CM

## 2022-02-01 DIAGNOSIS — R11.0 NAUSEA: ICD-10-CM

## 2022-02-01 PROCEDURE — 99213 OFFICE O/P EST LOW 20 MIN: CPT | Performed by: NURSE PRACTITIONER

## 2022-02-01 RX ORDER — ONDANSETRON 4 MG/1
4 TABLET, ORALLY DISINTEGRATING ORAL EVERY 8 HOURS PRN
Qty: 10 TABLET | Refills: 0 | Status: SHIPPED | OUTPATIENT
Start: 2022-02-01

## 2022-02-01 RX ORDER — AMOXICILLIN 500 MG/1
500 CAPSULE ORAL 2 TIMES DAILY
Qty: 20 CAPSULE | Refills: 0 | Status: SHIPPED | OUTPATIENT
Start: 2022-02-01 | End: 2022-02-11

## 2022-02-01 ASSESSMENT — FIBROSIS 4 INDEX: FIB4 SCORE: 0.59

## 2022-02-02 ENCOUNTER — TELEPHONE (OUTPATIENT)
Dept: URGENT CARE | Facility: CLINIC | Age: 32
End: 2022-02-02

## 2022-02-02 DIAGNOSIS — J03.90 TONSILLITIS: ICD-10-CM

## 2022-02-02 LAB
COVID ORDER STATUS COVID19: NORMAL
SARS-COV-2 RNA RESP QL NAA+PROBE: DETECTED
SPECIMEN SOURCE: ABNORMAL

## 2022-02-02 ASSESSMENT — ENCOUNTER SYMPTOMS
SORE THROAT: 1
DIZZINESS: 0
COUGH: 0
MYALGIAS: 0
VOMITING: 0
HEADACHES: 0
CHILLS: 0
EYE PAIN: 0
FEVER: 1
SHORTNESS OF BREATH: 0
TROUBLE SWALLOWING: 0
NAUSEA: 0
SWOLLEN GLANDS: 1

## 2022-02-02 NOTE — PROGRESS NOTES
Subjective:   Lali Funes is a 31 y.o. female who presents for Pharyngitis (feverx2 days, exposed to strep and possibly covid)      Pharyngitis   This is a new problem. Episode onset: 2 days; exposed strep  The problem has been unchanged. Neither side of throat is experiencing more pain than the other. There has been no fever. The pain is at a severity of 7/10. The pain is moderate. Associated symptoms include congestion and swollen glands. Pertinent negatives include no coughing, ear discharge, ear pain, headaches, shortness of breath, trouble swallowing or vomiting. She has had no exposure to strep or mono. She has tried acetaminophen for the symptoms. The treatment provided no relief.       Review of Systems   Constitutional: Positive for fever and malaise/fatigue. Negative for chills.   HENT: Positive for congestion and sore throat. Negative for ear discharge, ear pain and trouble swallowing.    Eyes: Negative for pain.   Respiratory: Negative for cough and shortness of breath.    Cardiovascular: Negative for chest pain.   Gastrointestinal: Negative for nausea and vomiting.   Genitourinary: Negative for hematuria.   Musculoskeletal: Negative for myalgias.   Skin: Negative for rash.   Neurological: Negative for dizziness and headaches.       Medications:    • amoxicillin Caps  • diclofenac  • diclofenac sodium Gel  • docusate sodium Caps  • lidocaine Oint  • norgestimate-ethinyl estradiol  • omeprazole  • ondansetron Tbdp  • oxybutynin SR  • oxybutynin Tabs  • sucralfate Tabs  • SUMAtriptan Tabs  • traMADol Tabs  • VITAMIN D PO  • VITAMINS C E PO    Allergies: Cantaloupe, Tape, and Celecoxib    Problem List: Lali Funes does not have any pertinent problems on file.    Surgical History:  Past Surgical History:   Procedure Laterality Date   • MS COMBINED ANT/POST COLPORRHAPHY N/A 6/3/2020    Procedure: COLPORRHAPHY, COMBINED ANTEROPOSTERIOR;  Surgeon: Jose Bowen M.D.;  Location: SURGERY SAME DAY  [facility];  Service: Obstetrics   • HYSTERECTOMY LAPAROSCOPY N/A 6/3/2020    Procedure: HYSTERECTOMY, LAPAROSCOPIC-;  Surgeon: Jose Bowen M.D.;  Location: SURGERY SAME DAY [facility];  Service: Obstetrics   • CYSTOSCOPY N/A 6/3/2020    Procedure: CYSTOSCOPY- WITH MID URETHRAL SLING PLACEMENT;  Surgeon: Jose Bowen M.D.;  Location: SURGERY SAME DAY [facility];  Service: Obstetrics   • DURAN BY LAPAROSCOPY  10/25/2019    Procedure: CHOLECYSTECTOMY, LAPAROSCOPIC;  Surgeon: Efrain Ramesh M.D.;  Location: SURGERY [facility];  Service: General   • ERCP IN OR N/A 10/24/2019    Procedure: ERCP (ENDOSCOPIC RETROGRADE CHOLANGIOPANCREATOGRAPHY);  Surgeon: Temo Liao M.D.;  Location: SURGERY [facility];  Service: Gastroenterology   • CO LAP,DIAGNOSTIC ABDOMEN  6/18/2019    Procedure: PELVISCOPY;  Surgeon: Pilar Vuong M.D.;  Location: SURGERY SAME DAY [facility];  Service: Gynecology   • SALPINGECTOMY Bilateral 6/18/2019    Procedure: SALPINGECTOMY;  Surgeon: Pilar Vuong M.D.;  Location: SURGERY SAME DAY [facility];  Service: Gynecology       Past Social Hx: Lali Funes  reports that she quit smoking about 2 years ago. Her smoking use included cigarettes. She has a 3.75 pack-year smoking history. She has never used smokeless tobacco. She reports previous alcohol use. She reports that she does not use drugs.     Past Family Hx:  Lali Funes family history includes Cancer in her maternal grandmother; Cancer (age of onset: 45) in her mother; Gout in her father; Heart Attack in her father.     Problem list, medications, and allergies reviewed by myself today in Epic.     Objective:     /58 (BP Location: Right arm, Patient Position: Sitting, BP Cuff Size: Adult)   Pulse 89   Temp 36.4 °C (97.6 °F) (Temporal)   Resp 16   Ht 1.651 m (5' 5\")   Wt 69.9 kg (154 lb)   LMP  (LMP Unknown)   SpO2 95%   BMI 25.63 kg/m²     Physical Exam  Vitals and nursing note " reviewed.   Constitutional:       General: She is not in acute distress.     Appearance: She is well-developed.   HENT:      Head: Normocephalic and atraumatic.      Right Ear: Tympanic membrane and external ear normal.      Left Ear: Tympanic membrane and external ear normal.      Nose: Nose normal.      Right Sinus: No maxillary sinus tenderness or frontal sinus tenderness.      Left Sinus: No maxillary sinus tenderness or frontal sinus tenderness.      Mouth/Throat:      Mouth: Mucous membranes are moist.      Pharynx: Uvula midline. No posterior oropharyngeal erythema.      Tonsils: No tonsillar exudate or tonsillar abscesses. 2+ on the right. 1+ on the left.   Eyes:      General:         Right eye: No discharge.         Left eye: No discharge.      Conjunctiva/sclera: Conjunctivae normal.   Cardiovascular:      Rate and Rhythm: Normal rate.   Pulmonary:      Effort: Pulmonary effort is normal. No respiratory distress.      Breath sounds: Normal breath sounds.   Abdominal:      General: There is no distension.   Musculoskeletal:         General: Normal range of motion.   Lymphadenopathy:      Cervical: Cervical adenopathy present.   Skin:     General: Skin is warm and dry.   Neurological:      General: No focal deficit present.      Mental Status: She is alert and oriented to person, place, and time. Mental status is at baseline.      Gait: Gait (gait at baseline) normal.   Psychiatric:         Judgment: Judgment normal.         Assessment/Plan:     Diagnosis and associated orders:     1. Tonsillitis  SARS-CoV-2 PCR (24 hour In-House): Collect NP swab in VTM    amoxicillin (AMOXIL) 500 MG Cap    CANCELED: CULTURE THROAT   2. Nausea  ondansetron (ZOFRAN ODT) 4 MG TABLET DISPERSIBLE   3. Exposure to strep throat        Comments/MDM:     • Patient is a 31-year-old female present with the stated above, patient having exposure to strep, does have adenopathy, no point-of-care strep test available in clinic location  low no throat culture swabs available in clinical location. Contingent antibiotic prescription given to patient to fill upon meeting criteria of guidelines discussed.   Test for COVID-19 via PCR. Result will be reviewed by myself. We will call/message back for results and appropriate further instructions. Instructed to sign up for [employer] if they have not already. Result will be automatically released to [employer] application for patient review. I will be sending a message with Next Step Instructions to [employer] soon after resulted.   Symptomatic and supportive care:   Plenty of oral hydration and rest   Over the counter cough suppressant as directed.  Tylenol or ibuprofen for pain and fever as directed.   Warm salt water gargles for sore throat, soft foods, cool liquids.   Saline nasal spray and Flonase as a decongestant.   Infection control measures at home. Stay away from people, Hand washing, covering sneeze/cough, disinfect surfaces.   Remain home from work, school, and other populated environments. Work note provided with information of quarantine measures per CDC guidelines.   Overall, the patient is well-appearing. They are not hypoxic, afebrile, and a normal pulmonary exam.      •                Please note that this dictation was created using voice recognition software. I have made a reasonable attempt to correct obvious errors, but I expect that there are errors of grammar and possibly content that I did not discover before finalizing the note.    This note was electronically signed by Rikki KENNEDY.

## 2022-03-16 DIAGNOSIS — G89.29 OTHER CHRONIC PAIN: ICD-10-CM

## 2022-03-16 RX ORDER — OMEPRAZOLE 20 MG/1
20 CAPSULE, DELAYED RELEASE ORAL DAILY
Qty: 90 CAPSULE | Refills: 0 | Status: SHIPPED | OUTPATIENT
Start: 2022-03-16 | End: 2022-06-22

## 2022-03-16 NOTE — PROGRESS NOTES
Patient sent message indicating she needs new referral sent to continue seeing her Pain Management specialist at [facility]. Order sent.

## 2022-05-06 ENCOUNTER — OFFICE VISIT (OUTPATIENT)
Dept: MEDICAL GROUP | Facility: CLINIC | Age: 32
End: 2022-05-06
Payer: MEDICAID

## 2022-05-06 VITALS
HEIGHT: 65 IN | WEIGHT: 156 LBS | SYSTOLIC BLOOD PRESSURE: 118 MMHG | OXYGEN SATURATION: 95 % | DIASTOLIC BLOOD PRESSURE: 76 MMHG | BODY MASS INDEX: 25.99 KG/M2 | HEART RATE: 89 BPM

## 2022-05-06 DIAGNOSIS — D22.9 SUSPICIOUS NEVUS: ICD-10-CM

## 2022-05-06 DIAGNOSIS — Z11.3 SCREEN FOR STD (SEXUALLY TRANSMITTED DISEASE): ICD-10-CM

## 2022-05-06 DIAGNOSIS — F32.9 REACTIVE DEPRESSION: ICD-10-CM

## 2022-05-06 DIAGNOSIS — Z80.3 FAMILY HISTORY OF BREAST CANCER: ICD-10-CM

## 2022-05-06 DIAGNOSIS — G89.29 CHRONIC BILATERAL LOW BACK PAIN WITHOUT SCIATICA: ICD-10-CM

## 2022-05-06 DIAGNOSIS — Z13.220 LIPID SCREENING: ICD-10-CM

## 2022-05-06 DIAGNOSIS — F51.01 PRIMARY INSOMNIA: ICD-10-CM

## 2022-05-06 DIAGNOSIS — M54.50 CHRONIC BILATERAL LOW BACK PAIN WITHOUT SCIATICA: ICD-10-CM

## 2022-05-06 DIAGNOSIS — Z82.49 FAMILY HISTORY OF HEART DISEASE: ICD-10-CM

## 2022-05-06 PROCEDURE — 99213 OFFICE O/P EST LOW 20 MIN: CPT | Performed by: FAMILY MEDICINE

## 2022-05-06 ASSESSMENT — FIBROSIS 4 INDEX: FIB4 SCORE: 0.59

## 2022-05-06 NOTE — PROGRESS NOTES
Subjective:   CC:  STD check, mood issues, mole check    HPI: Today she is coming in requesting an STD check.  Her  has had an infidelity and although she is symptomatic she would like everything checked.  She has a history of chlamydia.  She has also been having some depression and not sleeping well.  She has some marital problems and her sister recently committed suicide.  She does drink a fair amount of coffee which might be interfering with her sleep.  She is interested in some counseling but not in any medications.  She also has a mole on her forehead that she would like biopsied.  It has become slightly darker and slightly larger.    Problem   Screen for Std (Sexually Transmitted Disease)   Family History of Heart Disease    Sister autopsy s/p suicide + CAD     Lipid Screening   Suspicious Nevus   Reactive Depression   Family History of Breast Cancer    BRCA 1 & 2 neg, HNP         Current Outpatient Medications Ordered in Epic   Medication Sig Dispense Refill   • oxybutynin (DITROPAN) 5 MG Tab Take 1 Tablet by mouth every day. 90 Tablet 1   • omeprazole (PRILOSEC) 20 MG delayed-release capsule Take 1 Capsule by mouth every day for 90 days. Indications: Gastroesophageal Reflux Disease 90 Capsule 0   • ondansetron (ZOFRAN ODT) 4 MG TABLET DISPERSIBLE Take 1 Tablet by mouth every 8 hours as needed. 10 Tablet 0   • oxybutynin SR (DITROPAN-XL) 10 MG CR tablet Take 10 mg by mouth every day.     • sucralfate (CARAFATE) 1 GM Tab Take 1 Tablet by mouth every day. 100 Tablet 1   • SUMAtriptan (IMITREX) 25 MG Tab tablet Take  mg by mouth one time as needed. 1-2 times daily PRN for headaches     • VITAMIN D PO Take  by mouth.     • VITAMINS C E PO Take  by mouth.     • diclofenac (CATAFLAM) 50 MG tablet DICLOFENAC POTASSIUM 50 MG TABS     • tramadol (ULTRAM) 50 MG Tab Take 50 mg by mouth every day.     • docusate sodium (COLACE) 100 MG Cap Take 1 Capsule by mouth 2 times a day. (Patient not taking: Reported  "on 5/6/2022) 30 Capsule 0   • diclofenac sodium (VOLTAREN) 1 % Gel  (Patient not taking: Reported on 5/6/2022)     • lidocaine (XYLOCAINE) 5 % Ointment        No current Epic-ordered facility-administered medications on file.         ROS:  Gen: no fevers/chills  Pulm: no sob, no cough  CV: no chest pain, no palpitations  GI: no nausea/vomiting, no diarrhea        Objective:     Exam:  /76   Pulse 89   Ht 1.651 m (5' 5\")   Wt 70.8 kg (156 lb)   LMP  (LMP Unknown)   SpO2 95%   BMI 25.96 kg/m²  Body mass index is 25.96 kg/m².    Gen: Alert and oriented, No apparent distress.  Neck: Neck is supple without lymphadenopathy.  Lungs: Normal effort, CTA bilaterally, no wheezes, rhonchi, or rales  CV: Regular rate and rhythm. No murmurs, rubs, or gallops.  Ext: No edema.      Assessment & Plan:     31 y.o. female with the following -     Problem List Items Addressed This Visit     Family history of breast cancer    Screen for STD (sexually transmitted disease)     Asymptomatic, + indiscretion per , hx chlamydia         Relevant Orders    Chlamydia/GC, PCR (Urine)    RPR    HIV AG/AB COMBO ASSAY SCREENING    HEP C VIRUS ANTIBODY    Family history of heart disease    Relevant Orders    Lipid Profile    Lipid screening    Relevant Orders    Lipid Profile    Suspicious nevus    Relevant Orders    Referral to Dermatology    Reactive depression     Anger, grief, sadness, poor sleep - sister suicide -  infidelity - referred behavioral health, headspace florin, decrease caffeine. disceussed meds - will hold off now         Relevant Orders    Referral to Behavioral Health      Other Visit Diagnoses     Primary insomnia                    No follow-ups on file.          "

## 2022-05-06 NOTE — ASSESSMENT & PLAN NOTE
Anger, grief, sadness, poor sleep - sister suicide -  infidelity - referred behavioral health, headspace florin, decrease caffeine. disceussed meds - will hold off now

## 2022-06-22 RX ORDER — OMEPRAZOLE 20 MG/1
CAPSULE, DELAYED RELEASE ORAL
Qty: 90 CAPSULE | Refills: 0 | Status: SHIPPED | OUTPATIENT
Start: 2022-06-22 | End: 2022-09-20

## 2022-07-18 RX ORDER — DOCUSATE SODIUM 100 MG/1
100 CAPSULE, LIQUID FILLED ORAL 2 TIMES DAILY
Qty: 90 CAPSULE | Refills: 3 | Status: SHIPPED
Start: 2022-07-18 | End: 2023-03-24

## 2022-08-09 ENCOUNTER — OFFICE VISIT (OUTPATIENT)
Dept: MEDICAL GROUP | Facility: CLINIC | Age: 32
End: 2022-08-09
Payer: MEDICAID

## 2022-08-09 VITALS
DIASTOLIC BLOOD PRESSURE: 74 MMHG | HEIGHT: 65 IN | HEART RATE: 92 BPM | OXYGEN SATURATION: 95 % | BODY MASS INDEX: 25.83 KG/M2 | SYSTOLIC BLOOD PRESSURE: 115 MMHG | RESPIRATION RATE: 12 BRPM | WEIGHT: 155 LBS

## 2022-08-09 DIAGNOSIS — Z11.3 SCREEN FOR STD (SEXUALLY TRANSMITTED DISEASE): ICD-10-CM

## 2022-08-09 DIAGNOSIS — Z90.710 S/P TOTAL HYSTERECTOMY: ICD-10-CM

## 2022-08-09 DIAGNOSIS — B37.31 VAGINAL CANDIDIASIS: ICD-10-CM

## 2022-08-09 PROCEDURE — 99213 OFFICE O/P EST LOW 20 MIN: CPT | Mod: GE | Performed by: FAMILY MEDICINE

## 2022-08-09 RX ORDER — FLUCONAZOLE 150 MG/1
TABLET ORAL
Qty: 2 TABLET | Refills: 3 | Status: SHIPPED | OUTPATIENT
Start: 2022-08-09 | End: 2022-12-16 | Stop reason: SDUPTHER

## 2022-08-09 RX ORDER — HYDROCODONE BITARTRATE AND ACETAMINOPHEN 5; 325 MG/1; MG/1
TABLET ORAL
COMMUNITY
Start: 2022-08-05

## 2022-08-09 ASSESSMENT — FIBROSIS 4 INDEX: FIB4 SCORE: 0.59

## 2022-08-09 NOTE — ASSESSMENT & PLAN NOTE
Physical exam consistent with vaginal candidiasis. Will give Rx for diflucan with 3 refills. Gave instructions to patient.

## 2022-08-09 NOTE — ASSESSMENT & PLAN NOTE
S/p total hysterectomy and placement of urethral sling (June, 2020)  Hx of chlamydia (2020) from partner with hx of infidelity. Mild dyspareunia.   Last GC/CT, RPR, HIV neg in May of this year.  Will repeat GC/CT with swab. No signs/sxs on physical exam.

## 2022-08-09 NOTE — LETTER
August 9, 2022    To Whom It May Concern:         This is confirmation that Lali Marin attended her scheduled appointment with Denise Richardson M.D. on 8/09/22.         If you have any questions please do not hesitate to call me at the phone number listed below.    Sincerely,          Denise Richardson M.D.  [phone]

## 2022-08-09 NOTE — PROGRESS NOTES
"Subjective:     CC: Vaginal itching    HPI:   Lali presents today with:    Problem   Screen for Std (Sexually Transmitted Disease)   Vaginal Candidiasis    Vaginal itching - started 3 weeks ago, intermittent. Some clear vaginal discharge. Not malodorous. Hx of vaginal candidiasis in Jan of this year, unrelieved by monistat.     S/P Total Hysterectomy       Current Outpatient Medications Ordered in Epic   Medication Sig Dispense Refill   • fluconazole (DIFLUCAN) 150 MG tablet Take one tablet PO q72h 2 Tablet 3   • docusate sodium (COLACE) 100 MG Cap Take 1 Capsule by mouth 2 times a day. (Patient taking differently: Take 100 mg by mouth every day.) 90 Capsule 3   • omeprazole (PRILOSEC) 20 MG delayed-release capsule TAKE 1 CAPSULE BY MOUTH EVERY DAY FOR GERD 90 Capsule 0   • oxybutynin (DITROPAN) 5 MG Tab Take 1 Tablet by mouth every day. 90 Tablet 1   • ondansetron (ZOFRAN ODT) 4 MG TABLET DISPERSIBLE Take 1 Tablet by mouth every 8 hours as needed. 10 Tablet 0   • sucralfate (CARAFATE) 1 GM Tab Take 1 Tablet by mouth every day. 100 Tablet 1   • SUMAtriptan (IMITREX) 25 MG Tab tablet Take  mg by mouth one time as needed. 1-2 times daily PRN for headaches     • VITAMIN D PO Take  by mouth.     • VITAMINS C E PO Take  by mouth.     • diclofenac (CATAFLAM) 50 MG tablet DICLOFENAC POTASSIUM 50 MG TABS     • HYDROcodone-acetaminophen (NORCO) 5-325 MG Tab per tablet TAKE 1/2 TABLET BY MOUTH TWICE DAILY AS NEEDED       No current Epic-ordered facility-administered medications on file.       Objective:     Exam:  /74 (BP Location: Right arm, Patient Position: Sitting, BP Cuff Size: Adult)   Pulse 92   Resp 12   Ht 1.651 m (5' 5\")   Wt 70.3 kg (155 lb)   LMP  (LMP Unknown)   SpO2 95%   BMI 25.79 kg/m²  Body mass index is 25.79 kg/m².    General: Normal appearing. No distress.  Skin: Warm and dry.  No obvious lesions.  : white curd-like discharge in vaginal canal, no cervix visualized as s/p total " hysterectomy    Assessment & Plan:     31 y.o. female with the following -     Problem List Items Addressed This Visit     S/P total hysterectomy     No need for future Pap Smears           Vaginal candidiasis     Physical exam consistent with vaginal candidiasis. Will give Rx for diflucan with 3 refills. Gave instructions to patient.           Relevant Medications    fluconazole (DIFLUCAN) 150 MG tablet    Screen for STD (sexually transmitted disease)     S/p total hysterectomy and placement of urethral sling (June, 2020)  Hx of chlamydia (2020) from partner with hx of infidelity. Mild dyspareunia.   Last GC/CT, RPR, HIV neg in May of this year.  Will repeat GC/CT with swab. No signs/sxs on physical exam.           Relevant Orders    Chlamydia/GC, PCR (Genital/Anal swab)            No follow-ups on file.    Denise Richardson MD   PGY-3

## 2022-09-20 RX ORDER — OMEPRAZOLE 20 MG/1
CAPSULE, DELAYED RELEASE ORAL
Qty: 90 CAPSULE | Refills: 3 | Status: SHIPPED | OUTPATIENT
Start: 2022-09-20 | End: 2023-11-21

## 2022-10-01 RX ORDER — OXYBUTYNIN CHLORIDE 5 MG/1
5 TABLET ORAL DAILY
Qty: 90 TABLET | Refills: 1 | Status: SHIPPED | OUTPATIENT
Start: 2022-10-01 | End: 2023-04-10

## 2022-12-16 DIAGNOSIS — B37.31 VAGINAL CANDIDIASIS: ICD-10-CM

## 2022-12-16 RX ORDER — METRONIDAZOLE 500 MG/1
500 TABLET ORAL 2 TIMES DAILY
Qty: 14 TABLET | Refills: 1 | Status: SHIPPED | OUTPATIENT
Start: 2022-12-16 | End: 2023-03-24

## 2022-12-16 RX ORDER — FLUCONAZOLE 150 MG/1
TABLET ORAL
Qty: 2 TABLET | Refills: 3 | Status: SHIPPED | OUTPATIENT
Start: 2022-12-16 | End: 2023-03-24

## 2023-03-24 ENCOUNTER — OFFICE VISIT (OUTPATIENT)
Dept: MEDICAL GROUP | Facility: CLINIC | Age: 33
End: 2023-03-24
Payer: MEDICAID

## 2023-03-24 VITALS — HEIGHT: 65 IN | BODY MASS INDEX: 24.26 KG/M2 | RESPIRATION RATE: 16 BRPM | WEIGHT: 145.6 LBS

## 2023-03-24 DIAGNOSIS — R30.0 DYSURIA: ICD-10-CM

## 2023-03-24 DIAGNOSIS — N89.8 VAGINAL DISCHARGE: ICD-10-CM

## 2023-03-24 DIAGNOSIS — Z90.710 S/P TOTAL HYSTERECTOMY: ICD-10-CM

## 2023-03-24 DIAGNOSIS — Z11.3 SCREENING EXAMINATION FOR STD (SEXUALLY TRANSMITTED DISEASE): ICD-10-CM

## 2023-03-24 DIAGNOSIS — Z96.0 HISTORY OF PUBOVAGINAL SLING: ICD-10-CM

## 2023-03-24 DIAGNOSIS — N39.43 POST-VOID DRIBBLING: ICD-10-CM

## 2023-03-24 LAB
APPEARANCE UR: CLEAR
BILIRUB UR STRIP-MCNC: NEGATIVE MG/DL
COLOR UR AUTO: YELLOW
GLUCOSE UR STRIP.AUTO-MCNC: NEGATIVE MG/DL
KETONES UR STRIP.AUTO-MCNC: NEGATIVE MG/DL
LEUKOCYTE ESTERASE UR QL STRIP.AUTO: NEGATIVE
NITRITE UR QL STRIP.AUTO: NEGATIVE
PH UR STRIP.AUTO: 5.5 [PH] (ref 5–8)
PROT UR QL STRIP: NEGATIVE MG/DL
RBC UR QL AUTO: NEGATIVE
SP GR UR STRIP.AUTO: 1.01
UROBILINOGEN UR STRIP-MCNC: 0.2 MG/DL

## 2023-03-24 PROCEDURE — 81002 URINALYSIS NONAUTO W/O SCOPE: CPT | Mod: GC | Performed by: STUDENT IN AN ORGANIZED HEALTH CARE EDUCATION/TRAINING PROGRAM

## 2023-03-24 PROCEDURE — 99213 OFFICE O/P EST LOW 20 MIN: CPT | Mod: GE | Performed by: STUDENT IN AN ORGANIZED HEALTH CARE EDUCATION/TRAINING PROGRAM

## 2023-03-24 RX ORDER — AMOXICILLIN AND CLAVULANATE POTASSIUM 875; 125 MG/1; MG/1
1 TABLET, FILM COATED ORAL 2 TIMES DAILY
COMMUNITY
Start: 2023-03-20 | End: 2023-09-11

## 2023-03-24 RX ORDER — ESTRADIOL 0.1 MG/G
CREAM VAGINAL
Qty: 42.5 G | Refills: 3 | Status: SHIPPED | OUTPATIENT
Start: 2023-03-24 | End: 2023-09-11

## 2023-03-24 ASSESSMENT — PATIENT HEALTH QUESTIONNAIRE - PHQ9
2. FEELING DOWN, DEPRESSED, IRRITABLE, OR HOPELESS: NOT AT ALL
8. MOVING OR SPEAKING SO SLOWLY THAT OTHER PEOPLE COULD HAVE NOTICED. OR THE OPPOSITE, BEING SO FIGETY OR RESTLESS THAT YOU HAVE BEEN MOVING AROUND A LOT MORE THAN USUAL: NOT AT ALL
4. FEELING TIRED OR HAVING LITTLE ENERGY: NEARLY EVERY DAY
5. POOR APPETITE OR OVEREATING: NOT AT ALL
9. THOUGHTS THAT YOU WOULD BE BETTER OFF DEAD, OR OF HURTING YOURSELF: NOT AT ALL
7. TROUBLE CONCENTRATING ON THINGS, SUCH AS READING THE NEWSPAPER OR WATCHING TELEVISION: NOT AT ALL
SUM OF ALL RESPONSES TO PHQ QUESTIONS 1-9: 3
SUM OF ALL RESPONSES TO PHQ9 QUESTIONS 1 AND 2: 0
3. TROUBLE FALLING OR STAYING ASLEEP OR SLEEPING TOO MUCH: NOT AT ALL
6. FEELING BAD ABOUT YOURSELF - OR THAT YOU ARE A FAILURE OR HAVE LET YOURSELF OR YOUR FAMILY DOWN: NOT AL ALL
1. LITTLE INTEREST OR PLEASURE IN DOING THINGS: NOT AT ALL

## 2023-03-24 NOTE — ASSESSMENT & PLAN NOTE
On pelvic exam, there are no abnormalities, no excessive discharge noted.  Collected swabs, investigating for Candida, BV, trichomoniasis, chlamydia, and gonorrhea.  Will contact patient with results.  No therapy at this time.

## 2023-03-24 NOTE — ASSESSMENT & PLAN NOTE
Status post pubovaginal sling in 2020, as well as hysterectomy.  In-house urinalysis not suggestive of acute infection, no microscopic blood in urine.  Unclear etiology of the recurrence of the postvoid dribbling.  Recommended topical estrogen for the vaginal tissues, though this is unlikely to help much as patient is very young and her ovaries are still intact.  On pelvic exam, there are no abnormalities.  At this time, will refer to urogynecology for a second opinion.

## 2023-03-24 NOTE — PROGRESS NOTES
Subjective:     CC: Issues as below    HPI:   Lali presents today to address:      Problem   History of Pubovaginal Sling   Post-Void Dribbling    Patient reporting a 2 to 3-month history of postvoid leakage, consisting of just a few drops.  She states that this only occurs after urination and not sporadically throughout the daytime.  She denies any blood in her urine, but does endorse some dysuria as well as a painful spasm in the urethra.  Patient reports that she is doing daily Kegel exercises  No pain with intercourse  Aching oxybutynin for urinary incontinence after the history of a bladder sling placement 3 years ago with the hysterectomy, performed by Dr. Waller.     Vaginal Discharge    Patient complaining of a waxy like substance with her vaginal discharge that waxes and wanes.  No odor associated with it.  She has a history of candidiasis as well as BV recurring.         Current Outpatient Medications Ordered in Epic   Medication Sig Dispense Refill    amoxicillin-clavulanate (AUGMENTIN) 875-125 MG Tab Take 1 Tablet by mouth 2 times a day.      estradiol (ESTRACE) 0.1 MG/GM vaginal cream Apply thin layer of cream to vaginal opening daily 42.5 g 3    oxybutynin (DITROPAN) 5 MG Tab TAKE 1 TABLET BY MOUTH EVERY DAY 90 Tablet 1    omeprazole (PRILOSEC) 20 MG delayed-release capsule TAKE 1 CAPSULE BY MOUTH EVERY DAY FOR GERD 90 Capsule 3    HYDROcodone-acetaminophen (NORCO) 5-325 MG Tab per tablet TAKE 1/2 TABLET BY MOUTH TWICE DAILY AS NEEDED      ondansetron (ZOFRAN ODT) 4 MG TABLET DISPERSIBLE Take 1 Tablet by mouth every 8 hours as needed. 10 Tablet 0    SUMAtriptan (IMITREX) 25 MG Tab tablet Take  mg by mouth one time as needed. 1-2 times daily PRN for headaches      VITAMIN D PO Take  by mouth.      VITAMINS C E PO Take  by mouth.      diclofenac (CATAFLAM) 50 MG tablet DICLOFENAC POTASSIUM 50 MG TABS       No current Epic-ordered facility-administered medications on file.           Objective:  "    Exam:  BP (P) 94/60 (BP Location: Right arm, Patient Position: Sitting, BP Cuff Size: Adult)   Pulse (!) (P) 54   Temp (P) 36.2 °C (97.1 °F) (Temporal)   Resp 16   Ht 1.651 m (5' 5\")   Wt 66 kg (145 lb 9.6 oz)   LMP  (LMP Unknown)   SpO2 (P) 96%   BMI 24.23 kg/m²  Body mass index is 24.23 kg/m².    General: Normal appearing. No distress.  Skin: Warm and dry.  No obvious lesions.  Abdomen: No abdominal tenderness, soft, no CVA tenderness  Musculoskeletal: Normal gait. No extremity cyanosis, clubbing, or edema.  Psych: Normal mood and affect. Alert and oriented x3. Judgment and insight is normal.  Pelvic: Normal external genitalia, no obvious discharge in vaginal vault, cervix not visualized as s/p total hysterectomy      Assessment & Plan:     32 y.o. female with the following -     Problem List Items Addressed This Visit       Vaginal discharge     On pelvic exam, there are no abnormalities, no excessive discharge noted.  Collected swabs, investigating for Candida, BV, trichomoniasis, chlamydia, and gonorrhea.  Will contact patient with results.  No therapy at this time.         Relevant Orders    BV+TRICH ABEL+YEAST CULTURE    S/P total hysterectomy    Relevant Medications    estradiol (ESTRACE) 0.1 MG/GM vaginal cream    Other Relevant Orders    Referral to Urogynecology    History of pubovaginal sling    Relevant Medications    estradiol (ESTRACE) 0.1 MG/GM vaginal cream    Other Relevant Orders    Referral to Urogynecology    Post-void dribbling     Status post pubovaginal sling in 2020, as well as hysterectomy.  In-house urinalysis not suggestive of acute infection, no microscopic blood in urine.  Unclear etiology of the recurrence of the postvoid dribbling.  Recommended topical estrogen for the vaginal tissues, though this is unlikely to help much as patient is very young and her ovaries are still intact.  On pelvic exam, there are no abnormalities.  At this time, will refer to urogynecology for a " second opinion.         Relevant Medications    estradiol (ESTRACE) 0.1 MG/GM vaginal cream    Other Relevant Orders    Referral to Urogynecology     Other Visit Diagnoses       Screening examination for STD (sexually transmitted disease)        Relevant Orders    HIV AG/AB COMBO ASSAY SCREENING    RPR (SYPHILIS)    Chlamydia/GC, PCR (Genital/Anal swab)    Dysuria        Relevant Medications    estradiol (ESTRACE) 0.1 MG/GM vaginal cream    Other Relevant Orders    POCT Urinalysis (Completed)              No follow-ups on file.    Denise Richardson MD   PGY-3

## 2023-04-09 NOTE — PATIENT INSTRUCTIONS
INSTRUCTIONS FOR COVID-19 OR ANY OTHER INFECTIOUS RESPIRATORY ILLNESSES    The Centers for Disease Control and Prevention (CDC) states that early indications for COVID-19 include cough, shortness of breath, difficulty breathing, or at least two of the following symptoms: chills, shaking with chills, muscle pain, headache, sore throat, and loss of taste or smell. Symptoms can range from mild to severe and may appear up to two weeks after exposure to the virus.    The practice of self-isolation and quarantine helps protect the public and your family by  preventing exposure to people who have or may have a contagious disease. Please follow the prevention steps below as based on CDC guidelines:    WHEN TO STOP ISOLATION: Persons with COVID-19 or any other infectious respiratory illness who have symptoms and were advised to care for themselves at home may discontinue home isolation under the following conditions:  · At least 24 hours have passed since recovery defined as resolution of fever without the use of fever-reducing medications; AND,  · Improvement in respiratory symptoms (e.g., cough, shortness of breath); AND,  · At least 10 days have passed since symptoms first appeared and have had no subsequent illness.    MONITOR YOUR SYMPTOMS: If your illness is worsening, seek prompt medical attention. If you have a medical emergency and need to call 911, notify the dispatch personnel that you have, or are being evaluated for confirmed or suspected COVID-19 or another infectious respiratory illness. Wear a facemask if possible.    ACTIVITY RESTRICTION: restrict activities outside your home, except for getting medical care. Do not go to work, school, or public areas. Avoid using public transportation, ride-sharing, or taxis.    SCHEDULED MEDICAL APPOINTMENTS: Notify your provider that you have, or are being evaluated for, confirmed or suspected COVID-19 or another infectious respiratory. This will help the healthcare  provider’s office safely take care of you and keep other people from getting exposed or infected.    FACEMASKS, when to wear: Anytime you are away from your home or around other people or pets. If you are unable to wear one, maintain a minimum of 6 feet distancing from others.    LIVING ENVIRONMENT: Stay in a separate room from other people and pets. If possible, use a separate bathroom, have someone else care for your pets and avoid sharing household items. Any items used should be washed thoroughly with soap and water. Clean all “high-touch” surfaces every day. Use a household cleaning spray or wipe, according to the label instructions. High touch surfaces include (but are not limited to) counters, tabletops, doorknobs, bathroom fixtures, toilets, phones, keyboards, tablets, and bedside tables.     HAND WASHING: Frequently wash hands with soap and water for at least 20 seconds,  especially after blowing your nose, coughing, or sneezing; going to the bathroom; before and after interacting with pets; and before and after eating or preparing food. If hands are visibly dirty use soap and water. If soap and water are not available, use an alcohol-based hand  with at least 60% alcohol. Avoid touching your eyes, nose, and mouth with unwashed hands. Cover your coughs and sneezes with a tissue. Throw used tissues in a lined trash can. Immediately wash your hands.    ACTIVE/FACILITATED SELF-MONITORING: Follow instructions provided by your local health department or health professionals, as appropriate. When working with your local health department check their available hours.    [facility]   Phone Number   [facility] [phone]   [facility] [place] [phone]   [place] Call 211   [place] [phone]     IF YOU HAVE CONFIRMED POSITIVE COVID-19:    Those who have completely recovered from COVID-19 may have immune-boosting antibodies in their plasma--called “convalescent plasma”--that could be  used to treat critically ill COVID19 patients.    [facility] is excited to begin working with [facility] on collecting convalescent plasma from  people who have recovered from COVID-19 as part of a program to treat patients infected with the virus. This FDA-approved “emergency investigational new drug” is a special blood product containing antibodies that may give patients an extra boost to fight the virus.    To be eligible to donate convalescent plasma, you must have a prior COVID-19 diagnosis documented by a laboratory test (or a positive test result for SARS-CoV-2 antibodies) and meet additional eligibility requirements.    If you are interested in donating convalescent plasma or have any additional questions, please contact the [facility] Convalescent Plasma  at [phone] or via e-mail at [email or web address].   oral

## 2023-04-10 RX ORDER — OXYBUTYNIN CHLORIDE 5 MG/1
5 TABLET ORAL DAILY
Qty: 90 TABLET | Refills: 1 | Status: SHIPPED | OUTPATIENT
Start: 2023-04-10 | End: 2023-08-07

## 2023-05-03 ENCOUNTER — APPOINTMENT (OUTPATIENT)
Dept: MEDICAL GROUP | Facility: CLINIC | Age: 33
End: 2023-05-03
Payer: MEDICAID

## 2023-06-27 ENCOUNTER — TELEPHONE (OUTPATIENT)
Dept: MEDICAL GROUP | Facility: CLINIC | Age: 33
End: 2023-06-27
Payer: MEDICAID

## 2023-06-27 DIAGNOSIS — G89.29 OTHER CHRONIC PAIN: ICD-10-CM

## 2023-06-27 NOTE — TELEPHONE ENCOUNTER
1. Caller Name: [facility]                        Call Back Number: [phone]      How would the patient prefer to be contacted with a response:     2. SPECIFIC Action To Be Taken: Referral pending, please sign.    3. Diagnosis/Clinical Reason for Request: other chronic pain    4. Specialty & Provider Name/Lab/Imaging Location: pain management    5. Is appointment scheduled for requested order/referral: yes - 6/29/23    Patient was not informed they will receive a return phone call from the office ONLY if there are any questions before processing their request. Advised to call back if they haven't received a call from the referral department in 5 days.

## 2023-08-07 RX ORDER — OXYBUTYNIN CHLORIDE 5 MG/1
5 TABLET ORAL DAILY
Qty: 90 TABLET | Refills: 1 | Status: SHIPPED | OUTPATIENT
Start: 2023-08-07

## 2023-08-19 ENCOUNTER — OFFICE VISIT (OUTPATIENT)
Dept: URGENT CARE | Facility: CLINIC | Age: 33
End: 2023-08-19
Payer: MEDICAID

## 2023-08-19 ENCOUNTER — HOSPITAL ENCOUNTER (OUTPATIENT)
Facility: MEDICAL CENTER | Age: 33
End: 2023-08-19
Payer: MEDICAID

## 2023-08-19 VITALS
HEART RATE: 89 BPM | DIASTOLIC BLOOD PRESSURE: 70 MMHG | RESPIRATION RATE: 16 BRPM | WEIGHT: 140.6 LBS | TEMPERATURE: 98.3 F | OXYGEN SATURATION: 95 % | HEIGHT: 65 IN | BODY MASS INDEX: 23.43 KG/M2 | SYSTOLIC BLOOD PRESSURE: 116 MMHG

## 2023-08-19 DIAGNOSIS — R21 RASH OF GENITAL AREA: ICD-10-CM

## 2023-08-19 LAB
APPEARANCE UR: CLEAR
BILIRUB UR STRIP-MCNC: NEGATIVE MG/DL
COLOR UR AUTO: YELLOW
GLUCOSE UR STRIP.AUTO-MCNC: NEGATIVE MG/DL
KETONES UR STRIP.AUTO-MCNC: NEGATIVE MG/DL
LEUKOCYTE ESTERASE UR QL STRIP.AUTO: NEGATIVE
NITRITE UR QL STRIP.AUTO: NEGATIVE
PH UR STRIP.AUTO: 6.5 [PH] (ref 5–8)
PROT UR QL STRIP: NEGATIVE MG/DL
RBC UR QL AUTO: NEGATIVE
SP GR UR STRIP.AUTO: 1.01
UROBILINOGEN UR STRIP-MCNC: 0.2 MG/DL

## 2023-08-19 PROCEDURE — 81002 URINALYSIS NONAUTO W/O SCOPE: CPT

## 2023-08-19 PROCEDURE — 3078F DIAST BP <80 MM HG: CPT

## 2023-08-19 PROCEDURE — 99213 OFFICE O/P EST LOW 20 MIN: CPT

## 2023-08-19 PROCEDURE — 3074F SYST BP LT 130 MM HG: CPT

## 2023-08-19 RX ORDER — VALACYCLOVIR HYDROCHLORIDE 1 G/1
1000 TABLET, FILM COATED ORAL 2 TIMES DAILY
Qty: 14 TABLET | Refills: 0 | Status: SHIPPED | OUTPATIENT
Start: 2023-08-19 | End: 2023-08-26

## 2023-08-20 LAB
FORWARD REASON: SPWHY: NORMAL
FORWARDED TO LAB: SPWHR: NORMAL
SPECIMEN SENT: SPWT1: NORMAL

## 2023-08-20 NOTE — PROGRESS NOTES
Subjective:   Lali Funes is a 32 y.o. female who presents for Vaginal Itching (X4days vaginal irritation/burning/itching/)      HPI: This is a 32-year-old female who presents today for vaginal rash.  This is new problem.  Patient reports developing vaginal rash that is painful 4 days ago.  She denies any known exposure to STIs.  She has not experienced symptoms.  She denies fevers, chills, body aches.  She denies urinary symptoms.  She denies vaginal discharge.  Patient does have history of hysterectomy.      Review of Systems   Constitutional:  Negative for chills, fever and malaise/fatigue.   Skin:  Positive for rash. Negative for itching.   All other systems reviewed and are negative.      Medications:    Current Outpatient Medications on File Prior to Visit   Medication Sig Dispense Refill    oxybutynin (DITROPAN) 5 MG Tab TAKE 1 TABLET BY MOUTH EVERY DAY 90 Tablet 1    omeprazole (PRILOSEC) 20 MG delayed-release capsule TAKE 1 CAPSULE BY MOUTH EVERY DAY FOR GERD 90 Capsule 3    HYDROcodone-acetaminophen (NORCO) 5-325 MG Tab per tablet TAKE 1/2 TABLET BY MOUTH TWICE DAILY AS NEEDED      ondansetron (ZOFRAN ODT) 4 MG TABLET DISPERSIBLE Take 1 Tablet by mouth every 8 hours as needed. 10 Tablet 0    SUMAtriptan (IMITREX) 25 MG Tab tablet Take  mg by mouth one time as needed. 1-2 times daily PRN for headaches      VITAMIN D PO Take  by mouth.      VITAMINS C E PO Take  by mouth.      diclofenac (CATAFLAM) 50 MG tablet DICLOFENAC POTASSIUM 50 MG TABS      amoxicillin-clavulanate (AUGMENTIN) 875-125 MG Tab Take 1 Tablet by mouth 2 times a day. (Patient not taking: Reported on 8/19/2023)      estradiol (ESTRACE) 0.1 MG/GM vaginal cream Apply thin layer of cream to vaginal opening daily (Patient not taking: Reported on 8/19/2023) 42.5 g 3     No current facility-administered medications on file prior to visit.        Allergies:   Cantaloupe, Tape, and Celecoxib    Problem List:   Patient Active Problem List    Diagnosis    Female cystocele    Bacterial vaginosis    UTI (urinary tract infection)    Vaginal discharge    Family history of breast cancer    Migraine with aura, intractable, with status migrainosus    Mixed anxiety depressive disorder    S/P total hysterectomy    Pelvic pain    Pain of upper abdomen    Breast mass in female    Vaginal candidiasis    Gastroesophageal reflux disease    Screen for STD (sexually transmitted disease)    Family history of heart disease    Lipid screening    Suspicious nevus    Reactive depression    History of pubovaginal sling    Post-void dribbling        Surgical History:  Past Surgical History:   Procedure Laterality Date    HI COMBINED ANT/POST COLPORRHAPHY N/A 6/3/2020    Procedure: COLPORRHAPHY, COMBINED ANTEROPOSTERIOR;  Surgeon: Jose Bowen M.D.;  Location: SURGERY SAME DAY [facility];  Service: Obstetrics    HYSTERECTOMY LAPAROSCOPY N/A 6/3/2020    Procedure: HYSTERECTOMY, LAPAROSCOPIC-;  Surgeon: Jose Bowen M.D.;  Location: SURGERY SAME DAY [facility];  Service: Obstetrics    CYSTOSCOPY N/A 6/3/2020    Procedure: CYSTOSCOPY- WITH MID URETHRAL SLING PLACEMENT;  Surgeon: Jose Bowen M.D.;  Location: SURGERY SAME DAY [facility];  Service: Obstetrics    DURAN BY LAPAROSCOPY  10/25/2019    Procedure: CHOLECYSTECTOMY, LAPAROSCOPIC;  Surgeon: Efrain Ramesh M.D.;  Location: SURGERY [facility];  Service: General    ERCP IN OR N/A 10/24/2019    Procedure: ERCP (ENDOSCOPIC RETROGRADE CHOLANGIOPANCREATOGRAPHY);  Surgeon: Temo Liao M.D.;  Location: SURGERY [facility];  Service: Gastroenterology    HI LAP,DIAGNOSTIC ABDOMEN  6/18/2019    Procedure: PELVISCOPY;  Surgeon: Pilar Vuong M.D.;  Location: SURGERY SAME DAY [facility];  Service: Gynecology    SALPINGECTOMY Bilateral 6/18/2019    Procedure: SALPINGECTOMY;  Surgeon: Pilar Vuong M.D.;  Location: SURGERY SAME DAY [facility];  Service: Gynecology       Past Social  "Hx:   Social History     Tobacco Use    Smoking status: Former     Packs/day: 0.25     Years: 15.00     Additional pack years: 0.00     Total pack years: 3.75     Types: Cigarettes     Quit date: 2019     Years since quittin.1    Smokeless tobacco: Never   Vaping Use    Vaping Use: Every day    Substances: Nicotine, Flavoring    Devices: Refillable tank   Substance Use Topics    Alcohol use: Not Currently    Drug use: Never          Problem list, medications, and allergies reviewed by myself today in Epic.     Objective:     /70 (BP Location: Left arm, Patient Position: Sitting)   Pulse 89   Temp 36.8 °C (98.3 °F) (Temporal)   Resp 16   Ht 1.651 m (5' 5\")   Wt 63.8 kg (140 lb 9.6 oz)   LMP  (LMP Unknown)   SpO2 95%   BMI 23.40 kg/m²     Physical Exam  Vitals and nursing note reviewed.   Constitutional:       General: She is not in acute distress.     Appearance: Normal appearance. She is normal weight. She is not ill-appearing or toxic-appearing.   HENT:      Head: Normocephalic and atraumatic.   Cardiovascular:      Rate and Rhythm: Normal rate and regular rhythm.      Pulses: Normal pulses.      Heart sounds: Normal heart sounds. No murmur heard.     No friction rub. No gallop.   Pulmonary:      Effort: Pulmonary effort is normal. No respiratory distress.      Breath sounds: Normal breath sounds. No stridor. No wheezing, rhonchi or rales.   Chest:      Chest wall: No tenderness.   Genitourinary:     Labia:         Right: Rash present.         Left: Rash present.           Comments: Grouped fluid-filled bumps on erythematous base consistent with genital herpes  Musculoskeletal:      Cervical back: Neck supple. No tenderness.   Lymphadenopathy:      Cervical: No cervical adenopathy.   Skin:     General: Skin is warm and dry.      Capillary Refill: Capillary refill takes less than 2 seconds.   Neurological:      General: No focal deficit present.      Mental Status: She is alert and oriented to " person, place, and time. Mental status is at baseline.      Gait: Gait normal.   Psychiatric:         Mood and Affect: Mood normal.         Behavior: Behavior normal.         Thought Content: Thought content normal.         Judgment: Judgment normal.       Assessment/Plan:     Diagnosis and associated orders:   1. Rash of genital area  valacyclovir (VALTREX) 1 GM Tab    Chlamydia/GC, PCR (Genital/Anal swab)    VAGINAL PATHOGENS DNA PANEL    POCT Urinalysis    CANCELED: POCT PREGNANCY             Comments/MDM:   Pt is clinically stable at today's acute urgent care visit.  No acute distress noted. Appropriate for outpatient management at this time.     Acute problem.  Discussed with patient that physical exam findings are consistent with genital herpes.  Patient will be started on valacyclovir.  Additional STD screening for gonorrhea chlamydia and vaginal pathogens panel ordered, will follow up on these results and treat as indicated.  Advised patient to follow-up with PCP in 1 to 2 weeks for recheck.  She is to return for any worsening signs or symptoms or for symptoms fail to improve.  Patient is agreeable to plan of care verbalizes good understanding.           Discussed DDx, management options (risks,benefits, and alternatives to planned treatment), natural progression and supportive care.  Expressed understanding and the treatment plan was agreed upon. Questions were encouraged and answered   Return to urgent care prn if new or worsening sx or if there is no improvement in condition prn.    Educated in Red flags and indications to immediately call 911 or present to the Emergency Department.   Advised the patient to follow-up with the primary care physician for recheck, reevaluation, and consideration of further management.    I personally reviewed prior external notes and test results pertinent to today's visit.  I have independently reviewed and interpreted all diagnostics ordered during this urgent care acute  visit.     Please note that this dictation was created using voice recognition software. I have made a reasonable attempt to correct obvious errors, but I expect that there are errors of grammar and possibly content that I did not discover before finalizing the note.    This note was electronically signed by BRENT Ireland

## 2023-08-21 ASSESSMENT — ENCOUNTER SYMPTOMS
FEVER: 0
CHILLS: 0

## 2023-08-28 DIAGNOSIS — R10.10 PAIN OF UPPER ABDOMEN: ICD-10-CM

## 2023-08-28 DIAGNOSIS — R10.2 PELVIC PAIN: ICD-10-CM

## 2023-09-11 ENCOUNTER — OFFICE VISIT (OUTPATIENT)
Dept: MEDICAL GROUP | Facility: CLINIC | Age: 33
End: 2023-09-11
Payer: MEDICAID

## 2023-09-11 VITALS
WEIGHT: 140 LBS | OXYGEN SATURATION: 99 % | HEIGHT: 65 IN | BODY MASS INDEX: 23.32 KG/M2 | DIASTOLIC BLOOD PRESSURE: 60 MMHG | HEART RATE: 73 BPM | SYSTOLIC BLOOD PRESSURE: 102 MMHG | RESPIRATION RATE: 17 BRPM

## 2023-09-11 DIAGNOSIS — Z80.3 FAMILY HISTORY OF BREAST CANCER: ICD-10-CM

## 2023-09-11 DIAGNOSIS — B00.9 HSV-2 INFECTION: ICD-10-CM

## 2023-09-11 DIAGNOSIS — N89.8 VAGINAL DISCHARGE: ICD-10-CM

## 2023-09-11 DIAGNOSIS — R39.15 URINARY URGENCY: ICD-10-CM

## 2023-09-11 PROCEDURE — 3078F DIAST BP <80 MM HG: CPT

## 2023-09-11 PROCEDURE — 3074F SYST BP LT 130 MM HG: CPT

## 2023-09-11 PROCEDURE — 99213 OFFICE O/P EST LOW 20 MIN: CPT | Mod: GE

## 2023-09-11 NOTE — ASSESSMENT & PLAN NOTE
Patient was recently diagnosed with HSV infection and completed a course of Valcyclovir. Reports  recurrence. No lesions were visible on exam today. Recommend returning to clinic or Urgent Care if recurrence for additional course of Valcyclovir.

## 2023-09-11 NOTE — ASSESSMENT & PLAN NOTE
Reporting white, thick, vaginal discharge. History of chlamydia infection in 2020. No obvious discharge on exam today. Collected BD affirm and GC/CT today.

## 2023-09-11 NOTE — ASSESSMENT & PLAN NOTE
Family history of breast cancer. Reports that she was BRCA negative. Patient is receiving annual mammograms. Ordered mammogram and referred patient to [facility].

## 2023-09-11 NOTE — PROGRESS NOTES
Subjective:     CC: Vaginal lesion, discharge, and urinary urgency    HPI:   Lali with pmhx urinary urgency who presents today for follow-up of vaginal lesion. Patient reports that about a month ago, she was experiencing vaginal lesions and went to Urgent Care. Per chart review, the patient was diagnosed with HSV and was given a short course of Valcyclovir. She finished the Valcyclovir and feels like the lesion is coming back. The lesion becomes painful after wiping.    Patient is also experiencing vaginal discharge for the past three weeks. The discharge is white, thick, and has no odor. Associated with vaginal itchiness. Patient reports having two sexual partners in the past year. She reports a history of chlamydia in  which was treated. Patient had a prior hysterectomy due to dysmenorrhea and menorrhagia.     Since the hysterectomy in , patient has been experiencing urethral spasms and urinary incontinence. Patient was experiencing frequency with urination and started on Oxybutynin which improved her symptoms. She continues to feel the spasms and has dribbling after voiding due to this.  Patient would like to see a specialist for the incontinence. She was previously referred to Dr. Willard, Urogynecology, but was not able to follow-up.    Patient is requesting a mammogram. She has gotten annual mammograms due to a family history of breast cancer. Patient reports that her mom  of breast cancer at the age of 45. Both of her grandmother's and aunts have also had breast cancer.  She was tested for the BRCA mutation and states that it was negative. Patient denies a family history of other types of cancers including prostate, colon, or pancreatic.    Current Outpatient Medications Ordered in Epic   Medication Sig Dispense Refill    oxybutynin (DITROPAN) 5 MG Tab TAKE 1 TABLET BY MOUTH EVERY DAY 90 Tablet 1    omeprazole (PRILOSEC) 20 MG delayed-release capsule TAKE 1 CAPSULE BY MOUTH EVERY DAY FOR GERD 90  "Capsule 3    HYDROcodone-acetaminophen (NORCO) 5-325 MG Tab per tablet TAKE 1/2 TABLET BY MOUTH TWICE DAILY AS NEEDED      ondansetron (ZOFRAN ODT) 4 MG TABLET DISPERSIBLE Take 1 Tablet by mouth every 8 hours as needed. 10 Tablet 0    SUMAtriptan (IMITREX) 25 MG Tab tablet Take  mg by mouth one time as needed. 1-2 times daily PRN for headaches      VITAMIN D PO Take  by mouth.      VITAMINS C E PO Take  by mouth.      diclofenac (CATAFLAM) 50 MG tablet DICLOFENAC POTASSIUM 50 MG TABS       No current Epic-ordered facility-administered medications on file.       ROS:  ROS as per HPI. Otherwise negative.      Objective:     Exam:  /60 (BP Location: Left arm, Patient Position: Sitting, BP Cuff Size: Adult)   Pulse 73   Resp 17   Ht 1.651 m (5' 5\")   Wt 63.5 kg (140 lb)   LMP  (LMP Unknown)   SpO2 99%   BMI 23.30 kg/m²  Body mass index is 23.3 kg/m².    Gen: Alert and oriented, No apparent distress.  Neck: Neck is supple without lymphadenopathy.  Lungs: Normal effort, CTA bilaterally, no wheezes, rhonchi, or rales  CV: Regular rate and rhythm. No murmurs, rubs, or gallops.  Ext: No clubbing, cyanosis, edema.  :  No visible lesions on vulva, labia, or vaginal canal. No obvious discharge.    Labs: None    Assessment & Plan:     32 y.o. female with the following -     Problem List Items Addressed This Visit       Vaginal discharge     Reporting white, thick, vaginal discharge. History of chlamydia infection in 2020. No obvious discharge on exam today. Collected BD affirm and GC/CT today.         Relevant Orders    BV+TRICH ABEL+YEAST CULTURE    Chlamydia/GC, PCR (Genital/Anal swab)    Family history of breast cancer     Family history of breast cancer. Reports that she was BRCA negative. Patient is receiving annual mammograms. Ordered mammogram and referred patient to HNP.         Relevant Orders    Referral to Genetic Research Studies    MA-SCREENING MAMMO BILAT W/TOMOSYNTHESIS W/CAD    HSV-2 infection "     Patient was recently diagnosed with HSV infection and completed a course of Valcyclovir. Reports  recurrence. No lesions were visible on exam today. Recommend returning to clinic or Urgent Care if recurrence for additional course of Valcyclovir.         Urinary urgency     History of urinary urgency s/p hysterectomy in 2020. Currently on Oxybutynin. Referral placed to Urogynecology, Dr. Willard. Patient is not having urinary retention with Oxybutynin and feels it has helped with the frequency but not the incontinence.         Relevant Orders    Referral to Urogynecology       I spent a total of 30 minutes with record review, exam, communication with the patient, communication with other providers, and documentation of this encounter.      Return if symptoms worsen or fail to improve.

## 2023-09-11 NOTE — ASSESSMENT & PLAN NOTE
History of urinary urgency s/p hysterectomy in 2020. Currently on Oxybutynin. Referral placed to Urogynecology, Dr. Willard. Patient is not having urinary retention with Oxybutynin and feels it has helped with the frequency but not the incontinence.

## 2023-09-15 ENCOUNTER — APPOINTMENT (OUTPATIENT)
Dept: RESEARCH | Facility: WORKSITE | Age: 33
End: 2023-09-15
Payer: MEDICAID

## 2023-09-19 ENCOUNTER — RESEARCH ENCOUNTER (OUTPATIENT)
Dept: RESEARCH | Facility: WORKSITE | Age: 33
End: 2023-09-19
Payer: MEDICAID

## 2023-09-19 DIAGNOSIS — Z00.6 RESEARCH STUDY PATIENT: ICD-10-CM

## 2023-09-19 NOTE — RESEARCH NOTE
Confirmed with the participant which designated provider (Negra Roa M.D.) they would like study results shared with. Patient will have an opportunity to share the results with any providers of their choosing in the future by accessing their results from [employer].

## 2023-09-27 ENCOUNTER — HOSPITAL ENCOUNTER (OUTPATIENT)
Dept: LAB | Facility: MEDICAL CENTER | Age: 33
End: 2023-09-27
Payer: MEDICAID

## 2023-09-27 DIAGNOSIS — Z00.6 RESEARCH STUDY PATIENT: ICD-10-CM

## 2023-11-06 LAB
APOB+LDLR+PCSK9 GENE MUT ANL BLD/T: NOT DETECTED
BRCA1+BRCA2 DEL+DUP + FULL MUT ANL BLD/T: NOT DETECTED
MLH1+MSH2+MSH6+PMS2 GN DEL+DUP+FUL M: NOT DETECTED

## 2023-11-21 RX ORDER — OMEPRAZOLE 20 MG/1
CAPSULE, DELAYED RELEASE ORAL
Qty: 90 CAPSULE | Refills: 0 | Status: SHIPPED | OUTPATIENT
Start: 2023-11-21

## 2024-02-06 ENCOUNTER — OFFICE VISIT (OUTPATIENT)
Dept: MEDICAL GROUP | Facility: CLINIC | Age: 34
End: 2024-02-06
Payer: MEDICAID

## 2024-02-06 VITALS
TEMPERATURE: 97.2 F | BODY MASS INDEX: 23.82 KG/M2 | HEIGHT: 65 IN | OXYGEN SATURATION: 96 % | DIASTOLIC BLOOD PRESSURE: 59 MMHG | WEIGHT: 143 LBS | HEART RATE: 65 BPM | SYSTOLIC BLOOD PRESSURE: 96 MMHG

## 2024-02-06 DIAGNOSIS — R39.15 URINARY URGENCY: ICD-10-CM

## 2024-02-06 DIAGNOSIS — Z13.220 LIPID SCREENING: ICD-10-CM

## 2024-02-06 DIAGNOSIS — Z80.3 FAMILY HISTORY OF BREAST CANCER: Primary | ICD-10-CM

## 2024-02-06 PROCEDURE — 3074F SYST BP LT 130 MM HG: CPT | Performed by: FAMILY MEDICINE

## 2024-02-06 PROCEDURE — 3078F DIAST BP <80 MM HG: CPT | Performed by: FAMILY MEDICINE

## 2024-02-06 PROCEDURE — 99213 OFFICE O/P EST LOW 20 MIN: CPT | Performed by: FAMILY MEDICINE

## 2024-02-07 NOTE — PROGRESS NOTES
Subjective:     Chief Complaint   Patient presents with    Referral Needed     RDC    Requesting Labs       HPI: Lali is a 33 y.o. female who presents today for the following problems:    Here today requesting a few things.  She is been having urinary incontinence.  Reports that even after she completes urinating she will still have dribbling.  She is been on oxybutynin for a while now and reports that it helps with any type of urge incontinence however he does not help with dribbling.  Dribbling is not made worse with coughing.  It is constant throughout the day.  Especially when she works impacts her quality of life.  She denies any fevers or chills today.  Denies any dysuria.  Denies any hematuria.    She also has a family history of breast cancer in her family.  Her mother was sadly diagnosed with breast cancer at an early age and  after attempting to fight it.  It has been recommended that she receives annual mammograms and MRIs to evaluate for this.  Of note, patient does have a history of claustrophobia and does require sedation.  She reports that she experiences extreme panic and dyspnea during her times in confined spaces such as the MRI.  She is requesting sedation.    Also like basic lab work done as she is concerned about atherosclerotic changes and her sister reportedly had an early diagnosed problem.          No problems updated.    Current Outpatient Medications   Medication Sig Dispense Refill    omeprazole (PRILOSEC) 20 MG delayed-release capsule TAKE 1 CAPSULE BY MOUTH EVERY DAY FOR GERD 90 Capsule 0    oxybutynin (DITROPAN) 5 MG Tab TAKE 1 TABLET BY MOUTH EVERY DAY 90 Tablet 1    HYDROcodone-acetaminophen (NORCO) 5-325 MG Tab per tablet TAKE 1/2 TABLET BY MOUTH TWICE DAILY AS NEEDED      ondansetron (ZOFRAN ODT) 4 MG TABLET DISPERSIBLE Take 1 Tablet by mouth every 8 hours as needed. 10 Tablet 0    SUMAtriptan (IMITREX) 25 MG Tab tablet Take  mg by mouth one time as needed. 1-2 times  "daily PRN for headaches      VITAMIN D PO Take  by mouth.      VITAMINS C E PO Take  by mouth.      diclofenac (CATAFLAM) 50 MG tablet DICLOFENAC POTASSIUM 50 MG TABS       No current facility-administered medications for this visit.       Social History     Tobacco Use    Smoking status: Former     Current packs/day: 0.00     Average packs/day: 0.3 packs/day for 15.0 years (3.8 ttl pk-yrs)     Types: Cigarettes     Start date: 2004     Quit date: 2019     Years since quittin.5    Smokeless tobacco: Never   Vaping Use    Vaping Use: Every day    Substances: Nicotine, Flavoring    Devices: Ref51fanlible tank   Substance Use Topics    Alcohol use: Not Currently    Drug use: Never          Objective:     Vitals:    24 1624   BP: 96/59   BP Location: Left arm   Patient Position: Sitting   BP Cuff Size: Adult   Pulse: 65   Temp: 36.2 °C (97.2 °F)   TempSrc: Temporal   SpO2: 96%   Weight: 64.9 kg (143 lb)   Height: 1.651 m (5' 5\")     Body mass index is 23.8 kg/m².     Physical Exam:  Gen: Well developed, well nourished in no acute distress.   Skin: Pink, warm, and dry  HEENT: conjunctiva non-injected  Alert and oriented Eye contact is good, speech goal directed, affect calm  Gait: not antalgic    Assessment & Plan:   No problem-specific Assessment & Plan notes found for this encounter.    1. Family history of breast cancer  Given maternal history of breast cancer, MRI is indicated especially given radiology recommendations.  MRI with and without contrast ordered.  Additionally, given patient's history of claustrophobia, sedation requested as well.  - MR-BREAST-BILATERAL-WITH & W/O; Future  - CBC WITHOUT DIFFERENTIAL; Future    2. Urinary urgency  Given patient's symptoms, there may be a problem with the urethral sphincter.  Urogynecology referral placed.  - Referral to Urogynecology  - Comp Metabolic Panel; Future    3. Lipid screening  Will order lipid panel to ensure that there is no acute cholesterol " changes.  - Lipid Profile; Future       Patient verbalizes understanding and agreement with assessment and plan.    Followup: Return if symptoms worsen or fail to improve.    Hood Stock M.D.    Please note that this dictation was created using voice recognition software. I have made every reasonable attempt to correct obvious errors, but I expect that there are errors of grammar and possibly content that I did not discover before finalizing the note.

## 2024-02-21 DIAGNOSIS — F40.240 CLAUSTROPHOBIA: ICD-10-CM

## 2024-02-21 RX ORDER — ALPRAZOLAM 1 MG/1
1 TABLET ORAL
Qty: 1 TABLET | Refills: 0 | Status: SHIPPED | OUTPATIENT
Start: 2024-02-21 | End: 2024-02-21

## 2024-03-12 ENCOUNTER — HOSPITAL ENCOUNTER (OUTPATIENT)
Dept: RADIOLOGY | Facility: MEDICAL CENTER | Age: 34
End: 2024-03-12
Attending: FAMILY MEDICINE
Payer: MEDICAID

## 2024-03-12 DIAGNOSIS — Z80.3 FAMILY HISTORY OF BREAST CANCER: ICD-10-CM

## 2024-03-12 PROCEDURE — A9579 GAD-BASE MR CONTRAST NOS,1ML: HCPCS | Mod: UD | Performed by: FAMILY MEDICINE

## 2024-03-12 PROCEDURE — 77049 MRI BREAST C-+ W/CAD BI: CPT

## 2024-03-12 PROCEDURE — 700117 HCHG RX CONTRAST REV CODE 255: Mod: UD | Performed by: FAMILY MEDICINE

## 2024-03-12 RX ADMIN — GADOTERIDOL 15 ML: 279.3 INJECTION, SOLUTION INTRAVENOUS at 18:42

## 2024-03-12 NOTE — DISCHARGE SUMMARY
[employer] Alert Outreach Telephone Call Attempt     Patient is being outreached by the Care Transitions Program for a clinical alert from the [employer] monitoring program.     Call Attempt Date: 3/12/2024    Call Attempt: Second Attempt    Case closed - unable to reach patient.     Discharge Summary    CHIEF COMPLAINT ON ADMISSION  Chief Complaint   Patient presents with   • Abdominal Pain     described as stabbing xweeks, recently diagnosed w/ gallstones   • Nausea       Reason for Admission  Rib pain     Admission Date  10/23/2019    CODE STATUS  Full Code    HPI & HOSPITAL COURSE     Patient is a 29-year-old female with known medical history of arthritis, back pain and previous pregnancy.  Patient presented to the emergency room after having intermittent waxing and waning abdominal pain for the past 3 weeks.  Patient states her pain was associated with nausea and is aggravated when she eats.  Patient was originally seen by her primary care provider and recently diagnosed with gallstones on ultrasound.  Patient was admitted to CDU for further work-up and monitoring.  Patient was provided symptom management with antiemetics and analgesics.  Patient was initiated on IV fluid hydration and made n.p.o.  CBC was obtained which showed elevated white blood cell count of 13.7 otherwise benign.  CMP showed elevated liver enzymes.  Abdominal ultrasound showed cholelithiasis with borderline thickened gallbladder wall and biliary dilation with distal common bile duct stone and/or debris.  GI was consulted with plans for ERCP following morning. Patient underwent ERCP with multiple stone removal. General surgery consulted and performed laparoscopic cholecystectomy following day. Patient recovered from surgical intervention and was able to have bowel movement. Patient's pain controlled and patient requesting discharge. Vital signs stable, patient tolerating PO intake, patient ambulating. Patient has been cleared to discharge from surgical standpoint with close follow up in surgeons office. Patient understands discharge instructions. Patient instructed not to drink or drive with narcotics. Utilize stool softeners while taking pain medications.     Therefore, she is discharged in good and stable condition to  home with close outpatient follow-up.      Discharge Date  10/25/2019    FOLLOW UP ITEMS POST DISCHARGE  Please follow up with surgery and PCP   Stay well hydrated  Take stool softeners with narcotic pain meds  Don't drink or drive with pain medication     DISCHARGE DIAGNOSES  Principal Problem:    Biliary obstruction POA: Unknown  Active Problems:    Transaminitis POA: Unknown    Leukocytosis POA: Unknown    Calculus of gallbladder with acute cholecystitis and obstruction POA: Unknown    Constipation POA: Unknown  Resolved Problems:    * No resolved hospital problems. *      FOLLOW UP  Future Appointments   Date Time Provider Department Center   10/29/2019 11:00 AM Elian Crews M.D. [facility]   10/29/2019  2:45 PM Yisel Florez D.O. [facility]   11/5/2019 11:00 AM Jose Bowen M.D. [facility] [facility]   12/3/2019 11:40 AM Elian Crews M.D. [facility]       MEDICATIONS ON DISCHARGE     Medication List      START taking these medications      Instructions   oxyCODONE immediate-release 5 MG Tabs  Commonly known as:  ROXICODONE   Take 1 Tab by mouth every 6 hours as needed for up to 4 days.  Dose:  5 mg     senna-docusate 8.6-50 MG Tabs  Commonly known as:  PERICOLACE or SENOKOT S   Take 2 Tabs by mouth 2 Times a Day.  Dose:  2 Tab        CONTINUE taking these medications      Instructions   DEPO-PROVERA 150 MG/ML Susp  Generic drug:  medroxyPROGESTERone   150 mg by Intramuscular route every 90 days.  Dose:  150 mg     methocarbamol 750 MG Tabs  Commonly known as:  ROBAXIN   Take 750 mg by mouth 4 times a day.  Dose:  750 mg     tramadol 50 MG Tabs  Commonly known as:  ULTRAM   Take 50 mg by mouth every 8 hours as needed (Pain).  Dose:  50 mg            Allergies  Allergies   Allergen Reactions   • Nkda [No Known Drug Allergy]        DIET  Orders Placed This Encounter   Procedures   • Diet Order Full Liquid     Standing Status:   Standing     Number of Occurrences:   1     Order Specific Question:    Diet:     Answer:   Full Liquid [11]       ACTIVITY  As tolerated.  Weight bearing as tolerated    CONSULTATIONS  GI   General surgery     PROCEDURES  ERCP  Lap Alyssa     LABORATORY  Lab Results   Component Value Date    SODIUM 139 10/25/2019    POTASSIUM 3.7 10/25/2019    CHLORIDE 107 10/25/2019    CO2 24 10/25/2019    GLUCOSE 132 (H) 10/25/2019    BUN 11 10/25/2019    CREATININE 0.76 10/25/2019        Lab Results   Component Value Date    WBC 13.8 (H) 10/25/2019    HEMOGLOBIN 13.6 10/25/2019    HEMATOCRIT 40.9 10/25/2019    PLATELETCT 226 10/25/2019

## 2024-03-13 ENCOUNTER — HOSPITAL ENCOUNTER (OUTPATIENT)
Dept: RADIOLOGY | Facility: MEDICAL CENTER | Age: 34
End: 2024-03-13
Payer: MEDICAID

## 2024-03-14 ENCOUNTER — TELEPHONE (OUTPATIENT)
Dept: MEDICAL GROUP | Facility: CLINIC | Age: 34
End: 2024-03-14
Payer: MEDICAID

## 2024-03-14 DIAGNOSIS — R92.8 ABNORMAL FINDING ON BREAST IMAGING: ICD-10-CM

## 2024-03-14 NOTE — TELEPHONE ENCOUNTER
Probably benign area of nonmass enhancement in the inferior slightly lateral right breast posterior third. Recommend further evaluation with MR directed ultrasound. If no abnormality identified with ultrasound then would recommend follow-up MRI in   6-12 months.    Was able to discuss results above with patient via phone call, [phone]. She understands results. US order placed as recommended by radiology. She will follow-up in clinic to discuss ultrasound results after US is performed.    Ms Funes verbalizes understanding and agreement with assessment and plan.    Hood Stock M.D.     1. Abnormal finding on breast imaging  - US-BREAST LIMITED-RIGHT; Future

## 2024-03-15 ENCOUNTER — TELEPHONE (OUTPATIENT)
Dept: MEDICAL GROUP | Facility: CLINIC | Age: 34
End: 2024-03-15
Payer: MEDICAID

## 2024-03-15 NOTE — TELEPHONE ENCOUNTER
Patient was notified of result yesterday, ultrasound ordered, will follow-up result.  Hood Stock M.D.

## 2024-03-29 ENCOUNTER — HOSPITAL ENCOUNTER (OUTPATIENT)
Dept: RADIOLOGY | Facility: MEDICAL CENTER | Age: 34
End: 2024-03-29
Attending: FAMILY MEDICINE
Payer: MEDICAID

## 2024-03-29 DIAGNOSIS — R92.8 ABNORMAL FINDING ON BREAST IMAGING: ICD-10-CM

## 2024-03-29 PROCEDURE — 76642 ULTRASOUND BREAST LIMITED: CPT | Mod: RT

## 2024-04-24 NOTE — PROGRESS NOTES
"Urogynecology & Reconstructive Pelvic Surgery - Consultation Visit    Lali Funes MRN:[id number] :1990    Referred by: Hood Stock MD    Reason for Visit:   Chief Complaint   Patient presents with    New Patient     Consult          Subjective     History of Presenting Illness:    Lali Funes is a 33 y.o. P3 who presents for the evaluation and management of urinary incontinence.     She has significant urinary urgency, having to rush to the bathroom.  She usually makes it in time.  However she finishes urinating and approximately a minute later she has dripping/dribbling in the pants. She tried oxybutynin but this didn't help (only caused side effects).  She underwent a sling procedure at the time of her prolapse repair with Dr. Bowen in .  This helped her prolapse, urinary symptoms somewhat improved.    She also notes consistent \"ovarian\" pain, in the bilateral adnexa, at a regular intervals, usually left, sometimes right.  Has not had pelvic ultrasound in a few years.    Her pain improved after hysterectomy but she does have some consistent pelvic pain.  Endometriosis was not noted on the operative report or in the pathology report, but her symptoms are consistent with a possible endometriosis picture.    Prior Pelvic surgery:   Cholecystectomy  : total laparoscopic hysterectomy, sling (Jessi)     Prior treatment:   Oxybutynin - no improvement   Kegels  OCP      Fluid intake:   half gallon water      Pelvic floor symptom review:     Bladder:   Voids per day: 8 Voids per night: 0-1      Urinary incontinence episodes per day: post void dribble    Urge leakage:  None   Stress leakage: None   Continuous / insensible urine loss: No    Nocturnal enuresis: No    Leakage volume: Drops   Number of pads/day: 0    Bladder emptying: Complete   Voiding symptoms: Weak Stream   UTI in last 12 months: no   Other urologic history: no      Prolapse:     Prolapse symptoms: None     Bowel:    IBS, " mild constipation      Sexual function:    Sexually active: Yes   Gender of partners: Male   Pain with intercourse: yes, dryness        Past medical and surgical history      Past medical history:  Past Medical History:   Diagnosis Date    Migraine with aura, intractable, with status migrainosus 2/25/2021    Familial hypercholesterolemia 12/18/2020    Pain 05/29/2020    pelvic and back, 5/10    Mastitis 05/2019    Nausea/vomiting in pregnancy 10/5/2012    Abnormal uterine bleeding (AUB)     AC (acromioclavicular) joint bone spurs     Anxiety     Arthritis     toes, hands, back    Bowel habit changes     constipation/diarrhea    GERD (gastroesophageal reflux disease)      Past surgical history:  Past Surgical History:   Procedure Laterality Date    MT COMBINED ANT/POST COLPORRHAPHY N/A 6/3/2020    Procedure: COLPORRHAPHY, COMBINED ANTEROPOSTERIOR;  Surgeon: Jose Bowen M.D.;  Location: SURGERY SAME DAY [facility];  Service: Obstetrics    HYSTERECTOMY LAPAROSCOPY N/A 6/3/2020    Procedure: HYSTERECTOMY, LAPAROSCOPIC-;  Surgeon: Jose Bowen M.D.;  Location: SURGERY SAME DAY [facility];  Service: Obstetrics    CYSTOSCOPY N/A 6/3/2020    Procedure: CYSTOSCOPY- WITH MID URETHRAL SLING PLACEMENT;  Surgeon: Jose Bowen M.D.;  Location: SURGERY SAME DAY [facility];  Service: Obstetrics    DURAN BY LAPAROSCOPY  10/25/2019    Procedure: CHOLECYSTECTOMY, LAPAROSCOPIC;  Surgeon: Efrain Ramesh M.D.;  Location: SURGERY [facility];  Service: General    ERCP IN OR N/A 10/24/2019    Procedure: ERCP (ENDOSCOPIC RETROGRADE CHOLANGIOPANCREATOGRAPHY);  Surgeon: Temo Liao M.D.;  Location: SURGERY [facility];  Service: Gastroenterology    MT LAP,DIAGNOSTIC ABDOMEN  6/18/2019    Procedure: PELVISCOPY;  Surgeon: Pilar Vuong M.D.;  Location: SURGERY SAME DAY [facility];  Service: Gynecology    SALPINGECTOMY Bilateral 6/18/2019    Procedure: SALPINGECTOMY;  Surgeon: Pilar Vuong  "M.D.;  Location: SURGERY SAME DAY [facility];  Service: Gynecology     Medications:has a current medication list which includes the following prescription(s): norethindrone-ethinyl estradiol, omeprazole, oxybutynin, hydrocodone-acetaminophen, ondansetron, sumatriptan, vitamin d, vitamins c e, and diclofenac.  Allergies:Cantaloupe, Tape, and Celecoxib  Family history:  Family History   Problem Relation Age of Onset    Cancer Mother 45        breast cancer, lymph node,    Heart Attack Father     Gout Father     Cancer Maternal Grandmother         breast cancer     Social history: reports that she quit smoking about 4 years ago. Her smoking use included cigarettes. She started smoking about 19 years ago. She has a 3.8 pack-year smoking history. She has never used smokeless tobacco. She reports that she does not currently use alcohol. She reports that she does not use drugs.    Review of systems: A full review of systems was performed, and negative with the exception of want is noted above in the HPI.        Objective        /66 (BP Location: Right arm, Patient Position: Sitting, BP Cuff Size: Adult)   Pulse 66   Ht 5' 5\"   Wt 150 lb   LMP  (LMP Unknown)   BMI 24.96 kg/m²     Physical Exam  Vitals reviewed. Exam conducted with a chaperone present.   Constitutional:       Appearance: Normal appearance.   HENT:      Head: Normocephalic.      Mouth/Throat:      Mouth: Mucous membranes are moist.   Cardiovascular:      Rate and Rhythm: Normal rate.   Pulmonary:      Effort: Pulmonary effort is normal.   Skin:     General: Skin is warm and dry.   Neurological:      Mental Status: She is alert.   Psychiatric:         Mood and Affect: Mood normal.         Genitourinary:    Vulva: WNL   Bulbocavernosus reflex: Intact   Anal wink reflex: Intact   Perineal sensation: WNL   Urethra: WNL -no mesh banding or exposures noted.  No sensitivity or pain in the bilateral obturator internus fascia or suburethral he.   Vagina: " WNL   Atrophy: None   Cough stress test: Negative    Pelvic floor:    Prolapse stage: no significant prolapse   Bladder/ suprapubic tenderness: No    Levator tenderness: Bilateral   Levator muscle tone: Hypertonic   Pelvic floor contraction strength (modified Oxford scale): 3=Moderate   Pelvic floor contraction duration: Brief      Procedure Performed: No    Diagnostic test and records review:    Urine dipstick: neg     Post-void residual: 0 mL, performed by Bladder Scanner    Labs: n/a    Radiology: n/a    Procedural: n/a    Documentation reviewed: Prior EMR Records           Assessment & Plan     Lali Funes is a 33 y.o. P3 with postvoid dribbling, pelvic/adnexal pain. We discussed my recommendations for further diagnosis and treatment at length today.     1. Urinary urgency  2. Dribbling following urination  She has bothersome urgency and frequency after her prior sling procedure, along with postvoid dribbling.  She had significant high tone pelvic floor on exam today, which is likely contributing to some of her functional issues.  No sling tenderness or tension bands noted, and she is emptying her bladder completely.  - Referral to Physical Therapy  -S/p trial of oxybutynin with side effects and no improvement  -She is given samples of vibegron 75 mg once daily to be taken for a 3-week trial.  She will call if any improvement in symptoms.        3. Adnexal pain  4. Chronic pelvic pain in female  5. Endometriosis, suspected  She has chronic pelvic pain with an unclear pattern.  Some of this improved after hysterectomy but she does have persistent fluctuating pain, which she attributes to her ovaries.  She does have a history of ovarian cysts.  While no endometriosis was noted in the operative report or pathology report, some of her symptomatic patterns are consistent with this.  I noted that a lot of her treatment strategies of change over time including her approach to endometriosis.  Recommend imaging  evaluation of her adnexa to rule out any large cysts (functional smaller cyst will be observed), and she may benefit from hormonal therapy in order to suppress potential endometriosis.  He has no side effects from prior OCP therapies.  - US-PELVIC COMPLETE (TRANSABDOMINAL/TRANSVAGINAL) (COMBO); Future  - norethindrone-ethinyl estradiol (LOESTRIN 1/20, 21,) 1-20 MG-MCG per tablet; Take 1 Tablet by mouth every day.  Dispense: 28 Tablet; 2 months trial course, countinuous  -Follow-up in 3 months.  She may benefit from evaluation by .              Jennifer Willard MD, FACOG  Urogynecology and Reconstructive Pelvic Surgery  Department of Obstetrics and Gynecology  [facility]        This medical record contains text that has been entered with the assistance of computer voice recognition and dictation software.  Therefore, it may contain unintended errors in text, spelling, punctuation, or grammar

## 2024-04-25 ENCOUNTER — GYNECOLOGY VISIT (OUTPATIENT)
Dept: GYNECOLOGY | Facility: CLINIC | Age: 34
End: 2024-04-25
Payer: MEDICAID

## 2024-04-25 VITALS
BODY MASS INDEX: 24.99 KG/M2 | HEART RATE: 66 BPM | DIASTOLIC BLOOD PRESSURE: 66 MMHG | HEIGHT: 65 IN | WEIGHT: 150 LBS | SYSTOLIC BLOOD PRESSURE: 106 MMHG

## 2024-04-25 DIAGNOSIS — N39.43 DRIBBLING FOLLOWING URINATION: ICD-10-CM

## 2024-04-25 DIAGNOSIS — G89.29 CHRONIC PELVIC PAIN IN FEMALE: ICD-10-CM

## 2024-04-25 DIAGNOSIS — R10.2 ADNEXAL PAIN: ICD-10-CM

## 2024-04-25 DIAGNOSIS — R10.2 CHRONIC PELVIC PAIN IN FEMALE: ICD-10-CM

## 2024-04-25 DIAGNOSIS — N80.9 ENDOMETRIOSIS: ICD-10-CM

## 2024-04-25 DIAGNOSIS — R39.15 URINARY URGENCY: Primary | ICD-10-CM

## 2024-04-25 LAB
APPEARANCE UR: CLEAR
BILIRUB UR STRIP-MCNC: NEGATIVE MG/DL
COLOR UR AUTO: YELLOW
GLUCOSE UR STRIP.AUTO-MCNC: NEGATIVE MG/DL
KETONES UR STRIP.AUTO-MCNC: NEGATIVE MG/DL
LEUKOCYTE ESTERASE UR QL STRIP.AUTO: NEGATIVE
NITRITE UR QL STRIP.AUTO: NEGATIVE
PH UR STRIP.AUTO: 7 [PH] (ref 5–8)
PROT UR QL STRIP: NEGATIVE MG/DL
RBC UR QL AUTO: NEGATIVE
SP GR UR STRIP.AUTO: 1.01
UROBILINOGEN UR STRIP-MCNC: NORMAL MG/DL

## 2024-04-25 PROCEDURE — 99204 OFFICE O/P NEW MOD 45 MIN: CPT | Performed by: STUDENT IN AN ORGANIZED HEALTH CARE EDUCATION/TRAINING PROGRAM

## 2024-04-25 PROCEDURE — 3074F SYST BP LT 130 MM HG: CPT | Performed by: STUDENT IN AN ORGANIZED HEALTH CARE EDUCATION/TRAINING PROGRAM

## 2024-04-25 PROCEDURE — 3078F DIAST BP <80 MM HG: CPT | Performed by: STUDENT IN AN ORGANIZED HEALTH CARE EDUCATION/TRAINING PROGRAM

## 2024-04-25 PROCEDURE — 81002 URINALYSIS NONAUTO W/O SCOPE: CPT | Performed by: STUDENT IN AN ORGANIZED HEALTH CARE EDUCATION/TRAINING PROGRAM

## 2024-04-25 RX ORDER — NORETHINDRONE ACETATE AND ETHINYL ESTRADIOL .02; 1 MG/1; MG/1
1 TABLET ORAL DAILY
Qty: 28 TABLET | Refills: 6 | Status: SHIPPED | OUTPATIENT
Start: 2024-04-25

## 2024-04-25 NOTE — PROGRESS NOTES
PT here today for consult   Ref for urinary urgency   Hysterectomy? 2020   Good #: [phone]   PVR : 0 mL  Pharmacy Verified

## 2024-05-24 ENCOUNTER — HOSPITAL ENCOUNTER (OUTPATIENT)
Dept: RADIOLOGY | Facility: MEDICAL CENTER | Age: 34
End: 2024-05-24
Attending: STUDENT IN AN ORGANIZED HEALTH CARE EDUCATION/TRAINING PROGRAM
Payer: MEDICAID

## 2024-05-24 DIAGNOSIS — R10.2 ADNEXAL PAIN: ICD-10-CM

## 2024-07-11 NOTE — ED PROVIDER NOTES
"ED Provider Note    CHIEF COMPLAINT  Chief Complaint   Patient presents with   • Epigastric Pain     started at noon yesterday, dull aching pain that turns into stabbing pain after she eats. Patient states she has her gallbladder removed 2 years ago. Patient took ibuprofen without any relief. Patient is also taking Prilosec prn. Patient denies any chest pain.   • Nausea     after eating        HPI  Lali Funes is a 30 y.o. female here for evaluation of epigastric pain.  The patient states that she has been having this pain since yesterday, described as a dull ache that turns into a \"stabbing\" pain.  Patient states that she does take Prozac as needed, but not on a regular basis.  She also takes Advil 800 daily and one tramadol for her chronic back pain.  Patient has some nausea but no vomiting.  Nothing seemed to leave exacerbate her symptoms.  She denies any history of the same.  She has no fever chills.      ROS  See [facility] for further details, o/w negative.     PAST MEDICAL HISTORY   has a past medical history of Abnormal uterine bleeding (AUB), AC (acromioclavicular) joint bone spurs, Anxiety, Arthritis, Bowel habit changes, GERD (gastroesophageal reflux disease), Mastitis (2019), Nausea/vomiting in pregnancy (10/5/2012), and Pain (2020).    SOCIAL HISTORY  Social History     Tobacco Use   • Smoking status: Former Smoker     Packs/day: 0.25     Years: 15.00     Pack years: 3.75     Types: Cigarettes     Quit date: 2019     Years since quittin.6   • Smokeless tobacco: Never Used   Substance and Sexual Activity   • Alcohol use: Not Currently   • Drug use: Not Currently   • Sexual activity: Yes     Partners: Male     Birth control/protection: Surgical, Injection     Comment: none       Family History  No bleeding disorders    SURGICAL HISTORY   has a past surgical history that includes lap,diagnostic abdomen (2019); ercp in or (N/A, 10/24/2019); mariaelena by laparoscopy (10/25/2019); " salpingectomy (Bilateral, 6/18/2019); combined ant/post colporrhaphy (N/A, 6/3/2020); hysterectomy laparoscopy (N/A, 6/3/2020); and cystoscopy (N/A, 6/3/2020).    CURRENT MEDICATIONS  Home Medications    **Home medications have not yet been reviewed for this encounter**         ALLERGIES  Allergies   Allergen Reactions   • Nkda [No Known Drug Allergy]    • Cantaloupe    • Tape      Surgical tape       REVIEW OF SYSTEMS  See HPI for further details. Review of systems as above, otherwise all other systems are negative.     PHYSICAL EXAM  Constitutional: Well developed, well nourished. No acute distress.  HEENT: Normocephalic, atraumatic. Posterior pharynx clear and moist.  Eyes:  EOMI. Normal sclera.  Neck: Supple, Full range of motion, nontender.  Chest/Pulmonary: clear to ausculation. Symmetrical expansion.   Cardio: Regular rate and rhythm with no murmur.   Abdomen: Soft, mild epigastric tenderness. No peritoneal signs. No guarding. No palpable masses.  Musculoskeletal: No deformity, no edema, neurovascular intact.   Neuro: Clear speech, appropriate, cooperative, cranial nerves II-XII grossly intact.  Psych: Normal mood and affect    PROCEDURES     MEDICAL RECORD  I have reviewed patient's medical record and pertinent results are listed.    COURSE & MEDICAL DECISION MAKING  I have reviewed any medical record information, laboratory studies and radiographic results as noted above.    Results for orders placed or performed during the hospital encounter of 02/17/21   CBC WITH DIFFERENTIAL   Result Value Ref Range    WBC 6.3 4.8 - 10.8 K/uL    RBC 5.20 4.20 - 5.40 M/uL    Hemoglobin 15.2 12.0 - 16.0 g/dL    Hematocrit 45.8 37.0 - 47.0 %    MCV 88.1 81.4 - 97.8 fL    MCH 29.2 27.0 - 33.0 pg    MCHC 33.2 (L) 33.6 - 35.0 g/dL    RDW 41.1 35.9 - 50.0 fL    Platelet Count 239 164 - 446 K/uL    MPV 10.9 9.0 - 12.9 fL    Neutrophils-Polys 48.10 44.00 - 72.00 %    Lymphocytes 43.10 (H) 22.00 - 41.00 %    Monocytes 6.70 0.00 -  13.40 %    Eosinophils 1.30 0.00 - 6.90 %    Basophils 0.50 0.00 - 1.80 %    Immature Granulocytes 0.30 0.00 - 0.90 %    Nucleated RBC 0.00 /100 WBC    Neutrophils (Absolute) 3.03 2.00 - 7.15 K/uL    Lymphs (Absolute) 2.71 1.00 - 4.80 K/uL    Monos (Absolute) 0.42 0.00 - 0.85 K/uL    Eos (Absolute) 0.08 0.00 - 0.51 K/uL    Baso (Absolute) 0.03 0.00 - 0.12 K/uL    Immature Granulocytes (abs) 0.02 0.00 - 0.11 K/uL    NRBC (Absolute) 0.00 K/uL   COMP METABOLIC PANEL   Result Value Ref Range    Sodium 137 135 - 145 mmol/L    Potassium 4.0 3.6 - 5.5 mmol/L    Chloride 103 96 - 112 mmol/L    Co2 23 20 - 33 mmol/L    Anion Gap 11.0 7.0 - 16.0    Glucose 86 65 - 99 mg/dL    Bun 12 8 - 22 mg/dL    Creatinine 0.58 0.50 - 1.40 mg/dL    Calcium 9.5 8.5 - 10.5 mg/dL    AST(SGOT) 22 12 - 45 U/L    ALT(SGPT) 25 2 - 50 U/L    Alkaline Phosphatase 82 30 - 99 U/L    Total Bilirubin 0.9 0.1 - 1.5 mg/dL    Albumin 4.3 3.2 - 4.9 g/dL    Total Protein 7.2 6.0 - 8.2 g/dL    Globulin 2.9 1.9 - 3.5 g/dL    A-G Ratio 1.5 g/dL   LIPASE   Result Value Ref Range    Lipase 32 11 - 82 U/L   HCG QUAL SERUM   Result Value Ref Range    Beta-Hcg Qualitative Serum Negative Negative   URINALYSIS,CULTURE IF INDICATED    Specimen: Urine, Clean Catch; Blood   Result Value Ref Range    Color Yellow     Character Clear     Specific Gravity 1.020 <1.035    Ph 7.0 5.0 - 8.0    Glucose Negative Negative mg/dL    Ketones Negative Negative mg/dL    Protein Negative Negative mg/dL    Bilirubin Negative Negative    Urobilinogen, Urine 0.2 Negative    Nitrite Negative Negative    Leukocyte Esterase Negative Negative    Occult Blood Negative Negative    Micro Urine Req see below    ESTIMATED GFR   Result Value Ref Range    GFR If African American >60 >60 mL/min/1.73 m 2    GFR If Non African American >60 >60 mL/min/1.73 m 2     [id number] VIEW   Final Result         1.  Moderate stool in the colon suggests changes of constipation, otherwise nonspecific bowel gas  pattern            HYDRATION: Based on the patient's presentation of Dehydration the patient was given IV fluids. IV Hydration was used because oral hydration was not adequate alone. Upon recheck following hydration, the patient was improved.    10:52 PM  The patient is feeling much better at this time.  She has no acute findings, she is nontoxic-appearing and afebrile.  Patient will follow up, or return here for any further issues or concerns.  I feel this is her peptic ulcer disease, second to her increased Motrin use, and epigastric discomfort.  She also had relief after the GI cocktail.    If you have had any blood pressure issues while here in the emergency department, please see your doctor for a further evaluation or work up.    Differential diagnoses include but not limited to: Pancreatitis, gastroenteritis, small bowel obstruction, peptic ulcer disease    This patient presents with abdominal pain and constipation.  At this time, I have counseled the patient/family regarding their medications, pain control, and follow up.  They will continue their medications, if any, as prescribed.  They will return immediately for any worsening symptoms and/or any other medical concerns.  They will see their doctor, or contact the doctor provided, in 1-2 days for follow up.       FINAL IMPRESSION  1. Abdominal pain, unspecified abdominal location     2.      Constipation       Electronically signed by: Miki Sandoval D.O., 2/17/2021 9:32 PM         [facility] Pregnancy And Lactation Text: There is insufficient data to evaluate drug-associated risk for major birth defects, miscarriage, or other adverse maternal or fetal outcomes.  There is a pregnancy registry that monitors pregnancy outcomes in pregnant persons exposed to the medication during pregnancy.  It is unknown if this medication is excreted in breast milk.  Do not breastfeed during treatment and for about 4 weeks after the last dose.

## 2024-07-26 ENCOUNTER — GYNECOLOGY VISIT (OUTPATIENT)
Dept: GYNECOLOGY | Facility: CLINIC | Age: 34
End: 2024-07-26
Payer: MEDICAID

## 2024-07-26 VITALS
BODY MASS INDEX: 25.79 KG/M2 | HEART RATE: 74 BPM | SYSTOLIC BLOOD PRESSURE: 122 MMHG | WEIGHT: 155 LBS | DIASTOLIC BLOOD PRESSURE: 64 MMHG

## 2024-07-26 DIAGNOSIS — N80.9 ENDOMETRIOSIS: ICD-10-CM

## 2024-07-26 DIAGNOSIS — R10.2 PELVIC PAIN: ICD-10-CM

## 2024-07-26 PROCEDURE — 3078F DIAST BP <80 MM HG: CPT | Performed by: STUDENT IN AN ORGANIZED HEALTH CARE EDUCATION/TRAINING PROGRAM

## 2024-07-26 PROCEDURE — 3074F SYST BP LT 130 MM HG: CPT | Performed by: STUDENT IN AN ORGANIZED HEALTH CARE EDUCATION/TRAINING PROGRAM

## 2024-07-26 PROCEDURE — 99213 OFFICE O/P EST LOW 20 MIN: CPT | Performed by: STUDENT IN AN ORGANIZED HEALTH CARE EDUCATION/TRAINING PROGRAM

## 2024-07-26 RX ORDER — ESTRADIOL 0.1 MG/G
CREAM VAGINAL
Qty: 1 EACH | Refills: 0 | Status: SHIPPED | OUTPATIENT
Start: 2024-07-26

## 2024-09-04 ENCOUNTER — OFFICE VISIT (OUTPATIENT)
Dept: MEDICAL GROUP | Facility: CLINIC | Age: 34
End: 2024-09-04
Payer: MEDICAID

## 2024-09-04 ENCOUNTER — HOSPITAL ENCOUNTER (OUTPATIENT)
Facility: MEDICAL CENTER | Age: 34
End: 2024-09-04
Payer: MEDICAID

## 2024-09-04 ENCOUNTER — HOSPITAL ENCOUNTER (OUTPATIENT)
Dept: LAB | Facility: MEDICAL CENTER | Age: 34
End: 2024-09-04
Payer: MEDICAID

## 2024-09-04 ENCOUNTER — APPOINTMENT (OUTPATIENT)
Dept: URGENT CARE | Facility: PHYSICIAN GROUP | Age: 34
End: 2024-09-04
Payer: MEDICAID

## 2024-09-04 VITALS
OXYGEN SATURATION: 95 % | HEART RATE: 73 BPM | SYSTOLIC BLOOD PRESSURE: 102 MMHG | WEIGHT: 158 LBS | BODY MASS INDEX: 26.29 KG/M2 | DIASTOLIC BLOOD PRESSURE: 64 MMHG | TEMPERATURE: 97.6 F

## 2024-09-04 DIAGNOSIS — N89.8 VAGINAL DISCHARGE: ICD-10-CM

## 2024-09-04 DIAGNOSIS — R30.0 DYSURIA: ICD-10-CM

## 2024-09-04 DIAGNOSIS — Z20.2 POSSIBLE EXPOSURE TO STI: ICD-10-CM

## 2024-09-04 LAB
APPEARANCE UR: CLEAR
BILIRUB UR STRIP-MCNC: NEGATIVE MG/DL
COLOR UR AUTO: YELLOW
GLUCOSE UR STRIP.AUTO-MCNC: NEGATIVE MG/DL
HCV AB SER QL: NORMAL
HIV 1+2 AB+HIV1 P24 AG SERPL QL IA: NORMAL
KETONES UR STRIP.AUTO-MCNC: NEGATIVE MG/DL
LEUKOCYTE ESTERASE UR QL STRIP.AUTO: NORMAL
NITRITE UR QL STRIP.AUTO: POSITIVE
PH UR STRIP.AUTO: 5 [PH] (ref 5–8)
PROT UR QL STRIP: NEGATIVE MG/DL
RBC UR QL AUTO: NORMAL
SP GR UR STRIP.AUTO: 1
T PALLIDUM AB SER QL IA: NORMAL
UROBILINOGEN UR STRIP-MCNC: 0.2 MG/DL

## 2024-09-04 PROCEDURE — 87660 TRICHOMONAS VAGIN DIR PROBE: CPT

## 2024-09-04 PROCEDURE — 81002 URINALYSIS NONAUTO W/O SCOPE: CPT | Mod: GE

## 2024-09-04 PROCEDURE — 36415 COLL VENOUS BLD VENIPUNCTURE: CPT

## 2024-09-04 PROCEDURE — 99213 OFFICE O/P EST LOW 20 MIN: CPT | Mod: GE

## 2024-09-04 PROCEDURE — 87086 URINE CULTURE/COLONY COUNT: CPT

## 2024-09-04 PROCEDURE — 87480 CANDIDA DNA DIR PROBE: CPT

## 2024-09-04 PROCEDURE — 87186 SC STD MICRODIL/AGAR DIL: CPT

## 2024-09-04 PROCEDURE — 87510 GARDNER VAG DNA DIR PROBE: CPT

## 2024-09-04 PROCEDURE — 86803 HEPATITIS C AB TEST: CPT

## 2024-09-04 PROCEDURE — 87389 HIV-1 AG W/HIV-1&-2 AB AG IA: CPT

## 2024-09-04 PROCEDURE — 86780 TREPONEMA PALLIDUM: CPT

## 2024-09-04 PROCEDURE — 87077 CULTURE AEROBIC IDENTIFY: CPT

## 2024-09-04 RX ORDER — NITROFURANTOIN 25; 75 MG/1; MG/1
100 CAPSULE ORAL EVERY 12 HOURS
Status: SHIPPED | OUTPATIENT
Start: 2024-09-04 | End: 2024-09-07

## 2024-09-04 ASSESSMENT — PATIENT HEALTH QUESTIONNAIRE - PHQ9: CLINICAL INTERPRETATION OF PHQ2 SCORE: 0

## 2024-09-04 NOTE — PROGRESS NOTES
This patient's case was reviewed and discussed with the resident immediately after the patient visit. I agree with the resident's assessment and plan that we developed, as outlined in the note. Report electronically signed by:    Car Sands MD   Family and [facility]  [facility]

## 2024-09-04 NOTE — ASSESSMENT & PLAN NOTE
Interval 11 years cheated on patient, concern for STIs.    Plan  - Labs ordered: HIV, hepatitis C, RPR and media/gonorrhea urine

## 2024-09-04 NOTE — PROGRESS NOTES
CC:Diagnoses of Dysuria, Vaginal discharge, and Possible exposure to STI were pertinent to this visit.    HISTORY OF PRESENT ILLNESS: Patient is a 33 y.o. female established patient who presents today for the following:    Problem   Dysuria   Possible Exposure to Sti   Vaginal Discharge    Patient reports that she has been having abnormal discharge described as thick white for the past several days.  Patient has tried Azo for the last 3 days and has not improved her symptoms.  Patient has history of BV and vaginal candidiasis.  Patient reports moderate itching dysuria.  Patient also reports that her partner of 11 years recently cheated on her and she is concerned for STIs.  She reports that she is no longer with her previous partner.  Denies any pelvic pain or back pain.        Patient Active Problem List    Diagnosis Date Noted    Dysuria 09/04/2024    Possible exposure to STI 09/04/2024    Endometriosis, suspected 04/25/2024    HSV-2 infection 09/11/2023    Urinary urgency 09/11/2023    History of pubovaginal sling 03/24/2023    Post-void dribbling 03/24/2023    Screen for STD (sexually transmitted disease) 05/06/2022    Family history of heart disease 05/06/2022    Lipid screening 05/06/2022    Suspicious nevus 05/06/2022    Reactive depression 05/06/2022    Vaginal candidiasis 01/14/2022    Gastroesophageal reflux disease 01/14/2022    Pain of upper abdomen 10/08/2021    Breast mass in female 10/08/2021    Adnexal pain 09/16/2021    Migraine with aura, intractable, with status migrainosus 02/25/2021    Mixed anxiety depressive disorder 11/24/2020    Vaginal discharge 07/31/2020    S/P total hysterectomy 06/12/2020    UTI (urinary tract infection) 06/07/2020    Family history of breast cancer 02/26/2020    Bacterial vaginosis 10/30/2019    Female cystocele 06/27/2019      Allergies:Cantaloupe, Tape, and Celecoxib    Current Outpatient Medications   Medication Sig Dispense Refill    estradiol (ESTRACE VAGINAL)  0.1 MG/GM vaginal cream Apply 1g cream inside vagina twice per week 1 Each 0    norethindrone-ethinyl estradiol (LOESTRIN , ,) 1-20 MG-MCG per tablet Take 1 Tablet by mouth every day. 28 Tablet 6    omeprazole (PRILOSEC) 20 MG delayed-release capsule TAKE 1 CAPSULE BY MOUTH EVERY DAY FOR GERD 90 Capsule 0    HYDROcodone-acetaminophen (NORCO) 5-325 MG Tab per tablet TAKE 1/2 TABLET BY MOUTH TWICE DAILY AS NEEDED      ondansetron (ZOFRAN ODT) 4 MG TABLET DISPERSIBLE Take 1 Tablet by mouth every 8 hours as needed. 10 Tablet 0    SUMAtriptan (IMITREX) 25 MG Tab tablet Take  mg by mouth one time as needed. 1-2 times daily PRN for headaches      VITAMIN D PO Take  by mouth.      VITAMINS C E PO Take  by mouth.      diclofenac (CATAFLAM) 50 MG tablet DICLOFENAC POTASSIUM 50 MG TABS      oxybutynin (DITROPAN) 5 MG Tab TAKE 1 TABLET BY MOUTH EVERY DAY (Patient not taking: Reported on 2024) 90 Tablet 1     Current Facility-Administered Medications   Medication Dose Route Frequency Provider Last Rate Last Admin    nitrofurantoin (Macrobid) capsule 100 mg  100 mg Oral Q12HRS            Social History     Tobacco Use    Smoking status: Former     Current packs/day: 0.00     Average packs/day: 0.3 packs/day for 15.0 years (3.8 ttl pk-yrs)     Types: Cigarettes     Start date: 2004     Quit date: 2019     Years since quittin.1    Smokeless tobacco: Never   Vaping Use    Vaping status: Every Day    Substances: Nicotine, Flavoring    Devices: RefCebaTechble tank   Substance Use Topics    Alcohol use: Not Currently    Drug use: Never     Social History     Social History Narrative    Not on file       Family History   Problem Relation Age of Onset    Cancer Mother 45        breast cancer, lymph node,    Heart Attack Father     Gout Father     Cancer Maternal Grandmother         breast cancer       Exam:    /64 (BP Location: Right arm, Patient Position: Sitting, BP Cuff Size: Adult)   Pulse 73   Temp  36.4 °C (97.6 °F) (Temporal)   Wt 71.7 kg (158 lb)   SpO2 95%  Body mass index is 26.29 kg/m².    General:  Well nourished, well developed female in NAD  HENT: Atraumatic, normocephalic  EYES: Extraocular movements intact, pupils equal and reactive to light  NECK: Supple, FROM  CHEST: No deformities, Equal chest expansion  RESP: Unlabored, no stridor or audible wheeze  ABD: Soft, tenderness to palpation in suprapubic region, Non-Distended  BACK: No CVA tenderness  Extremities: No Clubbing, Cyanosis, or Edema  Skin: Warm/dry, without rashes  Neuro: A/O x 4, CN 2-12 Grossly intact, Motor/sensory grossly intact  Psych: Normal behavior, normal affect    LABS: Results reviewed and discussed with the patient, questions answered.   Latest Reference Range & Units 09/04/24 11:43   POC Color Negative  yellow   POC Appearance Negative  clear   POC Specific Gravity <1.005 - >1.030  1.00   POC Urine PH 5.0 - 8.0  5.0   POC Glucose Negative mg/dL negative   POC Ketones Negative mg/dL negative   POC Protein Negative mg/dL negative   POC Nitrites Negative  positive   POC Leukocyte Esterase Negative  small   POC Blood Negative  small   POC Bilirubin Negative mg/dL negative   POC Urobiligen Negative (0.2) mg/dL 0.2     ASSESSMENT/PLAN:    Vaginal discharge  33-year-old female presents to clinic with a 3-day history of dysuria and thick white vaginal discharge.  History of BV and vaginal candidiasis.  Partner of 11 years recently cheated on patient's concern for STIs.  Denies any pelvic pain or back pain.  Include neck patient afebrile.  PE: Tenderness to palpation in suprapubic region.  No CVA tenderness.  UA positive for: Nitrates and leukoesterase indicating UTI.    Plan  - Macrobid 100 mg BID for 5 days  - Patient can continue as though she thinks that it will help with her symptoms  - Labs ordered: Self swab BV/trich/Candida PCR and urine culture  - Encourage patient to drink plenty of water  - Return precautions given.   Discussed signs and symptoms that would facilitate patient going into the ED to be evaluated  - Follow-up as needed    Possible exposure to STI  Interval 11 years cheated on patient, concern for STIs.    Plan  - Labs ordered: HIV, hepatitis C, RPR and media/gonorrhea urine     Return if symptoms worsen or fail to improve.    Meena Godoy MD PGY3

## 2024-09-04 NOTE — ASSESSMENT & PLAN NOTE
33-year-old female presents to clinic with a 3-day history of dysuria and thick white vaginal discharge.  History of BV and vaginal candidiasis.  Partner of 11 years recently cheated on patient's concern for STIs.  Denies any pelvic pain or back pain.  Include neck patient afebrile.  PE: Tenderness to palpation in suprapubic region.  No CVA tenderness.  UA positive for: Nitrates and leukoesterase indicating UTI.    Plan  - Macrobid 100 mg BID for 5 days  - Patient can continue as though she thinks that it will help with her symptoms  - Labs ordered: Self swab BV/trich/Candida PCR and urine culture  - Encourage patient to drink plenty of water  - Return precautions given.  Discussed signs and symptoms that would facilitate patient going into the ED to be evaluated  - Follow-up as needed

## 2024-09-05 ENCOUNTER — TELEPHONE (OUTPATIENT)
Dept: MEDICAL GROUP | Facility: CLINIC | Age: 34
End: 2024-09-05
Payer: MEDICAID

## 2024-09-05 LAB
CANDIDA DNA VAG QL PROBE+SIG AMP: POSITIVE
G VAGINALIS DNA VAG QL PROBE+SIG AMP: NEGATIVE
T VAGINALIS DNA VAG QL PROBE+SIG AMP: NEGATIVE

## 2024-09-05 NOTE — TELEPHONE ENCOUNTER
Phone Number Called: [phone] (home)       Call outcome: Spoke to patient regarding message below.    Message: Called the patient regarding her prescription for Nitrofurantoin. Patient saw the lab results and that it was positive ad she was wondering if you will send a pill for it. Please advise. Thank you so much.      Best,  Kole

## 2024-09-06 DIAGNOSIS — B37.31 YEAST INFECTION OF THE VAGINA: ICD-10-CM

## 2024-09-06 RX ORDER — FLUCONAZOLE 150 MG/1
150 TABLET ORAL ONCE
Qty: 1 TABLET | Refills: 0 | Status: SHIPPED | OUTPATIENT
Start: 2024-09-06 | End: 2024-09-06

## 2024-09-13 ENCOUNTER — OFFICE VISIT (OUTPATIENT)
Dept: MEDICAL GROUP | Facility: CLINIC | Age: 34
End: 2024-09-13
Payer: MEDICAID

## 2024-09-13 VITALS
SYSTOLIC BLOOD PRESSURE: 92 MMHG | HEART RATE: 63 BPM | BODY MASS INDEX: 26.66 KG/M2 | OXYGEN SATURATION: 93 % | TEMPERATURE: 97.4 F | HEIGHT: 65 IN | DIASTOLIC BLOOD PRESSURE: 62 MMHG | WEIGHT: 160 LBS

## 2024-09-13 DIAGNOSIS — B37.31 VAGINAL CANDIDIASIS: ICD-10-CM

## 2024-09-13 DIAGNOSIS — D22.9 SUSPICIOUS NEVUS: ICD-10-CM

## 2024-09-13 PROCEDURE — 99214 OFFICE O/P EST MOD 30 MIN: CPT | Mod: GC

## 2024-09-13 RX ORDER — FLUCONAZOLE 150 MG/1
150 TABLET ORAL DAILY
Qty: 1 TABLET | Refills: 0 | Status: SHIPPED | OUTPATIENT
Start: 2024-09-13 | End: 2024-09-14

## 2024-09-13 NOTE — ASSESSMENT & PLAN NOTE
Patient's nevus is greater than 6 mm in size and has discoloration with telangiectasias.  Concern for atypia.  Referral to dermatology given the location of the lesion.  
Prescribed Diflucan 150 mg once.  
Abdominal pain

## 2024-09-13 NOTE — PROGRESS NOTES
"Subjective:     CC: Abnormal mole    HPI:   Lali who presents today with:    Problem   Suspicious Nevus    Patient has a suspicious nevus forehead as well as on her left chin.  They have both been growing in size in the past few months.  She denies any overlying skin irritation or itching.  Patient was seen by a dermatologist over a telemedicine visit in [place].  She was told to use a cream over the nevus on her forehead which has dried out but it has not removed it.     Vaginal Candidiasis    Patient reports symptoms of clear vaginal discharge after completing a course of antibiotics.         Current Outpatient Medications Ordered in Epic   Medication Sig Dispense Refill    fluconazole (DIFLUCAN) 150 MG tablet Take 1 Tablet by mouth every day for 1 dose. 1 Tablet 0    estradiol (ESTRACE VAGINAL) 0.1 MG/GM vaginal cream Apply 1g cream inside vagina twice per week 1 Each 0    norethindrone-ethinyl estradiol (LOESTRIN 1/20, 21,) 1-20 MG-MCG per tablet Take 1 Tablet by mouth every day. 28 Tablet 6    omeprazole (PRILOSEC) 20 MG delayed-release capsule TAKE 1 CAPSULE BY MOUTH EVERY DAY FOR GERD 90 Capsule 0    HYDROcodone-acetaminophen (NORCO) 5-325 MG Tab per tablet TAKE 1/2 TABLET BY MOUTH TWICE DAILY AS NEEDED      ondansetron (ZOFRAN ODT) 4 MG TABLET DISPERSIBLE Take 1 Tablet by mouth every 8 hours as needed. 10 Tablet 0    SUMAtriptan (IMITREX) 25 MG Tab tablet Take  mg by mouth one time as needed. 1-2 times daily PRN for headaches      VITAMIN D PO Take  by mouth.      VITAMINS C E PO Take  by mouth.      diclofenac (CATAFLAM) 50 MG tablet DICLOFENAC POTASSIUM 50 MG TABS       No current Epic-ordered facility-administered medications on file.       ROS:  ROS as per HPI. Otherwise negative.      Objective:     Exam:  BP 92/62   Pulse 63   Temp 36.3 °C (97.4 °F) (Temporal)   Ht 1.651 m (5' 5\")   Wt 72.6 kg (160 lb)   LMP  (LMP Unknown)   SpO2 93%   BMI 26.63 kg/m²  Body mass index is 26.63 " kg/m².    Gen: Alert and oriented, No apparent distress.  Lungs: Normal effort, CTA bilaterally, no wheezes, rhonchi, or rales  CV: Regular rate and rhythm. No murmurs, rubs, or gallops.  Skin: 8 x 6 mm hyperpigmented shin on right forehead with discoloration and telangiectasias.  4 x 4 millimeter hyperpigmented nevus on corner of left lip irregular borders.    Assessment & Plan:     33 y.o. female with the following -     Problem List Items Addressed This Visit       Vaginal candidiasis     Prescribed Diflucan 150 mg once.         Suspicious nevus     Patient's nevus is greater than 6 mm in size and has discoloration with telangiectasias.  Concern for atypia.  Referral to dermatology given the location of the lesion.         Relevant Orders    Referral to Dermatology         Return if symptoms worsen or fail to improve.

## 2024-09-27 ENCOUNTER — APPOINTMENT (OUTPATIENT)
Dept: MEDICAL GROUP | Facility: CLINIC | Age: 34
End: 2024-09-27
Payer: MEDICAID

## 2024-09-27 ENCOUNTER — OFFICE VISIT (OUTPATIENT)
Dept: MEDICAL GROUP | Facility: CLINIC | Age: 34
End: 2024-09-27
Payer: MEDICAID

## 2024-09-27 VITALS
DIASTOLIC BLOOD PRESSURE: 66 MMHG | OXYGEN SATURATION: 97 % | WEIGHT: 160 LBS | HEIGHT: 65 IN | TEMPERATURE: 97.6 F | HEART RATE: 83 BPM | BODY MASS INDEX: 26.66 KG/M2 | RESPIRATION RATE: 16 BRPM | SYSTOLIC BLOOD PRESSURE: 80 MMHG

## 2024-09-27 DIAGNOSIS — L60.0 INGROWN NAIL OF GREAT TOE: ICD-10-CM

## 2024-09-28 NOTE — PROGRESS NOTES
"Subjective:     CC: Ingrown toenail    HPI:   Lali presents today for concern of ingrown toenail.  Patient reports that she has noticed an ingrown toenail of her right big toe for the past week.  She has been trying to take out the edge of her toenail unsuccessfully.  She noticed some pus with mild drainage.  She continues to able to walk on her feet without severe pain.  No fevers.  Patient does have history of ingrown toenails of her bilateral big toes.  She previously had an ingrown toenail removal performed on her left toe 7 years ago.  Patient has no other concerns at this time.      Current Outpatient Medications Ordered in Epic   Medication Sig Dispense Refill    estradiol (ESTRACE VAGINAL) 0.1 MG/GM vaginal cream Apply 1g cream inside vagina twice per week 1 Each 0    norethindrone-ethinyl estradiol (LOESTRIN 1/20, 21,) 1-20 MG-MCG per tablet Take 1 Tablet by mouth every day. 28 Tablet 6    omeprazole (PRILOSEC) 20 MG delayed-release capsule TAKE 1 CAPSULE BY MOUTH EVERY DAY FOR GERD 90 Capsule 0    HYDROcodone-acetaminophen (NORCO) 5-325 MG Tab per tablet TAKE 1/2 TABLET BY MOUTH TWICE DAILY AS NEEDED      ondansetron (ZOFRAN ODT) 4 MG TABLET DISPERSIBLE Take 1 Tablet by mouth every 8 hours as needed. 10 Tablet 0    SUMAtriptan (IMITREX) 25 MG Tab tablet Take  mg by mouth one time as needed. 1-2 times daily PRN for headaches      VITAMIN D PO Take  by mouth.      VITAMINS C E PO Take  by mouth.      diclofenac (CATAFLAM) 50 MG tablet DICLOFENAC POTASSIUM 50 MG TABS       No current Epic-ordered facility-administered medications on file.       Objective:     Exam:  BP (!) 80/66 (BP Location: Right arm, Patient Position: Sitting, BP Cuff Size: Adult)   Pulse 83   Temp 36.4 °C (97.6 °F) (Temporal)   Resp 16   Ht 1.651 m (5' 5\")   Wt 72.6 kg (160 lb)   LMP  (LMP Unknown)   SpO2 97%   BMI 26.63 kg/m²  Body mass index is 26.63 kg/m².    Gen: Alert and oriented, No apparent " distress.  Lungs: Normal effort  Skin: Mild edema along the right big toe lateral nail edge without pustule formation or surrounding erythema  Ext: No clubbing, cyanosis, edema.    Assessment & Plan:     33 y.o. female with the following -     1. Ingrown nail of great toe  Patient here with mild ingrown toenail of her right big toe that started worsening over this past week.  Patient with mild edema, no surrounding erythema, abscess formation, or difficulty walking.  Patient is to schedule an appointment for ingrown toenail removal.  Low suspicion for needing antibiotics given does not appear infectious component.  Patient is to continue to use Epsom salt baths to help with inflammation.  Patient will follow-up right next week for procedure.    Follow up for ingrown toe nail procedure     Kami Singer M.D.  PGY-3 [facility]

## 2024-09-30 ENCOUNTER — OFFICE VISIT (OUTPATIENT)
Dept: MEDICAL GROUP | Facility: CLINIC | Age: 34
End: 2024-09-30
Payer: MEDICAID

## 2024-09-30 VITALS
OXYGEN SATURATION: 94 % | RESPIRATION RATE: 16 BRPM | HEART RATE: 68 BPM | BODY MASS INDEX: 26.82 KG/M2 | TEMPERATURE: 97.1 F | DIASTOLIC BLOOD PRESSURE: 60 MMHG | SYSTOLIC BLOOD PRESSURE: 95 MMHG | WEIGHT: 161 LBS | HEIGHT: 65 IN

## 2024-09-30 DIAGNOSIS — G43.111 MIGRAINE WITH AURA, INTRACTABLE, WITH STATUS MIGRAINOSUS: ICD-10-CM

## 2024-09-30 DIAGNOSIS — L60.0 INGROWN TOENAIL: ICD-10-CM

## 2024-09-30 RX ORDER — SUMATRIPTAN 25 MG/1
25-100 TABLET, FILM COATED ORAL
Qty: 30 TABLET | Refills: 2 | Status: SHIPPED | OUTPATIENT
Start: 2024-09-30

## 2024-09-30 NOTE — PROGRESS NOTES
"This note is formatted in an APSO format, for additional subjective and objective evaluation please scroll to the bottom of the note.    CC:toenail removal, med refill    Please see procedure note for same day - toenail removal    Assessment/Plan:  Problem List Items Addressed This Visit       Migraine with aura, intractable, with status migrainosus     Frequent migraines ~3x/week, sumatriptan helps. Asking for refill.  - Pt needs preventative agent, not just abortive. Discussed and pt agrees to schedule f/u appt at next available to discuss migraines  - refilled sumatriptan         Relevant Medications    SUMAtriptan (IMITREX) 25 MG Tab tablet     Other Visit Diagnoses       Ingrown toenail        Relevant Orders    Consent for all Surgical, Special Diagnostic or Therapeutic Procedures           Orders Placed This Encounter    SUMAtriptan (IMITREX) 25 MG Tab tablet    Consent for all Surgical, Special Diagnostic or Therapeutic Procedures       HISTORY OF PRESENT ILLNESS:   Needs them a few times a week.      Problem   Migraine With Aura, Intractable, With Status Migrainosus    Dr. Zamora, 2021:   History of frontal migraines, sensitivity to light and sound  -Takes Aleve and Tylenol as needed  -Sumatriptan as needed           Exam:    BP 95/60 (BP Location: Left arm, Patient Position: Sitting, BP Cuff Size: Adult)   Pulse 68   Temp 36.2 °C (97.1 °F) (Temporal)   Resp 16   Ht 1.651 m (5' 5\")   Wt 73 kg (161 lb)   LMP  (LMP Unknown)   SpO2 94%   BMI 26.79 kg/m²  Body mass index is 26.79 kg/m².    Gen: Well appearing. No apparent distress. Well developed. Sitting comfortably on chair  HEENT: NCAT, MMM  Neck: Supple, FROM  Chest: No deformities, Equal chest expansion  Abd: Non-distended.  Ext: No cyanosis. No edema.  Skin: Warm/dry. No visible rashes.  Neuro: Non-focal. A&Ox4.  Psych: Normal behavior, normal affect      Return in about 1 week (around 10/7/2024).    Matthias Gates MD  [facility]    "

## 2024-10-01 NOTE — ASSESSMENT & PLAN NOTE
Frequent migraines ~3x/week, sumatriptan helps. Asking for refill.  - Pt needs preventative agent, not just abortive. Discussed and pt agrees to schedule f/u appt at next available to discuss migraines  - refilled sumatriptan

## 2024-10-01 NOTE — PROCEDURES
Toenail Avulsion Procedure Note    PRE-OP DIAGNOSIS: Ingrown toenail  POST-OP DIAGNOSIS: Same   PROCEDURE: toenail avulsion  Performing Physician: Matthias Gates MD    PROCEDURE:     Risks, benefits, and alternatives were discussed with the patient and the patient elected to proceed with the procedure. Consent was signed. Correct toe and laterality were confirmed prior to the procedure.  The patient was placed in the supine position, with  the knees flexed (foot flat on the table).    The first toe on the right was cleaned and disinfected. A standard digital block was performed, using a 5-mL syringe and a 27-gauge needle. 4.5mL 1% lidocaine was injected resulting in effective digital block. The block was tested and ensured to be effective prior to continuing. The toe was prepped with povidone-iodine solution and draped in the usual sterile manner.    A nail elevator was slid under the cuticle to separate the nail plate from the overlying proximal nail fold.    The lateral 30% of the nail plate was cut free using bandage scissors and gently pulled free with a hemostat.    Chemical ablation with phenol was used to destroy the nail-forming matrix beneath the area where the nail plate  has been removed.    Antibiotic ointment was applied and gauze was applied followed by a dressing.     Followup: The patient tolerated the procedure well without  complications.  Standard post-procedure care is explained and return  precautions are given.

## 2024-10-02 RX ORDER — SUMATRIPTAN SUCCINATE 25 MG/1
25-100 TABLET ORAL
Qty: 30 TABLET | Refills: 2 | OUTPATIENT
Start: 2024-10-02

## 2024-10-02 NOTE — TELEPHONE ENCOUNTER
Received request via: Pharmacy    Was the patient seen in the last year in this department? Yes    Does the patient have an active prescription (recently filled or refills available) for medication(s) requested? No    Pharm[facility] Name: [facility]    Does the patient have care home Plus and need 100-day supply? (This applies to ALL medications) Patient does not have SCP

## 2024-10-02 NOTE — OR NURSING
1103 Received pt from OR, received report from OR RN and anesthesiologist. Pt sleeping, oral airway in place. Pt has 4 lap stabs to lower abdomen, all covered with steri strips. No bleeding observed. Pt has resendez catheter in place, draining clear bright yellow urine.     1118 Pt waking up, pt.'s oral airway dc'd.    1123 Pt medicated with fent for pain 9/10 to abdomen.     1130 Pt medicated with fent for pain 9/10 to abdomden.     1153 Pt medicated with dilaudid for pain 8/10 to abdomen.     1157 Pt medicated with oxy for pain 6/10 to abdomen.     1205 Pt medicated with dilaudid for pain 6/10 to abdomen.     1220 Report to Caroline, margie RN.     1230 Pt discharged to phase 2, pt meets criteria for discharge.    03-Oct-2024 05:09

## 2024-10-10 ENCOUNTER — OFFICE VISIT (OUTPATIENT)
Dept: MEDICAL GROUP | Facility: CLINIC | Age: 34
End: 2024-10-10
Payer: MEDICAID

## 2024-10-10 VITALS
BODY MASS INDEX: 26.33 KG/M2 | HEIGHT: 65 IN | WEIGHT: 158 LBS | HEART RATE: 76 BPM | OXYGEN SATURATION: 93 % | SYSTOLIC BLOOD PRESSURE: 98 MMHG | RESPIRATION RATE: 16 BRPM | DIASTOLIC BLOOD PRESSURE: 63 MMHG | TEMPERATURE: 98.6 F

## 2024-10-10 DIAGNOSIS — G43.701 CHRONIC MIGRAINE WITHOUT AURA WITH STATUS MIGRAINOSUS, NOT INTRACTABLE: ICD-10-CM

## 2024-10-10 DIAGNOSIS — Z23 NEED FOR VACCINATION: ICD-10-CM

## 2024-10-10 PROBLEM — G43.909 MIGRAINE: Status: ACTIVE | Noted: 2021-02-25

## 2024-10-10 PROBLEM — G43.111 MIGRAINE WITH AURA, INTRACTABLE, WITH STATUS MIGRAINOSUS: Chronic | Status: ACTIVE | Noted: 2021-02-25

## 2024-10-10 PROCEDURE — 99214 OFFICE O/P EST MOD 30 MIN: CPT

## 2024-10-10 RX ORDER — AMITRIPTYLINE HYDROCHLORIDE 10 MG/1
10 TABLET ORAL NIGHTLY
Qty: 60 TABLET | Refills: 1 | Status: SHIPPED | OUTPATIENT
Start: 2024-10-10

## 2024-10-29 ENCOUNTER — OFFICE VISIT (OUTPATIENT)
Dept: GYNECOLOGY | Facility: CLINIC | Age: 34
End: 2024-10-29
Payer: MEDICAID

## 2024-10-29 VITALS
HEART RATE: 74 BPM | DIASTOLIC BLOOD PRESSURE: 65 MMHG | WEIGHT: 160 LBS | BODY MASS INDEX: 26.63 KG/M2 | SYSTOLIC BLOOD PRESSURE: 108 MMHG

## 2024-10-29 DIAGNOSIS — R10.2 CHRONIC PELVIC PAIN IN FEMALE: ICD-10-CM

## 2024-10-29 DIAGNOSIS — N83.201 CYSTS OF BOTH OVARIES: ICD-10-CM

## 2024-10-29 DIAGNOSIS — G89.29 CHRONIC PELVIC PAIN IN FEMALE: ICD-10-CM

## 2024-10-29 DIAGNOSIS — N83.202 CYSTS OF BOTH OVARIES: ICD-10-CM

## 2024-10-29 DIAGNOSIS — M79.18 MYOFASCIAL PAIN: ICD-10-CM

## 2024-10-29 RX ORDER — NORETHINDRONE 5 MG/1
5 TABLET ORAL DAILY
Qty: 90 TABLET | Refills: 0 | Status: SHIPPED | OUTPATIENT
Start: 2024-10-29 | End: 2025-01-27

## 2024-10-29 RX ORDER — LACTOBACILLUS ACIDOPHILUS 1B CELL
1 TABLET ORAL
COMMUNITY

## 2024-11-08 ENCOUNTER — APPOINTMENT (OUTPATIENT)
Dept: MEDICAL GROUP | Facility: CLINIC | Age: 34
End: 2024-11-08
Payer: MEDICAID

## 2024-12-13 ENCOUNTER — OFFICE VISIT (OUTPATIENT)
Dept: MEDICAL GROUP | Facility: CLINIC | Age: 34
End: 2024-12-13
Payer: MEDICAID

## 2024-12-13 VITALS
OXYGEN SATURATION: 95 % | WEIGHT: 160 LBS | HEART RATE: 85 BPM | SYSTOLIC BLOOD PRESSURE: 90 MMHG | DIASTOLIC BLOOD PRESSURE: 63 MMHG | HEIGHT: 65 IN | BODY MASS INDEX: 26.66 KG/M2 | TEMPERATURE: 97.7 F

## 2024-12-13 DIAGNOSIS — F40.240 CLAUSTROPHOBIA: ICD-10-CM

## 2024-12-13 DIAGNOSIS — Z83.438 FAMILY HISTORY OF HYPERLIPIDEMIA: ICD-10-CM

## 2024-12-13 DIAGNOSIS — R42 DIZZINESS: ICD-10-CM

## 2024-12-13 DIAGNOSIS — Z80.3 FAMILY HISTORY OF BREAST CANCER: ICD-10-CM

## 2024-12-13 DIAGNOSIS — Z23 NEED FOR VACCINATION: ICD-10-CM

## 2024-12-13 PROCEDURE — 3074F SYST BP LT 130 MM HG: CPT | Mod: GC

## 2024-12-13 PROCEDURE — 3078F DIAST BP <80 MM HG: CPT | Mod: GC

## 2024-12-13 PROCEDURE — 99214 OFFICE O/P EST MOD 30 MIN: CPT | Mod: GC

## 2024-12-13 RX ORDER — DIAZEPAM 5 MG/1
5 TABLET ORAL
Qty: 1 TABLET | Refills: 0 | Status: SHIPPED | OUTPATIENT
Start: 2024-12-13 | End: 2025-02-13

## 2024-12-13 NOTE — PROGRESS NOTES
Subjective:     CC: MRI request, mammogram    HPI:   Lali with migraines who presents today with:    Problem   Dizziness    Patient describes a room-spinning dizziness for several months now. This occurs when she goes from a sitting to a standing position as well as when she turns her head quickly. It will happen daily. Patient will experience nausea with this. Patient reports some mild muffled hearing in bilateral ears. She did get hearing testing which reportedly showed bilateral mild hearing impairment in loud environments.  Patient denies any cardiac issues or shortness of breath when this occurs.     Family History of Hyperlipidemia    Reports a family history of high cholesterol.  She states that her father had heart attack in his mid 50s as well as her paternal grandfather.  Patient was also told that during her sister's autopsy, she had narrowing of her cardiac arteries.  Her sister did not pass away of a heart attack.     Family History of Breast Cancer    Maternal history in patient's mom and grandma. Patient is getting breast MRI's every 6-12 months.         Current Outpatient Medications Ordered in Epic   Medication Sig Dispense Refill    diazePAM (VALIUM) 5 MG Tab Take 1 Tablet by mouth one time as needed for Anxiety for up to 1 dose. 1 Tablet 0    Lactobacillus (PROBIATA) Tab Take 1 Each by mouth 1 time a day as needed.      SUMAtriptan (IMITREX) 25 MG Tab tablet Take 1-4 Tablets by mouth one time as needed for Migraine. 1-2 times daily PRN for headaches 30 Tablet 2    estradiol (ESTRACE VAGINAL) 0.1 MG/GM vaginal cream Apply 1g cream inside vagina twice per week 1 Each 0    omeprazole (PRILOSEC) 20 MG delayed-release capsule TAKE 1 CAPSULE BY MOUTH EVERY DAY FOR GERD 90 Capsule 0    HYDROcodone-acetaminophen (NORCO) 5-325 MG Tab per tablet TAKE 1/2 TABLET BY MOUTH TWICE DAILY AS NEEDED      ondansetron (ZOFRAN ODT) 4 MG TABLET DISPERSIBLE Take 1 Tablet by mouth every 8 hours as needed. 10 Tablet  "0    diclofenac (CATAFLAM) 50 MG tablet DICLOFENAC POTASSIUM 50 MG TABS      norethindrone (AYGESTIN) 5 MG tablet Take 1 Tablet by mouth every day for 90 days. (Patient not taking: Reported on 12/13/2024) 90 Tablet 0    amitriptyline (ELAVIL) 10 MG Tab Take 1 Tablet by mouth every evening. (Patient not taking: Reported on 12/13/2024) 60 Tablet 1    VITAMIN D PO Take  by mouth. (Patient not taking: Reported on 12/13/2024)      VITAMINS C E PO Take  by mouth. (Patient not taking: Reported on 12/13/2024)       No current [facility]-ordered facility-administered medications on file.       ROS:  ROS as per HPI. Otherwise negative.      Objective:     Exam:  BP 94/59 (BP Location: Left arm, Patient Position: Sitting, BP Cuff Size: Adult)   Pulse 86   Temp 36.5 °C (97.7 °F) (Temporal)   Ht 1.651 m (5' 5\")   Wt 72.6 kg (160 lb)   LMP  (LMP Unknown)   SpO2 94%   BMI 26.63 kg/m²  Body mass index is 26.63 kg/m².    Gen: Alert and oriented, No apparent distress.  HEENT: Normal TM's. Mucous membranes moist and clear. Neck is supple without lymphadenopathy.  Lungs: Normal effort, CTA bilaterally, no wheezes, rhonchi, or rales  CV: Regular rate and rhythm. No murmurs, rubs, or gallops.  Neuro: Negative [place]-Hallpike.  Cranial nerve exam II-XII intact.    Orthostatics:  Laying: BP 94/62, Pulse 60  Sitting: /68, Pulse 80  Standing: BP 90/63, Pulse 85      Assessment & Plan:     34 y.o. female with the following -     Problem List Items Addressed This Visit       Family history of breast cancer     Placed order for MRI. Patient requesting medication for Claustrophobia. Prescribed Valium 5mg to be taken 30 minutes fore MRI as Xanax did not previously work.         Relevant Orders    MR-BREAST-BILATERAL-WITH & W/O    Dizziness     Cranial nerve exam was within normal limits.  [place]lic[place]-Hallpike was negative.  Orthostatics were positive for pulse change when going from a laying to sitting position.  Differentials include orthostatic " hypotension versus vertigo.  Discussed vestibular therapy and placed referral for this.  Patient will follow-up 1 month after starting therapy and if she has not had improvement in her symptoms, we will order an MRI brain.         Relevant Orders    Referral to Physical Therapy    Family history of hyperlipidemia     Ordered lipid profile as well as CMP to rule out hyperglycemia or fatty liver disease.         Relevant Orders    Lipid Profile    Comp Metabolic Panel     Other Visit Diagnoses       Need for vaccination        Claustrophobia        Relevant Medications    diazePAM (VALIUM) 5 MG Tab              Return in about 2 months (around 2/13/2025).

## 2024-12-13 NOTE — ASSESSMENT & PLAN NOTE
Cranial nerve exam was within normal limits.  Alicia-Hallpike was negative.  Orthostatics were positive for pulse change when going from a laying to sitting position.  Differentials include orthostatic hypotension versus vertigo.  Discussed vestibular therapy and placed referral for this.  Patient will follow-up 1 month after starting therapy and if she has not had improvement in her symptoms, we will order an MRI brain.

## 2024-12-13 NOTE — ASSESSMENT & PLAN NOTE
Placed order for MRI. Patient requesting medication for Claustrophobia. Prescribed Valium 5mg to be taken 30 minutes fore MRI as Xanax did not previously work.

## 2024-12-24 ENCOUNTER — HOSPITAL ENCOUNTER (OUTPATIENT)
Dept: RADIOLOGY | Facility: MEDICAL CENTER | Age: 34
End: 2024-12-24
Payer: MEDICAID

## 2024-12-24 DIAGNOSIS — Z12.31 VISIT FOR SCREENING MAMMOGRAM: ICD-10-CM

## 2024-12-24 PROCEDURE — 77067 SCR MAMMO BI INCL CAD: CPT

## 2025-01-23 ENCOUNTER — OFFICE VISIT (OUTPATIENT)
Dept: MEDICAL GROUP | Facility: CLINIC | Age: 35
End: 2025-01-23
Payer: MEDICAID

## 2025-01-23 VITALS
OXYGEN SATURATION: 95 % | BODY MASS INDEX: 26.99 KG/M2 | DIASTOLIC BLOOD PRESSURE: 65 MMHG | WEIGHT: 162 LBS | RESPIRATION RATE: 18 BRPM | HEIGHT: 65 IN | SYSTOLIC BLOOD PRESSURE: 88 MMHG | HEART RATE: 69 BPM | TEMPERATURE: 97.2 F

## 2025-01-23 DIAGNOSIS — L60.0 INGROWN TOENAIL: ICD-10-CM

## 2025-01-23 DIAGNOSIS — Z23 NEED FOR VACCINATION: ICD-10-CM

## 2025-01-23 DIAGNOSIS — H93.8X2 EAR FULLNESS, LEFT: ICD-10-CM

## 2025-01-23 PROCEDURE — 3078F DIAST BP <80 MM HG: CPT | Mod: GE

## 2025-01-23 PROCEDURE — 90471 IMMUNIZATION ADMIN: CPT | Performed by: FAMILY MEDICINE

## 2025-01-23 PROCEDURE — 99213 OFFICE O/P EST LOW 20 MIN: CPT | Mod: 25,GE

## 2025-01-23 PROCEDURE — 3074F SYST BP LT 130 MM HG: CPT | Mod: GE

## 2025-01-23 PROCEDURE — 90656 IIV3 VACC NO PRSV 0.5 ML IM: CPT | Performed by: FAMILY MEDICINE

## 2025-01-23 ASSESSMENT — PATIENT HEALTH QUESTIONNAIRE - PHQ9
SUM OF ALL RESPONSES TO PHQ QUESTIONS 1-9: 0
SUM OF ALL RESPONSES TO PHQ9 QUESTIONS 1 AND 2: 0
7. TROUBLE CONCENTRATING ON THINGS, SUCH AS READING THE NEWSPAPER OR WATCHING TELEVISION: NOT AT ALL
1. LITTLE INTEREST OR PLEASURE IN DOING THINGS: NOT AT ALL
3. TROUBLE FALLING OR STAYING ASLEEP OR SLEEPING TOO MUCH: NOT AT ALL
4. FEELING TIRED OR HAVING LITTLE ENERGY: NOT AT ALL
9. THOUGHTS THAT YOU WOULD BE BETTER OFF DEAD, OR OF HURTING YOURSELF: NOT AT ALL
5. POOR APPETITE OR OVEREATING: NOT AT ALL
8. MOVING OR SPEAKING SO SLOWLY THAT OTHER PEOPLE COULD HAVE NOTICED. OR THE OPPOSITE, BEING SO FIGETY OR RESTLESS THAT YOU HAVE BEEN MOVING AROUND A LOT MORE THAN USUAL: NOT AT ALL
6. FEELING BAD ABOUT YOURSELF - OR THAT YOU ARE A FAILURE OR HAVE LET YOURSELF OR YOUR FAMILY DOWN: NOT AL ALL
2. FEELING DOWN, DEPRESSED, IRRITABLE, OR HOPELESS: NOT AT ALL

## 2025-01-23 NOTE — ASSESSMENT & PLAN NOTE
34-year-old female with 1 week history of ear fullness  Denies any recent illnesses  Has periodic congestion and uses a [place] pot to relieve pressure  No history of allergies  Not currently taking allergy medications symptoms  PE: Left ear TM reflective light no bulging or erythema with possible fluid line.    Given patient's presentation symptoms are likely due to blocked eustachian tubes.    Plan  - Recommended patient use 24-hour allergy medication and Flonase to help relieve pressure  - Return precautions given.  Discussed signs and symptoms that would facilitate patient going to the ED to be evaluated.

## 2025-01-23 NOTE — PROGRESS NOTES
"  [facility]    Subjective:     CC: left ear fullness    HPI:   Lali is a 34 y.o. female with:    Problem   Ear Fullness, Left    One week history of ear fullness and having the sensation that she needs to \"pop\" her ear through yawning.  Denies any recent illness, does have periodic congestion which she uses a [place] pot to relieve.   No history of allergies, not currently taking any allergy medication.  Denies any fever, chills, runny nose, sore throat, cough or shortness of breath.         Need for Vaccination   Ingrown Toenail    Right great lateral toenail removed on 9/27/2024 due to ingrown toenail  Toenail is not growing back and into the skin  Patient would like referral to podiatrist to discuss complete toenail removal and possible ablation.       Current Outpatient Medications Ordered in Epic   Medication Sig Dispense Refill    diazePAM (VALIUM) 5 MG Tab Take 1 Tablet by mouth one time as needed for Anxiety for up to 1 dose. 1 Tablet 0    Lactobacillus (PROBIATA) Tab Take 1 Each by mouth 1 time a day as needed.      SUMAtriptan (IMITREX) 25 MG Tab tablet Take 1-4 Tablets by mouth one time as needed for Migraine. 1-2 times daily PRN for headaches 30 Tablet 2    estradiol (ESTRACE VAGINAL) 0.1 MG/GM vaginal cream Apply 1g cream inside vagina twice per week 1 Each 0    omeprazole (PRILOSEC) 20 MG delayed-release capsule TAKE 1 CAPSULE BY MOUTH EVERY DAY FOR GERD 90 Capsule 0    HYDROcodone-acetaminophen (NORCO) 5-325 MG Tab per tablet TAKE 1/2 TABLET BY MOUTH TWICE DAILY AS NEEDED      ondansetron (ZOFRAN ODT) 4 MG TABLET DISPERSIBLE Take 1 Tablet by mouth every 8 hours as needed. 10 Tablet 0    diclofenac (CATAFLAM) 50 MG tablet DICLOFENAC POTASSIUM 50 MG TABS      norethindrone (AYGESTIN) 5 MG tablet Take 1 Tablet by mouth every day for 90 days. (Patient not taking: Reported on 1/23/2025) 90 Tablet 0    amitriptyline (ELAVIL) 10 MG Tab Take 1 Tablet by mouth every evening. (Patient not taking: " "Reported on 10/29/2024) 60 Tablet 1    VITAMIN D PO Take  by mouth. (Patient not taking: Reported on 1/23/2025)      VITAMINS C E PO Take  by mouth. (Patient not taking: Reported on 1/23/2025)       No current [facility]-ordered facility-administered medications on file.     ROS:  Negative except as noted in HPI        Objective:     Exam:  BP (!) 88/65 (BP Location: Left arm, Patient Position: Sitting, BP Cuff Size: Adult)   Pulse 69   Temp 36.2 °C (97.2 °F) (Temporal)   Resp 18   Ht 1.651 m (5' 5\")   Wt 73.5 kg (162 lb)   LMP  (LMP Unknown)   SpO2 95%   BMI 26.96 kg/m²  Body mass index is 26.96 kg/m².    Physical Exam  Constitutional:       General: She is not in acute distress.  HENT:      Right Ear: Tympanic membrane and ear canal normal.      Left Ear: Tympanic membrane and ear canal normal.      Ears:      Comments: Left TM  Fluid line, no erythema or bulging, reflective to light     Nose: Nose normal.      Mouth/Throat:      Mouth: Mucous membranes are moist.   Eyes:      Extraocular Movements: Extraocular movements intact.      Pupils: Pupils are equal, round, and reactive to light.   Pulmonary:      Effort: Pulmonary effort is normal.   Musculoskeletal:         General: Normal range of motion.      Cervical back: Normal range of motion.        Feet:    Feet:      Comments: Toenail growing into the skin, no evidence of infection  Skin:     General: Skin is warm.   Neurological:      General: No focal deficit present.      Mental Status: She is alert and oriented to person, place, and time.   Psychiatric:         Mood and Affect: Mood normal.         Behavior: Behavior normal.         Labs:     Results for orders placed or performed during the hospital encounter of 09/04/24   VAGINAL PATHOGENS DNA PANEL    Collection Time: 09/04/24 11:44 AM   Result Value Ref Range    Candida species DNA Probe POSITIVE (A) Negative    Trichamonas vaginalis DNA Probe Negative Negative    Gardnerella vaginalis DNA Probe " Negative Negative       Assessment & Plan:     34 y.o. female with the following -     Problem List Items Addressed This Visit       Ingrown toenail     Referral to podiatrist for evaluation and treatment of right great toe removal          Relevant Orders    Referral to Podiatry    Ear fullness, left     34-year-old female with 1 week history of ear fullness  Denies any recent illnesses  Has periodic congestion and uses a [place] pot to relieve pressure  No history of allergies  Not currently taking allergy medications symptoms  PE: Left ear TM reflective light no bulging or erythema with possible fluid line.    Given patient's presentation symptoms are likely due to blocked eustachian tubes.    Plan  - Recommended patient use 24-hour allergy medication and Flonase to help relieve pressure  - Return precautions given.  Discussed signs and symptoms that would facilitate patient going to the ED to be evaluated.           Need for vaccination    Relevant Orders    INFLUENZA VACCINE TRI INJ (PF)        Return if symptoms worsen or fail to improve.      I advised the patient that I will contact them with all lab, imaging, or procedure results within a week of having the test performed. If they have not received a message/call from our clinic during that expected time period, they are advised to contact our clinic to ensure that the labs/imaging studies were received by this office.     Meena Godoy MD  [facility]  PGY-3

## 2025-02-08 ENCOUNTER — HOSPITAL ENCOUNTER (OUTPATIENT)
Dept: RADIOLOGY | Facility: MEDICAL CENTER | Age: 35
End: 2025-02-08
Payer: MEDICAID

## 2025-02-08 DIAGNOSIS — Z80.3 FAMILY HISTORY OF BREAST CANCER: ICD-10-CM

## 2025-02-08 PROCEDURE — 77049 MRI BREAST C-+ W/CAD BI: CPT

## 2025-02-08 PROCEDURE — 700117 HCHG RX CONTRAST REV CODE 255: Mod: JZ,UD

## 2025-02-08 PROCEDURE — A9579 GAD-BASE MR CONTRAST NOS,1ML: HCPCS | Mod: JZ,UD

## 2025-02-08 RX ADMIN — GADOTERIDOL 15 ML: 279.3 INJECTION, SOLUTION INTRAVENOUS at 17:18

## 2025-02-15 ENCOUNTER — HOSPITAL ENCOUNTER (OUTPATIENT)
Dept: LAB | Facility: MEDICAL CENTER | Age: 35
End: 2025-02-15
Payer: MEDICAID

## 2025-02-15 DIAGNOSIS — Z83.438 FAMILY HISTORY OF HYPERLIPIDEMIA: ICD-10-CM

## 2025-02-15 LAB
ALBUMIN SERPL BCP-MCNC: 4.4 G/DL (ref 3.2–4.9)
ALBUMIN/GLOB SERPL: 1.3 G/DL
ALP SERPL-CCNC: 72 U/L (ref 30–99)
ALT SERPL-CCNC: 36 U/L (ref 2–50)
ANION GAP SERPL CALC-SCNC: 13 MMOL/L (ref 7–16)
AST SERPL-CCNC: 22 U/L (ref 12–45)
BILIRUB SERPL-MCNC: 1.1 MG/DL (ref 0.1–1.5)
BUN SERPL-MCNC: 11 MG/DL (ref 8–22)
CALCIUM ALBUM COR SERPL-MCNC: 9.4 MG/DL (ref 8.5–10.5)
CALCIUM SERPL-MCNC: 9.7 MG/DL (ref 8.5–10.5)
CHLORIDE SERPL-SCNC: 102 MMOL/L (ref 96–112)
CHOLEST SERPL-MCNC: 234 MG/DL (ref 100–199)
CO2 SERPL-SCNC: 24 MMOL/L (ref 20–33)
CREAT SERPL-MCNC: 0.8 MG/DL (ref 0.5–1.4)
FASTING STATUS PATIENT QL REPORTED: NORMAL
GFR SERPLBLD CREATININE-BSD FMLA CKD-EPI: 98 ML/MIN/1.73 M 2
GLOBULIN SER CALC-MCNC: 3.5 G/DL (ref 1.9–3.5)
GLUCOSE SERPL-MCNC: 84 MG/DL (ref 65–99)
HDLC SERPL-MCNC: 71 MG/DL
LDLC SERPL CALC-MCNC: 141 MG/DL
POTASSIUM SERPL-SCNC: 4.4 MMOL/L (ref 3.6–5.5)
PROT SERPL-MCNC: 7.9 G/DL (ref 6–8.2)
SODIUM SERPL-SCNC: 139 MMOL/L (ref 135–145)
TRIGL SERPL-MCNC: 112 MG/DL (ref 0–149)

## 2025-02-15 PROCEDURE — 80053 COMPREHEN METABOLIC PANEL: CPT

## 2025-02-15 PROCEDURE — 80061 LIPID PANEL: CPT

## 2025-02-15 PROCEDURE — 36415 COLL VENOUS BLD VENIPUNCTURE: CPT

## 2025-02-25 ENCOUNTER — RESULTS FOLLOW-UP (OUTPATIENT)
Dept: MEDICAL GROUP | Facility: CLINIC | Age: 35
End: 2025-02-25

## 2025-05-05 DIAGNOSIS — Z13.79 GENETIC SCREENING: ICD-10-CM

## 2025-05-05 NOTE — PROGRESS NOTES
Patient notes family history of atherosclerotic disease in her sister who had an autopsy done when she was early 30s and was noted to have 80% occlusion of coronary arteries.     Patient is interested in [facility] [place] project at this time, order placed.

## 2025-07-17 ENCOUNTER — HOSPITAL ENCOUNTER (OUTPATIENT)
Dept: LAB | Facility: MEDICAL CENTER | Age: 35
End: 2025-07-17
Attending: STUDENT IN AN ORGANIZED HEALTH CARE EDUCATION/TRAINING PROGRAM
Payer: MEDICAID

## 2025-07-17 LAB
ALBUMIN SERPL BCP-MCNC: 4.1 G/DL (ref 3.2–4.9)
ALBUMIN/GLOB SERPL: 1.6 G/DL
ALP SERPL-CCNC: 70 U/L (ref 30–99)
ALT SERPL-CCNC: 34 U/L (ref 2–50)
ANION GAP SERPL CALC-SCNC: 12 MMOL/L (ref 7–16)
AST SERPL-CCNC: 29 U/L (ref 12–45)
BASOPHILS # BLD AUTO: 0.5 % (ref 0–1.8)
BASOPHILS # BLD: 0.04 K/UL (ref 0–0.12)
BILIRUB SERPL-MCNC: 0.6 MG/DL (ref 0.1–1.5)
BUN SERPL-MCNC: 10 MG/DL (ref 8–22)
CALCIUM ALBUM COR SERPL-MCNC: 8.9 MG/DL (ref 8.5–10.5)
CALCIUM SERPL-MCNC: 9 MG/DL (ref 8.5–10.5)
CHLORIDE SERPL-SCNC: 105 MMOL/L (ref 96–112)
CO2 SERPL-SCNC: 21 MMOL/L (ref 20–33)
CREAT SERPL-MCNC: 0.74 MG/DL (ref 0.5–1.4)
CRP SERPL HS-MCNC: 0.49 MG/DL (ref 0–0.75)
EOSINOPHIL # BLD AUTO: 0.09 K/UL (ref 0–0.51)
EOSINOPHIL NFR BLD: 1.1 % (ref 0–6.9)
ERYTHROCYTE [DISTWIDTH] IN BLOOD BY AUTOMATED COUNT: 39.1 FL (ref 35.9–50)
ERYTHROCYTE [SEDIMENTATION RATE] IN BLOOD BY WESTERGREN METHOD: 11 MM/HOUR (ref 0–25)
EST. AVERAGE GLUCOSE BLD GHB EST-MCNC: 117 MG/DL
GFR SERPLBLD CREATININE-BSD FMLA CKD-EPI: 108 ML/MIN/1.73 M 2
GLOBULIN SER CALC-MCNC: 2.6 G/DL (ref 1.9–3.5)
GLUCOSE SERPL-MCNC: 105 MG/DL (ref 65–99)
HBA1C MFR BLD: 5.7 % (ref 4–5.6)
HCT VFR BLD AUTO: 42.9 % (ref 37–47)
HGB BLD-MCNC: 14.3 G/DL (ref 12–16)
IMM GRANULOCYTES # BLD AUTO: 0.02 K/UL (ref 0–0.11)
IMM GRANULOCYTES NFR BLD AUTO: 0.3 % (ref 0–0.9)
LYMPHOCYTES # BLD AUTO: 1.91 K/UL (ref 1–4.8)
LYMPHOCYTES NFR BLD: 24.4 % (ref 22–41)
MCH RBC QN AUTO: 29.3 PG (ref 27–33)
MCHC RBC AUTO-ENTMCNC: 33.3 G/DL (ref 32.2–35.5)
MCV RBC AUTO: 87.9 FL (ref 81.4–97.8)
MONOCYTES # BLD AUTO: 0.52 K/UL (ref 0–0.85)
MONOCYTES NFR BLD AUTO: 6.6 % (ref 0–13.4)
NEUTROPHILS # BLD AUTO: 5.25 K/UL (ref 1.82–7.42)
NEUTROPHILS NFR BLD: 67.1 % (ref 44–72)
NRBC # BLD AUTO: 0 K/UL
NRBC BLD-RTO: 0 /100 WBC (ref 0–0.2)
PLATELET # BLD AUTO: 245 K/UL (ref 164–446)
PMV BLD AUTO: 10.5 FL (ref 9–12.9)
POTASSIUM SERPL-SCNC: 3.9 MMOL/L (ref 3.6–5.5)
PROT SERPL-MCNC: 6.7 G/DL (ref 6–8.2)
RBC # BLD AUTO: 4.88 M/UL (ref 4.2–5.4)
SODIUM SERPL-SCNC: 138 MMOL/L (ref 135–145)
TSH SERPL DL<=0.005 MIU/L-ACNC: 1 UIU/ML (ref 0.38–5.33)
WBC # BLD AUTO: 7.8 K/UL (ref 4.8–10.8)

## 2025-07-17 PROCEDURE — 85025 COMPLETE CBC W/AUTO DIFF WBC: CPT

## 2025-07-17 PROCEDURE — 83036 HEMOGLOBIN GLYCOSYLATED A1C: CPT

## 2025-07-17 PROCEDURE — 84443 ASSAY THYROID STIM HORMONE: CPT

## 2025-07-17 PROCEDURE — 86140 C-REACTIVE PROTEIN: CPT

## 2025-07-17 PROCEDURE — 80053 COMPREHEN METABOLIC PANEL: CPT

## 2025-07-17 PROCEDURE — 36415 COLL VENOUS BLD VENIPUNCTURE: CPT

## 2025-07-17 PROCEDURE — 85652 RBC SED RATE AUTOMATED: CPT

## (undated) DEVICE — CANISTER SUCTION 3000ML MECHANICAL FILTER AUTO SHUTOFF MEDI-VAC NONSTERILE LF DISP  (40EA/CA)

## (undated) DEVICE — SUTURE 0 VICRYL PLUS CT-1 - 36 INCH (36/BX)

## (undated) DEVICE — EXTRACTOR PRO XL 9-12 MM ABOVE

## (undated) DEVICE — SENSOR SPO2 NEO LNCS ADHESIVE (20/BX) SEE USER NOTES

## (undated) DEVICE — WATER IRRIGATION STERILE 1000ML (12EA/CA)

## (undated) DEVICE — SUTURE 4-0 MONOCRYL PLUS PS-2 - 27 INCH (36/BX)

## (undated) DEVICE — MEDICINE CUP STERILE 2 OZ - (100/CA)

## (undated) DEVICE — ELECTROSURGICAL KOH EFFICIENT 3.0CM

## (undated) DEVICE — PAD SANITARY 11IN MAXI IND WRAPPED  (12EA/PK 24PK/CA)

## (undated) DEVICE — LACTATED RINGERS INJ 1000 ML - (14EA/CA 60CA/PF)

## (undated) DEVICE — TROCAR Z THREAD12MM OPTICAL - NON BLADED (6/BX)

## (undated) DEVICE — DRESSING NON-ADHERING 8 X 3 - (50/BX)

## (undated) DEVICE — GOWN SURGEONS X-LARGE - DISP. (30/CA)

## (undated) DEVICE — TRAY FOLEY CATHETER STATLOCK 16FR SURESTEP  (10EA/CA)

## (undated) DEVICE — PACK LAPAROSCOPY - (1/CA)

## (undated) DEVICE — PAD LAP STERILE 18 X 18 - (5/PK 40PK/CA)

## (undated) DEVICE — KIT  I.V. START (100EA/CA)

## (undated) DEVICE — MANIFOLD NEPTUNE 1 PORT (20/PK)

## (undated) DEVICE — UTERINE MANIP RUMI 6.7X8 - (5/BX)

## (undated) DEVICE — GLOVE BIOGEL SZ 6.5 SURGICAL PF LTX (50PR/BX 4BX/CA)

## (undated) DEVICE — TROCAR LAPSCP 100MM 12MM NTHRD - (6/BX)

## (undated) DEVICE — ELECTRODE DUAL RETURN W/ CORD - (50/PK)

## (undated) DEVICE — SUCTION INSTRUMENT YANKAUER BULBOUS TIP W/O VENT (50EA/CA)

## (undated) DEVICE — SLEEVE, VASO, THIGH, MED

## (undated) DEVICE — GOWN SURGEONS LARGE - (32/CA)

## (undated) DEVICE — SET IRRIGATION CYSTOSCOPY TUBE L80 IN (20EA/CA)

## (undated) DEVICE — CLIP MED LG INTNL HRZN TI ESCP - (20/BX)

## (undated) DEVICE — PAD BABY LAP 4X18 W/O - RINGS PREWASHED 5/PK 40PK/CS

## (undated) DEVICE — SODIUM CHL IRRIGATION 0.9% 1000ML (12EA/CA)

## (undated) DEVICE — TUBING CLEARLINK DUO-VENT - C-FLO (48EA/CA)

## (undated) DEVICE — SUTURE 0 VICRYL PLUS CT-2 - 27 INCH (36/BX)

## (undated) DEVICE — TUBE CONNECTING SUCTION - CLEAR PLASTIC STERILE 72 IN (50EA/CA)

## (undated) DEVICE — CONTAINER, SPECIMEN, STERILE

## (undated) DEVICE — CHLORAPREP 26 ML APPLICATOR - ORANGE TINT(25/CA)

## (undated) DEVICE — SCISSORS 5MM CVD (6EA/BX)

## (undated) DEVICE — PENCIL ELECTSURG 10FT BTN SWH - (50/CA)

## (undated) DEVICE — KIT ROOM DECONTAMINATION

## (undated) DEVICE — SYRINGE 30 ML LL (56/BX)

## (undated) DEVICE — SUTURE GENERAL

## (undated) DEVICE — LIGASURE 5MM BLUNT TIP LONG - 44CM (6EA/PK)

## (undated) DEVICE — GLOVE BIOGEL SZ 6 PF LATEX - (50EA/BX 4BX/CA)

## (undated) DEVICE — GLOVE BIOGEL INDICATOR SZ 6.5 SURGICAL PF LTX - (50PR/BX 4BX/CA)

## (undated) DEVICE — GOWN WARMING STANDARD FLEX - (30/CA)

## (undated) DEVICE — TUBING SETDISPOS HIGH FLOW II - (10/BX)

## (undated) DEVICE — TROCAR 5X100 SEPARTATOR ADV - FIXATION (6/BX)

## (undated) DEVICE — DERMABOND ADVANCED - (12EA/BX)

## (undated) DEVICE — SUTURE 2-0 PDS II CT-2 - (36/BX)

## (undated) DEVICE — SET EXTENSION WITH 2 PORTS (48EA/CA) ***PART #2C8610 IS A SUBSTITUTE*****

## (undated) DEVICE — NEEDLE INSFL 120MM 14GA VRRS - (20/BX)

## (undated) DEVICE — SET SUCTION/IRRIGATION WITH DISPOSABLE TIP (6/CA )PART #0250-070-520 IS A SUB

## (undated) DEVICE — SYRINGE SAFETY 3 ML 18 GA X 1 1/2 BLUNT LL (100/BX 8BX/CA)

## (undated) DEVICE — BANDAID SHEER STRIP 3/4 IN (100EA/BX 12BX/CA)

## (undated) DEVICE — GLOVE BIOGEL SZ 8.5 SURGICAL PF LTX - (50PR/BX 4BX/CA)

## (undated) DEVICE — PACK LAP CHOLE OR - (2EA/CA)

## (undated) DEVICE — TRAY SRGPRP PVP IOD WT PRP - (20/CA)

## (undated) DEVICE — KIT ANESTHESIA W/CIRCUIT & 3/LT BAG W/FILTER (20EA/CA)

## (undated) DEVICE — CANISTER SUCTION RIGID RED 1500CC (40EA/CA)

## (undated) DEVICE — SUTURE 4-0 VICRYL PLUS FS-2 - 27 INCH (36/BX)

## (undated) DEVICE — ELECTRODE 850 FOAM ADHESIVE - HYDROGEL RADIOTRNSPRNT (50/PK)

## (undated) DEVICE — NEPTUNE 4 PORT MANIFOLD - (20/PK)

## (undated) DEVICE — DRAPE LARGE 3 QUARTER - (20/CA)

## (undated) DEVICE — TUBE E-T HI-LO CUFF 7.0MM (10EA/PK)

## (undated) DEVICE — GLOVE SZ 7 BIOGEL PI MICRO - PF LF (50PR/BX 4BX/CA)

## (undated) DEVICE — TUBE CONNECT SUCTION CLEAR 120 X 1/4" (50EA/CA)"

## (undated) DEVICE — PROTECTOR ULNA NERVE - (36PR/CA)

## (undated) DEVICE — SPONGE GAUZE STER 4X4 8-PL - (2/PK 50PK/BX 12BX/CS)

## (undated) DEVICE — GLOVE BIOGEL SZ 7 SURGICAL PF LTX - (50PR/BX 4BX/CA)

## (undated) DEVICE — CATHETER IV 20 GA X 1-1/4 ---SURG.& SDS ONLY--- (50EA/BX)

## (undated) DEVICE — HEAD HOLDER JUNIOR/ADULT

## (undated) DEVICE — GLOVE BIOGEL INDICATOR SZ 8.5 SURGICAL PF LTX - (50/BX 4BX/CA)

## (undated) DEVICE — KIT SURGIFLO W/OUT THROMBIN - (6EA/CA)

## (undated) DEVICE — GLOVE BIOGEL PI ORTHO SZ 7 PF LF (40PR/BX)

## (undated) DEVICE — BITE BLOCK ADULT 60FR (100EA/CA)

## (undated) DEVICE — ARMREST CRADLE FOAM - (2PR/PK 12PR/CA)

## (undated) DEVICE — GLOVE BIOGEL SZ 8 SURGICAL PF LTX - (50PR/BX 4BX/CA)

## (undated) DEVICE — SET LEADWIRE 5 LEAD BEDSIDE DISPOSABLE ECG (1SET OF 5/EA)

## (undated) DEVICE — TROCAR 5X100 BLADED Z-THREAD - KII (6/BX)

## (undated) DEVICE — SPONGE GAUZE NON-STERILE 4X4 - (2000/CA 10PK/CA)

## (undated) DEVICE — MASK ANESTHESIA ADULT  - (100/CA)

## (undated) DEVICE — Device

## (undated) DEVICE — BAG RETRIEVAL 10ML (10EA/BX)

## (undated) DEVICE — CANNULA W/SEAL 5X100 Z-THRE - ADED KII (12/BX)

## (undated) DEVICE — BANDAID X-LARGE 2 X 4 IN LF (50EA/BX)

## (undated) DEVICE — TUBING INSUFFLATION - (10/BX)

## (undated) DEVICE — TROCAR5X55 KII SHIELDED SYS - (6/BX)

## (undated) DEVICE — KIT CUSTOM PROCEDURE SINGLE FOR ENDO  (15/CA)

## (undated) DEVICE — DRAPESURG STERI-DRAPE LONG - (10/BX 4BX/CA)

## (undated) DEVICE — SPONGE GAUZESTER 4 X 4 4PLY - (128PK/CA)

## (undated) DEVICE — TUBING LAPAROSCOPIC PLUME DEVICE (10EA/CA)

## (undated) DEVICE — TOWELS CLOTH SURGICAL - (4/PK 20PK/CA)